# Patient Record
Sex: FEMALE | Race: WHITE | Employment: OTHER | RURAL
[De-identification: names, ages, dates, MRNs, and addresses within clinical notes are randomized per-mention and may not be internally consistent; named-entity substitution may affect disease eponyms.]

---

## 2017-01-01 ENCOUNTER — OFFICE VISIT (OUTPATIENT)
Dept: INTERNAL MEDICINE CLINIC | Age: 72
End: 2017-01-01

## 2017-01-01 ENCOUNTER — TELEPHONE (OUTPATIENT)
Dept: INTERNAL MEDICINE CLINIC | Age: 72
End: 2017-01-01

## 2017-01-01 ENCOUNTER — HOSPITAL ENCOUNTER (OUTPATIENT)
Dept: LAB | Age: 72
Discharge: HOME | End: 2017-09-14
Payer: MEDICARE

## 2017-01-01 VITALS
HEART RATE: 91 BPM | HEIGHT: 66 IN | DIASTOLIC BLOOD PRESSURE: 60 MMHG | WEIGHT: 168 LBS | BODY MASS INDEX: 27 KG/M2 | TEMPERATURE: 96.8 F | SYSTOLIC BLOOD PRESSURE: 112 MMHG | OXYGEN SATURATION: 99 % | RESPIRATION RATE: 20 BRPM

## 2017-01-01 VITALS
OXYGEN SATURATION: 97 % | BODY MASS INDEX: 27.26 KG/M2 | HEIGHT: 66 IN | WEIGHT: 169.6 LBS | SYSTOLIC BLOOD PRESSURE: 120 MMHG | RESPIRATION RATE: 17 BRPM | HEART RATE: 82 BPM | DIASTOLIC BLOOD PRESSURE: 60 MMHG | TEMPERATURE: 97.2 F

## 2017-01-01 VITALS
TEMPERATURE: 96.6 F | RESPIRATION RATE: 16 BRPM | BODY MASS INDEX: 27.32 KG/M2 | HEIGHT: 66 IN | DIASTOLIC BLOOD PRESSURE: 49 MMHG | WEIGHT: 170 LBS | HEART RATE: 64 BPM | OXYGEN SATURATION: 98 % | SYSTOLIC BLOOD PRESSURE: 129 MMHG

## 2017-01-01 VITALS
RESPIRATION RATE: 18 BRPM | OXYGEN SATURATION: 97 % | DIASTOLIC BLOOD PRESSURE: 56 MMHG | HEART RATE: 57 BPM | WEIGHT: 163 LBS | BODY MASS INDEX: 26.2 KG/M2 | SYSTOLIC BLOOD PRESSURE: 104 MMHG | HEIGHT: 66 IN | TEMPERATURE: 96.9 F

## 2017-01-01 DIAGNOSIS — M10.072 IDIOPATHIC GOUT OF LEFT FOOT, UNSPECIFIED CHRONICITY: ICD-10-CM

## 2017-01-01 DIAGNOSIS — R10.2 VAGINAL PAIN: ICD-10-CM

## 2017-01-01 DIAGNOSIS — Z72.0 TOBACCO ABUSE: ICD-10-CM

## 2017-01-01 DIAGNOSIS — F41.9 ANXIETY: ICD-10-CM

## 2017-01-01 DIAGNOSIS — I10 ESSENTIAL HYPERTENSION: ICD-10-CM

## 2017-01-01 DIAGNOSIS — J45.21 EXACERBATION OF INTERMITTENT ASTHMA, UNSPECIFIED ASTHMA SEVERITY: Primary | ICD-10-CM

## 2017-01-01 DIAGNOSIS — K21.9 GASTROESOPHAGEAL REFLUX DISEASE WITHOUT ESOPHAGITIS: ICD-10-CM

## 2017-01-01 DIAGNOSIS — R10.2 VULVAR PAIN: ICD-10-CM

## 2017-01-01 DIAGNOSIS — R82.90 ABNORMAL URINE: ICD-10-CM

## 2017-01-01 DIAGNOSIS — E78.00 ELEVATED CHOLESTEROL: ICD-10-CM

## 2017-01-01 DIAGNOSIS — R10.84 GENERALIZED ABDOMINAL PAIN: Primary | ICD-10-CM

## 2017-01-01 DIAGNOSIS — Z88.7 ALLERGY TO INFLUENZA VACCINE: ICD-10-CM

## 2017-01-01 DIAGNOSIS — J02.9 PHARYNGITIS, UNSPECIFIED ETIOLOGY: Primary | ICD-10-CM

## 2017-01-01 DIAGNOSIS — G43.109 MIGRAINE WITH AURA AND WITHOUT STATUS MIGRAINOSUS, NOT INTRACTABLE: Primary | ICD-10-CM

## 2017-01-01 DIAGNOSIS — R10.84 GENERALIZED ABDOMINAL PAIN: ICD-10-CM

## 2017-01-01 LAB
BACTERIA UR CULT: NORMAL
BILIRUB UR QL STRIP: NEGATIVE
GLUCOSE UR-MCNC: NEGATIVE MG/DL
KETONES P FAST UR STRIP-MCNC: NEGATIVE MG/DL
PH UR STRIP: 5.5 [PH] (ref 4.6–8)
PROT UR QL STRIP: NEGATIVE MG/DL
SP GR UR STRIP: 1.01 (ref 1–1.03)
UA UROBILINOGEN AMB POC: NORMAL (ref 0.2–1)
URINALYSIS CLARITY POC: CLEAR
URINALYSIS COLOR POC: YELLOW
URINE BLOOD POC: NEGATIVE
URINE LEUKOCYTES POC: NORMAL
URINE NITRITES POC: NEGATIVE

## 2017-01-01 PROCEDURE — 88142 CYTOPATH C/V THIN LAYER: CPT | Performed by: NURSE PRACTITIONER

## 2017-01-01 RX ORDER — OMEPRAZOLE 20 MG/1
20 CAPSULE, DELAYED RELEASE ORAL DAILY
Qty: 30 CAP | Refills: 11 | Status: SHIPPED | OUTPATIENT
Start: 2017-01-01

## 2017-01-01 RX ORDER — POTASSIUM CHLORIDE 750 MG/1
10 TABLET, EXTENDED RELEASE ORAL DAILY
Qty: 30 TAB | Refills: 5 | Status: ON HOLD | OUTPATIENT
Start: 2017-01-01 | End: 2018-01-01

## 2017-01-01 RX ORDER — PREDNISONE 10 MG/1
TABLET ORAL
Qty: 20 TAB | Refills: 1 | Status: SHIPPED | OUTPATIENT
Start: 2017-01-01 | End: 2017-01-01 | Stop reason: SDUPTHER

## 2017-01-01 RX ORDER — FLUTICASONE PROPIONATE 50 MCG
SPRAY, SUSPENSION (ML) NASAL
Qty: 1 BOTTLE | Refills: 5 | Status: ON HOLD | OUTPATIENT
Start: 2017-01-01 | End: 2018-01-01 | Stop reason: DRUGHIGH

## 2017-01-01 RX ORDER — CLONAZEPAM 1 MG/1
TABLET ORAL
Qty: 120 TAB | Refills: 1 | Status: SHIPPED | OUTPATIENT
Start: 2017-01-01 | End: 2017-01-01 | Stop reason: SDUPTHER

## 2017-01-01 RX ORDER — CEPHALEXIN 500 MG/1
500 CAPSULE ORAL 3 TIMES DAILY
Qty: 21 CAP | Refills: 0 | Status: SHIPPED | OUTPATIENT
Start: 2017-01-01 | End: 2017-01-01

## 2017-01-01 RX ORDER — NYSTATIN 100000 U/G
CREAM TOPICAL 2 TIMES DAILY
Qty: 30 G | Refills: 2 | Status: ON HOLD | OUTPATIENT
Start: 2017-01-01 | End: 2018-01-01 | Stop reason: DRUGHIGH

## 2017-01-01 RX ORDER — CLONAZEPAM 1 MG/1
TABLET ORAL
Qty: 120 TAB | Refills: 1 | Status: SHIPPED | OUTPATIENT
Start: 2017-01-01 | End: 2018-01-01 | Stop reason: SDUPTHER

## 2017-01-01 RX ORDER — PREDNISONE 10 MG/1
TABLET ORAL
COMMUNITY
End: 2018-01-01 | Stop reason: ALTCHOICE

## 2017-01-01 RX ORDER — POTASSIUM CHLORIDE 750 MG/1
10 TABLET, EXTENDED RELEASE ORAL DAILY
Qty: 30 TAB | Refills: 5 | Status: SHIPPED | OUTPATIENT
Start: 2017-01-01 | End: 2017-01-01 | Stop reason: ALTCHOICE

## 2017-01-01 RX ORDER — CEPHALEXIN 500 MG/1
500 CAPSULE ORAL 3 TIMES DAILY
Qty: 21 CAP | Refills: 0 | OUTPATIENT
Start: 2017-01-01 | End: 2017-01-01

## 2017-01-01 RX ORDER — METOPROLOL TARTRATE 25 MG/1
25 TABLET, FILM COATED ORAL DAILY
Qty: 30 TAB | Refills: 5 | Status: ON HOLD | OUTPATIENT
Start: 2017-01-01 | End: 2018-01-01

## 2017-01-01 RX ORDER — NITROGLYCERIN 0.4 MG/1
0.4 TABLET SUBLINGUAL
Qty: 25 TAB | Refills: 5 | Status: SHIPPED | OUTPATIENT
Start: 2017-01-01 | End: 2018-01-01 | Stop reason: SDUPTHER

## 2017-01-01 RX ORDER — ALBUTEROL SULFATE 90 UG/1
1 AEROSOL, METERED RESPIRATORY (INHALATION)
Qty: 1 INHALER | Refills: 5 | Status: SHIPPED | OUTPATIENT
Start: 2017-01-01 | End: 2018-01-01 | Stop reason: SDUPTHER

## 2017-01-01 RX ORDER — NAPROXEN 500 MG/1
500 TABLET ORAL 2 TIMES DAILY WITH MEALS
Qty: 60 TAB | Refills: 2 | Status: SHIPPED | OUTPATIENT
Start: 2017-01-01 | End: 2018-01-01

## 2017-01-01 RX ORDER — PREDNISONE 10 MG/1
TABLET ORAL
Qty: 20 TAB | Refills: 1 | Status: SHIPPED | OUTPATIENT
Start: 2017-01-01 | End: 2017-01-01 | Stop reason: ALTCHOICE

## 2017-01-23 RX ORDER — CLONAZEPAM 1 MG/1
TABLET ORAL
Qty: 120 TAB | Refills: 0 | OUTPATIENT
Start: 2017-01-23

## 2017-01-23 RX ORDER — CLONAZEPAM 1 MG/1
TABLET ORAL
Qty: 120 TAB | Refills: 0 | Status: SHIPPED | OUTPATIENT
Start: 2017-01-23 | End: 2017-01-31 | Stop reason: SDUPTHER

## 2017-01-25 ENCOUNTER — TELEPHONE (OUTPATIENT)
Dept: INTERNAL MEDICINE CLINIC | Age: 72
End: 2017-01-25

## 2017-01-25 NOTE — TELEPHONE ENCOUNTER
Returned call and they have misplaced the overnight pulse ox test signed in December, will send another one today

## 2017-01-25 NOTE — TELEPHONE ENCOUNTER
[place], form [facility], called in reference to wanting to speak with a nurse about an oximetry test. Please call [phone] ext [phone].

## 2017-01-31 ENCOUNTER — OFFICE VISIT (OUTPATIENT)
Dept: INTERNAL MEDICINE CLINIC | Age: 72
End: 2017-01-31

## 2017-01-31 VITALS
BODY MASS INDEX: 28.32 KG/M2 | OXYGEN SATURATION: 96 % | HEIGHT: 65 IN | SYSTOLIC BLOOD PRESSURE: 122 MMHG | WEIGHT: 170 LBS | DIASTOLIC BLOOD PRESSURE: 47 MMHG | HEART RATE: 82 BPM | RESPIRATION RATE: 16 BRPM | TEMPERATURE: 96.9 F

## 2017-01-31 DIAGNOSIS — J01.00 SUBACUTE MAXILLARY SINUSITIS: ICD-10-CM

## 2017-01-31 DIAGNOSIS — Z13.6 SCREENING FOR ISCHEMIC HEART DISEASE: ICD-10-CM

## 2017-01-31 DIAGNOSIS — H43.393 FLOATERS, BILATERAL: ICD-10-CM

## 2017-01-31 DIAGNOSIS — Z00.00 ROUTINE GENERAL MEDICAL EXAMINATION AT A HEALTH CARE FACILITY: Primary | ICD-10-CM

## 2017-01-31 DIAGNOSIS — Z23 ENCOUNTER FOR IMMUNIZATION: ICD-10-CM

## 2017-01-31 DIAGNOSIS — J45.41 MODERATE PERSISTENT ASTHMA WITH ACUTE EXACERBATION: ICD-10-CM

## 2017-01-31 DIAGNOSIS — I50.22 CHRONIC SYSTOLIC HEART FAILURE (HCC): ICD-10-CM

## 2017-01-31 DIAGNOSIS — I10 ESSENTIAL HYPERTENSION: ICD-10-CM

## 2017-01-31 DIAGNOSIS — Z13.39 SCREENING FOR ALCOHOLISM: ICD-10-CM

## 2017-01-31 DIAGNOSIS — I25.10 CORONARY ARTERY DISEASE INVOLVING NATIVE CORONARY ARTERY OF NATIVE HEART WITHOUT ANGINA PECTORIS: ICD-10-CM

## 2017-01-31 DIAGNOSIS — Z13.31 SCREENING FOR DEPRESSION: ICD-10-CM

## 2017-01-31 DIAGNOSIS — Z11.59 NEED FOR HEPATITIS C SCREENING TEST: ICD-10-CM

## 2017-01-31 RX ORDER — CLONAZEPAM 1 MG/1
TABLET ORAL
Qty: 120 TAB | Refills: 1 | Status: SHIPPED | OUTPATIENT
Start: 2017-01-31 | End: 2017-04-21 | Stop reason: SDUPTHER

## 2017-01-31 RX ORDER — CEPHALEXIN 500 MG/1
1 TABLET ORAL 3 TIMES DAILY
Qty: 21 TAB | Refills: 0 | Status: SHIPPED | OUTPATIENT
Start: 2017-01-31 | End: 2017-03-24 | Stop reason: SDUPTHER

## 2017-01-31 RX ORDER — PREDNISONE 10 MG/1
TABLET ORAL
Qty: 20 TAB | Refills: 1 | Status: SHIPPED | OUTPATIENT
Start: 2017-01-31 | End: 2017-04-21 | Stop reason: ALTCHOICE

## 2017-01-31 NOTE — LETTER
2/1/2017 12:39 PM 
 
Ms. Oleksandr Pan [address] [facility] [id number] Dear Oleksandr Pan: 
 
Please find your most recent results below. Resulted Orders LIPID PANEL Result Value Ref Range Cholesterol, total 179 100 - 199 mg/dL Triglyceride 263 (H) 0 - 149 mg/dL HDL Cholesterol 41 >39 mg/dL VLDL, calculated 53 (H) 5 - 40 mg/dL LDL, calculated 85 0 - 99 mg/dL Narrative Performed at:  [address]  [id number] : Sarah Jiang MD, Phone:  [phone] HEPATITIS C AB Result Value Ref Range Hep C Virus Ab <0.1 0.0 - 0.9 s/co ratio Comment:  
                                     Negative:     < 0.8 Indeterminate: 0.8 - 0.9 Positive:     > 0.9 The CDC recommends that a positive HCV antibody result 
 be followed up with a HCV Nucleic Acid Amplification 
 test (488891). Narrative Performed at:  [address]  [id number] : Sarah Jiang MD, Phone:  [phone] METABOLIC PANEL, BASIC Result Value Ref Range Glucose 101 (H) 65 - 99 mg/dL BUN 32 (H) 8 - 27 mg/dL Creatinine 1.28 (H) 0.57 - 1.00 mg/dL GFR est non-AA 42 (L) >59 mL/min/1.73 GFR est AA 48 (L) >59 mL/min/1.73  
 BUN/Creatinine ratio 25 11 - 26 Sodium 138 134 - 144 mmol/L Potassium 4.6 3.5 - 5.2 mmol/L Chloride 100 96 - 106 mmol/L  
 CO2 20 18 - 29 mmol/L Calcium 9.4 8.7 - 10.3 mg/dL Narrative Performed at:  [address]  [id number] : Sarah Jiang MD, Phone:  [phone] CVD REPORT Result Value Ref Range INTERPRETATION NTAP   
 PDF IMAGE Not applicable Narrative Performed at:  [address] [place] [address]  [id number] : Andi Crabtree PhD, Phone:  [phone] CKD REPORT Result Value Ref Range Interpretation Note Comment:  
   Supplement report is available. Narrative Performed at:  [address] [place] [address]  [id number] : Quirino Morgan PhD, Phone:  [phone] RECOMMENDATIONS: 
Results of labs are normal/ stable. Follow up as scheduled. Please call me if you have any questions: [phone] Sincerely, Pamela Price MD

## 2017-01-31 NOTE — PROGRESS NOTES
This is a Subsequent Medicare Annual Wellness Visit providing Personalized Prevention Plan Services (PPPS) (Performed 12 months after initial AWV and PPPS )    I have reviewed the patient's medical history in detail and updated the computerized patient record. History     Feels well in general min SOB but has some wheezing and coughing lately, bring up some yellow sputum x few days. Wt stable lostr a efw Lbs compared to last year No PND  Or swelling. Lately seeing spots occa. Past Medical History   Diagnosis Date    CHF (congestive heart failure) (HCC)     Other ill-defined conditions(799.89)      AMI    Psychiatric disorder      depression      Past Surgical History   Procedure Laterality Date    Pr cardiac surg procedure unlist       stents    Hx appendectomy      Hx cholecystectomy      Hx gyn       hysterectomy    Hx gi       adhesions     Current Outpatient Prescriptions   Medication Sig Dispense Refill    clonazePAM (KLONOPIN) 1 mg tablet TAKE TWO TABLETS BY MOUTH TWICE DAILY 120 Tab 0    omeprazole (PRILOSEC) 20 mg capsule Take 1 Cap by mouth daily. Take only as needed 30 Cap 5    fluticasone (FLONASE) 50 mcg/actuation nasal spray 1 spray each nostril daily 1 Bottle 5    arformoterol (BROVANA) 15 mcg/2 mL nebu neb solution 2 mL by Nebulization route two (2) times a day. 180 Vial 3    furosemide (LASIX) 20 mg tablet Take 2 Tabs by mouth daily. 180 Tab 3    predniSONE (DELTASONE) 10 mg tablet 1 daily for allergies or headache 20 Tab 0    lisinopril (PRINIVIL, ZESTRIL) 5 mg tablet Take 1 Tab by mouth daily. 30 Tab 5    albuterol (PROVENTIL HFA, VENTOLIN HFA, PROAIR HFA) 90 mcg/actuation inhaler Take 1 Puff by inhalation every six (6) hours as needed for Wheezing. 1 Inhaler 5    atorvastatin (LIPITOR) 40 mg tablet Take 1 Tab by mouth every evening. 90 Tab 3    citalopram (CELEXA) 10 mg tablet Take 1 Tab by mouth daily.  Use in place of clonazepam 60 Tab 5    carvedilol (COREG) 3.125 mg tablet TAKE 1 TABLET TWICE DAILY WITH MEALS 180 Tab 3    omega-3 fatty acids-vitamin e (FISH OIL) 1,000 mg cap Take 1 Cap by mouth.  calcium-cholecalciferol, D3, (CALCIUM WITH VITAMIN D) tablet Take 1 tablet by mouth daily. 90 tablet 3    nitroglycerin (NITROSTAT) 0.4 mg SL tablet 1 Tab by SubLINGual route every five (5) minutes as needed. Indications: ANGINA 25 Tab 5    aspirin 81 mg chewable tablet Take 1 Tab by mouth daily.  cyanocobalamin (VITAMIN B-12) 100 mcg tablet Take 100 mcg by mouth daily.  ASPIRIN/ACETAMINOPHEN/CAFFEINE (EXCEDRIN MIGRAINE PO) Take 1 Tab by mouth as needed. Allergies   Allergen Reactions    Ciprofloxacin Shortness of Breath    Codeine Nausea Only    Influenza Virus Vaccine, Specific Unknown (comments)     Allergy documented in Admission Data base 1 screening    Pcn [Penicillins] Itching     History reviewed. No pertinent family history. Social History   Substance Use Topics    Smoking status: Former Smoker     Packs/day: 0.10     Quit date: 11/1/2014    Smokeless tobacco: Not on file    Alcohol use No     Patient Active Problem List   Diagnosis Code    Systolic heart failure (HCC) I50.20    Hypertension I10    Anxiety F41.9    GERD (gastroesophageal reflux disease) K21.9    Elevated cholesterol E78.00    Allergic rhinitis J30.9    Moderate persistent asthma J45.40    Coronary artery disease without angina pectoris I25.10       Depression Risk Factor Screening:     PHQ 2 / 9, over the last two weeks 1/31/2017   Little interest or pleasure in doing things Several days   Feeling down, depressed or hopeless Several days   Total Score PHQ 2 2     Alcohol Risk Factor Screening: On any occasion during the past 3 months, have you had more than 3 drinks containing alcohol? No    Do you average more than 7 drinks per week? No      Functional Ability and Level of Safety:     Hearing Loss   mild    Activities of Daily Living   Self-care.    Requires assistance with: no ADLs    Fall Risk     Fall Risk Assessment, last 12 mths 1/31/2017   Able to walk? Yes   Fall in past 12 months? No     Abuse Screen   Patient is not abused    Review of Systems   Pertinent items are noted in HPI. Physical Examination     Evaluation of Cognitive Function:  Mood/affect:  neutral  Appearance: age appropriate  Family member/caregiver input: NA    Visit Vitals    /47 (BP 1 Location: Left arm, BP Patient Position: Sitting)    Pulse 82    Temp 96.9 °F (36.1 °C) (Oral)    Resp 16    Ht 5' 4.5\" (1.638 m)    Wt 170 lb (77.1 kg)    SpO2 96%    BMI 28.73 kg/m2       WDWN NAD  TM clear, throat wnl  Neck no adenopathy  Heart RRR no C/M/R  Lungs CTA  Abdo soft non tender  Ext No redness swelling or edema      Patient Care Team:  Danielle Fernández MD as PCP - General (Family Practice)    Advice/Referrals/Counseling   Education and counseling provided:  Pneumococcal Vaccine  Cardiovascular screening blood test  Diabetes screening test    Assessment/Plan     Encounter Diagnoses   Name Primary?  Routine general medical examination at a health care facility Yes    Encounter for immunization     Screening for alcoholism     Screening for depression     Screening for ischemic heart disease     Need for hepatitis C screening test     Floaters, bilateral     Essential hypertension     Subacute maxillary sinusitis     Chronic systolic heart failure (HCC)     Moderate persistent asthma with acute exacerbation     Coronary artery disease involving native coronary artery of native heart without angina pectoris      Orders Placed This Encounter    Depression Screen Annual    PNEUMOCOCCAL CONJ VACCINE 13 VALENT IM    Lipid Panel ([id number])    HEPATITIS C AB    METABOLIC PANEL, BASIC    METABOLIC PANEL, BASIC    CVD REPORT    CKD REPORT    REFERRAL TO OPHTHALMOLOGY    predniSONE (DELTASONE) 10 mg tablet    clonazePAM (KLONOPIN) 1 mg tablet    cephalexin 500 mg tab   .   We discussed a screening exam STI and Hep C DM  and the  the pros and cons of having the test done. The patient made a decision to do the test: yes only Hep C and DM    Follow-up Disposition:  Return in about 3 months (around 4/30/2017) for routine follow up.

## 2017-01-31 NOTE — MR AVS SNAPSHOT
Visit Information Date & Time Provider Department Dept. Phone Encounter #  
 1/31/2017  3:30 PM Santi Berumen  [place]  [id number] Upcoming Health Maintenance Date Due Hepatitis C Screening 1945 COLONOSCOPY 1/3/1963 GLAUCOMA SCREENING Q2Y 1/3/2010 Pneumococcal 65+ Low/Medium Risk (2 of 2 - PCV13) 4/28/2016 MEDICARE YEARLY EXAM 4/28/2016 BREAST CANCER SCRN MAMMOGRAM 11/11/2018 DTaP/Tdap/Td series (2 - Td) 4/28/2025 Allergies as of 1/31/2017  Review Complete On: 1/31/2017 By: Jorge Murphy LPN Severity Noted Reaction Type Reactions Ciprofloxacin  05/06/2010    Shortness of Breath Codeine  05/06/2010    Nausea Only Influenza Virus Vaccine, Specific  03/10/2014   Not Verified Unknown (comments) Allergy documented in Admission Data base 1 screening Pcn [Penicillins]  05/06/2010    Itching Current Immunizations  Reviewed on 5/6/2010 Name Date Influenza Vaccine  Deferred (Contraindication) Pneumococcal Conjugate (PCV-13)  Incomplete Pneumococcal Polysaccharide (PPSV-23) 4/28/2015,  Deferred (Contraindication) Tdap 4/28/2015 Not reviewed this visit You Were Diagnosed With   
  
 Codes Comments Routine general medical examination at a health care facility    -  Primary ICD-10-CM: Z00.00 ICD-9-CM: V70.0 Encounter for immunization     ICD-10-CM: L48 ICD-9-CM: V03.89 Screening for alcoholism     ICD-10-CM: Z13.89 ICD-9-CM: V79.1 Screening for depression     ICD-10-CM: Z13.89 ICD-9-CM: V79.0 Screening for ischemic heart disease     ICD-10-CM: Z13.6 ICD-9-CM: V81.0 Need for hepatitis C screening test     ICD-10-CM: Z11.59 
ICD-9-CM: V73.89 Floaters, bilateral     ICD-10-CM: [id number] ICD-9-CM: 379.24 Essential hypertension     ICD-10-CM: I10 
ICD-9-CM: 401.9 Subacute maxillary sinusitis     ICD-10-CM: J01.00 ICD-9-CM: 461.0 Chronic systolic heart failure (HCC)     ICD-10-CM: I50.22 ICD-9-CM: 428.22 Moderate persistent asthma with acute exacerbation     ICD-10-CM: J45.41 
ICD-9-CM: 493.92 Coronary artery disease involving native coronary artery of native heart without angina pectoris     ICD-10-CM: I25.10 ICD-9-CM: 414.01 Vitals BP Pulse Temp Resp Height(growth percentile) Weight(growth percentile) 122/47 (BP 1 Location: Left arm, BP Patient Position: Sitting) 82 96.9 °F (36.1 °C) (Oral) 16 5' 4.5\" (1.638 m) 170 lb (77.1 kg) SpO2 BMI OB Status Smoking Status 96% 28.73 kg/m2 Hysterectomy Former Smoker Vitals History BMI and BSA Data Body Mass Index Body Surface Area 28.73 kg/m 2 1.87 m 2 Preferred Pharmacy Pharmacy Name Phone [facility] [address], [address] [facility] [phone] Your Updated Medication List  
  
   
This list is accurate as of: 1/31/17  4:45 PM.  Always use your most recent med list.  
  
  
  
  
 albuterol 90 mcg/actuation inhaler Commonly known as:  PROVENTIL HFA, VENTOLIN HFA, PROAIR HFA Take 1 Puff by inhalation every six (6) hours as needed for Wheezing. arformoterol 15 mcg/2 mL Nebu neb solution Commonly known as:  [place] Alt 2 mL by Nebulization route two (2) times a day. aspirin 81 mg chewable tablet Take 1 Tab by mouth daily. atorvastatin 40 mg tablet Commonly known as:  LIPITOR Take 1 Tab by mouth every evening. calcium-cholecalciferol (D3) tablet Commonly known as:  CALCIUM WITH VITAMIN D Take 1 tablet by mouth daily. carvedilol 3.125 mg tablet Commonly known as:  COREG  
TAKE 1 TABLET TWICE DAILY WITH MEALS  
  
 cephalexin 500 mg Tab Take 1 Tab by mouth three (3) times daily for 7 days. citalopram 10 mg tablet Commonly known as:  Antonieta Babe Take 1 Tab by mouth daily. Use in place of clonazepam  
  
 clonazePAM 1 mg tablet Commonly known as:  KlonoPIN  
TAKE TWO TABLETS BY MOUTH TWICE DAILY EXCEDRIN MIGRAINE PO Take 1 Tab by mouth as needed. FISH OIL 1,000 mg Cap Generic drug:  omega-3 fatty acids-vitamin e Take 1 Cap by mouth. fluticasone 50 mcg/actuation nasal spray Commonly known as:  FLONASE  
1 spray each nostril daily  
  
 furosemide 20 mg tablet Commonly known as:  LASIX Take 2 Tabs by mouth daily. lisinopril 5 mg tablet Commonly known as:  Nonah Yessenia Take 1 Tab by mouth daily. nitroglycerin 0.4 mg SL tablet Commonly known as:  NITROSTAT  
1 Tab by SubLINGual route every five (5) minutes as needed. Indications: ANGINA  
  
 omeprazole 20 mg capsule Commonly known as:  PRILOSEC Take 1 Cap by mouth daily. Take only as needed  
  
 predniSONE 10 mg tablet Commonly known as:  DELTASONE  
1 daily for allergies or headache VITAMIN B-12 100 mcg tablet Generic drug:  cyanocobalamin Take 100 mcg by mouth daily. Prescriptions Printed Refills  
 clonazePAM (KLONOPIN) 1 mg tablet 1 Sig: TAKE TWO TABLETS BY MOUTH TWICE DAILY Class: Print Prescriptions Sent to Pharmacy Refills  
 predniSONE (DELTASONE) 10 mg tablet 1 Si daily for allergies or headache Class: Normal  
 Pharmacy: [address] [place] [address]. Rd[address] [place]  [address] Ph #: [phone]  
 cephalexin 500 mg tab 0 Sig: Take 1 Tab by mouth three (3) times daily for 7 days. Class: Normal  
 Pharmacy: [address] [place] [address]. Rd[address] [place]  [address] Ph #: [phone] Route: Oral  
  
We Performed the Following ADMIN PNEUMOCOCCAL VACCINE [ [facility]] [facility] [[id number] HCPCS] HEPATITIS C AB [18824 CPT(R)] LIPID PANEL [57325 CPT(R)] METABOLIC PANEL, BASIC [49722 CPT(R)] PNEUMOCOCCAL CONJ VACCINE 13 VALENT IM [id number] CPT(R)] REFERRAL TO OPHTHALMOLOGY [REF57 Custom] Comments: C/o floaters, check for glaucoma Referral Information Referral ID Referred By Referred To  
  
 [id number] Adam MONTGOMERY MD   
   [address] [place], [address] Phone: [phone] Fax: [phone] Visits Status Start Date End Date 1 New Request 1/31/17 1/31/18 If your referral has a status of pending review or denied, additional information will be sent to support the outcome of this decision. Patient Instructions Vaccine Information Statement Pneumococcal Conjugate Vaccine (PCV13): What You Need to Know Many Vaccine Information Statements are available in [place]an and other languages. See www.immunize.org/vis. Hojas de información Sobre Vacunas están disponibles en español y en muchos otros idiomas. Visite www.immunize.org/vis. 1. Why get vaccinated? Vaccination can protect both children and adults from pneumococcal disease. Pneumococcal disease is caused by bacteria that can spread from person to person through close contact. It can cause ear infections, and it can also lead to more serious infections of the: 
 Lungs (pneumonia),  Blood (bacteremia), and 
 Covering of the brain and spinal cord (meningitis). Pneumococcal pneumonia is most common among adults. Pneumococcal meningitis can cause deafness and brain damage, and it kills about 1 child in 10 who get it. Anyone can get pneumococcal disease, but children under 3years of age and adults 72 years and older, people with certain medical conditions, and cigarette smokers are at the highest risk. Before there was a vaccine, the [facility] saw: 
 more than 700 cases of meningitis, 
 about 13,000 blood infections, 
 about 5 million ear infections, and 
 about 200 deaths 
in children under 5 each year from pneumococcal disease. Since vaccine became available, severe pneumococcal disease in these children has fallen by 88%. About 18,000 older adults die of pneumococcal disease each year in the [place]. Treatment of pneumococcal infections with penicillin and other drugs is not as effective as it used to be, because some strains of the disease have become resistant to these drugs. This makes prevention of the disease, through vaccination, even more important. 2. PCV13 vaccine Pneumococcal conjugate vaccine (called PCV13) protects against 13 types of pneumococcal bacteria. PCV13 is routinely given to children at 2, 4, 6, and 1515 months of age. It is also recommended for children and adults 3to 59years of age with certain health conditions, and for all adults 72years of age and older. Your doctor can give you details. 3. Some people should not get this vaccine Anyone who has ever had a life-threatening allergic reaction to a dose of this vaccine, to an earlier pneumococcal vaccine called PCV7, or to any vaccine containing diphtheria toxoid (for example, DTaP), should not get PCV13. Anyone with a severe allergy to any component of PCV13 should not get the vaccine. Tell your doctor if the person being vaccinated has any severe allergies. If the person scheduled for vaccination is not feeling well, your healthcare provider might decide to reschedule the shot on another day. 4. Risks of a vaccine reaction With any medicine, including vaccines, there is a chance of reactions. These are usually mild and go away on their own, but serious reactions are also possible. Problems reported following PCV13 varied by age and dose in the series. The most common problems reported among children were:  About half became drowsy after the shot, had a temporary loss of appetite, or had redness or tenderness where the shot was given.  About 1 out of 3 had swelling where the shot was given.  About 1 out of 3 had a mild fever, and about 1 in 20 had a fever over 102.2°F.  Up to about 8 out of 10 became fussy or irritable. Adults have reported pain, redness, and swelling where the shot was given; also mild fever, fatigue, headache, chills, or muscle pain. Zina Medicus children who get PCV13 along with inactivated flu vaccine at the same time may be at increased risk for seizures caused by fever. Ask your doctor for more information. Problems that could happen after any vaccine:  People sometimes faint after a medical procedure, including vaccination. Sitting or lying down for about 15 minutes can help prevent fainting, and injuries caused by a fall. Tell your doctor if you feel dizzy, or have vision changes or ringing in the ears.  Some older children and adults get severe pain in the shoulder and have difficulty moving the arm where a shot was given. This happens very rarely.  Any medication can cause a severe allergic reaction. Such reactions from a vaccine are very rare, estimated at about 1 in a million doses, and would happen within a few minutes to a few hours after the vaccination. As with any medicine, there is a very small chance of a vaccine causing a serious injury or death. The safety of vaccines is always being monitored. For more information, visit: www.cdc.gov/vaccinesafety/  
 
5. What if there is a serious reaction? What should I look for?  Look for anything that concerns you, such as signs of a severe allergic reaction, very high fever, or unusual behavior. Signs of a severe allergic reaction can include hives, swelling of the face and throat, difficulty breathing, a fast heartbeat, dizziness, and weakness  usually within a few minutes to a few hours after the vaccination. What should I do?  If you think it is a severe allergic reaction or other emergency that cant wait, call 9-1-1 or get the person to the nearest hospital. Otherwise, call your doctor. Reactions should be reported to the Vaccine Adverse Event Reporting System (VAERS). Your doctor should file this report, or you can do it yourself through the VAERS web site at www.vaers. [facility].gov, or by calling 4-678.899.1249. VAERS does not give medical advice. 6. The National Vaccine Injury Compensation Program 
 
The [facility] Vaccine Injury Compensation Program (VICP) is a federal program that was created to compensate people who may have been injured by certain vaccines. Persons who believe they may have been injured by a vaccine can learn about the program and about filing a claim by calling 3-749.191.8701 or visiting the [employer] website at www.[facility].gov/vaccinecompensation. There is a time limit to file a claim for compensation. 7. How can I learn more?  Ask your healthcare provider. He or she can give you the vaccine package insert or suggest other sources of information.  Call your local or state health department.  Contact the Centers for Disease Control and Prevention (CDC): 
- Call 8-121.544.5064 (1-800-CDC-INFO) or 
- Visit CDCs website at www.cdc.gov/vaccines Vaccine Information Statement PCV13 Vaccine 11/5/2015  
[address] THALIA Banegas [employer] [id number] Department of Health and [employer] Centers for Disease Control and Prevention Office Use Only Medicare Wellness Visit, Female The best way to live healthy is to have a healthy lifestyle by eating a well-balanced diet, exercising regularly, limiting alcohol and stopping smoking. Regular physical exams and screening tests are another way to keep healthy. Preventive exams provided by your health care provider can find health problems before they become diseases or illnesses. Preventive services including immunizations, screening tests, monitoring and exams can help you take care of your own health.  
 
All people over age 72 should have a pneumovax  and and a prevnar shot to prevent pneumonia. These are once in a lifetime unless you and your provider decide differently. All people over 65 should have a yearly flu shot and a tetanus vaccine every 10 years. A bone mass density to screen for osteoporosis or thinning of the bones should be done every 2 years after 65. Screening for diabetes mellitus with a blood sugar test should be done every year. Glaucoma is a disease of the eye due to increased ocular pressure that can lead to blindness and it should be done every year by an eye professional. 
 
Cardiovascular screening tests that check for elevated lipids (fatty part of blood) which can lead to heart disease and strokes should be done every 5 years. Colorectal screening that evaluates for blood or polyps in your colon should be done yearly as a stool test or every five years as a flexible sigmoidoscope or every 10 years as a colonoscopy up to age 76. Breast cancer screening with a mammogram is recommended biennially  for women age 54-69. Screening for cervical cancer with a pap smear and pelvic exam is recommended for women after age 72 years every 2 years up to age 79 or when the provider and patient decide to stop. If there is a history of cervical abnormalities or other increased risk for cancer then the test is recommended yearly. Hepatitis C screening is also recommended for anyone born between 80 through [address]. A shingles vaccine is also recommended once in a lifetime after age 61. Your Medicare Wellness Exam is recommended annually. Here is a list of your current Health Maintenance items with a due date: 
Health Maintenance Due Topic Date Due  
 Hepatitis C Test  1945  Shingles Vaccine  01/03/2005  Glaucoma Screening   01/03/2010  Pneumococcal Vaccine (2 of 2 - PCV13) 04/28/2016 [facility] Annual Well Visit  04/28/2016  Stool testing for trace blood  04/28/2016 Introducing [facility]! [employer] introduces moneymeets patient portal. Now you can access parts of your medical record, email your doctor's office, and request medication refills online. 1. In your internet browser, go to [email or web address] 2. Click on the First Time User? Click Here link in the Sign In box. You will see the New Member Sign Up page. 3. Enter your moneymeets Access Code exactly as it appears below. You will not need to use this code after youve completed the sign-up process. If you do not sign up before the expiration date, you must request a new code. · moneymeets Access Code: [email or web address] Expires: 5/1/2017  4:06 PM 
 
4. Enter the last four digits of your Social Security Number (xxxx) and Date of Birth (mm/dd/yyyy) as indicated and click Submit. You will be taken to the next sign-up page. 5. Create a moneymeets ID. This will be your moneymeets login ID and cannot be changed, so think of one that is secure and easy to remember. 6. Create a moneymeets password. You can change your password at any time. 7. Enter your Password Reset Question and Answer. This can be used at a later time if you forget your password. 8. Enter your e-mail address. You will receive e-mail notification when new information is available in [address] 9. Click Sign Up. You can now view and download portions of your medical record. 10. Click the Download Summary menu link to download a portable copy of your medical information. If you have questions, please visit the Frequently Asked Questions section of the moneymeets website. Remember, moneymeets is NOT to be used for urgent needs. For medical emergencies, dial 911. Now available from your iPhone and Android! Please provide this summary of care documentation to your next provider. Your primary care clinician is listed as Hernesto Addison. If you have any questions after today's visit, please call [phone].

## 2017-01-31 NOTE — PROGRESS NOTES
Chief Complaint   Patient presents with   [facility] Annual Wellness Visit     I have reviewed the patient's medical history in detail and updated the computerized patient record. Health Maintenance reviewed. 1. Have you been to the ER, urgent care clinic since your last visit? Hospitalized since your last visit?no    2. Have you seen or consulted any other health care providers outside of the [address] [place] [address] since your last visit? Include any pap smears or colon screening.  no

## 2017-01-31 NOTE — PATIENT INSTRUCTIONS
Vaccine Information Statement     Pneumococcal Conjugate Vaccine (PCV13): What You Need to Know    Many Vaccine Information Statements are available in Serbian and other languages. See www.immunize.org/vis. Hojas de información Sobre Vacunas están disponibles en español y en muchos otros idiomas. Visite www.immunize.org/vis. 1. Why get vaccinated? Vaccination can protect both children and adults from pneumococcal disease. Pneumococcal disease is caused by bacteria that can spread from person to person through close contact. It can cause ear infections, and it can also lead to more serious infections of the:   Lungs (pneumonia),   Blood (bacteremia), and   Covering of the brain and spinal cord (meningitis). Pneumococcal pneumonia is most common among adults. Pneumococcal meningitis can cause deafness and brain damage, and it kills about 1 child in 10 who get it. Anyone can get pneumococcal disease, but children under 3years of age and adults 72 years and older, people with certain medical conditions, and cigarette smokers are at the highest risk. Before there was a vaccine, the [facility] saw:   more than 700 cases of meningitis,   about 13,000 blood infections,   about 5 million ear infections, and   about 200 deaths  in children under 5 each year from pneumococcal disease. Since vaccine became available, severe pneumococcal disease in these children has fallen by 88%. About 18,000 older adults die of pneumococcal disease each year in the [place]. Treatment of pneumococcal infections with penicillin and other drugs is not as effective as it used to be, because some strains of the disease have become resistant to these drugs. This makes prevention of the disease, through vaccination, even more important. 2. PCV13 vaccine    Pneumococcal conjugate vaccine (called PCV13) protects against 13 types of pneumococcal bacteria.       PCV13 is routinely given to children at 2, 4, 6, and 1515 months of age. It is also recommended for children and adults 3to 59years of age with certain health conditions, and for all adults 72years of age and older. Your doctor can give you details. 3. Some people should not get this vaccine    Anyone who has ever had a life-threatening allergic reaction to a dose of this vaccine, to an earlier pneumococcal vaccine called PCV7, or to any vaccine containing diphtheria toxoid (for example, DTaP), should not get PCV13. Anyone with a severe allergy to any component of PCV13 should not get the vaccine. Tell your doctor if the person being vaccinated has any severe allergies. If the person scheduled for vaccination is not feeling well, your healthcare provider might decide to reschedule the shot on another day. 4. Risks of a vaccine reaction    With any medicine, including vaccines, there is a chance of reactions. These are usually mild and go away on their own, but serious reactions are also possible. Problems reported following PCV13 varied by age and dose in the series. The most common problems reported among children were:    About half became drowsy after the shot, had a temporary loss of appetite, or had redness or tenderness where the shot was given.  About 1 out of 3 had swelling where the shot was given.  About 1 out of 3 had a mild fever, and about 1 in 20 had a fever over 102.2°F.   Up to about 8 out of 10 became fussy or irritable. Adults have reported pain, redness, and swelling where the shot was given; also mild fever, fatigue, headache, chills, or muscle pain. Adelina Bell children who get PCV13 along with inactivated flu vaccine at the same time may be at increased risk for seizures caused by fever. Ask your doctor for more information. Problems that could happen after any vaccine:     People sometimes faint after a medical procedure, including vaccination.  Sitting or lying down for about 15 minutes can help prevent fainting, and injuries caused by a fall. Tell your doctor if you feel dizzy, or have vision changes or ringing in the ears.  Some older children and adults get severe pain in the shoulder and have difficulty moving the arm where a shot was given. This happens very rarely.  Any medication can cause a severe allergic reaction. Such reactions from a vaccine are very rare, estimated at about 1 in a million doses, and would happen within a few minutes to a few hours after the vaccination. As with any medicine, there is a very small chance of a vaccine causing a serious injury or death. The safety of vaccines is always being monitored. For more information, visit: www.cdc.gov/vaccinesafety/     5. What if there is a serious reaction? What should I look for?  Look for anything that concerns you, such as signs of a severe allergic reaction, very high fever, or unusual behavior. Signs of a severe allergic reaction can include hives, swelling of the face and throat, difficulty breathing, a fast heartbeat, dizziness, and weakness  usually within a few minutes to a few hours after the vaccination. What should I do?  If you think it is a severe allergic reaction or other emergency that cant wait, call 9-1-1 or get the person to the nearest hospital. Otherwise, call your doctor. Reactions should be reported to the Vaccine Adverse Event Reporting System (VAERS). Your doctor should file this report, or you can do it yourself through the VAERS web site at www.vaers. hhs.gov, or by calling 5-580.850.4380. VAERS does not give medical advice. 6. The National Vaccine Injury Compensation Program    The [facility] Vaccine Injury Compensation Program (VICP) is a federal program that was created to compensate people who may have been injured by certain vaccines.     Persons who believe they may have been injured by a vaccine can learn about the program and about filing a claim by calling 1-102.662.1340 or visiting the Press website at www.[facility].gov/vaccinecompensation. There is a time limit to file a claim for compensation. 7. How can I learn more?  Ask your healthcare provider. He or she can give you the vaccine package insert or suggest other sources of information.  Call your local or state health department.  Contact the Centers for Disease Control and Prevention (CDC):  - Call 8-441.724.5010 (5-908-TZA-INFO) or  - Visit CDCs website at www.cdc.gov/vaccines    Vaccine Information Statement   PCV13 Vaccine   11/5/2015   [id number]    Department of Health and Human Services  Centers for Disease Control and Prevention    Office Use Only    Medicare Wellness Visit, Female    The best way to live healthy is to have a healthy lifestyle by eating a well-balanced diet, exercising regularly, limiting alcohol and stopping smoking. Regular physical exams and screening tests are another way to keep healthy. Preventive exams provided by your health care provider can find health problems before they become diseases or illnesses. Preventive services including immunizations, screening tests, monitoring and exams can help you take care of your own health. All people over age 72 should have a pneumovax  and and a prevnar shot to prevent pneumonia. These are once in a lifetime unless you and your provider decide differently. All people over 65 should have a yearly flu shot and a tetanus vaccine every 10 years. A bone mass density to screen for osteoporosis or thinning of the bones should be done every 2 years after 65. Screening for diabetes mellitus with a blood sugar test should be done every year.     Glaucoma is a disease of the eye due to increased ocular pressure that can lead to blindness and it should be done every year by an eye professional.    Cardiovascular screening tests that check for elevated lipids (fatty part of blood) which can lead to heart disease and strokes should be done every 5 years. Colorectal screening that evaluates for blood or polyps in your colon should be done yearly as a stool test or every five years as a flexible sigmoidoscope or every 10 years as a colonoscopy up to age 76. Breast cancer screening with a mammogram is recommended biennially  for women age 54-69. Screening for cervical cancer with a pap smear and pelvic exam is recommended for women after age 72 years every 2 years up to age 79 or when the provider and patient decide to stop. If there is a history of cervical abnormalities or other increased risk for cancer then the test is recommended yearly. Hepatitis C screening is also recommended for anyone born between [birth year] through [address]. A shingles vaccine is also recommended once in a lifetime after age 61. Your Medicare Wellness Exam is recommended annually.     Here is a list of your current Health Maintenance items with a due date:  Health Maintenance Due   Topic Date Due    Hepatitis C Test  1945    Shingles Vaccine  01/03/2005    Glaucoma Screening   01/03/2010    Pneumococcal Vaccine (2 of 2 - PCV13) 04/28/2016    Annual Well Visit  04/28/2016    Stool testing for trace blood  04/28/2016

## 2017-02-01 LAB
BUN SERPL-MCNC: 32 MG/DL (ref 8–27)
BUN/CREAT SERPL: 25 (ref 11–26)
CALCIUM SERPL-MCNC: 9.4 MG/DL (ref 8.7–10.3)
CHLORIDE SERPL-SCNC: 100 MMOL/L (ref 96–106)
CHOLEST SERPL-MCNC: 179 MG/DL (ref 100–199)
CO2 SERPL-SCNC: 20 MMOL/L (ref 18–29)
CREAT SERPL-MCNC: 1.28 MG/DL (ref 0.57–1)
GLUCOSE SERPL-MCNC: 101 MG/DL (ref 65–99)
HCV AB S/CO SERPL IA: <0.1 S/CO RATIO (ref 0–0.9)
HDLC SERPL-MCNC: 41 MG/DL
INTERPRETATION, 910389: NORMAL
INTERPRETATION: NORMAL
LDLC SERPL CALC-MCNC: 85 MG/DL (ref 0–99)
PDF IMAGE, 910387: NORMAL
POTASSIUM SERPL-SCNC: 4.6 MMOL/L (ref 3.5–5.2)
SODIUM SERPL-SCNC: 138 MMOL/L (ref 134–144)
TRIGL SERPL-MCNC: 263 MG/DL (ref 0–149)
VLDLC SERPL CALC-MCNC: 53 MG/DL (ref 5–40)

## 2017-02-06 ENCOUNTER — TELEPHONE (OUTPATIENT)
Dept: INTERNAL MEDICINE CLINIC | Age: 72
End: 2017-02-06

## 2017-03-25 RX ORDER — CEPHALEXIN 500 MG/1
CAPSULE ORAL
Qty: 21 CAP | Refills: 0 | Status: SHIPPED | OUTPATIENT
Start: 2017-03-25 | End: 2017-04-11 | Stop reason: SDUPTHER

## 2017-04-11 ENCOUNTER — APPOINTMENT (OUTPATIENT)
Dept: GENERAL RADIOLOGY | Age: 72
End: 2017-04-11
Payer: MEDICARE

## 2017-04-11 ENCOUNTER — APPOINTMENT (OUTPATIENT)
Dept: CT IMAGING | Age: 72
End: 2017-04-11
Payer: MEDICARE

## 2017-04-11 ENCOUNTER — HOSPITAL ENCOUNTER (EMERGENCY)
Age: 72
Discharge: HOME | End: 2017-04-11
Attending: EMERGENCY MEDICINE
Payer: MEDICARE

## 2017-04-11 VITALS
RESPIRATION RATE: 21 BRPM | HEART RATE: 73 BPM | TEMPERATURE: 98.6 F | OXYGEN SATURATION: 96 % | SYSTOLIC BLOOD PRESSURE: 125 MMHG | DIASTOLIC BLOOD PRESSURE: 66 MMHG

## 2017-04-11 DIAGNOSIS — I49.3 MULTIFOCAL PVCS: ICD-10-CM

## 2017-04-11 DIAGNOSIS — R51.9 HEADACHE, UNSPECIFIED HEADACHE TYPE: ICD-10-CM

## 2017-04-11 DIAGNOSIS — J40 BRONCHITIS: Primary | ICD-10-CM

## 2017-04-11 DIAGNOSIS — R09.81 SINUS CONGESTION: ICD-10-CM

## 2017-04-11 LAB
ALBUMIN SERPL BCP-MCNC: 3.7 G/DL (ref 3.5–5)
ALBUMIN/GLOB SERPL: 1 {RATIO} (ref 1.1–2.2)
ALP SERPL-CCNC: 97 U/L (ref 45–117)
ALT SERPL-CCNC: 16 U/L (ref 12–78)
ANION GAP BLD CALC-SCNC: 8 MMOL/L (ref 5–15)
AST SERPL W P-5'-P-CCNC: 11 U/L (ref 15–37)
BASOPHILS # BLD AUTO: 0.1 K/UL (ref 0–0.1)
BASOPHILS # BLD: 1 % (ref 0–1)
BILIRUB SERPL-MCNC: 0.4 MG/DL (ref 0.2–1)
BUN SERPL-MCNC: 22 MG/DL (ref 6–20)
BUN/CREAT SERPL: 15 (ref 12–20)
CALCIUM SERPL-MCNC: 9.1 MG/DL (ref 8.5–10.1)
CHLORIDE SERPL-SCNC: 104 MMOL/L (ref 97–108)
CK SERPL-CCNC: 54 U/L (ref 26–192)
CO2 SERPL-SCNC: 29 MMOL/L (ref 21–32)
CREAT SERPL-MCNC: 1.43 MG/DL (ref 0.55–1.02)
EOSINOPHIL # BLD: 0.1 K/UL (ref 0–0.4)
EOSINOPHIL NFR BLD: 1 % (ref 0–7)
ERYTHROCYTE [DISTWIDTH] IN BLOOD BY AUTOMATED COUNT: 13.5 % (ref 11.5–14.5)
GLOBULIN SER CALC-MCNC: 3.6 G/DL (ref 2–4)
GLUCOSE SERPL-MCNC: 90 MG/DL (ref 65–100)
HCT VFR BLD AUTO: 35.3 % (ref 35–47)
HGB BLD-MCNC: 11.8 G/DL (ref 11.5–16)
LYMPHOCYTES # BLD AUTO: 26 % (ref 12–49)
LYMPHOCYTES # BLD: 2.6 K/UL (ref 0.8–3.5)
MCH RBC QN AUTO: 27.7 PG (ref 26–34)
MCHC RBC AUTO-ENTMCNC: 33.4 G/DL (ref 30–36.5)
MCV RBC AUTO: 82.9 FL (ref 80–99)
MONOCYTES # BLD: 0.5 K/UL (ref 0–1)
MONOCYTES NFR BLD AUTO: 5 % (ref 5–13)
NEUTS SEG # BLD: 6.6 K/UL (ref 1.8–8)
NEUTS SEG NFR BLD AUTO: 67 % (ref 32–75)
PLATELET # BLD AUTO: 173 K/UL (ref 150–400)
POTASSIUM SERPL-SCNC: 4.2 MMOL/L (ref 3.5–5.1)
PROT SERPL-MCNC: 7.3 G/DL (ref 6.4–8.2)
RBC # BLD AUTO: 4.26 M/UL (ref 3.8–5.2)
SODIUM SERPL-SCNC: 141 MMOL/L (ref 136–145)
TROPONIN I SERPL-MCNC: 0.05 NG/ML
WBC # BLD AUTO: 9.8 K/UL (ref 3.6–11)

## 2017-04-11 PROCEDURE — 84484 ASSAY OF TROPONIN QUANT: CPT | Performed by: PHYSICIAN ASSISTANT

## 2017-04-11 PROCEDURE — 80053 COMPREHEN METABOLIC PANEL: CPT | Performed by: PHYSICIAN ASSISTANT

## 2017-04-11 PROCEDURE — 82550 ASSAY OF CK (CPK): CPT | Performed by: PHYSICIAN ASSISTANT

## 2017-04-11 PROCEDURE — 74011250636 HC RX REV CODE- 250/636: Performed by: PHYSICIAN ASSISTANT

## 2017-04-11 PROCEDURE — 74011250637 HC RX REV CODE- 250/637: Performed by: PHYSICIAN ASSISTANT

## 2017-04-11 PROCEDURE — 93005 ELECTROCARDIOGRAM TRACING: CPT

## 2017-04-11 PROCEDURE — 36415 COLL VENOUS BLD VENIPUNCTURE: CPT | Performed by: PHYSICIAN ASSISTANT

## 2017-04-11 PROCEDURE — 85025 COMPLETE CBC W/AUTO DIFF WBC: CPT | Performed by: PHYSICIAN ASSISTANT

## 2017-04-11 PROCEDURE — 70450 CT HEAD/BRAIN W/O DYE: CPT

## 2017-04-11 PROCEDURE — 96375 TX/PRO/DX INJ NEW DRUG ADDON: CPT

## 2017-04-11 PROCEDURE — 71020 XR CHEST PA LAT: CPT

## 2017-04-11 PROCEDURE — 96374 THER/PROPH/DIAG INJ IV PUSH: CPT

## 2017-04-11 PROCEDURE — 99285 EMERGENCY DEPT VISIT HI MDM: CPT

## 2017-04-11 RX ORDER — SODIUM CHLORIDE 0.9 % (FLUSH) 0.9 %
5-10 SYRINGE (ML) INJECTION EVERY 8 HOURS
Status: DISCONTINUED | OUTPATIENT
Start: 2017-04-11 | End: 2017-04-11 | Stop reason: HOSPADM

## 2017-04-11 RX ORDER — BUTALBITAL, ACETAMINOPHEN AND CAFFEINE 50; 325; 40 MG/1; MG/1; MG/1
1 TABLET ORAL
Status: COMPLETED | OUTPATIENT
Start: 2017-04-11 | End: 2017-04-11

## 2017-04-11 RX ORDER — BUTALBITAL, ACETAMINOPHEN AND CAFFEINE 300; 40; 50 MG/1; MG/1; MG/1
1 CAPSULE ORAL
Qty: 20 CAP | Refills: 0 | Status: SHIPPED | OUTPATIENT
Start: 2017-04-11 | End: 2017-04-21 | Stop reason: ALTCHOICE

## 2017-04-11 RX ORDER — KETOROLAC TROMETHAMINE 30 MG/ML
15 INJECTION, SOLUTION INTRAMUSCULAR; INTRAVENOUS
Status: COMPLETED | OUTPATIENT
Start: 2017-04-11 | End: 2017-04-11

## 2017-04-11 RX ORDER — ONDANSETRON 2 MG/ML
4 INJECTION INTRAMUSCULAR; INTRAVENOUS
Status: COMPLETED | OUTPATIENT
Start: 2017-04-11 | End: 2017-04-11

## 2017-04-11 RX ORDER — SODIUM CHLORIDE 0.9 % (FLUSH) 0.9 %
5-10 SYRINGE (ML) INJECTION AS NEEDED
Status: DISCONTINUED | OUTPATIENT
Start: 2017-04-11 | End: 2017-04-11 | Stop reason: HOSPADM

## 2017-04-11 RX ORDER — CEPHALEXIN 500 MG/1
500 CAPSULE ORAL 2 TIMES DAILY
Qty: 14 CAP | Refills: 0 | Status: SHIPPED | OUTPATIENT
Start: 2017-04-11 | End: 2017-04-18

## 2017-04-11 RX ORDER — DEXAMETHASONE SODIUM PHOSPHATE 4 MG/ML
10 INJECTION, SOLUTION INTRA-ARTICULAR; INTRALESIONAL; INTRAMUSCULAR; INTRAVENOUS; SOFT TISSUE
Status: COMPLETED | OUTPATIENT
Start: 2017-04-11 | End: 2017-04-11

## 2017-04-11 RX ADMIN — Medication 10 ML: at 15:28

## 2017-04-11 RX ADMIN — DEXAMETHASONE SODIUM PHOSPHATE 10 MG: 4 INJECTION, SOLUTION INTRAMUSCULAR; INTRAVENOUS at 16:22

## 2017-04-11 RX ADMIN — KETOROLAC TROMETHAMINE 15 MG: 30 INJECTION, SOLUTION INTRAMUSCULAR at 20:05

## 2017-04-11 RX ADMIN — BUTALBITAL, ACETAMINOPHEN AND CAFFEINE 1 TABLET: 50; 325; 40 TABLET ORAL at 16:22

## 2017-04-11 RX ADMIN — ONDANSETRON HYDROCHLORIDE 4 MG: 2 INJECTION, SOLUTION INTRAMUSCULAR; INTRAVENOUS at 16:22

## 2017-04-11 NOTE — Clinical Note
Rest, follow up with primary care for recheck of upper respiratory symptoms. Follow up with Neurology for any continued headache. Contact information provided, as requested, for new cardiologist for evaluation of frequent PVCs and heart failure. Retur n to the Emergency Dept for any worsening headache, confusion, nausea/vomiting, weakness or problems with speech or gait or any chest pain/shortness of breath, dizziness. Thank you for allowing us to provide you with medical care today. We realize  that you have many choices for your emergency care needs. We thank you for choosing [id number]. Please choose us in the future for any continued health care needs. We hope we addressed all of your medical concerns. We strive t o provide excellent quality care in the Emergency Department. Anything less than excellent care does not meet our expectations. If a prescription has been provided, please have it filled as soon as possible to avoid a delay in treatment. Read the e ntire medication instruction sheet provided to you by the pharmacy. If you have any questions or reservations about taking the medication due to side effects or interactions with other medications please call the ER or your primary care physician. T he exam and treatment you received in the Emergency Department were for an emergent problem and is not intended as complete care. It is important that you follow up with a doctor, nurse practitioner, or [phone] assistant for ongoing care. If your sym ptoms worsen or you do not improve as expected and you are unable to reach your usual health care provider, you should return to the Emergency Department. We are available 24 hours a day.   
 
You may be contacted by a [address] for your opinion of clinton his visit. We would appreciate it if you answer \"very satisfied\" to the survey questions. If you are unable to answer that you are \"very satisfied\" with your visit please contact our nurse manager at [phone] to discuss your concerns. We strive to do  the best we can and your opinions are important to us and anything less that \"very satisfied\" is not acceptable to Claudine Bah or DORIS.

## 2017-04-11 NOTE — ED PROVIDER NOTES
HPI Comments: Christiane Gillespie is a 67 y.o. female with PMhx significant for CHF, AMI, and depression who presents via EMS to the ED with cc of \"worse headache of her life\" that radiates into her neck and into her bilateral shoulders with nausea secondary to the pain. She also c/o of cough and congestion x several weeks. Pt reports visual disturbance, but is unsure if the intensity of her headache is causing the change and is unable to describe the change in vision. She endorses applying arthritic cream to no relief. She states her pain is not exacerbated by movement. She notes a hx of migraines and headaches, but states her current symptoms are more intense and are not consistent. She states her PCP controls her CHF and does not regularly see a cardiologist. Pt specifically denies any SOB, vomiting, fevers. PCP: Lizzie Ruiz MD    There are no other complaints, changes or physical findings at this time. The history is provided by the patient and the EMS personnel. Past Medical History:   Diagnosis Date    CHF (congestive heart failure) (HCC)     Other ill-defined conditions(799.89)     AMI    Psychiatric disorder     depression       Past Surgical History:   Procedure Laterality Date    CARDIAC SURG PROCEDURE UNLIST      stents    HX APPENDECTOMY      HX CHOLECYSTECTOMY      HX GI      adhesions    HX GYN      hysterectomy         No family history on file.     Social History     Social History    Marital status:      Spouse name: N/A    Number of children: 3    Years of education: N/A     Occupational History     Retired     Social History Main Topics    Smoking status: Former Smoker     Packs/day: 0.10     Quit date: 11/1/2014    Smokeless tobacco: Not on file    Alcohol use No    Drug use: No    Sexual activity: Yes     Partners: Male     Birth control/ protection: None     Other Topics Concern    Not on file     Social History Narrative    Lives alone         ALLERGIES: Ciprofloxacin; Codeine; Influenza virus vaccine, specific; and Pcn [penicillins]    Review of Systems   Constitutional: Negative for chills and fever. HENT: Negative for congestion, rhinorrhea and sore throat. Eyes: Positive for visual disturbance. Respiratory: Negative for cough and shortness of breath. Cardiovascular: Negative for chest pain and palpitations. Gastrointestinal: Positive for nausea. Negative for abdominal pain, diarrhea and vomiting. Genitourinary: Negative for dysuria and hematuria. Musculoskeletal: Positive for myalgias (bilateral shoulders) and neck pain. Negative for neck stiffness. Skin: Negative for rash and wound. Neurological: Positive for headaches. Negative for dizziness. Psychiatric/Behavioral: Negative for agitation and confusion. Patient Vitals for the past 12 hrs:   Temp Pulse Resp BP SpO2   04/11/17 1518 98.6 °F (37 °C) 66 18 (!) 147/101 98 %         Physical Exam   Constitutional: She is oriented to person, place, and time. She appears well-developed and well-nourished. No distress. WDWN elderly female, alert, in NAD   HENT:   Head: Normocephalic and atraumatic. Mouth/Throat: No oropharyngeal exudate. Boggy nasal mucosa, clear rhinorrhea, posterior oropharynx injected without exudate. Increased effusion noted to bilat TMs, no erythema, good light reflex noted. Eyes: Conjunctivae and EOM are normal. Pupils are equal, round, and reactive to light. Right eye exhibits no discharge. Left eye exhibits no discharge. No scleral icterus. Neck: Normal range of motion. Neck supple. No JVD present. No tracheal deviation present. No thyromegaly present. No meningeal signs   Cardiovascular: Normal rate, regular rhythm and normal heart sounds. Pulmonary/Chest: Effort normal and breath sounds normal. No respiratory distress. She has no wheezes. Abdominal: Soft. She exhibits no distension. There is no tenderness. There is no rebound and no guarding. Musculoskeletal: Normal range of motion. She exhibits no edema. Lymphadenopathy:     She has no cervical adenopathy. Neurological: She is alert and oriented to person, place, and time. No cranial nerve deficit. She exhibits normal muscle tone. Coordination normal.   FNF, HTS neg bilat, no pronator drift   Skin: Skin is warm and dry. No rash noted. She is not diaphoretic. No erythema. Psychiatric: She has a normal mood and affect. Her behavior is normal. Judgment normal.   Nursing note and vitals reviewed. MDM  Number of Diagnoses or Management Options  Bronchitis:   Headache, unspecified headache type:   Multifocal PVCs:   Sinus congestion:   Diagnosis management comments: DDx: sinusitis, atypical migraine, tension headache, ICH, PNA       Amount and/or Complexity of Data Reviewed  Clinical lab tests: ordered and reviewed  Tests in the radiology section of CPT®: ordered and reviewed  Tests in the medicine section of CPT®: ordered and reviewed  Obtain history from someone other than the patient: yes (EMS)  Review and summarize past medical records: yes  Independent visualization of images, tracings, or specimens: yes    Patient Progress  Patient progress: stable    ED Course       Procedures    PROGRESS NOTE  3:20 PM  Discussed with patient at length the need for blood pressure re-evaluation and management with primary care. Discussed the long term sequelae for elevated blood pressure including blindness, CVA, MI, and renal failure/ dialysis. Pt agrees to follow up as directed. COLE Montano     ED EKG interpretation: 15:29  Rhythm: normal sinus rhythm, left anterior fascicular block and PVC's; and regular . Rate (approx.): 75; Axis: normal; P wave: normal; QRS interval: normal ; ST/T wave: non-specific changes; Other findings: abnormal ekg. This EKG was interpreted by Freddy Gibbs MD,ED Provider.     PROGRESS NOTE:  6:34 PM  Discussed CT findings with pt and informed her it showed not acute abnormality. Written by Merlinda Endow, ED Scribe, as dictated by Derek Wu. PROGRESS NOTE:  7:00 PM  Discussed pt with Dr. Jose Cruz Monterroso who agrees with the care plan. Written by Merlinda Endow, ED Scribe, as dictated by Derek Wu. PROGRESS NOTE:  7:06 PM  Pt reports that her headache is much improved. I will advise pt to follow up with neurology. Per chart review, pt saw Dr. Cecilia Zhang in the past. She states she was unsatisfied with her care and is requesting another group. Written by Merlinda Endow, ED Scribe, as dictated by Derek Wu. LABORATORY TESTS:  Recent Results (from the past 12 hour(s))   EKG, 12 LEAD, INITIAL    Collection Time: 04/11/17  3:29 PM   Result Value Ref Range    Ventricular Rate 75 BPM    Atrial Rate 75 BPM    P-R Interval 154 ms    QRS Duration 104 ms    Q-T Interval 422 ms    QTC Calculation (Bezet) 471 ms    Calculated P Axis 39 degrees    Calculated R Axis -51 degrees    Calculated T Axis 80 degrees    Diagnosis       Sinus rhythm with frequent premature ventricular complexes  Left anterior fascicular block  Nonspecific T wave abnormality  Prolonged QT  Abnormal ECG  When compared with ECG of 24-JUN-2016 21:23,  T wave inversion less evident in Lateral leads  QT has lengthened     CBC WITH AUTOMATED DIFF    Collection Time: 04/11/17  3:46 PM   Result Value Ref Range    WBC 9.8 3.6 - 11.0 K/uL    RBC 4.26 3.80 - 5.20 M/uL    HGB 11.8 11.5 - 16.0 g/dL    HCT 35.3 35.0 - 47.0 %    MCV 82.9 80.0 - 99.0 FL    MCH 27.7 26.0 - 34.0 PG    MCHC 33.4 30.0 - 36.5 g/dL    RDW 13.5 11.5 - 14.5 %    PLATELET 719 173 - 924 K/uL    NEUTROPHILS 67 32 - 75 %    LYMPHOCYTES 26 12 - 49 %    MONOCYTES 5 5 - 13 %    EOSINOPHILS 1 0 - 7 %    BASOPHILS 1 0 - 1 %    ABS. NEUTROPHILS 6.6 1.8 - 8.0 K/UL    ABS. LYMPHOCYTES 2.6 0.8 - 3.5 K/UL    ABS. MONOCYTES 0.5 0.0 - 1.0 K/UL    ABS. EOSINOPHILS 0.1 0.0 - 0.4 K/UL    ABS.  BASOPHILS 0.1 0.0 - 0.1 K/UL   METABOLIC PANEL, COMPREHENSIVE Collection Time: 04/11/17  3:46 PM   Result Value Ref Range    Sodium 141 136 - 145 mmol/L    Potassium 4.2 3.5 - 5.1 mmol/L    Chloride 104 97 - 108 mmol/L    CO2 29 21 - 32 mmol/L    Anion gap 8 5 - 15 mmol/L    Glucose 90 65 - 100 mg/dL    BUN 22 (H) 6 - 20 MG/DL    Creatinine 1.43 (H) 0.55 - 1.02 MG/DL    BUN/Creatinine ratio 15 12 - 20      GFR est AA 44 (L) >60 ml/min/1.73m2    GFR est non-AA 36 (L) >60 ml/min/1.73m2    Calcium 9.1 8.5 - 10.1 MG/DL    Bilirubin, total 0.4 0.2 - 1.0 MG/DL    ALT (SGPT) 16 12 - 78 U/L    AST (SGOT) 11 (L) 15 - 37 U/L    Alk. phosphatase 97 45 - 117 U/L    Protein, total 7.3 6.4 - 8.2 g/dL    Albumin 3.7 3.5 - 5.0 g/dL    Globulin 3.6 2.0 - 4.0 g/dL    A-G Ratio 1.0 (L) 1.1 - 2.2     CK W/ REFLX CKMB    Collection Time: 04/11/17  3:46 PM   Result Value Ref Range    CK 54 26 - 192 U/L   TROPONIN I    Collection Time: 04/11/17  3:46 PM   Result Value Ref Range    Troponin-I, Qt. 0.05 (H) <0.05 ng/mL       IMAGING RESULTS:  CT HEAD WO CONT   Final Result   EXAM: CT HEAD WO CONT     INDICATION: headache, constant headache radiating into the neck and bilateral  arms, worse headache of her life, possible visual disturbance           COMPARISON: 3/8/2014.     TECHNIQUE: Unenhanced CT of the head was performed using 5 mm images. Brain and  bone windows were generated. CT dose reduction was achieved through use of a  standardized protocol tailored for this examination and automatic exposure  control for dose modulation.      FINDINGS:  The ventricles and sulci are normal in size, shape and configuration and  midline. There is mild hypodensity of the cerebral white matter along with  chronic bilateral basal ganglia and white matter lacunar infarcts. Findings are  consistent with intracranial small vessel disease. . There is no intracranial  hemorrhage, extra-axial collection, mass, mass effect or midline shift. The  basilar cisterns are open. No acute infarct is identified.  The bone windows  demonstrate no abnormalities. The visualized portions of the paranasal sinuses  and mastoid air cells are clear.     IMPRESSION  IMPRESSION: No acute findings and no change since 2014. White matter hypodensity  and chronic lacunar infarcts consistent with chronic small vessel ischemic  changes. XR CHEST PA LAT   Final Result   EXAM: XR CHEST PA LAT     INDICATION: cough, congestion     COMPARISON: 6/24/2016      FINDINGS:     PA and lateral radiographs of the chest demonstrate clear lungs. The heart may  be slightly enlarged, but this is likely exaggerated by the pectus deformity. There is no vascular congestion or pleural fluid. There is a bone island in a  lower thoracic vertebra. This has been previously demonstrated on the CT scan.      IMPRESSION  IMPRESSION:      No acute process. MEDICATIONS GIVEN:  Medications   sodium chloride (NS) flush 5-10 mL (10 mL IntraVENous Given 4/11/17 1528)   sodium chloride (NS) flush 5-10 mL (not administered)   ketorolac (TORADOL) injection 15 mg (not administered)   butalbital-acetaminophen-caffeine (FIORICET, ESGIC) -40 mg per tablet 1 Tab (1 Tab Oral Given 4/11/17 1622)   dexamethasone (DECADRON) 4 mg/mL injection 10 mg (10 mg IntraVENous Given 4/11/17 1622)   ondansetron (ZOFRAN) injection 4 mg (4 mg IntraVENous Given 4/11/17 1622)       IMPRESSION:  1. Bronchitis    2. Sinus congestion    3. Headache, unspecified headache type    4. Multifocal PVCs        PLAN:  1. Discharge home  Current Discharge Medication List      START taking these medications    Details   butalbital-acetaminophen-caff (FIORICET) -40 mg per capsule Take 1 Cap by mouth every six (6) hours as needed for Headache for up to 20 doses. Max Daily Amount: 4 Caps. Qty: 20 Cap, Refills: 0         CONTINUE these medications which have NOT CHANGED    Details   calcium-cholecalciferol, D3, (CALCIUM WITH VITAMIN D) tablet Take 1 tablet by mouth daily.   Qty: 90 tablet, Refills: 3 nitroglycerin (NITROSTAT) 0.4 mg SL tablet 1 Tab by SubLINGual route every five (5) minutes as needed. Indications: ANGINA  Qty: 25 Tab, Refills: 5      aspirin 81 mg chewable tablet Take 1 Tab by mouth daily. cyanocobalamin (VITAMIN B-12) 100 mcg tablet Take 100 mcg by mouth daily. ASPIRIN/ACETAMINOPHEN/CAFFEINE (EXCEDRIN MIGRAINE PO) Take 1 Tab by mouth as needed. 2.   Follow-up Information     Follow up With Details Comments [address], [place]   [address] [facility] [address] Dineen Mcardle, [address] [facility]       Jaylene Hernandez, [address] [facility] [address]  [facility].  [phone]      [facility] EMERGENCY DEPT  If symptoms worsen [address] [facility] [address]   [address] [phone]        Return to ED if worse     DISCHARGE NOTE:  7:13 PM  The patient is ready for discharge. The patients signs, symptoms, diagnosis, and instructions for discharge have been discussed and the pt has conveyed their understanding. The patient is to follow up as recommended with PCP or return to the ER should their symptoms worsen. Plan has been discussed and patient has conveyed their agreement. This note is prepared by Jose Miguel De León, acting as Scribe for Mando Hernandez. COLE Ansari: The scribe's documentation has been prepared under my direction and personally reviewed by me in its entirety. I confirm that the note above accurately reflects all work, treatment, procedures, and medical decision making performed by me.

## 2017-04-11 NOTE — DISCHARGE INSTRUCTIONS
Bronchitis: Care Instructions  Your Care Instructions    Bronchitis is inflammation of the bronchial tubes, which carry air to the lungs. The tubes swell and produce mucus, or phlegm. The mucus and inflamed bronchial tubes make you cough. You may have trouble breathing. Most cases of bronchitis are caused by viruses like those that cause colds. Antibiotics usually do not help and they may be harmful. Bronchitis usually develops rapidly and lasts about 2 to 3 weeks in otherwise healthy people. Follow-up care is a key part of your treatment and safety. Be sure to make and go to all appointments, and call your doctor if you are having problems. It's also a good idea to know your test results and keep a list of the medicines you take. How can you care for yourself at home? · Take all medicines exactly as prescribed. Call your doctor if you think you are having a problem with your medicine. · Get some extra rest.  · Take an over-the-counter pain medicine, such as acetaminophen (Tylenol), ibuprofen (Advil, Motrin), or naproxen (Aleve) to reduce fever and relieve body aches. Read and follow all instructions on the label. · Do not take two or more pain medicines at the same time unless the doctor told you to. Many pain medicines have acetaminophen, which is Tylenol. Too much acetaminophen (Tylenol) can be harmful. · Take an over-the-counter cough medicine that contains dextromethorphan to help quiet a dry, hacking cough so that you can sleep. Avoid cough medicines that have more than one active ingredient. Read and follow all instructions on the label. · Breathe moist air from a humidifier, hot shower, or sink filled with hot water. The heat and moisture will thin mucus so you can cough it out. · Do not smoke. Smoking can make bronchitis worse. If you need help quitting, talk to your doctor about stop-smoking programs and medicines. These can increase your chances of quitting for good.   When should you call for help? Call 911 anytime you think you may need emergency care. For example, call if:  · You have severe trouble breathing. Call your doctor now or seek immediate medical care if:  · You have new or worse trouble breathing. · You cough up dark brown or bloody mucus (sputum). · You have a new or higher fever. · You have a new rash. Watch closely for changes in your health, and be sure to contact your doctor if:  · You cough more deeply or more often, especially if you notice more mucus or a change in the color of your mucus. · You are not getting better as expected. Where can you learn more? Go to http://marta-naomy.info/. Enter H333 in the search box to learn more about \"Bronchitis: Care Instructions. \"  Current as of: May 23, 2016  Content Version: 11.2  © 4136-0273 [employer]. Care instructions adapted under license by [employer] (which disclaims liability or warranty for this information). If you have questions about a medical condition or this instruction, always ask your healthcare professional. [place] [address] any warranty or liability for your use of this information. Saline Nasal Washes: Care Instructions  Your Care Instructions  Saline nasal washes help keep the nasal passages open by washing out thick or dried mucus. This simple remedy can help relieve symptoms of allergies, sinusitis, and colds. It also can make the nose feel more comfortable by keeping the mucous membranes moist. You may notice a little burning sensation in your nose the first few times you use the solution, but this usually gets better in a few days. Follow-up care is a key part of your treatment and safety. Be sure to make and go to all appointments, and call your doctor if you are having problems. It's also a good idea to know your test results and keep a list of the medicines you take. How can you care for yourself at home?   · You can buy premixed saline solution in a squeeze bottle or other sinus rinse products at a drugstore. Read and follow the instructions on the label. · You also can make your own saline solution by adding 1 teaspoon of salt and 1 teaspoon of baking soda to 2 cups of distilled water. · If you use a homemade solution, pour a small amount into a clean bowl. Using a rubber bulb syringe, squeeze the syringe and place the tip in the salt water. Pull a small amount of the salt water into the syringe by relaxing your hand. · Sit down with your head tilted slightly back. Do not lie down. Put the tip of the bulb syringe or the squeeze bottle a little way into one of your nostrils. Gently drip or squirt a few drops into the nostril. Repeat with the other nostril. Some sneezing and gagging are normal at first.  · Gently blow your nose. · Wipe the syringe or bottle tip clean after each use. · Repeat this 2 or 3 times a day. · Use nasal washes gently if you have nosebleeds often. When should you call for help? Watch closely for changes in your health, and be sure to contact your doctor if:  · You often get nosebleeds. · You have problems doing the nasal washes. Where can you learn more? Go to http://marta-naomy.info/. Enter [id number] in the search box to learn more about \"Saline Nasal Washes: Care Instructions. \"  Current as of: July 29, 2016  Content Version: 11.2  © 9923-8284 [employer]. Care instructions adapted under license by [employer] (which disclaims liability or warranty for this information). If you have questions about a medical condition or this instruction, always ask your healthcare professional. [place] [address] any warranty or liability for your use of this information. Headache: Care Instructions  Your Care Instructions    Headaches have many possible causes. Most headaches aren't a sign of a more serious problem, and they will get better on their own. Home treatment may help you feel better faster. The doctor has checked you carefully, but problems can develop later. If you notice any problems or new symptoms, get medical treatment right away. Follow-up care is a key part of your treatment and safety. Be sure to make and go to all appointments, and call your doctor if you are having problems. It's also a good idea to know your test results and keep a list of the medicines you take. How can you care for yourself at home? · Do not drive if you have taken a prescription pain medicine. · Rest in a quiet, dark room until your headache is gone. Close your eyes and try to relax or go to sleep. Don't watch TV or read. · Put a cold, moist cloth or cold pack on the painful area for 10 to 20 minutes at a time. Put a thin cloth between the cold pack and your skin. · Use a warm, moist towel or a heating pad set on low to relax tight shoulder and neck muscles. · Have someone gently massage your neck and shoulders. · Take pain medicines exactly as directed. ¨ If the doctor gave you a prescription medicine for pain, take it as prescribed. ¨ If you are not taking a prescription pain medicine, ask your doctor if you can take an over-the-counter medicine. · Be careful not to take pain medicine more often than the instructions allow, because you may get worse or more frequent headaches when the medicine wears off. · Do not ignore new symptoms that occur with a headache, such as a fever, weakness or numbness, vision changes, or confusion. These may be signs of a more serious problem. To prevent headaches  · Keep a headache diary so you can figure out what triggers your headaches. Avoiding triggers may help you prevent headaches. Record when each headache began, how long it lasted, and what the pain was like (throbbing, aching, stabbing, or dull).  Write down any other symptoms you had with the headache, such as nausea, flashing lights or dark spots, or sensitivity to bright light or loud noise. Note if the headache occurred near your period. List anything that might have triggered the headache, such as certain foods (chocolate, cheese, wine) or odors, smoke, bright light, stress, or lack of sleep. · Find healthy ways to deal with stress. Headaches are most common during or right after stressful times. Take time to relax before and after you do something that has caused a headache in the past.  · Try to keep your muscles relaxed by keeping good posture. Check your jaw, face, neck, and shoulder muscles for tension, and try relaxing them. When sitting at a desk, change positions often, and stretch for 30 seconds each hour. · Get plenty of sleep and exercise. · Eat regularly and well. Long periods without food can trigger a headache. · Treat yourself to a massage. Some people find that regular massages are very helpful in relieving tension. · Limit caffeine by not drinking too much coffee, tea, or soda. But don't quit caffeine suddenly, because that can also give you headaches. · Reduce eyestrain from computers by blinking frequently and looking away from the computer screen every so often. Make sure you have proper eyewear and that your monitor is set up properly, about an arm's length away. · Seek help if you have depression or anxiety. Your headaches may be linked to these conditions. Treatment can both prevent headaches and help with symptoms of anxiety or depression. When should you call for help? Call 911 anytime you think you may need emergency care. For example, call if:  · You have signs of a stroke. These may include:  ¨ Sudden numbness, paralysis, or weakness in your face, arm, or leg, especially on only one side of your body. ¨ Sudden vision changes. ¨ Sudden trouble speaking. ¨ Sudden confusion or trouble understanding simple statements. ¨ Sudden problems with walking or balance. ¨ A sudden, severe headache that is different from past headaches.   Call your doctor now or seek immediate medical care if:  · You have a new or worse headache. · Your headache gets much worse. Where can you learn more? Go to http://marta-naomy.info/. Enter M271 in the search box to learn more about \"Headache: Care Instructions. \"  Current as of: October 14, 2016  Content Version: 11.2  © 7951-3023 [employer]. Care instructions adapted under license by [employer] (which disclaims liability or warranty for this information). If you have questions about a medical condition or this instruction, always ask your healthcare professional. [place] [address] any warranty or liability for your use of this information. Premature Heartbeat: Care Instructions  Your Care Instructions    A premature heartbeat happens when the heart beats earlier than it should. This briefly interrupts the heart's rhythm. You do not usually feel the early heartbeat, and the next beat is stronger. To many people, this feels like a skipped heartbeat or a flutter. If you have no heart problems, premature heartbeats are not a cause for concern. Most people have them at some time. They may happen more often if you drink a lot of caffeine or alcohol or are under stress. Usually, no cause for a premature heartbeat is found, and no treatment is needed. Follow-up care is a key part of your treatment and safety. Be sure to make and go to all appointments, and call your doctor if you are having problems. It's also a good idea to know your test results and keep a list of the medicines you take. How can you care for yourself at home? · Limit caffeine and other stimulants if they trigger premature heartbeats. · Reduce stress. Avoid people and places that make you feel anxious, if you can. Learn ways to reduce stress, such as biofeedback, guided imagery, and meditation. · Do not smoke or allow others to smoke around you.  If you need help quitting, talk to your doctor about stop-smoking programs and medicines. These can increase your chances of quitting for good. · Get at least 30 minutes of exercise on most days of the week. Walking is a good choice. You also may want to do other activities, such as running, swimming, cycling, or playing tennis or team sports. · Get enough sleep. Keep your room dark and quiet, and try to go to bed at the same time every night. · Limit alcohol to 2 drinks a day for men and 1 drink a day for women. Too much alcohol can cause health problems. If drinking alcohol causes more premature heartbeats, do not drink it. · If your doctor prescribes medicine, take it exactly as prescribed. Call your doctor if you think you are having a problem with your medicine. When should you call for help? Call 911 anytime you think you may need emergency care. For example, call if:  · You passed out (lost consciousness). · You have severe shortness of breath. Call your doctor now or seek immediate medical care if:  · You have a heart rate that stays above 120 beats per minute for more than a few minutes. · You are suddenly dizzy. Watch closely for changes in your health, and be sure to contact your doctor if you have any problems. Where can you learn more? Go to http://marta-naomy.info/. Enter W967 in the search box to learn more about \"Premature Heartbeat: Care Instructions. \"  Current as of: January 27, 2016  Content Version: 11.2  © 4686-3381 [employer]. Care instructions adapted under license by [employer] (which disclaims liability or warranty for this information). If you have questions about a medical condition or this instruction, always ask your healthcare professional. [place] [address] any warranty or liability for your use of this information.

## 2017-04-11 NOTE — ED TRIAGE NOTES
Pt arrived to ED via stretcher by EMS. No acute distress noted upon arrival to ED. According to EMS pt has had bilateral arm pain and weakness \"for a while\" with cold and flu like symptoms and HA. No other concerns noted by EMS at this time.

## 2017-04-12 ENCOUNTER — PATIENT OUTREACH (OUTPATIENT)
Dept: INTERNAL MEDICINE CLINIC | Age: 72
End: 2017-04-12

## 2017-04-12 ENCOUNTER — TELEPHONE (OUTPATIENT)
Dept: INTERNAL MEDICINE CLINIC | Age: 72
End: 2017-04-12

## 2017-04-12 LAB
ATRIAL RATE: 75 BPM
CALCULATED P AXIS, ECG09: 39 DEGREES
CALCULATED R AXIS, ECG10: -51 DEGREES
CALCULATED T AXIS, ECG11: 80 DEGREES
DIAGNOSIS, 93000: NORMAL
P-R INTERVAL, ECG05: 154 MS
Q-T INTERVAL, ECG07: 422 MS
QRS DURATION, ECG06: 104 MS
QTC CALCULATION (BEZET), ECG08: 471 MS
VENTRICULAR RATE, ECG03: 75 BPM

## 2017-04-12 NOTE — TELEPHONE ENCOUNTER
Returned pt's call and explained that she needed an appt with Dr. Severiano Burgess. Tried to transfer to  but got disconnected. Called pt back and she will not  the phone.

## 2017-04-12 NOTE — ED NOTES
Assumed care of pt from Loree Valentine RN at bedside with verbal report consisting of Situation, Background, Assessment, and Recommendations (SBAR). Pt is A&O x 4. Pt resting comfortably on the stretcher in a position of comfort. Call bell within reach. Side rails x 2. Cardiac monitor x 2. Stretcher locked in the lowest position. Concerns and questions addressed at this time. Pt in no acute distress at this the time. Will continue to monitor.

## 2017-04-12 NOTE — ED NOTES
FLORY Rivera gave and reviewed discharge instructions with the patient. The patient verbalized understanding. The patient was given opportunity for questions. Patient discharged in stable condition to the waiting room with female visitor.

## 2017-04-17 NOTE — PROGRESS NOTES
Spoke with patient today. Pt denies arm pain but continues with headache. Pt states headache is a 5/10 today to right side. Pt states she has pressure behind her eye and pain increases when she bends over. Pt was given Fioricet rx from ED but states it does not help, that Excedrin migraine provides more relief. Pt last took Fioricet yesterday. Pt has f/u with DR. Danette Plasencia on 4/21/17 at 1pm, I offered to check for earlier appt but pt states transportation is an issue but she will be able to make the appt Friday. Pt states she uses bus and a friend. Pt states she tried an ice pack to head for a few minutes and that did provide relief. Pt denies dizziness or visual changes. Pt expresses thoughts of going back to ED for pain medicine. Educated patient on the importance of seeing PCP to see if we are able to get to cause of headache, Pt verbalized understanding and denies questions. Educated patient about signs and symptoms of stroke (F.A.S.T.), Pt verbalized understanding and denies questions. This note will not be viewable in [address]

## 2017-04-21 ENCOUNTER — OFFICE VISIT (OUTPATIENT)
Dept: INTERNAL MEDICINE CLINIC | Age: 72
End: 2017-04-21

## 2017-04-21 VITALS
HEART RATE: 49 BPM | DIASTOLIC BLOOD PRESSURE: 56 MMHG | HEIGHT: 65 IN | WEIGHT: 168 LBS | SYSTOLIC BLOOD PRESSURE: 132 MMHG | TEMPERATURE: 96.6 F | OXYGEN SATURATION: 98 % | RESPIRATION RATE: 18 BRPM | BODY MASS INDEX: 27.99 KG/M2

## 2017-04-21 DIAGNOSIS — G43.011 INTRACTABLE MIGRAINE WITHOUT AURA AND WITH STATUS MIGRAINOSUS: Primary | ICD-10-CM

## 2017-04-21 DIAGNOSIS — I10 ESSENTIAL HYPERTENSION: ICD-10-CM

## 2017-04-21 DIAGNOSIS — I50.20 SYSTOLIC HEART FAILURE, UNSPECIFIED HEART FAILURE CHRONICITY: ICD-10-CM

## 2017-04-21 DIAGNOSIS — I25.10 CORONARY ARTERY DISEASE INVOLVING NATIVE CORONARY ARTERY OF NATIVE HEART WITHOUT ANGINA PECTORIS: ICD-10-CM

## 2017-04-21 DIAGNOSIS — H92.01 OTALGIA OF RIGHT EAR: ICD-10-CM

## 2017-04-21 RX ORDER — CLONAZEPAM 1 MG/1
TABLET ORAL
Qty: 120 TAB | Refills: 0 | Status: SHIPPED | OUTPATIENT
Start: 2017-04-21 | End: 2017-01-01 | Stop reason: SDUPTHER

## 2017-04-21 RX ORDER — NEOMYCIN SULFATE, POLYMYXIN B SULFATE AND HYDROCORTISONE 10; 3.5; 1 MG/ML; MG/ML; [USP'U]/ML
3 SUSPENSION/ DROPS AURICULAR (OTIC) 3 TIMES DAILY
Qty: 10 ML | Refills: 0 | Status: SHIPPED | OUTPATIENT
Start: 2017-04-21 | End: 2018-01-01 | Stop reason: SDUPTHER

## 2017-04-21 RX ORDER — HYDROCODONE BITARTRATE AND ACETAMINOPHEN 5; 325 MG/1; MG/1
1 TABLET ORAL
Qty: 16 TAB | Refills: 0 | Status: SHIPPED | OUTPATIENT
Start: 2017-04-21 | End: 2017-01-01 | Stop reason: ALTCHOICE

## 2017-04-21 RX ORDER — PREDNISONE 10 MG/1
TABLET ORAL
Qty: 20 TAB | Refills: 1 | Status: SHIPPED | OUTPATIENT
Start: 2017-04-21 | End: 2017-01-01 | Stop reason: SDUPTHER

## 2017-04-21 NOTE — LETTER
4/25/2017 7:44 AM 
 
Ms. Hanny Goncalves [address] [place] [id number] Dear Hanny Goncalves: 
 
Please find your most recent results below. Resulted Orders SED RATE (ESR) Result Value Ref Range Sed rate (ESR) 21 0 - 40 mm/hr Narrative Performed at:  [address]  [id number] : Mary Anne Kolb MD, Phone:  [phone] RECOMMENDATIONS: 
Results of your most recent labs are normal/ stable. Follow up as scheduled. Please call me if you have any questions: [phone] Sincerely, Julia Gonzalez MD

## 2017-04-21 NOTE — PATIENT INSTRUCTIONS
Please bring your medications with you to the next visit. You can check your  medications against what is listed in your check out sheet  Let us know if there is a difference.

## 2017-04-21 NOTE — PROGRESS NOTES
HISTORY OF PRESENT ILLNESS  Patrick Jean is a 67 y.o. female. Head Pain    The history is provided by the patient. This is a chronic problem. The current episode started more than 1 week ago. The problem occurs constantly. The problem has been gradually improving. The headache is aggravated by visual complaints. The quality of the pain is described as sharp. The pain is severe. Associated symptoms include visual change. Pertinent negatives include no fever and no shortness of breath. Treatments tried: fioricet from ER. The treatment provided no (but ice helps) relief. she is requesting pain relief with narcotic agents given the severity of her discomfort. Prednisone in the past has helped, ran out of it. She has had migraines before this 1 is more severe than the other ones and has lasted longer. Workup in the emergency room has included a CT scan which was negative. For the last few days her right ear has been hurting, no cough cold symptoms.     Allergies   Allergen Reactions    Ciprofloxacin Shortness of Breath    Codeine Nausea Only    Influenza Virus Vaccine, Specific Unknown (comments)     Allergy documented in Admission Data base 1 screening    Pcn [Penicillins] Itching     Past Medical History:   Diagnosis Date    CHF (congestive heart failure) (HCC)     Other ill-defined conditions     [facility]    Psychiatric disorder     depression     Social History     Social History    Marital status:      Spouse name: N/A    Number of children: 3    Years of education: N/A     Occupational History     Retired     Social History Main Topics    Smoking status: Former Smoker     Packs/day: 0.10     Quit date: 11/1/2014    Smokeless tobacco: Not on file    Alcohol use No    Drug use: No    Sexual activity: Yes     Partners: Male     Birth control/ protection: None     Other Topics Concern    Not on file     Social History Narrative    Lives alone         Review of Systems   Constitutional: Negative for fever. HENT:        Sinus congestion, CT showed no sinusitis   Eyes:        Squiggly lines   Respiratory: Negative for cough and shortness of breath. Cardiovascular: Negative for chest pain. Musculoskeletal: Negative for falls. Skin: Negative for rash. Neurological: Positive for headaches. Negative for speech change and focal weakness. Left sided weakness       Physical Exam  Visit Vitals    /56 (BP 1 Location: Left arm, BP Patient Position: Sitting)    Pulse (!) 49    Temp 96.6 °F (35.9 °C) (Oral)    Resp 18    Ht 5' 4.5\" (1.638 m)    Wt 168 lb (76.2 kg)    SpO2 98%    BMI 28.39 kg/m2     Well developed well nourished no acute distress. Pupils equal round react to light, extraocular muscles intact. Tympanic membranes within normal limits throat, right ear sore to touch TM unremarkable, right ear tender to palp  Cranial nerves II through XII are intact. Neck unremarkable  Heart regular rate and rhythm without clicks murmurs rubs  Lungs are clear to auscultation  Abdomen soft. Extremities, motor 5 out of 5 bilaterally, reflexes 2+ equal bilaterally. Labs from today were reviewed  yes, labs done previously were reviewed  yes, Labs done in ER were reviewed  yes, Additional labs are ordered  yes,      ASSESSMENT and PLAN for the last few days  Encounter Diagnoses   Name Primary?     Intractable migraine without aura and with status migrainosus Yes    Systolic heart failure, unspecified heart failure chronicity     Coronary artery disease involving native coronary artery of native heart without angina pectoris     Essential hypertension     Otalgia of right ear      Orders Placed This Encounter    SED RATE (ESR)    HYDROcodone-acetaminophen (NORCO) 5-325 mg per tablet    clonazePAM (KLONOPIN) 1 mg tablet    predniSONE (DELTASONE) 10 mg tablet    neomycin-polymyxin-hydrocortisone, buffered, (PEDIOTIC) 3.5-10,000-1 mg/mL-unit/mL-% otic suspension    she is requesting narcotic agent. Did agree to some hydrocodone but emphasized limited usage of this pain medication for headache relief. She is not to take the clonazepam with it. It will not be refilled. Sed rate to rule out temporal arteritis. Consider Imitrex. We discussed this pain and the usage of controlled substances for headache relief. In general these medications are indicated for the acute symptom relief and not for chronic usage, allthough they are frequently used for chronic management  After a certain period of time they should be stopped to avoid dependence and/or addiction. I warned her about the dangers of addiction and other side affects including respiratory depression and death. Discussed how this is a controlled substance and that the prescribing of it is should be monitored very carefully. Drug usage is also monitored by our practise and the [facility], since there has been a lot of abuse and diversion of controlled substances. In general we do not refill this medication over the phone. PLease ask for enough pills to get you to your next appointment and make sure you keep your appointments. Warned not to take other medications like alcohol, other benzodiazapines marijuana or other narcotics when on this medication. Follow-up Disposition:  Return in about 4 weeks (around 5/19/2017) for routine follow up.

## 2017-04-21 NOTE — PROGRESS NOTES
Chief Complaint   Patient presents with    Head Pain     R/side head pain     I have reviewed the patient's medical history in detail and updated the computerized patient record. Health Maintenance reviewed. 1. Have you been to the ER, urgent care clinic since your last visit? Hospitalized since your last visit?no    2. Have you seen or consulted any other health care providers outside of the [address] [place] [address] since your last visit? Include any pap smears or colon screening. no    Do you have an [address] in place in the event that you have a healthcare crisis that could impact your decision making as it pertains to your health?no    Would you like information about the [address] [place] [address] given. no

## 2017-04-22 LAB — ERYTHROCYTE [SEDIMENTATION RATE] IN BLOOD BY WESTERGREN METHOD: 21 MM/HR (ref 0–40)

## 2017-04-26 ENCOUNTER — TELEPHONE (OUTPATIENT)
Dept: INTERNAL MEDICINE CLINIC | Age: 72
End: 2017-04-26

## 2017-05-02 ENCOUNTER — PATIENT OUTREACH (OUTPATIENT)
Dept: INTERNAL MEDICINE CLINIC | Age: 72
End: 2017-05-02

## 2017-05-11 ENCOUNTER — HOSPITAL ENCOUNTER (EMERGENCY)
Age: 72
Discharge: HOME | End: 2017-05-11
Attending: EMERGENCY MEDICINE
Payer: MEDICARE

## 2017-05-11 VITALS
BODY MASS INDEX: 25.71 KG/M2 | OXYGEN SATURATION: 96 % | SYSTOLIC BLOOD PRESSURE: 147 MMHG | DIASTOLIC BLOOD PRESSURE: 71 MMHG | HEART RATE: 73 BPM | RESPIRATION RATE: 18 BRPM | WEIGHT: 160 LBS | HEIGHT: 66 IN

## 2017-05-11 DIAGNOSIS — Z86.79 HISTORY OF CHF (CONGESTIVE HEART FAILURE): ICD-10-CM

## 2017-05-11 DIAGNOSIS — R51.9 HEADACHE, UNSPECIFIED HEADACHE TYPE: ICD-10-CM

## 2017-05-11 DIAGNOSIS — R60.9 PITTING EDEMA: ICD-10-CM

## 2017-05-11 DIAGNOSIS — M79.672 LEFT FOOT PAIN: Primary | ICD-10-CM

## 2017-05-11 LAB
ALBUMIN SERPL BCP-MCNC: 3.8 G/DL (ref 3.5–5)
ALBUMIN/GLOB SERPL: 0.8 {RATIO} (ref 1.1–2.2)
ALP SERPL-CCNC: 114 U/L (ref 45–117)
ALT SERPL-CCNC: 14 U/L (ref 12–78)
ANION GAP BLD CALC-SCNC: 11 MMOL/L (ref 5–15)
APPEARANCE UR: CLEAR
AST SERPL W P-5'-P-CCNC: 10 U/L (ref 15–37)
BACTERIA URNS QL MICRO: NEGATIVE /HPF
BASOPHILS # BLD AUTO: 0 K/UL (ref 0–0.1)
BASOPHILS # BLD: 0 % (ref 0–1)
BILIRUB SERPL-MCNC: 0.3 MG/DL (ref 0.2–1)
BILIRUB UR QL: NEGATIVE
BNP SERPL-MCNC: 9504 PG/ML (ref 0–125)
BUN SERPL-MCNC: 21 MG/DL (ref 6–20)
BUN/CREAT SERPL: 16 (ref 12–20)
CALCIUM SERPL-MCNC: 9.2 MG/DL (ref 8.5–10.1)
CHLORIDE SERPL-SCNC: 104 MMOL/L (ref 97–108)
CO2 SERPL-SCNC: 25 MMOL/L (ref 21–32)
COLOR UR: NORMAL
CREAT SERPL-MCNC: 1.34 MG/DL (ref 0.55–1.02)
EOSINOPHIL # BLD: 0 K/UL (ref 0–0.4)
EOSINOPHIL NFR BLD: 0 % (ref 0–7)
EPITH CASTS URNS QL MICRO: NORMAL /LPF
ERYTHROCYTE [DISTWIDTH] IN BLOOD BY AUTOMATED COUNT: 13.3 % (ref 11.5–14.5)
GLOBULIN SER CALC-MCNC: 4.7 G/DL (ref 2–4)
GLUCOSE SERPL-MCNC: 132 MG/DL (ref 65–100)
GLUCOSE UR STRIP.AUTO-MCNC: NEGATIVE MG/DL
HCT VFR BLD AUTO: 38.5 % (ref 35–47)
HGB BLD-MCNC: 12.9 G/DL (ref 11.5–16)
HGB UR QL STRIP: NEGATIVE
KETONES UR QL STRIP.AUTO: NEGATIVE MG/DL
LEUKOCYTE ESTERASE UR QL STRIP.AUTO: NEGATIVE
LYMPHOCYTES # BLD AUTO: 11 % (ref 12–49)
LYMPHOCYTES # BLD: 1.1 K/UL (ref 0.8–3.5)
MCH RBC QN AUTO: 27.4 PG (ref 26–34)
MCHC RBC AUTO-ENTMCNC: 33.5 G/DL (ref 30–36.5)
MCV RBC AUTO: 81.7 FL (ref 80–99)
MONOCYTES # BLD: 0.1 K/UL (ref 0–1)
MONOCYTES NFR BLD AUTO: 1 % (ref 5–13)
NEUTS SEG # BLD: 8.8 K/UL (ref 1.8–8)
NEUTS SEG NFR BLD AUTO: 88 % (ref 32–75)
NITRITE UR QL STRIP.AUTO: NEGATIVE
PH UR STRIP: 5 [PH] (ref 5–8)
PLATELET # BLD AUTO: 221 K/UL (ref 150–400)
POTASSIUM SERPL-SCNC: 3.2 MMOL/L (ref 3.5–5.1)
PROT SERPL-MCNC: 8.5 G/DL (ref 6.4–8.2)
PROT UR STRIP-MCNC: NEGATIVE MG/DL
RBC # BLD AUTO: 4.71 M/UL (ref 3.8–5.2)
RBC #/AREA URNS HPF: NORMAL /HPF (ref 0–5)
SODIUM SERPL-SCNC: 140 MMOL/L (ref 136–145)
SP GR UR REFRACTOMETRY: 1.01 (ref 1–1.03)
UA: UC IF INDICATED,UAUC: NORMAL
UROBILINOGEN UR QL STRIP.AUTO: 0.2 EU/DL (ref 0.2–1)
WBC # BLD AUTO: 10 K/UL (ref 3.6–11)
WBC URNS QL MICRO: NORMAL /HPF (ref 0–4)

## 2017-05-11 PROCEDURE — 81001 URINALYSIS AUTO W/SCOPE: CPT

## 2017-05-11 PROCEDURE — 80053 COMPREHEN METABOLIC PANEL: CPT

## 2017-05-11 PROCEDURE — 83880 ASSAY OF NATRIURETIC PEPTIDE: CPT

## 2017-05-11 PROCEDURE — 74011250637 HC RX REV CODE- 250/637: Performed by: PHYSICIAN ASSISTANT

## 2017-05-11 PROCEDURE — 36415 COLL VENOUS BLD VENIPUNCTURE: CPT

## 2017-05-11 PROCEDURE — 99283 EMERGENCY DEPT VISIT LOW MDM: CPT

## 2017-05-11 PROCEDURE — 85025 COMPLETE CBC W/AUTO DIFF WBC: CPT

## 2017-05-11 RX ORDER — BUTALBITAL, ACETAMINOPHEN AND CAFFEINE 50; 325; 40 MG/1; MG/1; MG/1
1 TABLET ORAL
Status: COMPLETED | OUTPATIENT
Start: 2017-05-11 | End: 2017-05-11

## 2017-05-11 RX ORDER — BUTALBITAL, ACETAMINOPHEN AND CAFFEINE 300; 40; 50 MG/1; MG/1; MG/1
1 CAPSULE ORAL
Qty: 20 CAP | Refills: 0 | Status: SHIPPED | OUTPATIENT
Start: 2017-05-11 | End: 2018-01-01

## 2017-05-11 RX ADMIN — BUTALBITAL, ACETAMINOPHEN, AND CAFFEINE 1 TABLET: 50; 325; 40 TABLET ORAL at 16:59

## 2017-05-11 NOTE — DISCHARGE INSTRUCTIONS
Leg and Ankle Edema: Care Instructions  Your Care Instructions  Swelling in the legs, ankles, and feet is called edema. It is common after you sit or stand for a while. Long plane flights or car rides often cause swelling in the legs and feet. You may also have swelling if you have to stand for long periods of time at your job. Problems with the veins in the legs (varicose veins) and changes in hormones can also cause swelling. Sometimes the swelling in the ankles and feet is caused by a more serious problem, such as heart failure, infection, blood clots, or liver or kidney disease. Follow-up care is a key part of your treatment and safety. Be sure to make and go to all appointments, and call your doctor if you are having problems. Its also a good idea to know your test results and keep a list of the medicines you take. How can you care for yourself at home? · If your doctor gave you medicine, take it as prescribed. Call your doctor if you think you are having a problem with your medicine. · Whenever you are resting, raise your legs up. Try to keep the swollen area higher than the level of your heart. · Take breaks from standing or sitting in one position. ¨ Walk around to increase the blood flow in your lower legs. ¨ Move your feet and ankles often while you stand, or tighten and relax your leg muscles. · Wear support stockings. Put them on in the morning, before swelling gets worse. · Eat a balanced diet. Lose weight if you need to. · Limit the amount of salt (sodium) in your diet. Salt holds fluid in the body and may increase swelling. When should you call for help? Call 911 anytime you think you may need emergency care. For example, call if:  · You have symptoms of a blood clot in your lung (called a pulmonary embolism). These may include:  ¨ Sudden chest pain. ¨ Trouble breathing. ¨ Coughing up blood.   Call your doctor now or seek immediate medical care if:  · You have signs of a blood clot, such as:  ¨ Pain in your calf, back of the knee, thigh, or groin. ¨ Redness and swelling in your leg or groin. · You have symptoms of infection, such as:  ¨ Increased pain, swelling, warmth, or redness. ¨ Red streaks or pus. ¨ A fever. Watch closely for changes in your health, and be sure to contact your doctor if:  · Your swelling is getting worse. · You have new or worsening pain in your legs. · You do not get better as expected. Where can you learn more? Go to http://marta-naomy.info/. Enter B690 in the search box to learn more about \"Leg and Ankle Edema: Care Instructions. \"  Current as of: May 27, 2016  Content Version: 11.2  © 8393-0614 [employer]. Care instructions adapted under license by [employer] (which disclaims liability or warranty for this information). If you have questions about a medical condition or this instruction, always ask your healthcare professional. [place] [address] any warranty or liability for your use of this information. Headache: Care Instructions  Your Care Instructions    Headaches have many possible causes. Most headaches aren't a sign of a more serious problem, and they will get better on their own. Home treatment may help you feel better faster. The doctor has checked you carefully, but problems can develop later. If you notice any problems or new symptoms, get medical treatment right away. Follow-up care is a key part of your treatment and safety. Be sure to make and go to all appointments, and call your doctor if you are having problems. It's also a good idea to know your test results and keep a list of the medicines you take. How can you care for yourself at home? · Do not drive if you have taken a prescription pain medicine. · Rest in a quiet, dark room until your headache is gone. Close your eyes and try to relax or go to sleep. Don't watch TV or read.   · Put a cold, moist cloth or cold pack on the painful area for 10 to 20 minutes at a time. Put a thin cloth between the cold pack and your skin. · Use a warm, moist towel or a heating pad set on low to relax tight shoulder and neck muscles. · Have someone gently massage your neck and shoulders. · Take pain medicines exactly as directed. ¨ If the doctor gave you a prescription medicine for pain, take it as prescribed. ¨ If you are not taking a prescription pain medicine, ask your doctor if you can take an over-the-counter medicine. · Be careful not to take pain medicine more often than the instructions allow, because you may get worse or more frequent headaches when the medicine wears off. · Do not ignore new symptoms that occur with a headache, such as a fever, weakness or numbness, vision changes, or confusion. These may be signs of a more serious problem. To prevent headaches  · Keep a headache diary so you can figure out what triggers your headaches. Avoiding triggers may help you prevent headaches. Record when each headache began, how long it lasted, and what the pain was like (throbbing, aching, stabbing, or dull). Write down any other symptoms you had with the headache, such as nausea, flashing lights or dark spots, or sensitivity to bright light or loud noise. Note if the headache occurred near your period. List anything that might have triggered the headache, such as certain foods (chocolate, cheese, wine) or odors, smoke, bright light, stress, or lack of sleep. · Find healthy ways to deal with stress. Headaches are most common during or right after stressful times. Take time to relax before and after you do something that has caused a headache in the past.  · Try to keep your muscles relaxed by keeping good posture. Check your jaw, face, neck, and shoulder muscles for tension, and try relaxing them. When sitting at a desk, change positions often, and stretch for 30 seconds each hour. · Get plenty of sleep and exercise.   · Eat regularly and well. Long periods without food can trigger a headache. · Treat yourself to a massage. Some people find that regular massages are very helpful in relieving tension. · Limit caffeine by not drinking too much coffee, tea, or soda. But don't quit caffeine suddenly, because that can also give you headaches. · Reduce eyestrain from computers by blinking frequently and looking away from the computer screen every so often. Make sure you have proper eyewear and that your monitor is set up properly, about an arm's length away. · Seek help if you have depression or anxiety. Your headaches may be linked to these conditions. Treatment can both prevent headaches and help with symptoms of anxiety or depression. When should you call for help? Call 911 anytime you think you may need emergency care. For example, call if:  · You have signs of a stroke. These may include:  ¨ Sudden numbness, paralysis, or weakness in your face, arm, or leg, especially on only one side of your body. ¨ Sudden vision changes. ¨ Sudden trouble speaking. ¨ Sudden confusion or trouble understanding simple statements. ¨ Sudden problems with walking or balance. ¨ A sudden, severe headache that is different from past headaches. Call your doctor now or seek immediate medical care if:  · You have a new or worse headache. · Your headache gets much worse. Where can you learn more? Go to http://marta-naomy.info/. Enter M271 in the search box to learn more about \"Headache: Care Instructions. \"  Current as of: October 14, 2016  Content Version: 11.2  © 6568-3974 [employer]. Care instructions adapted under license by [employer] (which disclaims liability or warranty for this information).  If you have questions about a medical condition or this instruction, always ask your healthcare professional. [place] [address] any warranty or liability for your use of this information. Foot Pain: Care Instructions  Your Care Instructions  Foot injuries that cause pain and swelling are fairly common. Almost all sports or home repair projects can cause a misstep that ends up as foot pain. Normal wear and tear, especially as you get older, also can cause foot pain. Most minor foot injuries will heal on their own, and home treatment is usually all you need to do. If you have a severe injury, you may need tests and treatment. Follow-up care is a key part of your treatment and safety. Be sure to make and go to all appointments, and call your doctor if you are having problems. Its also a good idea to know your test results and keep a list of the medicines you take. How can you care for yourself at home? · Take pain medicines exactly as directed. ¨ If the doctor gave you a prescription medicine for pain, take it as prescribed. ¨ If you are not taking a prescription pain medicine, ask your doctor if you can take an over-the-counter medicine. · Rest and protect your foot. Take a break from any activity that may cause pain. · Put ice or a cold pack on your foot for 10 to 20 minutes at a time. Put a thin cloth between the ice and your skin. · Prop up the sore foot on a pillow when you ice it or anytime you sit or lie down during the next 3 days. Try to keep it above the level of your heart. This will help reduce swelling. · Your doctor may recommend that you wrap your foot with an elastic bandage. Keep your foot wrapped for as long as your doctor advises. · If your doctor recommends crutches, use them as directed. · Wear roomy footwear. · As soon as pain and swelling end, begin gentle exercises of your foot. Your doctor can tell you which exercises will help. When should you call for help? Call 911 anytime you think you may need emergency care. For example, call if:  · Your foot turns pale, white, blue, or cold.   Call your doctor now or seek immediate medical care if:  · You cannot move or stand on your foot. · Your foot looks twisted or out of its normal position. · Your foot is not stable when you step down. · You have signs of infection, such as:  ¨ Increased pain, swelling, warmth, or redness. ¨ Red streaks leading from the sore area. ¨ Pus draining from a place on your foot. ¨ A fever. · Your foot is numb or tingly. Watch closely for changes in your health, and be sure to contact your doctor if:  · You do not get better as expected. · You have bruises from an injury that last longer than 2 weeks. Where can you learn more? Go to http://marta-naomy.info/. Enter R730 in the search box to learn more about \"Foot Pain: Care Instructions. \"  Current as of: May 23, 2016  Content Version: 11.2  © 5106-2347 [email or web address]. Care instructions adapted under license by [employer] (which disclaims liability or warranty for this information). If you have questions about a medical condition or this instruction, always ask your healthcare professional. [address] any warranty or liability for your use of this information.

## 2017-05-11 NOTE — ED NOTES
Provided with a urine cup and instructed on how to obtain clean urine sample. Pt ambulated to restroom with steady gait to obtain urine sample.

## 2017-05-11 NOTE — PROGRESS NOTES
Patient brought to ED via EMS for complaint of left foot pain and edema - Hx of CHF. Patient has been seen in ED and ready for discharge. Patient states she has no one that can pick her up from hospital as everyone is \"out of town for [place] Patient denies any other resources for transport, including other family or friends. CM contacted patient's daughter - Eduardo Aguilera - [phone] - who reports she has a sister that lives over an hour from the hospital and cannot  patient. Daughter also denies ability to assist patient with cost of transport. CM has no option but to provide patient with [employer] voucher to her home at [address] [place] [address]. Patient's daughter states patient is competent to be alone at home. Patient states she has keys and can access home.     Kailey Coffman RN, BSN, ACM  ED Case Manager  [phone]

## 2017-05-11 NOTE — ED NOTES
Pt provided with cab ticket. Pt insistent on waiting in waiting room. Ambulated with a steady gait to waiting room after declining wheelchair ride out of ED. No other complaints voiced at this time.

## 2017-05-11 NOTE — ED PROVIDER NOTES
The history is provided by the patient. No  was used. Lorena Gomez is a 67 y.o. female who presents via EMS in [address] [facility] to [facility] ED, with PMH of depression, AMI, CHF, migraines,  c/o 4/10 scale pain when she suddenly looked down and noticed B foot/ankle pain/swelling with L > R  and discoloration to left lateral foot. Patient advises she felt discomfort from left medial ankle up left medial calf to left medial knee a few minutes before she noticed her left foot discoloration. Patient advises the left foot discoloration is improving since onset. Patient has experienced recent SOB with increase of SOB today due to \"being scared\",  sweats, toe swelling, recent urinary urgency with low output, dysuria for a few weeks, itching of left foot, lower back pain, nausea, ABD bloating sensation(last BM yesterday) and h/a. Patient has had no fever/chills, left heel pain, CP, vomiting, wounds. Patient has been taking a lot of Excedrin daily due to having hx h/a; excedrin has worked for her discomfort. Patient's last dose of excedrin was 1000 today. Patient denies recent change  in lasix dosing; patient takes lasix 10 mg twice daily; patient advises for past 2 weeks \"lasix has not been working\". For past x 2 weeks patient has experienced urinary urgency but has had low output. Patient with hx of CHF x 10 years,  but has not had a cardiac evaluation by a cardiologist in many years; patient's PMD does her heart evaluations and lasix dosing. Patient's most recent PMD evaluation was x 1 month ago. Patient with no other specific c/o or concerns today. Patient advises no recent changes in medications however, patient did recently begin to take flonase as prescribed by her ENT for nasal issues; patient has never take flonase before.  Patient has not used compression hose in past.       PCP: Marilynn Lynn MD  Allergies: cipro, codeine,influenza vaccine, PCN  Social Hx: former tobacco, no alcohol    There are no other complaints, changes or physical findings at this time. Past Medical History:   Diagnosis Date    CHF (congestive heart failure) (HCC)     Other ill-defined conditions     AMI    Psychiatric disorder     depression       Past Surgical History:   Procedure Laterality Date    CARDIAC SURG PROCEDURE UNLIST      stents    HX APPENDECTOMY      HX CHOLECYSTECTOMY      HX GI      adhesions    HX GYN      hysterectomy         History reviewed. No pertinent family history. Social History     Social History    Marital status:      Spouse name: N/A    Number of children: 3    Years of education: N/A     Occupational History     Retired     Social History Main Topics    Smoking status: Former Smoker     Packs/day: 0.10     Quit date: 11/1/2014    Smokeless tobacco: Not on file    Alcohol use No    Drug use: No    Sexual activity: Yes     Partners: Male     Birth control/ protection: None     Other Topics Concern    Not on file     Social History Narrative    Lives alone         ALLERGIES: Ciprofloxacin; Codeine; Influenza virus vaccine, specific; and Pcn [penicillins]    Review of Systems   Constitutional: Positive for diaphoresis. Negative for chills and fever. HENT: Negative for sore throat. Eyes: Negative for pain. Respiratory: Positive for shortness of breath. Negative for cough. Cardiovascular: Negative for chest pain. Gastrointestinal: Positive for nausea. Negative for abdominal pain, diarrhea and vomiting. Sensation of ABD bloating   Genitourinary: Positive for decreased urine volume, dysuria and urgency. Negative for hematuria. Musculoskeletal: Positive for arthralgias (left foot) and back pain. Negative for myalgias. Lower back pain, B foot pain/swelling with L > R   Skin: Negative for rash and wound. Neurological: Positive for headaches. Negative for dizziness, light-headedness and numbness.    Psychiatric/Behavioral: Negative for behavioral problems and confusion. All other systems reviewed and are negative. Vitals:    05/11/17 1507   BP: 147/71   Pulse: 73   Resp: 18   SpO2: 96%   Weight: 72.6 kg (160 lb)   Height: 5' 6\" (1.676 m)            Physical Exam   Constitutional: She is oriented to person, place, and time. She appears well-developed and well-nourished. No distress. 68 y/o  female in no acute distress   HENT:   Head: Normocephalic and atraumatic. Right Ear: External ear normal.   Left Ear: External ear normal.   Nose: Nose normal.   Eyes: Conjunctivae and EOM are normal.   Neck: Normal range of motion. Neck supple. Cardiovascular: Normal rate, regular rhythm, normal heart sounds and intact distal pulses. No murmur heard. Pulmonary/Chest: Effort normal and breath sounds normal. No respiratory distress. She has no wheezes. Abdominal: Soft. Bowel sounds are normal. She exhibits no distension. There is no tenderness. There is no guarding. Musculoskeletal: Normal range of motion. Left foot: There is tenderness. There is normal range of motion, no deformity and no laceration. 1+ symmetric pitting edema in bilateral feet. Mild ecchymosis present on lateral aspect of left foot    Neurological: She is alert and oriented to person, place, and time. Skin: Skin is warm and dry. No rash noted. She is not diaphoretic. Psychiatric: She has a normal mood and affect. Her behavior is normal. Judgment normal.   Nursing note and vitals reviewed. MDM  Number of Diagnoses or Management Options  Headache, unspecified headache type:   History of CHF (congestive heart failure):   Left foot pain:   Pitting edema:   Diagnosis management comments: DDx: UTI, pitting edema, pyelo, CHF, electrolyte abnormality, migraine    Patient presents with left foot pain and swelling x 1 day. Reports history of CHF and taking Lasix 10 mg BID. Bilateral 1+ pitting edema present in bilateral feet. Small area of ecchymosis present to left foot.  No recent trauma or fall. Suspect pain and swelling related to history of CHF and may need Lasix prescription adjusted. Will assess with basic lab work and recommend close f/u with her PCP for likely medication adjustment. Amount and/or Complexity of Data Reviewed  Clinical lab tests: ordered and reviewed    Patient Progress  Patient progress: stable    ED Course       Procedures     Chief Complaint   Patient presents with    Foot Swelling     Sudden onset of left foot/leg swelling today around 1200. Pt. denies injury and reports compliance with daily lasix.  Leg Pain     LLE discomfort. No calf tenderness, warmth or redness noted. Area of Ecchymosis noted to left lateral aspect of foot. Dorsalis pedis pulses palpable on both extremities. Pt. reports pain with weight bearing. 4:36 PM  The patients presenting problems have been discussed, and they are in agreement with the care plan formulated and outlined with them. I have encouraged them to ask questions as they arise throughout their visit.     MEDICATIONS GIVEN:  Medications   butalbital-acetaminophen-caffeine (FIORICET, ESGIC) -40 mg per tablet 1 Tab (1 Tab Oral Given 5/11/17 8765)       LABS REVIEWED:  Recent Results (from the past 24 hour(s))   URINALYSIS W/ REFLEX CULTURE    Collection Time: 05/11/17  4:25 PM   Result Value Ref Range    Color YELLOW/STRAW      Appearance CLEAR CLEAR      Specific gravity 1.009 1.003 - 1.030      pH (UA) 5.0 5.0 - 8.0      Protein NEGATIVE  NEG mg/dL    Glucose NEGATIVE  NEG mg/dL    Ketone NEGATIVE  NEG mg/dL    Bilirubin NEGATIVE  NEG      Blood NEGATIVE  NEG      Urobilinogen 0.2 0.2 - 1.0 EU/dL    Nitrites NEGATIVE  NEG      Leukocyte Esterase NEGATIVE  NEG      WBC 0-4 0 - 4 /hpf    RBC 0-5 0 - 5 /hpf    Epithelial cells FEW FEW /lpf    Bacteria NEGATIVE  NEG /hpf    UA:UC IF INDICATED CULTURE NOT INDICATED BY UA RESULT CNI     CBC WITH AUTOMATED DIFF    Collection Time: 05/11/17  4:25 PM   Result Value Ref Range    WBC 10.0 3.6 - 11.0 K/uL    RBC 4.71 3.80 - 5.20 M/uL    HGB 12.9 11.5 - 16.0 g/dL    HCT 38.5 35.0 - 47.0 %    MCV 81.7 80.0 - 99.0 FL    MCH 27.4 26.0 - 34.0 PG    MCHC 33.5 30.0 - 36.5 g/dL    RDW 13.3 11.5 - 14.5 %    PLATELET 085 599 - 209 K/uL    NEUTROPHILS 88 (H) 32 - 75 %    LYMPHOCYTES 11 (L) 12 - 49 %    MONOCYTES 1 (L) 5 - 13 %    EOSINOPHILS 0 0 - 7 %    BASOPHILS 0 0 - 1 %    ABS. NEUTROPHILS 8.8 (H) 1.8 - 8.0 K/UL    ABS. LYMPHOCYTES 1.1 0.8 - 3.5 K/UL    ABS. MONOCYTES 0.1 0.0 - 1.0 K/UL    ABS. EOSINOPHILS 0.0 0.0 - 0.4 K/UL    ABS. BASOPHILS 0.0 0.0 - 0.1 K/UL   METABOLIC PANEL, COMPREHENSIVE    Collection Time: 05/11/17  4:25 PM   Result Value Ref Range    Sodium 140 136 - 145 mmol/L    Potassium 3.2 (L) 3.5 - 5.1 mmol/L    Chloride 104 97 - 108 mmol/L    CO2 25 21 - 32 mmol/L    Anion gap 11 5 - 15 mmol/L    Glucose 132 (H) 65 - 100 mg/dL    BUN 21 (H) 6 - 20 MG/DL    Creatinine 1.34 (H) 0.55 - 1.02 MG/DL    BUN/Creatinine ratio 16 12 - 20      GFR est AA 47 (L) >60 ml/min/1.73m2    GFR est non-AA 39 (L) >60 ml/min/1.73m2    Calcium 9.2 8.5 - 10.1 MG/DL    Bilirubin, total 0.3 0.2 - 1.0 MG/DL    ALT (SGPT) 14 12 - 78 U/L    AST (SGOT) 10 (L) 15 - 37 U/L    Alk. phosphatase 114 45 - 117 U/L    Protein, total 8.5 (H) 6.4 - 8.2 g/dL    Albumin 3.8 3.5 - 5.0 g/dL    Globulin 4.7 (H) 2.0 - 4.0 g/dL    A-G Ratio 0.8 (L) 1.1 - 2.2     PRO-BNP    Collection Time: 05/11/17  4:25 PM   Result Value Ref Range    NT pro-BNP 9504 (H) 0 - 125 PG/ML       VITAL SIGNS:  Patient Vitals for the past 12 hrs:   Pulse Resp BP SpO2   05/11/17 1507 73 18 147/71 96 %     PROGRESS NOTES:  3:39 PM  Patient noted to be ambulatory to /from bathroom without assistance or difficulty. 4:36 PM  Initial assessment of patient complete, and patient was given the opportunity to ask questions. Plan of care reviewed with patient and will update when results are available.     5:04 PM  I have just reevaluated the patient. I have reviewed her vital signs and determined there is currently no worsening in their condition or physical exam. Results have been reviewed with them and their questions have been answered. We will continue to review further results as they come available. Patient is requesting something for her headache.    5:30 PM  I have just reevaluated the patient. I have reviewed her vital signs and determined there is currently no worsening in their condition or physical exam. Results have been reviewed with them and their questions have been answered. 5:28 PM  FLORY Perez and FLORY aGllo at bedside. Patient advised of the change in caregiver while in ER today and reviewing with patient the available test results and the anticipated discharge instructions. Patient verbalizes understanding. SIGN OUT:  5:31 PM  Patient's presentation, labs/imaging and plan of care was reviewed with FLORY Gallo as part of sign out. They will review additional test results as part of the plan discussed with the patient. FLORY Gallo's assistance in completion of this plan is greatly appreciated but it should be noted that I will be the provider of record for this patient. This note is prepared by Gagan Medina, acting as Scribe for FLORY Cool PA-C  : The scribe's documentation has been prepared under my direction and personally reviewed by me in its entirety. I confirm that the note above accurately reflects all work, treatment, procedures, and medical decision making performed by me. DIAGNOSIS:    1. Left foot pain    2. Pitting edema    3. History of CHF (congestive heart failure)    4. Headache, unspecified headache type        PLAN:  1. Discharge home  2. Medications as directed  3. F/u with PCP in 2-3 days  4.  Return precautions reviewed    Follow-up Information     Follow up With Details Comments Mattie Gu MD Schedule an appointment as soon as possible for a visit in 3 days  [phone] [address] [address]  [phone]      hospitals EMERGENCY DEPT  As needed, If symptoms worsen [address] [facility] [address]  [phone]        Discharge Medication List as of 5/11/2017  6:01 PM      START taking these medications    Details   butalbital-acetaminophen-caff (FIORICET) -40 mg per capsule Take 1 Cap by mouth every four (4) hours as needed for Pain. Max Daily Amount: 6 Caps., Print, Disp-20 Cap, R-0         CONTINUE these medications which have NOT CHANGED    Details   HYDROcodone-acetaminophen (NORCO) 5-325 mg per tablet Take 1 Tab by mouth every six (6) hours as needed for Pain. Max Daily Amount: 4 Tabs. Do not take with klonipine, Print, Disp-16 Tab, R-0      clonazePAM (KLONOPIN) 1 mg tablet TAKE TWO TABLETS BY MOUTH TWICE DAILY, Print, Disp-120 Tab, R-0      predniSONE (DELTASONE) 10 mg tablet 4 tabs for 2 day, 3 tabs for 2 days, 2 tabs for 2 days, 1tab for 2 days, Normal, Disp-20 Tab, R-1      omeprazole (PRILOSEC) 20 mg capsule Take 1 Cap by mouth daily. Take only as needed, Normal, Disp-30 Cap, R-5      fluticasone (FLONASE) 50 mcg/actuation nasal spray 1 spray each nostril daily, Normal, Disp-1 Bottle, R-5      arformoterol (BROVANA) 15 mcg/2 mL nebu neb solution 2 mL by Nebulization route two (2) times a day., Normal, Disp-180 Vial, R-3      furosemide (LASIX) 20 mg tablet Take 2 Tabs by mouth daily. , Normal, Disp-180 Tab, R-3      lisinopril (PRINIVIL, ZESTRIL) 5 mg tablet Take 1 Tab by mouth daily. , Normal, Disp-30 Tab, R-5      albuterol (PROVENTIL HFA, VENTOLIN HFA, PROAIR HFA) 90 mcg/actuation inhaler Take 1 Puff by inhalation every six (6) hours as needed for Wheezing., Normal, Disp-1 Inhaler, R-5      atorvastatin (LIPITOR) 40 mg tablet Take 1 Tab by mouth every evening., Normal, Disp-90 Tab, R-3      omega-3 fatty acids-vitamin e (FISH OIL) 1,000 mg cap Take 1 Cap by mouth., Historical Med      nitroglycerin (NITROSTAT) 0.4 mg SL tablet 1 Tab by SubLINGual route every five (5) minutes as needed. Indications: ANGINA, Normal, Disp-25 Tab, R-5      aspirin 81 mg chewable tablet Take 1 Tab by mouth daily. , OTC      cyanocobalamin (VITAMIN B-12) 100 mcg tablet Take 100 mcg by mouth daily. , Historical Med      ASPIRIN/ACETAMINOPHEN/CAFFEINE (EXCEDRIN MIGRAINE PO) Take 1 Tab by mouth as needed., Historical Med               ED COURSE: The patients hospital course has been uncomplicated. DISCHARGE NOTE:  6:04 PM  The care plan has been outline with the patient and/or family, who verbally conveyed understanding and agreement. Available results have been reviewed. Patient and/or family understand the follow up plan as outlined and discharge instructions. Should their condition deterioration at any time after discharge the patient agrees to return, follow up sooner than outlined or seek medical assistance at the closest Emergency Room as soon as possible. Questions have been answered. Discharge instructions and educational information regarding the patient's diagnosis as well a list of reasons why the patient would want to seek immediate medical attention, should their condition change, were reviewed directly with the patient/family     This note is prepared by Berto Sargent, acting as a Scribe for [employer], [employer]. [facility], [place]: The scribe's documentation has been prepared under my direction and personally reviewed by me in its entirety. I confirm that the notes above accurately reflects all work, treatment, procedures, and medical decision making performed by me.          This note will not be viewable in [facility]

## 2017-05-11 NOTE — ED NOTES
Discharge instructions reviewed with pt and given by MAMTA Brown. Spoke with case management for ride home for pt.

## 2017-05-11 NOTE — ED NOTES
Medication provided for headache and pillow for comfort measures. Awaiting lab results. No other complaints voiced at this time.

## 2017-05-23 NOTE — TELEPHONE ENCOUNTER
Patient called in reference to wanting to speak with someone about her labs not being covered. Please call her at [phone].

## 2017-05-25 NOTE — TELEPHONE ENCOUNTER
2nd call from pt is ref to her labs work not being covered and her receiving a bill for them. the patient would like a call back asap

## 2017-05-25 NOTE — TELEPHONE ENCOUNTER
Called patient - she states that she received a denial letter about her labs at [facility] - letter states that they are not covered, only at yearly physicals - she is coming to see Dr iMma Farfan next week and will bring the letter with her when she comes - Dedrick Franco LPN  9/99/9821  6:65 PM

## 2017-05-31 NOTE — PROGRESS NOTES
Chief Complaint   Patient presents with    Foot Swelling     follow up     I have reviewed the patient's medical history in detail and updated the computerized patient record. Health Maintenance reviewed. 1. Have you been to the ER, urgent care clinic since your last visit? Hospitalized since your last visit?no    2. Have you seen or consulted any other health care providers outside of the [address] [place] [address] since your last visit? Include any pap smears or colon screening. No    Do you have an [address] in place in the event that you have a healthcare crisis that could impact your decision making as it pertains to your health?no    Would you like information about the [address] [place] [address] given. no

## 2017-05-31 NOTE — PROGRESS NOTES
HISTORY OF PRESENT ILLNESS  Margarette Alvarado is a 67 y.o. female. Head Pain    The history is provided by the patient. This is a recurrent problem. Episode onset: few months. Episode frequency: daily. The problem has been gradually worsening. The headache is aggravated by nausea (sinuses). The pain is located in the bilateral and frontal region. The quality of the pain is described as throbbing. The pain is severe (at times). Associated symptoms include nausea. Pertinent negatives include no shortness of breath and no dizziness. Treatments tried: prednisone. The treatment provided mild relief. she is finished her narcotic agent. Requests refills of her prednisone and clonazepam.  Says without it she would go crazy. Takes her blood pressure pills no complaints with it but blood pressure runs quite low. Current Outpatient Prescriptions   Medication Sig Dispense Refill    butalbital-acetaminophen-caff (FIORICET) -40 mg per capsule Take 1 Cap by mouth every four (4) hours as needed for Pain. Max Daily Amount: 6 Caps. 20 Cap 0    clonazePAM (KLONOPIN) 1 mg tablet TAKE TWO TABLETS BY MOUTH TWICE DAILY 120 Tab 0    predniSONE (DELTASONE) 10 mg tablet 4 tabs for 2 day, 3 tabs for 2 days, 2 tabs for 2 days, 1tab for 2 days 20 Tab 1    omeprazole (PRILOSEC) 20 mg capsule Take 1 Cap by mouth daily. Take only as needed 30 Cap 5    fluticasone (FLONASE) 50 mcg/actuation nasal spray 1 spray each nostril daily 1 Bottle 5    arformoterol (BROVANA) 15 mcg/2 mL nebu neb solution 2 mL by Nebulization route two (2) times a day. 180 Vial 3    furosemide (LASIX) 20 mg tablet Take 2 Tabs by mouth daily. 180 Tab 3    albuterol (PROVENTIL HFA, VENTOLIN HFA, PROAIR HFA) 90 mcg/actuation inhaler Take 1 Puff by inhalation every six (6) hours as needed for Wheezing. 1 Inhaler 5    atorvastatin (LIPITOR) 40 mg tablet Take 1 Tab by mouth every evening.  90 Tab 3    omega-3 fatty acids-vitamin e (FISH OIL) 1,000 mg cap Take 1 Cap by mouth.  nitroglycerin (NITROSTAT) 0.4 mg SL tablet 1 Tab by SubLINGual route every five (5) minutes as needed. Indications: ANGINA 25 Tab 5    aspirin 81 mg chewable tablet Take 1 Tab by mouth daily.  cyanocobalamin (VITAMIN B-12) 100 mcg tablet Take 100 mcg by mouth daily.  ASPIRIN/ACETAMINOPHEN/CAFFEINE (EXCEDRIN MIGRAINE PO) Take 1 Tab by mouth as needed. Allergies   Allergen Reactions    Ciprofloxacin Shortness of Breath    Codeine Nausea Only    Influenza Virus Vaccine, Specific Unknown (comments)     Allergy documented in Admission Data base 1 screening    Pcn [Penicillins] Itching       Review of Systems   Respiratory: Negative for shortness of breath. Cardiovascular: Negative for chest pain. Gastrointestinal: Positive for nausea. Neurological: Negative for dizziness and focal weakness. Psychiatric/Behavioral: The patient is nervous/anxious. Physical Exam  Visit Vitals    /56    Pulse (!) 57    Temp 96.9 °F (36.1 °C) (Oral)    Resp 18    Ht 5' 6\" (1.676 m)    Wt 163 lb (73.9 kg)    SpO2 97%    BMI 26.31 kg/m2     Well developed well nourished no acute distress. Pupils equal round react to light, extraocular muscles intact. Tympanic membranes within normal limits throat unremarkable  Cranial nerves II through XII are intact. Neck unremarkable  Heart regular rate and rhythm without clicks murmurs rubs  Lungs are clear to auscultation  Abdomen soft. Extremities, motor 5 out of 5 bilaterally, reflexes 1+ equal bilaterally. ASSESSMENT and PLAN  Encounter Diagnoses   Name Primary?     Migraine with aura and without status migrainosus, not intractable Yes    Essential hypertension     Anxiety     Elevated cholesterol      Orders Placed This Encounter    predniSONE (DELTASONE) 10 mg tablet    clonazePAM (KLONOPIN) 1 mg tablet    metoprolol tartrate (LOPRESSOR) 25 mg tablet    DISCONTD: potassium chloride (KLOR-CON) 10 mEq tablet    potassium chloride (KLOR-CON) 10 mEq tablet    will try migraine prophylactic medicine beta-blocker as above. Discussed possible side affects, precautions, and drug interactions and possible benefits of the medication(s). Refilled prednisone which helps her headaches. Last labs showed low potassium she takes Lasix, add potassium. Refilled her anxiety medicine discussed how we eventually would need to get her off that but will not do that currently  Follow-up Disposition:  Return in about 4 weeks (around 6/28/2017) for routine follow up.

## 2017-05-31 NOTE — MR AVS SNAPSHOT
Visit Information Date & Time Provider Department Dept. Phone Encounter #  
 5/31/2017  3:15 PM Michael Felder  Kay Ortega [id number] Follow-up Instructions Return in about 4 weeks (around 6/28/2017) for routine follow up. Upcoming Health Maintenance Date Due  
 GLAUCOMA SCREENING Q2Y 1/3/2010 MEDICARE YEARLY EXAM 2/1/2018 BREAST CANCER SCRN MAMMOGRAM 11/11/2018 COLONOSCOPY 12/24/2019 DTaP/Tdap/Td series (2 - Td) 4/28/2025 Allergies as of 5/31/2017  Review Complete On: 5/31/2017 By: Adolfo Parkinson LPN Severity Noted Reaction Type Reactions Ciprofloxacin  05/06/2010    Shortness of Breath Codeine  05/06/2010    Nausea Only Influenza Virus Vaccine, Specific  03/10/2014   Not Verified Unknown (comments) Allergy documented in Admission Data base 1 screening Pcn [Penicillins]  05/06/2010    Itching Current Immunizations  Reviewed on 5/6/2010 Name Date Influenza Vaccine  Deferred (Contraindication) Pneumococcal Conjugate (PCV-13) 1/31/2017 Pneumococcal Polysaccharide (PPSV-23) 4/28/2015,  Deferred (Contraindication) Tdap 4/28/2015 Not reviewed this visit You Were Diagnosed With   
  
 Codes Comments Migraine with aura and without status migrainosus, not intractable    -  Primary ICD-10-CM: G43.109 ICD-9-CM: 346.00 Essential hypertension     ICD-10-CM: I10 
ICD-9-CM: 401.9 Anxiety     ICD-10-CM: F41.9 ICD-9-CM: 300.00 Elevated cholesterol     ICD-10-CM: E78.00 ICD-9-CM: 272.0 Vitals BP Pulse Temp Resp Height(growth percentile) Weight(growth percentile) 104/56 (!) 57 96.9 °F (36.1 °C) (Oral) 18 5' 6\" (1.676 m) 163 lb (73.9 kg) SpO2 BMI OB Status Smoking Status 97% 26.31 kg/m2 Hysterectomy Former Smoker Vitals History BMI and BSA Data Body Mass Index Body Surface Area  
 26.31 kg/m 2 1.86 m 2 Preferred Pharmacy Pharmacy Name Phone [facility] [address], [address] [phone] Your Updated Medication List  
  
   
This list is accurate as of: 5/31/17  3:42 PM.  Always use your most recent med list.  
  
  
  
  
 albuterol 90 mcg/actuation inhaler Commonly known as:  PROVENTIL HFA, VENTOLIN HFA, PROAIR HFA Take 1 Puff by inhalation every six (6) hours as needed for Wheezing. arformoterol 15 mcg/2 mL Nebu neb solution Commonly known as:  Sudarshan Lincoln 2 mL by Nebulization route two (2) times a day. aspirin 81 mg chewable tablet Take 1 Tab by mouth daily. atorvastatin 40 mg tablet Commonly known as:  LIPITOR Take 1 Tab by mouth every evening. butalbital-acetaminophen-caff -40 mg per capsule Commonly known as:  [employer] Take 1 Cap by mouth every four (4) hours as needed for Pain. Max Daily Amount: 6 Caps. clonazePAM 1 mg tablet Commonly known as:  KlonoPIN  
TAKE TWO TABLETS BY MOUTH TWICE DAILY EXCEDRIN MIGRAINE PO Take 1 Tab by mouth as needed. FISH OIL 1,000 mg Cap Generic drug:  omega-3 fatty acids-vitamin e Take 1 Cap by mouth. fluticasone 50 mcg/actuation nasal spray Commonly known as:  FLONASE  
1 spray each nostril daily  
  
 furosemide 20 mg tablet Commonly known as:  LASIX Take 2 Tabs by mouth daily. metoprolol tartrate 25 mg tablet Commonly known as:  LOPRESSOR Take 1 Tab by mouth daily. nitroglycerin 0.4 mg SL tablet Commonly known as:  NITROSTAT  
1 Tab by SubLINGual route every five (5) minutes as needed. Indications: ANGINA  
  
 omeprazole 20 mg capsule Commonly known as:  PRILOSEC Take 1 Cap by mouth daily. Take only as needed  
  
 potassium chloride 10 mEq tablet Commonly known as:  KLOR-CON Take 1 Tab by mouth daily. predniSONE 10 mg tablet Commonly known as:  DELTASONE  
4 tabs for 2 day, 3 tabs for 2 days, 2 tabs for 2 days, 1tab for 2 days as needed for headache VITAMIN B-12 100 mcg tablet Generic drug:  cyanocobalamin Take 100 mcg by mouth daily. Prescriptions Printed Refills  
 predniSONE (DELTASONE) 10 mg tablet 1 Si tabs for 2 day, 3 tabs for 2 days, 2 tabs for 2 days, 1tab for 2 days as needed for headache Class: Print  
 clonazePAM (KLONOPIN) 1 mg tablet 1 Sig: TAKE TWO TABLETS BY MOUTH TWICE DAILY Class: Print  
 metoprolol tartrate (LOPRESSOR) 25 mg tablet 5 Sig: Take 1 Tab by mouth daily. Class: Print Route: Oral  
  
Prescriptions Sent to Pharmacy Refills  
 potassium chloride (KLOR-CON) 10 mEq tablet 5 Sig: Take 1 Tab by mouth daily. Class: Normal  
 Pharmacy: [address] #: [phone] Route: Oral  
  
Follow-up Instructions Return in about 4 weeks (around 2017) for routine follow up. Introducing [facility] & HEALTH SERVICES! Nazario Meneses introduces [employer] patient portal. Now you can access parts of your medical record, email your doctor's office, and request medication refills online. 1. In your internet browser, go to [email or web address] 2. Click on the First Time User? Click Here link in the Sign In box. You will see the New Member Sign Up page. 3. Enter your [employer] Access Code exactly as it appears below. You will not need to use this code after youve completed the sign-up process. If you do not sign up before the expiration date, you must request a new code. · [employer] Access Code: [email or web address] Expires: 2017  4:21 PM 
 
4. Enter the last four digits of your Social Security Number (xxxx) and Date of Birth (mm/dd/yyyy) as indicated and click Submit. You will be taken to the next sign-up page. 5. Create a [employer] ID. This will be your [employer] login ID and cannot be changed, so think of one that is secure and easy to remember. 6. Create a [employer] password. You can change your password at any time. 7. Enter your Password Reset Question and Answer. This can be used at a later time if you forget your password. 8. Enter your e-mail address. You will receive e-mail notification when new information is available in [address] 9. Click Sign Up. You can now view and download portions of your medical record. 10. Click the Download Summary menu link to download a portable copy of your medical information. If you have questions, please visit the Frequently Asked Questions section of the [employer] website. Remember, [employer] is NOT to be used for urgent needs. For medical emergencies, dial 911. Now available from your iPhone and Android! Please provide this summary of care documentation to your next provider. Your primary care clinician is listed as Ramiro Daugherty. If you have any questions after today's visit, please call [phone].

## 2017-08-07 NOTE — PROGRESS NOTES
HISTORY OF PRESENT ILLNESS  Sven Shankar is a 67 y.o. female. Facial Pain   The history is provided by the patient. This is a new problem. Episode onset: 3 -4 days but coughing for 2 weeks. Associated symptoms include headaches. Pertinent negatives include no shortness of breath. Ear Pain   This is a new problem. Episode onset: 3-4 days. The problem occurs hourly. Associated symptoms include headaches. Pertinent negatives include no shortness of breath. Associated symptoms comments: Right ear. Treatments tried: neomycin hc. The treatment provided no relief. Past Medical History:   Diagnosis Date    CHF (congestive heart failure) (HCC)     Other ill-defined conditions     AMI    Psychiatric disorder     depression     Social History     Social History    Marital status:      Spouse name: N/A    Number of children: 3    Years of education: N/A     Occupational History     Retired     Social History Main Topics    Smoking status: Former Smoker     Packs/day: 0.10     Quit date: 11/1/2014    Smokeless tobacco: Not on file    Alcohol use No    Drug use: No    Sexual activity: Yes     Partners: Male     Birth control/ protection: None     Other Topics Concern    Not on file     Social History Narrative    Lives alone, kids are moving         Review of Systems   Constitutional: Negative for fever. HENT: Positive for sore throat. Respiratory: Positive for cough. Negative for hemoptysis, shortness of breath and wheezing. Skin: Negative for rash. Neurological: Positive for headaches. Negative for focal weakness. Psychiatric/Behavioral: The patient is nervous/anxious. Lots of family stressors       Physical Exam  Visit Vitals    /60    Pulse 91    Temp 96.8 °F (36 °C) (Oral)    Resp 20    Ht 5' 6\" (1.676 m)    Wt 168 lb (76.2 kg)    SpO2 99%    BMI 27.12 kg/m2       WDWN NAD  TM clear, No acute infection old perf?  On R, throat wnl  Neck no adenopathy  Heart RRR no C/M/R  Lungs CTA  Abdo soft non tender  Ext No redness swelling or edema    ASSESSMENT and PLAN  Encounter Diagnoses   Name Primary?  Pharyngitis, unspecified etiology Yes    Essential hypertension     Anxiety     Tobacco abuse      Orders Placed This Encounter    clonazePAM (KLONOPIN) 1 mg tablet    predniSONE (DELTASONE) 10 mg tablet    cephALEXin (KEFLEX) 500 mg capsule    omeprazole (PRILOSEC) 20 mg capsule     The patient was counseled on the dangers of tobacco use, and was advised to quit. Reviewed strategies to maximize success, including written materials. Discussed possible side affects, precautions, and drug interactions and possible benefits of the medication(s). Follow-up Disposition:  Return in about 6 weeks (around 9/18/2017) for routine follow up.

## 2017-08-07 NOTE — PATIENT INSTRUCTIONS

## 2017-08-07 NOTE — MR AVS SNAPSHOT
Visit Information Date & Time Provider Department Dept. Phone Encounter #  
 8/7/2017  9:00 AM [place] [address] [phone] Follow-up Instructions Return in about 6 weeks (around 9/18/2017) for routine follow up. Upcoming Health Maintenance Date Due  
 GLAUCOMA SCREENING Q2Y 1/3/2010 MEDICARE YEARLY EXAM 2/1/2018 BREAST CANCER SCRN MAMMOGRAM 11/11/2018 COLONOSCOPY 12/24/2019 DTaP/Tdap/Td series (2 - Td) 4/28/2025 Allergies as of 8/7/2017  Review Complete On: 8/7/2017 By: Carlos Chu LPN Severity Noted Reaction Type Reactions Ciprofloxacin  05/06/2010    Shortness of Breath Codeine  05/06/2010    Nausea Only Influenza Virus Vaccine, Specific  03/10/2014   Not Verified Unknown (comments) Allergy documented in Admission Data base 1 screening Pcn [Penicillins]  05/06/2010    Itching Current Immunizations  Reviewed on 5/6/2010 Name Date Influenza Vaccine  Deferred (Contraindication) Pneumococcal Conjugate (PCV-13) 1/31/2017 Pneumococcal Polysaccharide (PPSV-23) 4/28/2015,  Deferred (Contraindication) Tdap 4/28/2015 Not reviewed this visit You Were Diagnosed With   
  
 Codes Comments Pharyngitis, unspecified etiology    -  Primary ICD-10-CM: J02.9 ICD-9-CM: 599 Essential hypertension     ICD-10-CM: I10 
ICD-9-CM: 401.9 Anxiety     ICD-10-CM: F41.9 ICD-9-CM: 300.00 Tobacco abuse     ICD-10-CM: Z72.0 ICD-9-CM: 305.1 Vitals BP Pulse Temp Resp Height(growth percentile) Weight(growth percentile) 112/60 91 96.8 °F (36 °C) (Oral) 20 5' 6\" (1.676 m) 168 lb (76.2 kg) SpO2 BMI OB Status Smoking Status 99% 27.12 kg/m2 Hysterectomy Former Smoker Vitals History BMI and BSA Data Body Mass Index Body Surface Area  
 27.12 kg/m 2 1.88 m 2 Preferred Pharmacy Pharmacy Name Phone [facility] [address] [facility], [address] [place] [phone] Your Updated Medication List  
  
   
This list is accurate as of: 8/7/17  9:29 AM.  Always use your most recent med list.  
  
  
  
  
 albuterol 90 mcg/actuation inhaler Commonly known as:  PROVENTIL HFA, VENTOLIN HFA, PROAIR HFA Take 1 Puff by inhalation every six (6) hours as needed for Wheezing. arformoterol 15 mcg/2 mL Nebu neb solution Commonly known as:  Nils Mario 2 mL by Nebulization route two (2) times a day. aspirin 81 mg chewable tablet Take 1 Tab by mouth daily. atorvastatin 40 mg tablet Commonly known as:  LIPITOR Take 1 Tab by mouth every evening. butalbital-acetaminophen-caff -40 mg per capsule Commonly known as:  [employer] Take 1 Cap by mouth every four (4) hours as needed for Pain. Max Daily Amount: 6 Caps. cephALEXin 500 mg capsule Commonly known as:  Margean Skillern Take 1 Cap by mouth three (3) times daily for 7 days. clonazePAM 1 mg tablet Commonly known as:  KlonoPIN  
TAKE TWO TABLETS BY MOUTH TWICE DAILY EXCEDRIN MIGRAINE PO Take 1 Tab by mouth as needed. FISH OIL 1,000 mg Cap Generic drug:  omega-3 fatty acids-vitamin e Take 1 Cap by mouth. fluticasone 50 mcg/actuation nasal spray Commonly known as:  FLONASE  
1 spray each nostril daily  
  
 furosemide 20 mg tablet Commonly known as:  LASIX Take 2 Tabs by mouth daily. metoprolol tartrate 25 mg tablet Commonly known as:  LOPRESSOR Take 1 Tab by mouth daily. nitroglycerin 0.4 mg SL tablet Commonly known as:  NITROSTAT  
1 Tab by SubLINGual route every five (5) minutes as needed. Indications: ANGINA  
  
 omeprazole 20 mg capsule Commonly known as:  PRILOSEC Take 1 Cap by mouth daily. Take only as needed  
  
 potassium chloride 10 mEq tablet Commonly known as:  KLOR-CON Take 1 Tab by mouth daily. predniSONE 10 mg tablet Commonly known as:  DELTASONE  
4 tabs for 2 day, 3 tabs for 2 days, 2 tabs for 2 days, 1tab for 2 days as needed for headache VITAMIN B-12 100 mcg tablet Generic drug:  cyanocobalamin Take 100 mcg by mouth daily. Prescriptions Printed Refills  
 clonazePAM (KLONOPIN) 1 mg tablet 1 Sig: TAKE TWO TABLETS BY MOUTH TWICE DAILY Class: Print  
 predniSONE (DELTASONE) 10 mg tablet 1 Si tabs for 2 day, 3 tabs for 2 days, 2 tabs for 2 days, 1tab for 2 days as needed for headache Class: Print  
 cephALEXin (KEFLEX) 500 mg capsule 0 Sig: Take 1 Cap by mouth three (3) times daily for 7 days. Class: Print Route: Oral  
 omeprazole (PRILOSEC) 20 mg capsule 11 Sig: Take 1 Cap by mouth daily. Take only as needed Class: Print Route: Oral  
  
Follow-up Instructions Return in about 6 weeks (around 2017) for routine follow up. Patient Instructions Stopping Smoking: Care Instructions Your Care Instructions Cigarette smokers crave the nicotine in cigarettes. Giving it up is much harder than simply changing a habit. Your body has to stop craving the nicotine. It is hard to quit, but you can do it. There are many tools that people use to quit smoking. You may find that combining tools works best for you. There are several steps to quitting. First you get ready to quit. Then you get support to help you. After that, you learn new skills and behaviors to become a nonsmoker. For many people, a necessary step is getting and using medicine. Your doctor will help you set up the plan that best meets your needs. You may want to attend a smoking cessation program to help you quit smoking. When you choose a program, look for one that has proven success. Ask your doctor for ideas.  You will greatly increase your chances of success if you take medicine as well as get counseling or join a cessation program. 
 Some of the changes you feel when you first quit tobacco are uncomfortable. Your body will miss the nicotine at first, and you may feel short-tempered and grumpy. You may have trouble sleeping or concentrating. Medicine can help you deal with these symptoms. You may struggle with changing your smoking habits and rituals. The last step is the tricky one: Be prepared for the smoking urge to continue for a time. This is a lot to deal with, but keep at it. You will feel better. Follow-up care is a key part of your treatment and safety. Be sure to make and go to all appointments, and call your doctor if you are having problems. Its also a good idea to know your test results and keep a list of the medicines you take. How can you care for yourself at home? · Ask your family, friends, and coworkers for support. You have a better chance of quitting if you have help and support. · Join a support group, such as Nicotine Anonymous, for people who are trying to quit smoking. · Consider signing up for a smoking cessation program, such as the American Lung Association's Freedom from Smoking program. 
· Set a quit date. Pick your date carefully so that it is not right in the middle of a big deadline or stressful time. Once you quit, do not even take a puff. Get rid of all ashtrays and lighters after your last cigarette. Clean your house and your clothes so that they do not smell of smoke. · Learn how to be a nonsmoker. Think about ways you can avoid those things that make you reach for a cigarette. ¨ Avoid situations that put you at greatest risk for smoking. For some people, it is hard to have a drink with friends without smoking. For others, they might skip a coffee break with coworkers who smoke. ¨ Change your daily routine. Take a different route to work or eat a meal in a different place. · Cut down on stress.  Calm yourself or release tension by doing an activity you enjoy, such as reading a book, taking a hot bath, or gardening. · Talk to your doctor or pharmacist about nicotine replacement therapy, which replaces the nicotine in your body. You still get nicotine but you do not use tobacco. Nicotine replacement products help you slowly reduce the amount of nicotine you need. These products come in several forms, many of them available over-the-counter: ¨ Nicotine patches ¨ Nicotine gum and lozenges ¨ Nicotine inhaler · Ask your doctor about bupropion (Wellbutrin) or varenicline (Chantix), which are prescription medicines. They do not contain nicotine. They help you by reducing withdrawal symptoms, such as stress and anxiety. · Some people find hypnosis, acupuncture, and massage helpful for ending the smoking habit. · Eat a healthy diet and get regular exercise. Having healthy habits will help your body move past its craving for nicotine. · Be prepared to keep trying. Most people are not successful the first few times they try to quit. Do not get mad at yourself if you smoke again. Make a list of things you learned and think about when you want to try again, such as next week, next month, or next year. Where can you learn more? Go to http://martaAvenger Networksnaomy.info/. Enter O084 in the search box to learn more about \"Stopping Smoking: Care Instructions. \" Current as of: March 20, 2017 Content Version: 11.3 © 4120-6840 Healthwise[employer]orporated. Care instructions adapted under license by [employer] (which disclaims liability or warranty for this information). If you have questions about a medical condition or this instruction, always ask your healthcare professional. [place] [address] any warranty or liability for your use of this information. Introducing [facility] HE[facility] SERVICES!    
 Raudel Mckeon introduces [employer] patient portal. Now you can access parts of your medical record, email your doctor's office, and request medication refills online. 1. In your internet browser, go to [email or web address] 2. Click on the First Time User? Click Here link in the Sign In box. You will see the New Member Sign Up page. 3. Enter your [employer] Access Code exactly as it appears below. You will not need to use this code after youve completed the sign-up process. If you do not sign up before the expiration date, you must request a new code. · [employer] Access Code: [email or web address] Expires: 11/5/2017  8:52 AM 
 
4. Enter the last four digits of your Social Security Number (xxxx) and Date of Birth (mm/dd/yyyy) as indicated and click Submit. You will be taken to the next sign-up page. 5. Create a [employer] ID. This will be your [employer] login ID and cannot be changed, so think of one that is secure and easy to remember. 6. Create a [employer] password. You can change your password at any time. 7. Enter your Password Reset Question and Answer. This can be used at a later time if you forget your password. 8. Enter your e-mail address. You will receive e-mail notification when new information is available in [address] 9. Click Sign Up. You can now view and download portions of your medical record. 10. Click the Download Summary menu link to download a portable copy of your medical information. If you have questions, please visit the Frequently Asked Questions section of the [employer] website. Remember, [employer] is NOT to be used for urgent needs. For medical emergencies, dial 911. Now available from your iPhone and Android! Please provide this summary of care documentation to your next provider. Your primary care clinician is listed as Thiago Petty. If you have any questions after today's visit, please call [phone].

## 2017-09-14 NOTE — PROGRESS NOTES
HISTORY OF PRESENT ILLNESS  Ace Hughes is a 67 y.o. female. HPI  Chief Complaint   Patient presents with    Vaginal Pain     X 1 WEEK; C/O ITCHING, BUT DENIES DISCHARGE    Abdominal Pain     X 1 WEEK     States pain started 10 days ago  States with urination pain was the worse on the outside. Has used epsom salt water and vagisil that has been comforting. C/o pain ABD around navel area on Sunday. Pain stopped 2 days after Sunday. States pressure on stomach starts pain up again. Review of Systems   Constitutional: Negative for chills and fever. Gastrointestinal: Positive for nausea. Negative for vomiting. Genitourinary: Positive for frequency. Negative for hematuria. States has been having severe vaginal and ABD pain. States use vagisil and soaked in epsom salt and feels better today. C/O vulva discomfort. Neurological: Negative. Physical Exam   Constitutional: She is oriented to person, place, and time. She appears well-developed and well-nourished. No distress. HENT:   Head: Normocephalic and atraumatic. Neck: Normal range of motion. Neck supple. Cardiovascular: Normal rate, regular rhythm and intact distal pulses. Exam reveals no gallop and no friction rub. No murmur heard. Pulmonary/Chest: Effort normal and breath sounds normal. No respiratory distress. She has no wheezes. She has no rales. Abdominal: Soft. Bowel sounds are normal. She exhibits no mass. There is tenderness. There is no guarding. Positive for generalized ABD Pain   Genitourinary: Vagina normal. No vaginal discharge found. Genitourinary Comments: No cervix  Vaginal Pap smear done   Musculoskeletal: Normal range of motion. Neurological: She is alert and oriented to person, place, and time. Skin: Skin is warm and dry. She is not diaphoretic. Nursing note and vitals reviewed. Plan of care and AVS reviewed with patient who verbalized understanding.     ASSESSMENT and PLAN    ICD-10-CM ICD-9-CM    1. Generalized abdominal pain R10.84 789.07 AMB POC URINALYSIS DIP STICK MANUAL W/O MICRO      US ABD COMP      US PELV NON OBS   2. Vaginal pain R10.2 625.9 PAP, LIQUID BASED, MANUAL SCREEN      US PELV NON OBS   3. Abnormal urine R82.90 791.9 CULTURE, URINE   4. Vulvar pain R10.2 625.9 nystatin (MYCOSTATIN) topical cream   5. Allergy to Flu Vaccine. Nursing to assist patient with US appointments  Will notify patient of urine and Pap results. Reviewed with patient use of nystatin.   Keep regular scheduled appointment with PCP

## 2017-09-14 NOTE — MR AVS SNAPSHOT
Visit Information Date & Time Provider Department Dept. Phone Encounter #  
 9/14/2017  9:00 AM Erasmo Cobian NP [facility] [phone] [id number] Follow-up Instructions Return if symptoms worsen or fail to improve. Your Appointments 9/18/2017 10:45 AM  
ACUTE CARE with MD Maksim Dutta [address] [place] [address]) Appt Note: f/u on bp $0 cp lsh 8/7/17  
 [address] Formerly [facility], [facility] [address]  
[phone]  
  
   
 [address] Upcoming Health Maintenance Date Due  
 GLAUCOMA SCREENING Q2Y 1/3/2010 MEDICARE YEARLY EXAM 2/1/2018 BREAST CANCER SCRN MAMMOGRAM 11/11/2018 COLONOSCOPY 12/24/2019 DTaP/Tdap/Td series (2 - Td) 4/28/2025 Allergies as of 9/14/2017  Review Complete On: 9/14/2017 By: Erasmo Cobian NP Severity Noted Reaction Type Reactions Ciprofloxacin  05/06/2010    Shortness of Breath Codeine  05/06/2010    Nausea Only Influenza Virus Vaccine, Specific  03/10/2014   Not Verified Unknown (comments) Allergy documented in Admission Data base 1 screening Pcn [Penicillins]  05/06/2010    Itching Current Immunizations  Reviewed on 5/6/2010 Name Date Influenza Vaccine  Deferred (Contraindication) Pneumococcal Conjugate (PCV-13) 1/31/2017 Pneumococcal Polysaccharide (PPSV-23) 4/28/2015,  Deferred (Contraindication) Tdap 4/28/2015 Not reviewed this visit You Were Diagnosed With   
  
 Codes Comments Generalized abdominal pain    -  Primary ICD-10-CM: R10.84 ICD-9-CM: 789.07 Vaginal pain     ICD-10-CM: R10.2 ICD-9-CM: 625.9 Abnormal urine     ICD-10-CM: R82.90 ICD-9-CM: 791.9 Vulvar pain     ICD-10-CM: R10.2 ICD-9-CM: 625.9 Vitals BP Pulse Temp Resp Height(growth percentile) Weight(growth percentile) 120/60 (BP 1 Location: Left arm, BP Patient Position: Sitting) 82 97.2 °F (36.2 °C) (Oral) 17 5' 6\" (1.676 m) 169 lb 9.6 oz (76.9 kg) SpO2 BMI OB Status Smoking Status 97% 27.37 kg/m2 Hysterectomy Former Smoker Vitals History BMI and BSA Data Body Mass Index Body Surface Area  
 27.37 kg/m 2 1.89 m 2 Preferred Pharmacy Pharmacy Name Phone [facility] [address] [facility], [address] [facility] [phone] Your Updated Medication List  
  
   
This list is accurate as of: 9/14/17  9:51 AM.  Always use your most recent med list.  
  
  
  
  
 albuterol 90 mcg/actuation inhaler Commonly known as:  PROVENTIL HFA, VENTOLIN HFA, PROAIR HFA Take 1 Puff by inhalation every six (6) hours as needed for Wheezing. arformoterol 15 mcg/2 mL Nebu neb solution Commonly known as:  Antonella Barrs 2 mL by Nebulization route two (2) times a day. aspirin 81 mg chewable tablet Take 1 Tab by mouth daily. atorvastatin 40 mg tablet Commonly known as:  LIPITOR Take 1 Tab by mouth every evening. butalbital-acetaminophen-caff -40 mg per capsule Commonly known as:  [employer] Take 1 Cap by mouth every four (4) hours as needed for Pain. Max Daily Amount: 6 Caps. clonazePAM 1 mg tablet Commonly known as:  KlonoPIN  
TAKE TWO TABLETS BY MOUTH TWICE DAILY EXCEDRIN MIGRAINE PO Take 1 Tab by mouth as needed. FISH OIL 1,000 mg Cap Generic drug:  omega-3 fatty acids-vitamin e Take 1 Cap by mouth. fluticasone 50 mcg/actuation nasal spray Commonly known as:  FLONASE  
1 spray each nostril daily  
  
 furosemide 20 mg tablet Commonly known as:  LASIX Take 2 Tabs by mouth daily. metoprolol tartrate 25 mg tablet Commonly known as:  LOPRESSOR Take 1 Tab by mouth daily. nitroglycerin 0.4 mg SL tablet Commonly known as:  NITROSTAT 1 Tab by SubLINGual route every five (5) minutes as needed. Indications: ANGINA  
  
 nystatin topical cream  
Commonly known as:  MYCOSTATIN Apply  to affected area two (2) times a day. omeprazole 20 mg capsule Commonly known as:  PRILOSEC Take 1 Cap by mouth daily. Take only as needed  
  
 potassium chloride 10 mEq tablet Commonly known as:  KLOR-CON Take 1 Tab by mouth daily. VITAMIN B-12 100 mcg tablet Generic drug:  cyanocobalamin Take 100 mcg by mouth daily. Prescriptions Sent to Pharmacy Refills  
 fluticasone (FLONASE) 50 mcg/actuation nasal spray 5 Si spray each nostril daily Class: Normal  
 Pharmacy: [address] [place] [address]. Rd[address], [facility] Ph #: [phone]  
 albuterol (PROVENTIL HFA, VENTOLIN HFA, PROAIR HFA) 90 mcg/actuation inhaler 5 Sig: Take 1 Puff by inhalation every six (6) hours as needed for Wheezing. Class: Normal  
 Pharmacy: [address] [place] [address]. Rd., [address], [facility] Ph #: [phone] Route: Inhalation  
 nitroglycerin (NITROSTAT) 0.4 mg SL tablet 5 Si Tab by SubLINGual route every five (5) minutes as needed. Indications: ANGINA Class: Normal  
 Pharmacy: [address] [place] [address]. Rd., [address], [address] [place] [address] Ph #: [phone] Route: SubLINGual  
 nystatin (MYCOSTATIN) topical cream 2 Sig: Apply  to affected area two (2) times a day. Class: Normal  
 Pharmacy: [address] [place] [address]. Rd., [address], [address] [place] [address] Ph #: [phone] Route: Topical  
  
We Performed the Following AMB POC URINALYSIS DIP STICK MANUAL W/O MICRO [61848 CPT(R)] CULTURE, URINE [id number] CPT(R)] PAP, LIQUID BASED, MANUAL SCREEN [71526 CPT(R)] Follow-up Instructions Return if symptoms worsen or fail to improve. To-Do List   
 2017 Imaging:  US ABD COMP   
  
 2017 Imaging:  US PELV NON OBS Introducing [facility] HEALTH [facility]! Heike Lincoln introduces [employer] patient portal. Now you can access parts of your medical record, email your doctor's office, and request medication refills online. 1. In your internet browser, go to [email or web address] 2. Click on the First Time User? Click Here link in the Sign In box. You will see the New Member Sign Up page. 3. Enter your [employer] Access Code exactly as it appears below. You will not need to use this code after youve completed the sign-up process. If you do not sign up before the expiration date, you must request a new code. · [employer] Access Code: [email or web address] Expires: 11/5/2017  8:52 AM 
 
4. Enter the last four digits of your Social Security Number (xxxx) and Date of Birth (mm/dd/yyyy) as indicated and click Submit. You will be taken to the next sign-up page. 5. Create a [employer] ID. This will be your [employer] login ID and cannot be changed, so think of one that is secure and easy to remember. 6. Create a [employer] password. You can change your password at any time. 7. Enter your Password Reset Question and Answer. This can be used at a later time if you forget your password. 8. Enter your e-mail address. You will receive e-mail notification when new information is available in [address] 9. Click Sign Up. You can now view and download portions of your medical record. 10. Click the Download Summary menu link to download a portable copy of your medical information. If you have questions, please visit the Frequently Asked Questions section of the [employer] website. Remember, [employer] is NOT to be used for urgent needs. For medical emergencies, dial 911. Now available from your iPhone and Android! Please provide this summary of care documentation to your next provider. Your primary care clinician is listed as Mamie Gary. If you have any questions after today's visit, please call [phone].

## 2017-09-14 NOTE — PROGRESS NOTES
Chief Complaint   Patient presents with    Vaginal Pain     X 1 WEEK; C/O ITCHING, BUT DENIES DISCHARGE    Abdominal Pain     X 1 WEEK     1. Have you been to the ER, urgent care clinic since your last visit? Hospitalized since your last visit? No    2. Have you seen or consulted any other health care providers outside of the [address] [place] [address] since your last visit? Include any pap smears or colon screening. No    Health Maintenance Due   Topic Date Due    GLAUCOMA SCREENING Q2Y  01/03/2010     Do you have an [address] in place in the event that you have a healthcare crisis that could impact your decision making as it pertains to your health? NO    Would you like information about Advance Care Planning? NO    Information given.  NO

## 2017-09-15 NOTE — TELEPHONE ENCOUNTER
Chief Complaint   Patient presents with    Appointment     US ABD [address] NON OBS     Left message for the patient to return my call to discuss whether that she will make the appointment or do I have to make the appointment. Appointment scheduled for Tuesday, September 19, 2017 at [facility] is scheduled at 0830 followed by the US PELV NON OBS at 0930. Patient has been informed to be arrive 20 minutes early for registration and to be NPO after midnite the day of testing for US ABD COMP. Patient was also advised to drink 32 oz clear fluid and not to urinate prior to the [address]. Patient verbalized understanding.

## 2017-09-18 NOTE — TELEPHONE ENCOUNTER
Pt called state that she was returning nurse call, and that It was very important that she received a call back asap

## 2017-09-18 NOTE — TELEPHONE ENCOUNTER
----- Message from Yobany Roper sent at 9/15/2017  4:37 PM EDT -----  Regarding: Dr. Ayla Shultz  Pt would like a call from the nurse regarding a question about test results. Please call the pt back [phone].

## 2017-09-18 NOTE — TELEPHONE ENCOUNTER
Chief Complaint   Patient presents with    Results     URINE CULTURE AND PAP SMEAR     Informed patient that her normal mohan is a little elevated which may attribute to the elevated WBC per Vinnie Grissom DNP. Patient inquired about doing the test that is scheduled for the next day. I informed her that she still had complaints of groin pain and that it was in her best interest to have the tests done.

## 2017-09-18 NOTE — TELEPHONE ENCOUNTER
Chief Complaint   Patient presents with    Results     URINE CULTURE AND PAP SMEAR     Informed patient per Winnie Crockett DNP that her urine culture results came back negative for infection and that results are still pending for the Pap Smear.

## 2017-10-02 NOTE — TELEPHONE ENCOUNTER
Requested Prescriptions     Pending Prescriptions Disp Refills    cephALEXin (KEFLEX) 500 mg capsule 21 Cap 0     Sig: Take 1 Cap by mouth three (3) times daily for 7 days.

## 2017-10-03 NOTE — TELEPHONE ENCOUNTER
Last office visit 9/14/17 with Dr. Lizeth Cleveland  No future  Last filled 8/7/17  Changes made to medication on last visit  NEEDS APPT

## 2017-10-10 NOTE — MR AVS SNAPSHOT
Visit Information Date & Time Provider Department Dept. Phone Encounter #  
 10/10/2017  2:00 PM Noni Mendoza  Amendshana Ortega [id number] Follow-up Instructions Return in about 2 weeks (around 10/24/2017) for routine follow up. Upcoming Health Maintenance Date Due  
 GLAUCOMA SCREENING Q2Y 12/30/2017* MEDICARE YEARLY EXAM 2/1/2018 BREAST CANCER SCRN MAMMOGRAM 11/11/2018 COLONOSCOPY 12/24/2019 DTaP/Tdap/Td series (2 - Td) 4/28/2025 *Topic was postponed. The date shown is not the original due date. Allergies as of 10/10/2017  Review Complete On: 10/10/2017 By: Noni Mendoza MD  
  
 Severity Noted Reaction Type Reactions Ciprofloxacin  05/06/2010    Shortness of Breath Codeine  05/06/2010    Nausea Only Influenza Virus Vaccine, Specific  03/10/2014   Not Verified Unknown (comments) Allergy documented in Admission Data base 1 screening Pcn [Penicillins]  05/06/2010    Itching Current Immunizations  Reviewed on 5/6/2010 Name Date Influenza Vaccine  Deferred (Contraindication) Pneumococcal Conjugate (PCV-13) 1/31/2017 Pneumococcal Polysaccharide (PPSV-23) 4/28/2015,  Deferred (Contraindication) Tdap 4/28/2015 Not reviewed this visit You Were Diagnosed With   
  
 Codes Comments Exacerbation of intermittent asthma, unspecified asthma severity    -  Primary ICD-10-CM: J45.21 ICD-9-CM: 249.25 Anxiety     ICD-10-CM: F41.9 ICD-9-CM: 300.00 Gastroesophageal reflux disease without esophagitis     ICD-10-CM: K21.9 ICD-9-CM: 530.81 Idiopathic gout of left foot, unspecified chronicity     ICD-10-CM: M10.072 ICD-9-CM: 274.00 Vitals BP Pulse Temp Resp Height(growth percentile) Weight(growth percentile) 129/49 (BP 1 Location: Left arm, BP Patient Position: At rest) 64 96.6 °F (35.9 °C) (Oral) 16 5' 6\" (1.676 m) 170 lb (77.1 kg) SpO2 BMI OB Status Smoking Status 98% 27.44 kg/m2 Hysterectomy Former Smoker BMI and BSA Data Body Mass Index Body Surface Area  
 27.44 kg/m 2 1.89 m 2 Preferred Pharmacy Pharmacy Name Phone [facility] [address] [facility], [address] [facility] [phone] Your Updated Medication List  
  
   
This list is accurate as of: 10/10/17  3:05 PM.  Always use your most recent med list.  
  
  
  
  
 albuterol 90 mcg/actuation inhaler Commonly known as:  PROVENTIL HFA, VENTOLIN HFA, PROAIR HFA Take 1 Puff by inhalation every six (6) hours as needed for Wheezing. aspirin 81 mg chewable tablet Take 1 Tab by mouth daily. atorvastatin 40 mg tablet Commonly known as:  LIPITOR Take 1 Tab by mouth every evening. butalbital-acetaminophen-caff -40 mg per capsule Commonly known as:  [employer] Take 1 Cap by mouth every four (4) hours as needed for Pain. Max Daily Amount: 6 Caps. cephALEXin 500 mg capsule Commonly known as:  Inez Ly Take 1 Cap by mouth three (3) times daily for 7 days. clonazePAM 1 mg tablet Commonly known as:  KlonoPIN  
TAKE TWO TABLETS BY MOUTH TWICE DAILY EXCEDRIN MIGRAINE PO Take 1 Tab by mouth as needed. FISH OIL 1,000 mg Cap Generic drug:  omega-3 fatty acids-vitamin e Take 1 Cap by mouth. fluticasone 50 mcg/actuation nasal spray Commonly known as:  FLONASE  
1 spray each nostril daily  
  
 furosemide 20 mg tablet Commonly known as:  LASIX Take 2 Tabs by mouth daily. metoprolol tartrate 25 mg tablet Commonly known as:  LOPRESSOR Take 1 Tab by mouth daily. naproxen 500 mg tablet Commonly known as:  NAPROSYN Take 1 Tab by mouth two (2) times daily (with meals). As needed  
  
 nitroglycerin 0.4 mg SL tablet Commonly known as:  NITROSTAT  
1 Tab by SubLINGual route every five (5) minutes as needed.  Indications: ANGINA  
  
 nystatin topical cream  
Commonly known as:  MYCOSTATIN  
 Apply  to affected area two (2) times a day. omeprazole 20 mg capsule Commonly known as:  PRILOSEC Take 1 Cap by mouth daily. Take only as needed  
  
 potassium chloride 10 mEq tablet Commonly known as:  KLOR-CON Take 1 Tab by mouth daily. predniSONE 10 mg tablet Commonly known as:  Saray James Take  by mouth daily (with breakfast). VITAMIN B-12 100 mcg tablet Generic drug:  cyanocobalamin Take 100 mcg by mouth daily. Prescriptions Printed Refills  
 clonazePAM (KLONOPIN) 1 mg tablet 1 Sig: TAKE TWO TABLETS BY MOUTH TWICE DAILY Class: Print  
 cephALEXin (KEFLEX) 500 mg capsule 0 Sig: Take 1 Cap by mouth three (3) times daily for 7 days. Class: Print Route: Oral  
 naproxen (NAPROSYN) 500 mg tablet 2 Sig: Take 1 Tab by mouth two (2) times daily (with meals). As needed Class: Print Route: Oral  
  
Follow-up Instructions Return in about 2 weeks (around 10/24/2017) for routine follow up. Introducing [facility] & HEALTH SERVICES! Allie Dobbs introduces [employer] patient portal. Now you can access parts of your medical record, email your doctor's office, and request medication refills online. 1. In your internet browser, go to [email or web address] 2. Click on the First Time User? Click Here link in the Sign In box. You will see the New Member Sign Up page. 3. Enter your [employer] Access Code exactly as it appears below. You will not need to use this code after youve completed the sign-up process. If you do not sign up before the expiration date, you must request a new code. · [employer] Access Code: [email or web address] Expires: 11/5/2017  8:52 AM 
 
4. Enter the last four digits of your Social Security Number (xxxx) and Date of Birth (mm/dd/yyyy) as indicated and click Submit. You will be taken to the next sign-up page. 5. Create a [employer] ID.  This will be your [employer] login ID and cannot be changed, so think of one that is secure and easy to remember. 6. Create a [employer] password. You can change your password at any time. 7. Enter your Password Reset Question and Answer. This can be used at a later time if you forget your password. 8. Enter your e-mail address. You will receive e-mail notification when new information is available in [place]. 9. Click Sign Up. You can now view and download portions of your medical record. 10. Click the Download Summary menu link to download a portable copy of your medical information. If you have questions, please visit the Frequently Asked Questions section of the [employer] website. Remember, [employer] is NOT to be used for urgent needs. For medical emergencies, dial 911. Now available from your iPhone and Android! Please provide this summary of care documentation to your next provider. Your primary care clinician is listed as Debbie Sousa. If you have any questions after today's visit, please call [phone].

## 2017-10-10 NOTE — PROGRESS NOTES
Subjective:   Ramiro Townsend is a 67 y.o. female who complains of congestion, sore throat, post nasal drip, productive cough, headache and hoarseness for 7 days, gradually worsening since that time. She denies a history of fevers and wheezing. For the last few days her left foot has been hurting, it has improved somewhat. No trauma, she blames gout which she has had before. Evaluation to date: none. But went to the ER because stressed about US results who then sent her to see Patricia. He went over the results with her. They are fairly unremarkable. Minimal complaints of abdominal pain or vaginal pain at this point. Saw my partner last month with some lower abdomen and vaginal complaints. Ultrasound was done but no results in chart for over a week. Felt very anxious about the lack of results to the point where she called the rescue squad to take her to the ER. We have just received the results by fax today. Treatment to date: OTC products. Patient does not smoke cigarettes. Relevant PMH: Asthma and heart failure. Minimal swelling, minimal wheezing.     Allergies   Allergen Reactions    Ciprofloxacin Shortness of Breath    Codeine Nausea Only    Influenza Virus Vaccine, Specific Unknown (comments)     Allergy documented in Admission Data base 1 screening    Pcn [Penicillins] Itching         Patient Active Problem List    Diagnosis Date Noted    Coronary artery disease without angina pectoris 01/31/2017    Moderate persistent asthma 08/23/2016    Allergic rhinitis 04/28/2015    Elevated cholesterol 07/22/2014    Hypertension 03/20/2014    Anxiety 03/20/2014    GERD (gastroesophageal reflux disease) 34/18/7395    Systolic heart failure ([facility] [address] 03/11/2014     Social History   Substance Use Topics    Smoking status: Former Smoker     Packs/day: 0.10     Quit date: 11/1/2014    Smokeless tobacco: Never Used    Alcohol use No        Review of Systems  Pertinent items are noted in HPI.    Objective:     Visit Vitals    /49 (BP 1 Location: Left arm, BP Patient Position: At rest)    Pulse 64    Temp 96.6 °F (35.9 °C) (Oral)    Resp 16    Ht 5' 6\" (1.676 m)    Wt 170 lb (77.1 kg)    SpO2 98%    BMI 27.44 kg/m2     General:  alert, cooperative, no distress   Eyes: negative   Ears: normal TM's and external ear canals AU   Sinuses: Normal paranasal sinuses without tenderness   Mouth:  Lips, mucosa, and tongue normal. Teeth and gums normal   Neck: supple, symmetrical, trachea midline and no adenopathy. Heart: S1 and S2 normal, no murmurs noted. Lungs: clear to auscultation bilaterally   Abdomen: soft, non-tender. Bowel sounds normal. No masses,  no organomegaly   Left foot somewhat swollen little red, painful to touch, neurovascularly intact. Assessment/Plan:   sinusitis  Suggested symptomatic OTC remedies. Antibiotics per orders. Encounter Diagnoses   Name Primary?  Exacerbation of intermittent asthma, unspecified asthma severity Yes    Anxiety     Gastroesophageal reflux disease without esophagitis     Idiopathic gout of left foot, unspecified chronicity      Orders Placed This Encounter    predniSONE (DELTASONE) 10 mg tablet    clonazePAM (KLONOPIN) 1 mg tablet    cephALEXin (KEFLEX) 500 mg capsule    naproxen (NAPROSYN) 500 mg tablet   . Follow-up Disposition:  Return in about 2 weeks (around 10/24/2017) for routine follow up. Chronic Conditions Addressed Today     1. GERD (gastroesophageal reflux disease)    2.  Anxiety     Relevant Medications     clonazePAM (KLONOPIN) 1 mg tablet      Acute Diagnoses Addressed Today     Exacerbation of intermittent asthma, unspecified asthma severity    -  Primary        Relevant Medications        predniSONE (DELTASONE) 10 mg tablet    Idiopathic gout of left foot, unspecified chronicity            Relevant Medications        predniSONE (DELTASONE) 10 mg tablet        naproxen (NAPROSYN) 500 mg tablet

## 2018-01-01 ENCOUNTER — HOME CARE VISIT (OUTPATIENT)
Dept: HOME HEALTH SERVICES | Facility: HOME HEALTH | Age: 73
End: 2018-01-01
Payer: MEDICARE

## 2018-01-01 ENCOUNTER — APPOINTMENT (OUTPATIENT)
Dept: GENERAL RADIOLOGY | Age: 73
DRG: 280 | End: 2018-01-01
Attending: EMERGENCY MEDICINE
Payer: MEDICARE

## 2018-01-01 ENCOUNTER — TELEPHONE (OUTPATIENT)
Dept: INTERNAL MEDICINE CLINIC | Age: 73
End: 2018-01-01

## 2018-01-01 ENCOUNTER — APPOINTMENT (OUTPATIENT)
Dept: CT IMAGING | Age: 73
DRG: 280 | End: 2018-01-01
Attending: EMERGENCY MEDICINE
Payer: MEDICARE

## 2018-01-01 ENCOUNTER — HOSPITAL ENCOUNTER (EMERGENCY)
Age: 73
Discharge: HOME | End: 2018-05-08
Attending: EMERGENCY MEDICINE
Payer: MEDICARE

## 2018-01-01 ENCOUNTER — APPOINTMENT (OUTPATIENT)
Dept: GENERAL RADIOLOGY | Age: 73
End: 2018-01-01
Attending: EMERGENCY MEDICINE
Payer: MEDICARE

## 2018-01-01 ENCOUNTER — PATIENT OUTREACH (OUTPATIENT)
Dept: INTERNAL MEDICINE CLINIC | Age: 73
End: 2018-01-01

## 2018-01-01 ENCOUNTER — APPOINTMENT (OUTPATIENT)
Dept: GENERAL RADIOLOGY | Age: 73
DRG: 246 | End: 2018-01-01
Attending: INTERNAL MEDICINE
Payer: MEDICARE

## 2018-01-01 ENCOUNTER — HOSPITAL ENCOUNTER (INPATIENT)
Age: 73
LOS: 1 days | Discharge: HOSPICE | DRG: 280 | End: 2018-05-13
Attending: EMERGENCY MEDICINE | Admitting: HOSPITALIST
Payer: MEDICARE

## 2018-01-01 ENCOUNTER — HOME HEALTH ADMISSION (OUTPATIENT)
Dept: HOME HEALTH SERVICES | Facility: HOME HEALTH | Age: 73
End: 2018-01-01
Payer: MEDICARE

## 2018-01-01 ENCOUNTER — APPOINTMENT (OUTPATIENT)
Dept: ULTRASOUND IMAGING | Age: 73
DRG: 280 | End: 2018-01-01
Attending: EMERGENCY MEDICINE
Payer: MEDICARE

## 2018-01-01 ENCOUNTER — OFFICE VISIT (OUTPATIENT)
Dept: INTERNAL MEDICINE CLINIC | Age: 73
End: 2018-01-01

## 2018-01-01 ENCOUNTER — HOME CARE VISIT (OUTPATIENT)
Dept: SCHEDULING | Facility: HOME HEALTH | Age: 73
End: 2018-01-01
Payer: MEDICARE

## 2018-01-01 ENCOUNTER — HOSPITAL ENCOUNTER (EMERGENCY)
Age: 73
Discharge: HOME | End: 2018-02-08
Attending: EMERGENCY MEDICINE
Payer: MEDICARE

## 2018-01-01 ENCOUNTER — APPOINTMENT (OUTPATIENT)
Dept: NUCLEAR MEDICINE | Age: 73
DRG: 246 | End: 2018-01-01
Attending: INTERNAL MEDICINE
Payer: MEDICARE

## 2018-01-01 ENCOUNTER — APPOINTMENT (OUTPATIENT)
Dept: CT IMAGING | Age: 73
DRG: 246 | End: 2018-01-01
Attending: EMERGENCY MEDICINE
Payer: MEDICARE

## 2018-01-01 ENCOUNTER — HOSPICE ADMISSION (OUTPATIENT)
Dept: HOSPICE | Facility: HOSPICE | Age: 73
End: 2018-01-01
Payer: MEDICARE

## 2018-01-01 ENCOUNTER — HOSPITAL ENCOUNTER (INPATIENT)
Age: 73
LOS: 9 days | Discharge: NURSING, REHAB OR LONG-TERM CARE | DRG: 246 | End: 2018-04-03
Attending: EMERGENCY MEDICINE | Admitting: INTERNAL MEDICINE
Payer: MEDICARE

## 2018-01-01 ENCOUNTER — APPOINTMENT (OUTPATIENT)
Dept: GENERAL RADIOLOGY | Age: 73
DRG: 246 | End: 2018-01-01
Attending: EMERGENCY MEDICINE
Payer: MEDICARE

## 2018-01-01 ENCOUNTER — HOSPITAL ENCOUNTER (INPATIENT)
Age: 73
LOS: 3 days | DRG: 951 | End: 2018-05-16
Attending: INTERNAL MEDICINE | Admitting: PHYSICAL MEDICINE & REHABILITATION
Payer: OTHER MISCELLANEOUS

## 2018-01-01 ENCOUNTER — PATIENT OUTREACH (OUTPATIENT)
Dept: CASE MANAGEMENT | Age: 73
End: 2018-01-01

## 2018-01-01 ENCOUNTER — APPOINTMENT (OUTPATIENT)
Dept: CT IMAGING | Age: 73
End: 2018-01-01
Attending: EMERGENCY MEDICINE
Payer: MEDICARE

## 2018-01-01 VITALS
DIASTOLIC BLOOD PRESSURE: 36 MMHG | RESPIRATION RATE: 8 BRPM | TEMPERATURE: 98 F | HEART RATE: 80 BPM | SYSTOLIC BLOOD PRESSURE: 101 MMHG | OXYGEN SATURATION: 87 %

## 2018-01-01 VITALS
TEMPERATURE: 97.7 F | HEART RATE: 64 BPM | DIASTOLIC BLOOD PRESSURE: 82 MMHG | WEIGHT: 155 LBS | SYSTOLIC BLOOD PRESSURE: 121 MMHG | BODY MASS INDEX: 25.83 KG/M2 | RESPIRATION RATE: 16 BRPM | HEIGHT: 65 IN | OXYGEN SATURATION: 97 %

## 2018-01-01 VITALS
TEMPERATURE: 98 F | OXYGEN SATURATION: 97 % | HEART RATE: 88 BPM | SYSTOLIC BLOOD PRESSURE: 110 MMHG | RESPIRATION RATE: 18 BRPM | DIASTOLIC BLOOD PRESSURE: 60 MMHG

## 2018-01-01 VITALS
RESPIRATION RATE: 16 BRPM | OXYGEN SATURATION: 98 % | SYSTOLIC BLOOD PRESSURE: 122 MMHG | HEART RATE: 66 BPM | TEMPERATURE: 97.9 F | DIASTOLIC BLOOD PRESSURE: 62 MMHG

## 2018-01-01 VITALS
HEART RATE: 77 BPM | OXYGEN SATURATION: 96 % | TEMPERATURE: 98.3 F | SYSTOLIC BLOOD PRESSURE: 108 MMHG | RESPIRATION RATE: 18 BRPM | DIASTOLIC BLOOD PRESSURE: 60 MMHG

## 2018-01-01 VITALS
TEMPERATURE: 97.8 F | HEART RATE: 77 BPM | SYSTOLIC BLOOD PRESSURE: 126 MMHG | DIASTOLIC BLOOD PRESSURE: 59 MMHG | BODY MASS INDEX: 25.83 KG/M2 | WEIGHT: 155 LBS | OXYGEN SATURATION: 96 % | HEIGHT: 65 IN | RESPIRATION RATE: 20 BRPM

## 2018-01-01 VITALS
RESPIRATION RATE: 18 BRPM | DIASTOLIC BLOOD PRESSURE: 54 MMHG | BODY MASS INDEX: 26.66 KG/M2 | TEMPERATURE: 97.3 F | SYSTOLIC BLOOD PRESSURE: 140 MMHG | HEIGHT: 65 IN | WEIGHT: 160 LBS | OXYGEN SATURATION: 97 % | HEART RATE: 69 BPM

## 2018-01-01 VITALS
BODY MASS INDEX: 25.99 KG/M2 | HEIGHT: 65 IN | DIASTOLIC BLOOD PRESSURE: 41 MMHG | OXYGEN SATURATION: 95 % | HEART RATE: 59 BPM | TEMPERATURE: 98.1 F | RESPIRATION RATE: 20 BRPM | SYSTOLIC BLOOD PRESSURE: 95 MMHG | WEIGHT: 156 LBS

## 2018-01-01 VITALS
TEMPERATURE: 98.1 F | RESPIRATION RATE: 18 BRPM | DIASTOLIC BLOOD PRESSURE: 60 MMHG | OXYGEN SATURATION: 96 % | HEART RATE: 88 BPM | SYSTOLIC BLOOD PRESSURE: 110 MMHG

## 2018-01-01 VITALS
DIASTOLIC BLOOD PRESSURE: 62 MMHG | RESPIRATION RATE: 18 BRPM | OXYGEN SATURATION: 99 % | SYSTOLIC BLOOD PRESSURE: 124 MMHG | TEMPERATURE: 98.3 F | HEART RATE: 100 BPM

## 2018-01-01 VITALS
DIASTOLIC BLOOD PRESSURE: 41 MMHG | RESPIRATION RATE: 20 BRPM | HEART RATE: 106 BPM | BODY MASS INDEX: 26.78 KG/M2 | HEIGHT: 65 IN | OXYGEN SATURATION: 99 % | TEMPERATURE: 98 F | SYSTOLIC BLOOD PRESSURE: 87 MMHG | WEIGHT: 160.72 LBS

## 2018-01-01 VITALS
DIASTOLIC BLOOD PRESSURE: 60 MMHG | HEART RATE: 90 BPM | OXYGEN SATURATION: 97 % | SYSTOLIC BLOOD PRESSURE: 96 MMHG | BODY MASS INDEX: 22.47 KG/M2 | WEIGHT: 135 LBS | TEMPERATURE: 97 F

## 2018-01-01 VITALS
OXYGEN SATURATION: 95 % | TEMPERATURE: 97 F | HEART RATE: 64 BPM | WEIGHT: 157 LBS | RESPIRATION RATE: 24 BRPM | BODY MASS INDEX: 26.16 KG/M2 | HEIGHT: 65 IN | SYSTOLIC BLOOD PRESSURE: 102 MMHG | DIASTOLIC BLOOD PRESSURE: 64 MMHG

## 2018-01-01 VITALS
HEART RATE: 97 BPM | SYSTOLIC BLOOD PRESSURE: 100 MMHG | RESPIRATION RATE: 22 BRPM | DIASTOLIC BLOOD PRESSURE: 70 MMHG | WEIGHT: 153 LBS | OXYGEN SATURATION: 93 % | BODY MASS INDEX: 25.46 KG/M2 | TEMPERATURE: 98 F

## 2018-01-01 VITALS
TEMPERATURE: 97.7 F | HEIGHT: 66 IN | OXYGEN SATURATION: 98 % | DIASTOLIC BLOOD PRESSURE: 52 MMHG | HEART RATE: 68 BPM | RESPIRATION RATE: 16 BRPM | WEIGHT: 160 LBS | BODY MASS INDEX: 25.71 KG/M2 | SYSTOLIC BLOOD PRESSURE: 128 MMHG

## 2018-01-01 VITALS
RESPIRATION RATE: 18 BRPM | SYSTOLIC BLOOD PRESSURE: 118 MMHG | BODY MASS INDEX: 25.83 KG/M2 | TEMPERATURE: 96.7 F | HEIGHT: 65 IN | DIASTOLIC BLOOD PRESSURE: 68 MMHG | OXYGEN SATURATION: 97 % | WEIGHT: 155 LBS | HEART RATE: 79 BPM

## 2018-01-01 VITALS
HEART RATE: 68 BPM | OXYGEN SATURATION: 97 % | RESPIRATION RATE: 20 BRPM | TEMPERATURE: 98.7 F | SYSTOLIC BLOOD PRESSURE: 106 MMHG | DIASTOLIC BLOOD PRESSURE: 68 MMHG

## 2018-01-01 DIAGNOSIS — R51.9 NONINTRACTABLE HEADACHE, UNSPECIFIED CHRONICITY PATTERN, UNSPECIFIED HEADACHE TYPE: ICD-10-CM

## 2018-01-01 DIAGNOSIS — G43.909 MIGRAINE WITHOUT STATUS MIGRAINOSUS, NOT INTRACTABLE, UNSPECIFIED MIGRAINE TYPE: ICD-10-CM

## 2018-01-01 DIAGNOSIS — I25.10 CORONARY ARTERY DISEASE INVOLVING NATIVE CORONARY ARTERY OF NATIVE HEART WITHOUT ANGINA PECTORIS: ICD-10-CM

## 2018-01-01 DIAGNOSIS — I21.4 NSTEMI (NON-ST ELEVATED MYOCARDIAL INFARCTION) (HCC): Primary | ICD-10-CM

## 2018-01-01 DIAGNOSIS — F41.9 ANXIETY: ICD-10-CM

## 2018-01-01 DIAGNOSIS — R10.84 ABDOMINAL PAIN, GENERALIZED: Primary | ICD-10-CM

## 2018-01-01 DIAGNOSIS — R65.10 SIRS (SYSTEMIC INFLAMMATORY RESPONSE SYNDROME) (HCC): ICD-10-CM

## 2018-01-01 DIAGNOSIS — R35.0 URINE FREQUENCY: Primary | ICD-10-CM

## 2018-01-01 DIAGNOSIS — J18.9 PNEUMONIA OF RIGHT LOWER LOBE DUE TO INFECTIOUS ORGANISM: ICD-10-CM

## 2018-01-01 DIAGNOSIS — J01.00 SUBACUTE MAXILLARY SINUSITIS: Primary | ICD-10-CM

## 2018-01-01 DIAGNOSIS — I50.20 SYSTOLIC HEART FAILURE, UNSPECIFIED HF CHRONICITY (HCC): Primary | ICD-10-CM

## 2018-01-01 DIAGNOSIS — J44.9 CHRONIC OBSTRUCTIVE PULMONARY DISEASE, UNSPECIFIED COPD TYPE (HCC): ICD-10-CM

## 2018-01-01 DIAGNOSIS — Z13.31 SCREENING FOR DEPRESSION: ICD-10-CM

## 2018-01-01 DIAGNOSIS — M79.89 LEFT LEG SWELLING: ICD-10-CM

## 2018-01-01 DIAGNOSIS — Z13.39 SCREENING FOR ALCOHOLISM: ICD-10-CM

## 2018-01-01 DIAGNOSIS — J45.40 MODERATE PERSISTENT ASTHMA WITHOUT COMPLICATION: ICD-10-CM

## 2018-01-01 DIAGNOSIS — J43.9 PULMONARY EMPHYSEMA, UNSPECIFIED EMPHYSEMA TYPE (HCC): ICD-10-CM

## 2018-01-01 DIAGNOSIS — I50.22 CHRONIC SYSTOLIC CHF (CONGESTIVE HEART FAILURE) (HCC): Primary | ICD-10-CM

## 2018-01-01 DIAGNOSIS — R77.8 ELEVATED TROPONIN: ICD-10-CM

## 2018-01-01 DIAGNOSIS — I50.20 SYSTOLIC CONGESTIVE HEART FAILURE, UNSPECIFIED HF CHRONICITY (HCC): Primary | ICD-10-CM

## 2018-01-01 DIAGNOSIS — L03.119 CELLULITIS OF LOWER EXTREMITY, UNSPECIFIED LATERALITY: ICD-10-CM

## 2018-01-01 DIAGNOSIS — I50.20 SYSTOLIC HEART FAILURE, UNSPECIFIED HF CHRONICITY (HCC): ICD-10-CM

## 2018-01-01 DIAGNOSIS — R07.9 RIGHT-SIDED CHEST PAIN: Primary | ICD-10-CM

## 2018-01-01 DIAGNOSIS — I48.0 PAROXYSMAL ATRIAL FIBRILLATION (HCC): ICD-10-CM

## 2018-01-01 DIAGNOSIS — N18.9 ACUTE RENAL FAILURE SUPERIMPOSED ON CHRONIC KIDNEY DISEASE, UNSPECIFIED CKD STAGE, UNSPECIFIED ACUTE RENAL FAILURE TYPE (HCC): ICD-10-CM

## 2018-01-01 DIAGNOSIS — Z00.00 MEDICARE ANNUAL WELLNESS VISIT, SUBSEQUENT: Primary | ICD-10-CM

## 2018-01-01 DIAGNOSIS — R10.32 ABDOMINAL PAIN, LLQ (LEFT LOWER QUADRANT): ICD-10-CM

## 2018-01-01 DIAGNOSIS — N17.9 ACUTE RENAL FAILURE SUPERIMPOSED ON CHRONIC KIDNEY DISEASE, UNSPECIFIED CKD STAGE, UNSPECIFIED ACUTE RENAL FAILURE TYPE (HCC): ICD-10-CM

## 2018-01-01 DIAGNOSIS — J01.00 SUBACUTE MAXILLARY SINUSITIS: ICD-10-CM

## 2018-01-01 DIAGNOSIS — J45.41 MODERATE PERSISTENT ASTHMA WITH ACUTE EXACERBATION: ICD-10-CM

## 2018-01-01 DIAGNOSIS — N39.0 URINARY TRACT INFECTION WITHOUT HEMATURIA, SITE UNSPECIFIED: ICD-10-CM

## 2018-01-01 DIAGNOSIS — G43.001 MIGRAINE WITHOUT AURA AND WITH STATUS MIGRAINOSUS, NOT INTRACTABLE: ICD-10-CM

## 2018-01-01 DIAGNOSIS — R10.11 RUQ PAIN: ICD-10-CM

## 2018-01-01 DIAGNOSIS — I51.9 CARDIAC DISEASE: ICD-10-CM

## 2018-01-01 DIAGNOSIS — I50.22 CHRONIC SYSTOLIC HEART FAILURE (HCC): ICD-10-CM

## 2018-01-01 DIAGNOSIS — I10 ESSENTIAL HYPERTENSION: ICD-10-CM

## 2018-01-01 LAB
ALBUMIN SERPL-MCNC: 3 G/DL (ref 3.5–5)
ALBUMIN SERPL-MCNC: 3.1 G/DL (ref 3.5–5)
ALBUMIN SERPL-MCNC: 3.5 G/DL (ref 3.5–5)
ALBUMIN SERPL-MCNC: 3.6 G/DL (ref 3.5–5)
ALBUMIN SERPL-MCNC: 3.6 G/DL (ref 3.5–5)
ALBUMIN SERPL-MCNC: 4.1 G/DL (ref 3.5–5)
ALBUMIN/GLOB SERPL: 0.8 {RATIO} (ref 1.1–2.2)
ALBUMIN/GLOB SERPL: 0.9 {RATIO} (ref 1.1–2.2)
ALBUMIN/GLOB SERPL: 1 {RATIO} (ref 1.1–2.2)
ALBUMIN/GLOB SERPL: 1.2 {RATIO} (ref 1.1–2.2)
ALP SERPL-CCNC: 75 U/L (ref 45–117)
ALP SERPL-CCNC: 80 U/L (ref 45–117)
ALP SERPL-CCNC: 83 U/L (ref 45–117)
ALP SERPL-CCNC: 85 U/L (ref 45–117)
ALP SERPL-CCNC: 86 U/L (ref 45–117)
ALP SERPL-CCNC: 87 U/L (ref 45–117)
ALT SERPL-CCNC: 21 U/L (ref 12–78)
ALT SERPL-CCNC: 22 U/L (ref 12–78)
ALT SERPL-CCNC: 23 U/L (ref 12–78)
ALT SERPL-CCNC: 24 U/L (ref 12–78)
ALT SERPL-CCNC: 29 U/L (ref 12–78)
ALT SERPL-CCNC: 31 U/L (ref 12–78)
ANION GAP SERPL CALC-SCNC: 10 MMOL/L (ref 5–15)
ANION GAP SERPL CALC-SCNC: 11 MMOL/L (ref 5–15)
ANION GAP SERPL CALC-SCNC: 11 MMOL/L (ref 5–15)
ANION GAP SERPL CALC-SCNC: 13 MMOL/L (ref 5–15)
ANION GAP SERPL CALC-SCNC: 16 MMOL/L (ref 5–15)
ANION GAP SERPL CALC-SCNC: 5 MMOL/L (ref 5–15)
ANION GAP SERPL CALC-SCNC: 5 MMOL/L (ref 5–15)
ANION GAP SERPL CALC-SCNC: 6 MMOL/L (ref 5–15)
ANION GAP SERPL CALC-SCNC: 6 MMOL/L (ref 5–15)
ANION GAP SERPL CALC-SCNC: 7 MMOL/L (ref 5–15)
ANION GAP SERPL CALC-SCNC: 8 MMOL/L (ref 5–15)
ANION GAP SERPL CALC-SCNC: 9 MMOL/L (ref 5–15)
ANION GAP SERPL CALC-SCNC: 9 MMOL/L (ref 5–15)
APPEARANCE UR: ABNORMAL
APPEARANCE UR: CLEAR
APTT PPP: 26.5 SEC (ref 22.1–32)
APTT PPP: 41.3 SEC (ref 22.1–32)
APTT PPP: 46.8 SEC (ref 22.1–32)
APTT PPP: 53.2 SEC (ref 22.1–32)
APTT PPP: 54.7 SEC (ref 22.1–32)
ARTERIAL PATENCY WRIST A: YES
AST SERPL-CCNC: 14 U/L (ref 15–37)
AST SERPL-CCNC: 15 U/L (ref 15–37)
AST SERPL-CCNC: 17 U/L (ref 15–37)
AST SERPL-CCNC: 20 U/L (ref 15–37)
AST SERPL-CCNC: 30 U/L (ref 15–37)
AST SERPL-CCNC: 61 U/L (ref 15–37)
ATRIAL RATE: 127 BPM
ATRIAL RATE: 128 BPM
ATRIAL RATE: 55 BPM
ATRIAL RATE: 71 BPM
ATRIAL RATE: 75 BPM
ATRIAL RATE: 87 BPM
ATRIAL RATE: 92 BPM
BACTERIA SPEC CULT: ABNORMAL
BACTERIA SPEC CULT: ABNORMAL
BACTERIA SPEC CULT: NORMAL
BACTERIA UR CULT: NORMAL
BACTERIA UR CULT: NORMAL
BACTERIA URNS QL MICRO: ABNORMAL /HPF
BACTERIA URNS QL MICRO: NEGATIVE /HPF
BASE DEFICIT BLDA-SCNC: 1.5 MMOL/L
BASOPHILS # BLD: 0 K/UL (ref 0–0.1)
BASOPHILS # BLD: 0 K/UL (ref 0–0.1)
BASOPHILS # BLD: 0.1 K/UL (ref 0–0.1)
BASOPHILS NFR BLD: 0 % (ref 0–1)
BASOPHILS NFR BLD: 1 % (ref 0–1)
BDY SITE: ABNORMAL
BILIRUB SERPL-MCNC: 0.3 MG/DL (ref 0.2–1)
BILIRUB SERPL-MCNC: 0.3 MG/DL (ref 0.2–1)
BILIRUB SERPL-MCNC: 0.4 MG/DL (ref 0.2–1)
BILIRUB SERPL-MCNC: 0.5 MG/DL (ref 0.2–1)
BILIRUB SERPL-MCNC: 0.7 MG/DL (ref 0.2–1)
BILIRUB SERPL-MCNC: 1.2 MG/DL (ref 0.2–1)
BILIRUB UR QL CFM: NEGATIVE
BILIRUB UR QL CFM: NEGATIVE
BILIRUB UR QL STRIP: ABNORMAL
BILIRUB UR QL STRIP: NORMAL
BILIRUB UR QL: NEGATIVE
BILIRUB UR QL: NEGATIVE
BNP SERPL-MCNC: ABNORMAL PG/ML (ref 0–125)
BNP SERPL-MCNC: ABNORMAL PG/ML (ref 0–125)
BUN SERPL-MCNC: 23 MG/DL (ref 6–20)
BUN SERPL-MCNC: 24 MG/DL (ref 6–20)
BUN SERPL-MCNC: 29 MG/DL (ref 6–20)
BUN SERPL-MCNC: 29 MG/DL (ref 6–20)
BUN SERPL-MCNC: 32 MG/DL (ref 6–20)
BUN SERPL-MCNC: 32 MG/DL (ref 6–20)
BUN SERPL-MCNC: 33 MG/DL (ref 6–20)
BUN SERPL-MCNC: 34 MG/DL (ref 6–20)
BUN SERPL-MCNC: 38 MG/DL (ref 6–20)
BUN SERPL-MCNC: 39 MG/DL (ref 6–20)
BUN SERPL-MCNC: 39 MG/DL (ref 6–20)
BUN SERPL-MCNC: 40 MG/DL (ref 6–20)
BUN SERPL-MCNC: 44 MG/DL (ref 6–20)
BUN SERPL-MCNC: 48 MG/DL (ref 6–20)
BUN SERPL-MCNC: 68 MG/DL (ref 6–20)
BUN/CREAT SERPL: 17 (ref 12–20)
BUN/CREAT SERPL: 18 (ref 12–20)
BUN/CREAT SERPL: 19 (ref 12–20)
BUN/CREAT SERPL: 20 (ref 12–20)
BUN/CREAT SERPL: 20 (ref 12–20)
BUN/CREAT SERPL: 21 (ref 12–20)
BUN/CREAT SERPL: 22 (ref 12–20)
BUN/CREAT SERPL: 22 (ref 12–20)
BUN/CREAT SERPL: 26 (ref 12–20)
BUN/CREAT SERPL: 26 (ref 12–20)
BUN/CREAT SERPL: 27 (ref 12–20)
BUN/CREAT SERPL: 27 (ref 12–20)
BUN/CREAT SERPL: 28 (ref 12–20)
CALCIUM SERPL-MCNC: 8.4 MG/DL (ref 8.5–10.1)
CALCIUM SERPL-MCNC: 8.5 MG/DL (ref 8.5–10.1)
CALCIUM SERPL-MCNC: 8.7 MG/DL (ref 8.5–10.1)
CALCIUM SERPL-MCNC: 8.8 MG/DL (ref 8.5–10.1)
CALCIUM SERPL-MCNC: 8.9 MG/DL (ref 8.5–10.1)
CALCIUM SERPL-MCNC: 8.9 MG/DL (ref 8.5–10.1)
CALCIUM SERPL-MCNC: 9.1 MG/DL (ref 8.5–10.1)
CALCIUM SERPL-MCNC: 9.1 MG/DL (ref 8.5–10.1)
CALCIUM SERPL-MCNC: 9.3 MG/DL (ref 8.5–10.1)
CALCIUM SERPL-MCNC: 9.5 MG/DL (ref 8.5–10.1)
CALCULATED P AXIS, ECG09: 61 DEGREES
CALCULATED P AXIS, ECG09: 85 DEGREES
CALCULATED P AXIS, ECG09: 88 DEGREES
CALCULATED P AXIS, ECG09: 92 DEGREES
CALCULATED R AXIS, ECG10: -60 DEGREES
CALCULATED R AXIS, ECG10: -62 DEGREES
CALCULATED R AXIS, ECG10: -63 DEGREES
CALCULATED R AXIS, ECG10: -64 DEGREES
CALCULATED R AXIS, ECG10: -66 DEGREES
CALCULATED R AXIS, ECG10: -68 DEGREES
CALCULATED R AXIS, ECG10: -73 DEGREES
CALCULATED T AXIS, ECG11: 101 DEGREES
CALCULATED T AXIS, ECG11: 103 DEGREES
CALCULATED T AXIS, ECG11: 126 DEGREES
CALCULATED T AXIS, ECG11: 133 DEGREES
CALCULATED T AXIS, ECG11: 137 DEGREES
CALCULATED T AXIS, ECG11: 84 DEGREES
CALCULATED T AXIS, ECG11: 93 DEGREES
CC UR VC: ABNORMAL
CHLORIDE SERPL-SCNC: 101 MMOL/L (ref 97–108)
CHLORIDE SERPL-SCNC: 102 MMOL/L (ref 97–108)
CHLORIDE SERPL-SCNC: 103 MMOL/L (ref 97–108)
CHLORIDE SERPL-SCNC: 104 MMOL/L (ref 97–108)
CHLORIDE SERPL-SCNC: 104 MMOL/L (ref 97–108)
CHLORIDE SERPL-SCNC: 105 MMOL/L (ref 97–108)
CHLORIDE SERPL-SCNC: 106 MMOL/L (ref 97–108)
CHLORIDE SERPL-SCNC: 107 MMOL/L (ref 97–108)
CHLORIDE SERPL-SCNC: 109 MMOL/L (ref 97–108)
CHLORIDE SERPL-SCNC: 98 MMOL/L (ref 97–108)
CHOLEST SERPL-MCNC: 127 MG/DL
CK MB CFR SERPL CALC: 13.8 % (ref 0–2.5)
CK MB CFR SERPL CALC: 14.2 % (ref 0–2.5)
CK MB CFR SERPL CALC: 19.1 % (ref 0–2.5)
CK MB CFR SERPL CALC: 7.5 % (ref 0–2.5)
CK MB SERPL-MCNC: 12.1 NG/ML (ref 5–25)
CK MB SERPL-MCNC: 13.5 NG/ML (ref 5–25)
CK MB SERPL-MCNC: 4.2 NG/ML (ref 5–25)
CK MB SERPL-MCNC: 44.7 NG/ML (ref 5–25)
CK SERPL-CCNC: 234 U/L (ref 26–192)
CK SERPL-CCNC: 56 U/L (ref 26–192)
CK SERPL-CCNC: 88 U/L (ref 26–192)
CK SERPL-CCNC: 95 U/L (ref 26–192)
CO2 SERPL-SCNC: 18 MMOL/L (ref 21–32)
CO2 SERPL-SCNC: 22 MMOL/L (ref 21–32)
CO2 SERPL-SCNC: 24 MMOL/L (ref 21–32)
CO2 SERPL-SCNC: 25 MMOL/L (ref 21–32)
CO2 SERPL-SCNC: 26 MMOL/L (ref 21–32)
CO2 SERPL-SCNC: 27 MMOL/L (ref 21–32)
CO2 SERPL-SCNC: 27 MMOL/L (ref 21–32)
CO2 SERPL-SCNC: 28 MMOL/L (ref 21–32)
CO2 SERPL-SCNC: 29 MMOL/L (ref 21–32)
COLOR UR: ABNORMAL
COLOR UR: NORMAL
CREAT SERPL-MCNC: 1.05 MG/DL (ref 0.55–1.02)
CREAT SERPL-MCNC: 1.1 MG/DL (ref 0.55–1.02)
CREAT SERPL-MCNC: 1.18 MG/DL (ref 0.55–1.02)
CREAT SERPL-MCNC: 1.21 MG/DL (ref 0.55–1.02)
CREAT SERPL-MCNC: 1.31 MG/DL (ref 0.55–1.02)
CREAT SERPL-MCNC: 1.38 MG/DL (ref 0.55–1.02)
CREAT SERPL-MCNC: 1.42 MG/DL (ref 0.55–1.02)
CREAT SERPL-MCNC: 1.47 MG/DL (ref 0.55–1.02)
CREAT SERPL-MCNC: 1.5 MG/DL (ref 0.55–1.02)
CREAT SERPL-MCNC: 1.5 MG/DL (ref 0.55–1.02)
CREAT SERPL-MCNC: 1.76 MG/DL (ref 0.55–1.02)
CREAT SERPL-MCNC: 1.86 MG/DL (ref 0.55–1.02)
CREAT SERPL-MCNC: 2.33 MG/DL (ref 0.55–1.02)
CREAT SERPL-MCNC: 2.86 MG/DL (ref 0.55–1.02)
CREAT SERPL-MCNC: 3.73 MG/DL (ref 0.55–1.02)
CREAT UR-MCNC: 226 MG/DL
D DIMER PPP FEU-MCNC: 0.29 MG/L FEU (ref 0–0.65)
D DIMER PPP FEU-MCNC: 0.29 MG/L FEU (ref 0–0.65)
D DIMER PPP FEU-MCNC: 0.31 MG/L FEU (ref 0–0.65)
DIAGNOSIS, 93000: NORMAL
DIFFERENTIAL METHOD BLD: ABNORMAL
EOSINOPHIL # BLD: 0 K/UL (ref 0–0.4)
EOSINOPHIL # BLD: 0.1 K/UL (ref 0–0.4)
EOSINOPHIL NFR BLD: 0 % (ref 0–7)
EOSINOPHIL NFR BLD: 1 % (ref 0–7)
EPITH CASTS URNS QL MICRO: ABNORMAL /LPF
EPITH CASTS URNS QL MICRO: NORMAL /LPF
ERYTHROCYTE [DISTWIDTH] IN BLOOD BY AUTOMATED COUNT: 14.3 % (ref 11.5–14.5)
ERYTHROCYTE [DISTWIDTH] IN BLOOD BY AUTOMATED COUNT: 14.5 % (ref 11.5–14.5)
ERYTHROCYTE [DISTWIDTH] IN BLOOD BY AUTOMATED COUNT: 14.5 % (ref 11.5–14.5)
ERYTHROCYTE [DISTWIDTH] IN BLOOD BY AUTOMATED COUNT: 14.8 % (ref 11.5–14.5)
ERYTHROCYTE [DISTWIDTH] IN BLOOD BY AUTOMATED COUNT: 15 % (ref 11.5–14.5)
ERYTHROCYTE [DISTWIDTH] IN BLOOD BY AUTOMATED COUNT: 15.1 % (ref 11.5–14.5)
ERYTHROCYTE [DISTWIDTH] IN BLOOD BY AUTOMATED COUNT: 15.5 % (ref 11.5–14.5)
ERYTHROCYTE [DISTWIDTH] IN BLOOD BY AUTOMATED COUNT: 16 % (ref 11.5–14.5)
ERYTHROCYTE [DISTWIDTH] IN BLOOD BY AUTOMATED COUNT: 16.2 % (ref 11.5–14.5)
ERYTHROCYTE [DISTWIDTH] IN BLOOD BY AUTOMATED COUNT: 16.4 % (ref 11.5–14.5)
ERYTHROCYTE [DISTWIDTH] IN BLOOD BY AUTOMATED COUNT: 16.5 % (ref 11.5–14.5)
ERYTHROCYTE [DISTWIDTH] IN BLOOD BY AUTOMATED COUNT: 16.6 % (ref 11.5–14.5)
FERRITIN SERPL-MCNC: 16 NG/ML (ref 8–252)
FOLATE SERPL-MCNC: 5.7 NG/ML (ref 5–21)
GAS FLOW.O2 O2 DELIVERY SYS: 2 L/MIN
GLOBULIN SER CALC-MCNC: 3.3 G/DL (ref 2–4)
GLOBULIN SER CALC-MCNC: 3.4 G/DL (ref 2–4)
GLOBULIN SER CALC-MCNC: 3.5 G/DL (ref 2–4)
GLOBULIN SER CALC-MCNC: 3.7 G/DL (ref 2–4)
GLOBULIN SER CALC-MCNC: 3.8 G/DL (ref 2–4)
GLOBULIN SER CALC-MCNC: 3.8 G/DL (ref 2–4)
GLUCOSE BLD STRIP.AUTO-MCNC: 123 MG/DL (ref 65–100)
GLUCOSE BLD STRIP.AUTO-MCNC: 139 MG/DL (ref 65–100)
GLUCOSE BLD STRIP.AUTO-MCNC: 171 MG/DL (ref 65–100)
GLUCOSE SERPL-MCNC: 103 MG/DL (ref 65–100)
GLUCOSE SERPL-MCNC: 105 MG/DL (ref 65–100)
GLUCOSE SERPL-MCNC: 107 MG/DL (ref 65–100)
GLUCOSE SERPL-MCNC: 110 MG/DL (ref 65–100)
GLUCOSE SERPL-MCNC: 112 MG/DL (ref 65–100)
GLUCOSE SERPL-MCNC: 123 MG/DL (ref 65–100)
GLUCOSE SERPL-MCNC: 127 MG/DL (ref 65–100)
GLUCOSE SERPL-MCNC: 130 MG/DL (ref 65–100)
GLUCOSE SERPL-MCNC: 131 MG/DL (ref 65–100)
GLUCOSE SERPL-MCNC: 133 MG/DL (ref 65–100)
GLUCOSE SERPL-MCNC: 93 MG/DL (ref 65–100)
GLUCOSE SERPL-MCNC: 98 MG/DL (ref 65–100)
GLUCOSE SERPL-MCNC: 99 MG/DL (ref 65–100)
GLUCOSE UR STRIP.AUTO-MCNC: NEGATIVE MG/DL
GLUCOSE UR-MCNC: NEGATIVE MG/DL
GLUCOSE UR-MCNC: NEGATIVE MG/DL
HCO3 BLDA-SCNC: 21 MMOL/L (ref 22–26)
HCT VFR BLD AUTO: 29.3 % (ref 35–47)
HCT VFR BLD AUTO: 29.5 % (ref 35–47)
HCT VFR BLD AUTO: 30.7 % (ref 35–47)
HCT VFR BLD AUTO: 31 % (ref 35–47)
HCT VFR BLD AUTO: 31 % (ref 35–47)
HCT VFR BLD AUTO: 31.4 % (ref 35–47)
HCT VFR BLD AUTO: 31.4 % (ref 35–47)
HCT VFR BLD AUTO: 31.8 % (ref 35–47)
HCT VFR BLD AUTO: 32.1 % (ref 35–47)
HCT VFR BLD AUTO: 33 % (ref 35–47)
HCT VFR BLD AUTO: 33.7 % (ref 35–47)
HCT VFR BLD AUTO: 33.7 % (ref 35–47)
HCT VFR BLD AUTO: 33.8 % (ref 35–47)
HCT VFR BLD AUTO: 33.9 % (ref 35–47)
HDLC SERPL-MCNC: 43 MG/DL
HDLC SERPL: 3 {RATIO} (ref 0–5)
HGB BLD-MCNC: 10.1 G/DL (ref 11.5–16)
HGB BLD-MCNC: 10.1 G/DL (ref 11.5–16)
HGB BLD-MCNC: 10.2 G/DL (ref 11.5–16)
HGB BLD-MCNC: 10.4 G/DL (ref 11.5–16)
HGB BLD-MCNC: 10.7 G/DL (ref 11.5–16)
HGB BLD-MCNC: 9 G/DL (ref 11.5–16)
HGB BLD-MCNC: 9 G/DL (ref 11.5–16)
HGB BLD-MCNC: 9.1 G/DL (ref 11.5–16)
HGB BLD-MCNC: 9.3 G/DL (ref 11.5–16)
HGB BLD-MCNC: 9.4 G/DL (ref 11.5–16)
HGB BLD-MCNC: 9.5 G/DL (ref 11.5–16)
HGB BLD-MCNC: 9.7 G/DL (ref 11.5–16)
HGB BLD-MCNC: 9.7 G/DL (ref 11.5–16)
HGB BLD-MCNC: 9.8 G/DL (ref 11.5–16)
HGB UR QL STRIP: ABNORMAL
HGB UR QL STRIP: NEGATIVE
HYALINE CASTS URNS QL MICRO: ABNORMAL /LPF (ref 0–5)
HYALINE CASTS URNS QL MICRO: NORMAL /LPF (ref 0–5)
IMM GRANULOCYTES # BLD: 0 K/UL (ref 0–0.04)
IMM GRANULOCYTES # BLD: 0.1 K/UL (ref 0–0.04)
IMM GRANULOCYTES NFR BLD AUTO: 0 % (ref 0–0.5)
IMM GRANULOCYTES NFR BLD AUTO: 1 % (ref 0–0.5)
INR PPP: 1.5 (ref 0.9–1.1)
IRON SATN MFR SERPL: 4 % (ref 20–50)
IRON SERPL-MCNC: 17 UG/DL (ref 35–150)
KETONES P FAST UR STRIP-MCNC: NEGATIVE MG/DL
KETONES P FAST UR STRIP-MCNC: NEGATIVE MG/DL
KETONES UR QL STRIP.AUTO: ABNORMAL MG/DL
KETONES UR QL STRIP.AUTO: NEGATIVE MG/DL
LACTATE SERPL-SCNC: 0.9 MMOL/L (ref 0.4–2)
LACTATE SERPL-SCNC: 1.4 MMOL/L (ref 0.4–2)
LDLC SERPL CALC-MCNC: 61.2 MG/DL (ref 0–100)
LEUKOCYTE ESTERASE UR QL STRIP.AUTO: ABNORMAL
LEUKOCYTE ESTERASE UR QL STRIP.AUTO: NEGATIVE
LIPASE SERPL-CCNC: 135 U/L (ref 73–393)
LIPASE SERPL-CCNC: 171 U/L (ref 73–393)
LIPASE SERPL-CCNC: 76 U/L (ref 73–393)
LIPID PROFILE,FLP: NORMAL
LYMPHOCYTES # BLD: 1.4 K/UL (ref 0.8–3.5)
LYMPHOCYTES # BLD: 1.4 K/UL (ref 0.8–3.5)
LYMPHOCYTES # BLD: 1.6 K/UL (ref 0.8–3.5)
LYMPHOCYTES # BLD: 1.6 K/UL (ref 0.8–3.5)
LYMPHOCYTES # BLD: 1.9 K/UL (ref 0.8–3.5)
LYMPHOCYTES # BLD: 2 K/UL (ref 0.8–3.5)
LYMPHOCYTES # BLD: 2.2 K/UL (ref 0.8–3.5)
LYMPHOCYTES # BLD: 2.6 K/UL (ref 0.8–3.5)
LYMPHOCYTES # BLD: 2.9 K/UL (ref 0.8–3.5)
LYMPHOCYTES NFR BLD: 10 % (ref 12–49)
LYMPHOCYTES NFR BLD: 10 % (ref 12–49)
LYMPHOCYTES NFR BLD: 12 % (ref 12–49)
LYMPHOCYTES NFR BLD: 13 % (ref 12–49)
LYMPHOCYTES NFR BLD: 16 % (ref 12–49)
LYMPHOCYTES NFR BLD: 18 % (ref 12–49)
LYMPHOCYTES NFR BLD: 22 % (ref 12–49)
LYMPHOCYTES NFR BLD: 24 % (ref 12–49)
LYMPHOCYTES NFR BLD: 25 % (ref 12–49)
MAGNESIUM SERPL-MCNC: 2 MG/DL (ref 1.6–2.4)
MAGNESIUM SERPL-MCNC: 2.1 MG/DL (ref 1.6–2.4)
MAGNESIUM SERPL-MCNC: 2.2 MG/DL (ref 1.6–2.4)
MAGNESIUM SERPL-MCNC: 2.3 MG/DL (ref 1.6–2.4)
MAGNESIUM SERPL-MCNC: 2.6 MG/DL (ref 1.6–2.4)
MAGNESIUM SERPL-MCNC: 2.7 MG/DL (ref 1.6–2.4)
MCH RBC QN AUTO: 23.6 PG (ref 26–34)
MCH RBC QN AUTO: 23.8 PG (ref 26–34)
MCH RBC QN AUTO: 24.1 PG (ref 26–34)
MCH RBC QN AUTO: 24.1 PG (ref 26–34)
MCH RBC QN AUTO: 24.3 PG (ref 26–34)
MCH RBC QN AUTO: 24.7 PG (ref 26–34)
MCH RBC QN AUTO: 24.7 PG (ref 26–34)
MCH RBC QN AUTO: 24.8 PG (ref 26–34)
MCH RBC QN AUTO: 24.9 PG (ref 26–34)
MCH RBC QN AUTO: 24.9 PG (ref 26–34)
MCH RBC QN AUTO: 25 PG (ref 26–34)
MCH RBC QN AUTO: 25.2 PG (ref 26–34)
MCH RBC QN AUTO: 25.3 PG (ref 26–34)
MCH RBC QN AUTO: 26.4 PG (ref 26–34)
MCHC RBC AUTO-ENTMCNC: 29.6 G/DL (ref 30–36.5)
MCHC RBC AUTO-ENTMCNC: 29.6 G/DL (ref 30–36.5)
MCHC RBC AUTO-ENTMCNC: 30 G/DL (ref 30–36.5)
MCHC RBC AUTO-ENTMCNC: 30.2 G/DL (ref 30–36.5)
MCHC RBC AUTO-ENTMCNC: 30.2 G/DL (ref 30–36.5)
MCHC RBC AUTO-ENTMCNC: 30.3 G/DL (ref 30–36.5)
MCHC RBC AUTO-ENTMCNC: 30.5 G/DL (ref 30–36.5)
MCHC RBC AUTO-ENTMCNC: 30.6 G/DL (ref 30–36.5)
MCHC RBC AUTO-ENTMCNC: 30.6 G/DL (ref 30–36.5)
MCHC RBC AUTO-ENTMCNC: 30.7 G/DL (ref 30–36.5)
MCHC RBC AUTO-ENTMCNC: 30.8 G/DL (ref 30–36.5)
MCHC RBC AUTO-ENTMCNC: 30.9 G/DL (ref 30–36.5)
MCHC RBC AUTO-ENTMCNC: 30.9 G/DL (ref 30–36.5)
MCHC RBC AUTO-ENTMCNC: 31.6 G/DL (ref 30–36.5)
MCV RBC AUTO: 76.6 FL (ref 80–99)
MCV RBC AUTO: 77.6 FL (ref 80–99)
MCV RBC AUTO: 79.7 FL (ref 80–99)
MCV RBC AUTO: 80.4 FL (ref 80–99)
MCV RBC AUTO: 80.7 FL (ref 80–99)
MCV RBC AUTO: 81.1 FL (ref 80–99)
MCV RBC AUTO: 81.3 FL (ref 80–99)
MCV RBC AUTO: 82 FL (ref 80–99)
MCV RBC AUTO: 82.1 FL (ref 80–99)
MCV RBC AUTO: 82.1 FL (ref 80–99)
MCV RBC AUTO: 82.2 FL (ref 80–99)
MCV RBC AUTO: 82.3 FL (ref 80–99)
MCV RBC AUTO: 83.3 FL (ref 80–99)
MCV RBC AUTO: 83.5 FL (ref 80–99)
MONOCYTES # BLD: 0.5 K/UL (ref 0–1)
MONOCYTES # BLD: 0.5 K/UL (ref 0–1)
MONOCYTES # BLD: 0.7 K/UL (ref 0–1)
MONOCYTES # BLD: 0.7 K/UL (ref 0–1)
MONOCYTES # BLD: 0.8 K/UL (ref 0–1)
MONOCYTES # BLD: 0.8 K/UL (ref 0–1)
MONOCYTES # BLD: 0.9 K/UL (ref 0–1)
MONOCYTES NFR BLD: 4 % (ref 5–13)
MONOCYTES NFR BLD: 5 % (ref 5–13)
MONOCYTES NFR BLD: 6 % (ref 5–13)
MONOCYTES NFR BLD: 7 % (ref 5–13)
MONOCYTES NFR BLD: 7 % (ref 5–13)
MONOCYTES NFR BLD: 8 % (ref 5–13)
MONOCYTES NFR BLD: 9 % (ref 5–13)
NEUTS SEG # BLD: 10.2 K/UL (ref 1.8–8)
NEUTS SEG # BLD: 11.1 K/UL (ref 1.8–8)
NEUTS SEG # BLD: 11.1 K/UL (ref 1.8–8)
NEUTS SEG # BLD: 11.2 K/UL (ref 1.8–8)
NEUTS SEG # BLD: 6.4 K/UL (ref 1.8–8)
NEUTS SEG # BLD: 6.7 K/UL (ref 1.8–8)
NEUTS SEG # BLD: 8.2 K/UL (ref 1.8–8)
NEUTS SEG # BLD: 8.8 K/UL (ref 1.8–8)
NEUTS SEG # BLD: 8.9 K/UL (ref 1.8–8)
NEUTS SEG NFR BLD: 63 % (ref 32–75)
NEUTS SEG NFR BLD: 67 % (ref 32–75)
NEUTS SEG NFR BLD: 72 % (ref 32–75)
NEUTS SEG NFR BLD: 73 % (ref 32–75)
NEUTS SEG NFR BLD: 75 % (ref 32–75)
NEUTS SEG NFR BLD: 82 % (ref 32–75)
NEUTS SEG NFR BLD: 82 % (ref 32–75)
NEUTS SEG NFR BLD: 84 % (ref 32–75)
NEUTS SEG NFR BLD: 84 % (ref 32–75)
NITRITE UR QL STRIP.AUTO: NEGATIVE
NRBC # BLD: 0 K/UL (ref 0–0.01)
NRBC # BLD: 0.02 K/UL (ref 0–0.01)
NRBC # BLD: 0.02 K/UL (ref 0–0.01)
NRBC # BLD: 0.04 K/UL (ref 0–0.01)
NRBC BLD-RTO: 0 PER 100 WBC
NRBC BLD-RTO: 0.2 PER 100 WBC
P-R INTERVAL, ECG05: 154 MS
P-R INTERVAL, ECG05: 156 MS
P-R INTERVAL, ECG05: 160 MS
P-R INTERVAL, ECG05: 162 MS
PCO2 BLDA: 31 MMHG (ref 35–45)
PH BLDA: 7.45 [PH] (ref 7.35–7.45)
PH UR STRIP: 5 [PH] (ref 4.6–8)
PH UR STRIP: 5 [PH] (ref 5–8)
PH UR STRIP: 5.5 [PH] (ref 4.6–8)
PH UR STRIP: 5.5 [PH] (ref 5–8)
PHOSPHATE SERPL-MCNC: 3 MG/DL (ref 2.6–4.7)
PHOSPHATE SERPL-MCNC: 3.3 MG/DL (ref 2.6–4.7)
PHOSPHATE SERPL-MCNC: 3.9 MG/DL (ref 2.6–4.7)
PHOSPHATE SERPL-MCNC: 4.6 MG/DL (ref 2.6–4.7)
PHOSPHATE SERPL-MCNC: 5 MG/DL (ref 2.6–4.7)
PHOSPHATE SERPL-MCNC: 5.2 MG/DL (ref 2.6–4.7)
PLATELET # BLD AUTO: 174 K/UL (ref 150–400)
PLATELET # BLD AUTO: 177 K/UL (ref 150–400)
PLATELET # BLD AUTO: 183 K/UL (ref 150–400)
PLATELET # BLD AUTO: 186 K/UL (ref 150–400)
PLATELET # BLD AUTO: 189 K/UL (ref 150–400)
PLATELET # BLD AUTO: 201 K/UL (ref 150–400)
PLATELET # BLD AUTO: 210 K/UL (ref 150–400)
PLATELET # BLD AUTO: 214 K/UL (ref 150–400)
PLATELET # BLD AUTO: 218 K/UL (ref 150–400)
PLATELET # BLD AUTO: 218 K/UL (ref 150–400)
PLATELET # BLD AUTO: 256 K/UL (ref 150–400)
PLATELET # BLD AUTO: 266 K/UL (ref 150–400)
PLATELET # BLD AUTO: 307 K/UL (ref 150–400)
PLATELET # BLD AUTO: 312 K/UL (ref 150–400)
PMV BLD AUTO: 11.3 FL (ref 8.9–12.9)
PMV BLD AUTO: 11.8 FL (ref 8.9–12.9)
PMV BLD AUTO: 11.8 FL (ref 8.9–12.9)
PMV BLD AUTO: 12 FL (ref 8.9–12.9)
PMV BLD AUTO: 12.5 FL (ref 8.9–12.9)
PMV BLD AUTO: 12.8 FL (ref 8.9–12.9)
PMV BLD AUTO: 12.9 FL (ref 8.9–12.9)
PMV BLD AUTO: 12.9 FL (ref 8.9–12.9)
PMV BLD AUTO: 13 FL (ref 8.9–12.9)
PO2 BLDA: 88 MMHG (ref 80–100)
POTASSIUM SERPL-SCNC: 3.5 MMOL/L (ref 3.5–5.1)
POTASSIUM SERPL-SCNC: 3.7 MMOL/L (ref 3.5–5.1)
POTASSIUM SERPL-SCNC: 3.7 MMOL/L (ref 3.5–5.1)
POTASSIUM SERPL-SCNC: 3.8 MMOL/L (ref 3.5–5.1)
POTASSIUM SERPL-SCNC: 4 MMOL/L (ref 3.5–5.1)
POTASSIUM SERPL-SCNC: 4.1 MMOL/L (ref 3.5–5.1)
POTASSIUM SERPL-SCNC: 4.2 MMOL/L (ref 3.5–5.1)
POTASSIUM SERPL-SCNC: 4.3 MMOL/L (ref 3.5–5.1)
POTASSIUM SERPL-SCNC: 4.3 MMOL/L (ref 3.5–5.1)
POTASSIUM SERPL-SCNC: 4.6 MMOL/L (ref 3.5–5.1)
POTASSIUM SERPL-SCNC: 4.7 MMOL/L (ref 3.5–5.1)
POTASSIUM SERPL-SCNC: 4.9 MMOL/L (ref 3.5–5.1)
PROT SERPL-MCNC: 6.3 G/DL (ref 6.4–8.2)
PROT SERPL-MCNC: 6.8 G/DL (ref 6.4–8.2)
PROT SERPL-MCNC: 7.1 G/DL (ref 6.4–8.2)
PROT SERPL-MCNC: 7.3 G/DL (ref 6.4–8.2)
PROT SERPL-MCNC: 7.4 G/DL (ref 6.4–8.2)
PROT SERPL-MCNC: 7.5 G/DL (ref 6.4–8.2)
PROT UR QL STRIP: ABNORMAL
PROT UR QL STRIP: NORMAL
PROT UR STRIP-MCNC: 100 MG/DL
PROT UR STRIP-MCNC: 30 MG/DL
PROT UR STRIP-MCNC: ABNORMAL MG/DL
PROT UR STRIP-MCNC: NEGATIVE MG/DL
PROT UR-MCNC: 155 MG/DL (ref 0–11.9)
PROTHROMBIN TIME: 15.2 SEC (ref 9–11.1)
PTH-INTACT SERPL-MCNC: 114.9 PG/ML (ref 18.4–88)
Q-T INTERVAL, ECG07: 322 MS
Q-T INTERVAL, ECG07: 324 MS
Q-T INTERVAL, ECG07: 388 MS
Q-T INTERVAL, ECG07: 424 MS
Q-T INTERVAL, ECG07: 430 MS
Q-T INTERVAL, ECG07: 434 MS
Q-T INTERVAL, ECG07: 478 MS
QRS DURATION, ECG06: 106 MS
QRS DURATION, ECG06: 108 MS
QRS DURATION, ECG06: 112 MS
QRS DURATION, ECG06: 112 MS
QTC CALCULATION (BEZET), ECG08: 466 MS
QTC CALCULATION (BEZET), ECG08: 467 MS
QTC CALCULATION (BEZET), ECG08: 470 MS
QTC CALCULATION (BEZET), ECG08: 478 MS
QTC CALCULATION (BEZET), ECG08: 484 MS
QTC CALCULATION (BEZET), ECG08: 495 MS
QTC CALCULATION (BEZET), ECG08: 555 MS
RBC # BLD AUTO: 3.57 M/UL (ref 3.8–5.2)
RBC # BLD AUTO: 3.63 M/UL (ref 3.8–5.2)
RBC # BLD AUTO: 3.74 M/UL (ref 3.8–5.2)
RBC # BLD AUTO: 3.77 M/UL (ref 3.8–5.2)
RBC # BLD AUTO: 3.77 M/UL (ref 3.8–5.2)
RBC # BLD AUTO: 3.83 M/UL (ref 3.8–5.2)
RBC # BLD AUTO: 3.84 M/UL (ref 3.8–5.2)
RBC # BLD AUTO: 3.9 M/UL (ref 3.8–5.2)
RBC # BLD AUTO: 3.92 M/UL (ref 3.8–5.2)
RBC # BLD AUTO: 4.06 M/UL (ref 3.8–5.2)
RBC # BLD AUTO: 4.19 M/UL (ref 3.8–5.2)
RBC # BLD AUTO: 4.24 M/UL (ref 3.8–5.2)
RBC # BLD AUTO: 4.25 M/UL (ref 3.8–5.2)
RBC # BLD AUTO: 4.4 M/UL (ref 3.8–5.2)
RBC #/AREA URNS HPF: ABNORMAL /HPF (ref 0–5)
RBC #/AREA URNS HPF: NORMAL /HPF (ref 0–5)
RBC MORPH BLD: ABNORMAL
SAO2 % BLD: 97 % (ref 92–97)
SAO2% DEVICE SAO2% SENSOR NAME: ABNORMAL
SERVICE CMNT-IMP: ABNORMAL
SERVICE CMNT-IMP: NORMAL
SODIUM SERPL-SCNC: 132 MMOL/L (ref 136–145)
SODIUM SERPL-SCNC: 135 MMOL/L (ref 136–145)
SODIUM SERPL-SCNC: 135 MMOL/L (ref 136–145)
SODIUM SERPL-SCNC: 136 MMOL/L (ref 136–145)
SODIUM SERPL-SCNC: 136 MMOL/L (ref 136–145)
SODIUM SERPL-SCNC: 137 MMOL/L (ref 136–145)
SODIUM SERPL-SCNC: 138 MMOL/L (ref 136–145)
SODIUM SERPL-SCNC: 139 MMOL/L (ref 136–145)
SODIUM SERPL-SCNC: 140 MMOL/L (ref 136–145)
SODIUM SERPL-SCNC: 141 MMOL/L (ref 136–145)
SODIUM SERPL-SCNC: 143 MMOL/L (ref 136–145)
SODIUM UR-SCNC: 45 MMOL/L
SP GR UR REFRACTOMETRY: 1.01 (ref 1–1.03)
SP GR UR REFRACTOMETRY: 1.02 (ref 1–1.03)
SP GR UR REFRACTOMETRY: 1.03 (ref 1–1.03)
SP GR UR REFRACTOMETRY: >1.03 (ref 1–1.03)
SP GR UR STRIP: 1.03 (ref 1–1.03)
SP GR UR STRIP: 5.5 (ref 1–1.03)
SPECIMEN SITE: ABNORMAL
THERAPEUTIC RANGE,PTTT: ABNORMAL SECS (ref 58–77)
THERAPEUTIC RANGE,PTTT: NORMAL SECS (ref 58–77)
TIBC SERPL-MCNC: 408 UG/DL (ref 250–450)
TRIGL SERPL-MCNC: 114 MG/DL (ref ?–150)
TROPONIN I SERPL-MCNC: 0.05 NG/ML
TROPONIN I SERPL-MCNC: 0.06 NG/ML
TROPONIN I SERPL-MCNC: 1.28 NG/ML
TROPONIN I SERPL-MCNC: 1.57 NG/ML
TROPONIN I SERPL-MCNC: 5.31 NG/ML
TROPONIN I SERPL-MCNC: 5.89 NG/ML
TROPONIN I SERPL-MCNC: 6.46 NG/ML
TROPONIN I SERPL-MCNC: 6.5 NG/ML
TSH SERPL DL<=0.05 MIU/L-ACNC: 1.07 UIU/ML (ref 0.36–3.74)
UA UROBILINOGEN AMB POC: ABNORMAL (ref 0.2–1)
UA UROBILINOGEN AMB POC: NORMAL (ref 0.2–1)
UA: UC IF INDICATED,UAUC: ABNORMAL
UA: UC IF INDICATED,UAUC: ABNORMAL
UA: UC IF INDICATED,UAUC: NORMAL
URINALYSIS CLARITY POC: ABNORMAL
URINALYSIS CLARITY POC: NORMAL
URINALYSIS COLOR POC: ABNORMAL
URINALYSIS COLOR POC: NORMAL
URINE BLOOD POC: NEGATIVE
URINE BLOOD POC: NEGATIVE
URINE LEUKOCYTES POC: NEGATIVE
URINE LEUKOCYTES POC: NEGATIVE
URINE NITRITES POC: NEGATIVE
URINE NITRITES POC: NEGATIVE
UROBILINOGEN UR QL STRIP.AUTO: 0.2 EU/DL (ref 0.2–1)
UROBILINOGEN UR QL STRIP.AUTO: 0.2 EU/DL (ref 0.2–1)
UROBILINOGEN UR QL STRIP.AUTO: 1 EU/DL (ref 0.2–1)
UROBILINOGEN UR QL STRIP.AUTO: 1 EU/DL (ref 0.2–1)
VENTRICULAR RATE, ECG03: 128 BPM
VENTRICULAR RATE, ECG03: 131 BPM
VENTRICULAR RATE, ECG03: 71 BPM
VENTRICULAR RATE, ECG03: 75 BPM
VENTRICULAR RATE, ECG03: 81 BPM
VENTRICULAR RATE, ECG03: 82 BPM
VENTRICULAR RATE, ECG03: 87 BPM
VIT B12 SERPL-MCNC: 730 PG/ML (ref 211–911)
VLDLC SERPL CALC-MCNC: 22.8 MG/DL
WBC # BLD AUTO: 10.1 K/UL (ref 3.6–11)
WBC # BLD AUTO: 11.7 K/UL (ref 3.6–11)
WBC # BLD AUTO: 12.3 K/UL (ref 3.6–11)
WBC # BLD AUTO: 12.3 K/UL (ref 3.6–11)
WBC # BLD AUTO: 12.5 K/UL (ref 3.6–11)
WBC # BLD AUTO: 13.2 K/UL (ref 3.6–11)
WBC # BLD AUTO: 13.4 K/UL (ref 3.6–11)
WBC # BLD AUTO: 13.6 K/UL (ref 3.6–11)
WBC # BLD AUTO: 22.5 K/UL (ref 3.6–11)
WBC # BLD AUTO: 8.6 K/UL (ref 3.6–11)
WBC # BLD AUTO: 9.3 K/UL (ref 3.6–11)
WBC # BLD AUTO: 9.6 K/UL (ref 3.6–11)
WBC # BLD AUTO: 9.8 K/UL (ref 3.6–11)
WBC # BLD AUTO: 9.9 K/UL (ref 3.6–11)
WBC URNS QL MICRO: ABNORMAL /HPF (ref 0–4)
WBC URNS QL MICRO: NORMAL /HPF (ref 0–4)

## 2018-01-01 PROCEDURE — 3331090002 HH PPS REVENUE DEBIT

## 2018-01-01 PROCEDURE — 80048 BASIC METABOLIC PNL TOTAL CA: CPT | Performed by: INTERNAL MEDICINE

## 2018-01-01 PROCEDURE — 74011000258 HC RX REV CODE- 258: Performed by: INTERNAL MEDICINE

## 2018-01-01 PROCEDURE — 99284 EMERGENCY DEPT VISIT MOD MDM: CPT

## 2018-01-01 PROCEDURE — 3331090001 HH PPS REVENUE CREDIT

## 2018-01-01 PROCEDURE — 97161 PT EVAL LOW COMPLEX 20 MIN: CPT

## 2018-01-01 PROCEDURE — C1769 GUIDE WIRE: HCPCS

## 2018-01-01 PROCEDURE — 74011250636 HC RX REV CODE- 250/636: Performed by: INTERNAL MEDICINE

## 2018-01-01 PROCEDURE — 74011000250 HC RX REV CODE- 250: Performed by: INTERNAL MEDICINE

## 2018-01-01 PROCEDURE — 77010033678 HC OXYGEN DAILY

## 2018-01-01 PROCEDURE — 74011250637 HC RX REV CODE- 250/637: Performed by: INTERNAL MEDICINE

## 2018-01-01 PROCEDURE — G0151 HHCP-SERV OF PT,EA 15 MIN: HCPCS

## 2018-01-01 PROCEDURE — 400013 HH SOC

## 2018-01-01 PROCEDURE — 93458 L HRT ARTERY/VENTRICLE ANGIO: CPT

## 2018-01-01 PROCEDURE — C1725 CATH, TRANSLUMIN NON-LASER: HCPCS

## 2018-01-01 PROCEDURE — 97116 GAIT TRAINING THERAPY: CPT | Performed by: PHYSICAL THERAPIST

## 2018-01-01 PROCEDURE — B2111ZZ FLUOROSCOPY OF MULTIPLE CORONARY ARTERIES USING LOW OSMOLAR CONTRAST: ICD-10-PCS | Performed by: INTERNAL MEDICINE

## 2018-01-01 PROCEDURE — 85730 THROMBOPLASTIN TIME PARTIAL: CPT | Performed by: HOSPITALIST

## 2018-01-01 PROCEDURE — 80061 LIPID PANEL: CPT | Performed by: INTERNAL MEDICINE

## 2018-01-01 PROCEDURE — 83970 ASSAY OF PARATHORMONE: CPT | Performed by: INTERNAL MEDICINE

## 2018-01-01 PROCEDURE — 87077 CULTURE AEROBIC IDENTIFY: CPT | Performed by: HOSPITALIST

## 2018-01-01 PROCEDURE — 81001 URINALYSIS AUTO W/SCOPE: CPT | Performed by: EMERGENCY MEDICINE

## 2018-01-01 PROCEDURE — 65660000000 HC RM CCU STEPDOWN

## 2018-01-01 PROCEDURE — 36415 COLL VENOUS BLD VENIPUNCTURE: CPT | Performed by: EMERGENCY MEDICINE

## 2018-01-01 PROCEDURE — 83735 ASSAY OF MAGNESIUM: CPT | Performed by: INTERNAL MEDICINE

## 2018-01-01 PROCEDURE — 36600 WITHDRAWAL OF ARTERIAL BLOOD: CPT | Performed by: INTERNAL MEDICINE

## 2018-01-01 PROCEDURE — 77030029684 HC NEB SM VOL KT MONA -A

## 2018-01-01 PROCEDURE — 99232 SBSQ HOSP IP/OBS MODERATE 35: CPT | Performed by: INTERNAL MEDICINE

## 2018-01-01 PROCEDURE — 74011250637 HC RX REV CODE- 250/637: Performed by: HOSPITALIST

## 2018-01-01 PROCEDURE — 83735 ASSAY OF MAGNESIUM: CPT

## 2018-01-01 PROCEDURE — 77030005518 HC CATH URETH FOL 2W BARD -B

## 2018-01-01 PROCEDURE — 84100 ASSAY OF PHOSPHORUS: CPT | Performed by: INTERNAL MEDICINE

## 2018-01-01 PROCEDURE — G0299 HHS/HOSPICE OF RN EA 15 MIN: HCPCS

## 2018-01-01 PROCEDURE — 85027 COMPLETE CBC AUTOMATED: CPT | Performed by: INTERNAL MEDICINE

## 2018-01-01 PROCEDURE — 85610 PROTHROMBIN TIME: CPT | Performed by: EMERGENCY MEDICINE

## 2018-01-01 PROCEDURE — 0651 HSPC ROUTINE HOME CARE

## 2018-01-01 PROCEDURE — 74011250636 HC RX REV CODE- 250/636: Performed by: HOSPITALIST

## 2018-01-01 PROCEDURE — 77030038269 HC DRN EXT URIN PURWCK BARD -A

## 2018-01-01 PROCEDURE — C1894 INTRO/SHEATH, NON-LASER: HCPCS

## 2018-01-01 PROCEDURE — 82550 ASSAY OF CK (CPK): CPT | Performed by: EMERGENCY MEDICINE

## 2018-01-01 PROCEDURE — G0155 HHCP-SVS OF CSW,EA 15 MIN: HCPCS

## 2018-01-01 PROCEDURE — 83880 ASSAY OF NATRIURETIC PEPTIDE: CPT | Performed by: EMERGENCY MEDICINE

## 2018-01-01 PROCEDURE — 74176 CT ABD & PELVIS W/O CONTRAST: CPT

## 2018-01-01 PROCEDURE — 65270000015 HC RM PRIVATE ONCOLOGY

## 2018-01-01 PROCEDURE — 74011250636 HC RX REV CODE- 250/636: Performed by: EMERGENCY MEDICINE

## 2018-01-01 PROCEDURE — 84484 ASSAY OF TROPONIN QUANT: CPT | Performed by: EMERGENCY MEDICINE

## 2018-01-01 PROCEDURE — 80048 BASIC METABOLIC PNL TOTAL CA: CPT | Performed by: HOSPITALIST

## 2018-01-01 PROCEDURE — 74011250637 HC RX REV CODE- 250/637: Performed by: EMERGENCY MEDICINE

## 2018-01-01 PROCEDURE — 74011636320 HC RX REV CODE- 636/320

## 2018-01-01 PROCEDURE — 93005 ELECTROCARDIOGRAM TRACING: CPT

## 2018-01-01 PROCEDURE — 83690 ASSAY OF LIPASE: CPT | Performed by: EMERGENCY MEDICINE

## 2018-01-01 PROCEDURE — 71250 CT THORAX DX C-: CPT

## 2018-01-01 PROCEDURE — 74022 RADEX COMPL AQT ABD SERIES: CPT

## 2018-01-01 PROCEDURE — 85379 FIBRIN DEGRADATION QUANT: CPT | Performed by: EMERGENCY MEDICINE

## 2018-01-01 PROCEDURE — 82803 BLOOD GASES ANY COMBINATION: CPT | Performed by: INTERNAL MEDICINE

## 2018-01-01 PROCEDURE — G8988 SELF CARE GOAL STATUS: HCPCS | Performed by: OCCUPATIONAL THERAPIST

## 2018-01-01 PROCEDURE — 94640 AIRWAY INHALATION TREATMENT: CPT

## 2018-01-01 PROCEDURE — 82962 GLUCOSE BLOOD TEST: CPT

## 2018-01-01 PROCEDURE — 83605 ASSAY OF LACTIC ACID: CPT | Performed by: EMERGENCY MEDICINE

## 2018-01-01 PROCEDURE — C1874 STENT, COATED/COV W/DEL SYS: HCPCS

## 2018-01-01 PROCEDURE — 82728 ASSAY OF FERRITIN: CPT | Performed by: INTERNAL MEDICINE

## 2018-01-01 PROCEDURE — 87186 SC STD MICRODIL/AGAR DIL: CPT | Performed by: HOSPITALIST

## 2018-01-01 PROCEDURE — 97165 OT EVAL LOW COMPLEX 30 MIN: CPT | Performed by: OCCUPATIONAL THERAPIST

## 2018-01-01 PROCEDURE — 81001 URINALYSIS AUTO W/SCOPE: CPT | Performed by: HOSPITALIST

## 2018-01-01 PROCEDURE — 82550 ASSAY OF CK (CPK): CPT | Performed by: INTERNAL MEDICINE

## 2018-01-01 PROCEDURE — 84300 ASSAY OF URINE SODIUM: CPT | Performed by: HOSPITALIST

## 2018-01-01 PROCEDURE — 77030028837 HC SYR ANGI PWR INJ COEU -A

## 2018-01-01 PROCEDURE — A9540 TC99M MAA: HCPCS

## 2018-01-01 PROCEDURE — 84484 ASSAY OF TROPONIN QUANT: CPT | Performed by: INTERNAL MEDICINE

## 2018-01-01 PROCEDURE — 74011000250 HC RX REV CODE- 250: Performed by: EMERGENCY MEDICINE

## 2018-01-01 PROCEDURE — 80053 COMPREHEN METABOLIC PANEL: CPT | Performed by: INTERNAL MEDICINE

## 2018-01-01 PROCEDURE — 82746 ASSAY OF FOLIC ACID SERUM: CPT | Performed by: INTERNAL MEDICINE

## 2018-01-01 PROCEDURE — 36415 COLL VENOUS BLD VENIPUNCTURE: CPT | Performed by: INTERNAL MEDICINE

## 2018-01-01 PROCEDURE — 77030029065 HC DRSG HEMO QCLOT ZMED -B

## 2018-01-01 PROCEDURE — 85025 COMPLETE CBC W/AUTO DIFF WBC: CPT | Performed by: INTERNAL MEDICINE

## 2018-01-01 PROCEDURE — 74011000250 HC RX REV CODE- 250

## 2018-01-01 PROCEDURE — 99285 EMERGENCY DEPT VISIT HI MDM: CPT

## 2018-01-01 PROCEDURE — 84156 ASSAY OF PROTEIN URINE: CPT | Performed by: HOSPITALIST

## 2018-01-01 PROCEDURE — 85730 THROMBOPLASTIN TIME PARTIAL: CPT | Performed by: EMERGENCY MEDICINE

## 2018-01-01 PROCEDURE — 85025 COMPLETE CBC W/AUTO DIFF WBC: CPT | Performed by: EMERGENCY MEDICINE

## 2018-01-01 PROCEDURE — 81001 URINALYSIS AUTO W/SCOPE: CPT | Performed by: INTERNAL MEDICINE

## 2018-01-01 PROCEDURE — C1760 CLOSURE DEV, VASC: HCPCS

## 2018-01-01 PROCEDURE — 77030018798 HC PMP KT ENTRL FED COVD -A

## 2018-01-01 PROCEDURE — 96374 THER/PROPH/DIAG INJ IV PUSH: CPT

## 2018-01-01 PROCEDURE — 80053 COMPREHEN METABOLIC PANEL: CPT | Performed by: EMERGENCY MEDICINE

## 2018-01-01 PROCEDURE — 93306 TTE W/DOPPLER COMPLETE: CPT

## 2018-01-01 PROCEDURE — 74011250636 HC RX REV CODE- 250/636

## 2018-01-01 PROCEDURE — 97535 SELF CARE MNGMENT TRAINING: CPT | Performed by: OCCUPATIONAL THERAPIST

## 2018-01-01 PROCEDURE — 83735 ASSAY OF MAGNESIUM: CPT | Performed by: EMERGENCY MEDICINE

## 2018-01-01 PROCEDURE — C1887 CATHETER, GUIDING: HCPCS

## 2018-01-01 PROCEDURE — 96376 TX/PRO/DX INJ SAME DRUG ADON: CPT

## 2018-01-01 PROCEDURE — 87040 BLOOD CULTURE FOR BACTERIA: CPT | Performed by: INTERNAL MEDICINE

## 2018-01-01 PROCEDURE — 93970 EXTREMITY STUDY: CPT

## 2018-01-01 PROCEDURE — 96375 TX/PRO/DX INJ NEW DRUG ADDON: CPT

## 2018-01-01 PROCEDURE — 82607 VITAMIN B-12: CPT | Performed by: INTERNAL MEDICINE

## 2018-01-01 PROCEDURE — 027035Z DILATION OF CORONARY ARTERY, ONE ARTERY WITH TWO DRUG-ELUTING INTRALUMINAL DEVICES, PERCUTANEOUS APPROACH: ICD-10-PCS | Performed by: INTERNAL MEDICINE

## 2018-01-01 PROCEDURE — 87086 URINE CULTURE/COLONY COUNT: CPT | Performed by: HOSPITALIST

## 2018-01-01 PROCEDURE — 77030000299 HC FEMSTP COMP GLD STJU -B

## 2018-01-01 PROCEDURE — G0300 HHS/HOSPICE OF LPN EA 15 MIN: HCPCS

## 2018-01-01 PROCEDURE — 77030019697 HC SYR ANGI INFL MRTM -B

## 2018-01-01 PROCEDURE — 94761 N-INVAS EAR/PLS OXIMETRY MLT: CPT

## 2018-01-01 PROCEDURE — 71046 X-RAY EXAM CHEST 2 VIEWS: CPT

## 2018-01-01 PROCEDURE — 97116 GAIT TRAINING THERAPY: CPT

## 2018-01-01 PROCEDURE — 85027 COMPLETE CBC AUTOMATED: CPT | Performed by: HOSPITALIST

## 2018-01-01 PROCEDURE — 83540 ASSAY OF IRON: CPT | Performed by: INTERNAL MEDICINE

## 2018-01-01 PROCEDURE — 71045 X-RAY EXAM CHEST 1 VIEW: CPT

## 2018-01-01 PROCEDURE — G8987 SELF CARE CURRENT STATUS: HCPCS | Performed by: OCCUPATIONAL THERAPIST

## 2018-01-01 PROCEDURE — 85025 COMPLETE CBC W/AUTO DIFF WBC: CPT

## 2018-01-01 PROCEDURE — 96365 THER/PROPH/DIAG IV INF INIT: CPT

## 2018-01-01 PROCEDURE — 82570 ASSAY OF URINE CREATININE: CPT | Performed by: HOSPITALIST

## 2018-01-01 PROCEDURE — 36415 COLL VENOUS BLD VENIPUNCTURE: CPT | Performed by: HOSPITALIST

## 2018-01-01 PROCEDURE — 36415 COLL VENOUS BLD VENIPUNCTURE: CPT

## 2018-01-01 PROCEDURE — 74011000250 HC RX REV CODE- 250: Performed by: HOSPITALIST

## 2018-01-01 PROCEDURE — 4A023N7 MEASUREMENT OF CARDIAC SAMPLING AND PRESSURE, LEFT HEART, PERCUTANEOUS APPROACH: ICD-10-PCS | Performed by: INTERNAL MEDICINE

## 2018-01-01 PROCEDURE — 80048 BASIC METABOLIC PNL TOTAL CA: CPT

## 2018-01-01 PROCEDURE — 84443 ASSAY THYROID STIM HORMONE: CPT | Performed by: INTERNAL MEDICINE

## 2018-01-01 PROCEDURE — G8989 SELF CARE D/C STATUS: HCPCS | Performed by: OCCUPATIONAL THERAPIST

## 2018-01-01 PROCEDURE — 0656 HSPC GENERAL INPATIENT

## 2018-01-01 PROCEDURE — 77030003623 HC NDL PERC VASC TELE -C

## 2018-01-01 PROCEDURE — 74011000258 HC RX REV CODE- 258

## 2018-01-01 PROCEDURE — 97530 THERAPEUTIC ACTIVITIES: CPT | Performed by: PHYSICAL THERAPIST

## 2018-01-01 PROCEDURE — 3336500001 HSPC ELECTION

## 2018-01-01 RX ORDER — LISINOPRIL 2.5 MG/1
2.5 TABLET ORAL DAILY
Qty: 30 TAB | Refills: 1 | Status: SHIPPED | OUTPATIENT
Start: 2018-01-01 | End: 2018-01-01 | Stop reason: ALTCHOICE

## 2018-01-01 RX ORDER — ASPIRIN 325 MG
325 TABLET ORAL ONCE
Status: COMPLETED | OUTPATIENT
Start: 2018-01-01 | End: 2018-01-01

## 2018-01-01 RX ORDER — IODIXANOL 320 MG/ML
0-100 INJECTION, SOLUTION INTRAVASCULAR
Status: DISCONTINUED | OUTPATIENT
Start: 2018-01-01 | End: 2018-01-01

## 2018-01-01 RX ORDER — BUTALBITAL, ACETAMINOPHEN AND CAFFEINE 50; 325; 40 MG/1; MG/1; MG/1
2 TABLET ORAL
Status: COMPLETED | OUTPATIENT
Start: 2018-01-01 | End: 2018-01-01

## 2018-01-01 RX ORDER — BUTALBITAL, ACETAMINOPHEN AND CAFFEINE 50; 325; 40 MG/1; MG/1; MG/1
2 TABLET ORAL
Qty: 30 TAB | Refills: 0 | Status: SHIPPED | OUTPATIENT
Start: 2018-01-01 | End: 2018-01-01 | Stop reason: ALTCHOICE

## 2018-01-01 RX ORDER — SODIUM CHLORIDE 9 MG/ML
100 INJECTION, SOLUTION INTRAVENOUS CONTINUOUS
Status: DISCONTINUED | OUTPATIENT
Start: 2018-01-01 | End: 2018-01-01

## 2018-01-01 RX ORDER — IODIXANOL 320 MG/ML
INJECTION, SOLUTION INTRAVASCULAR
Status: COMPLETED
Start: 2018-01-01 | End: 2018-01-01

## 2018-01-01 RX ORDER — FACIAL-BODY WIPES
10 EACH TOPICAL DAILY PRN
Status: DISCONTINUED | OUTPATIENT
Start: 2018-01-01 | End: 2018-05-17 | Stop reason: HOSPADM

## 2018-01-01 RX ORDER — ATORVASTATIN CALCIUM 40 MG/1
40 TABLET, FILM COATED ORAL EVERY EVENING
Status: DISCONTINUED | OUTPATIENT
Start: 2018-01-01 | End: 2018-01-01 | Stop reason: HOSPADM

## 2018-01-01 RX ORDER — FUROSEMIDE 10 MG/ML
40 INJECTION INTRAMUSCULAR; INTRAVENOUS 2 TIMES DAILY
Status: DISCONTINUED | OUTPATIENT
Start: 2018-01-01 | End: 2018-01-01

## 2018-01-01 RX ORDER — ACETAMINOPHEN 650 MG/1
650 SUPPOSITORY RECTAL
Status: DISCONTINUED | OUTPATIENT
Start: 2018-01-01 | End: 2018-05-17 | Stop reason: HOSPADM

## 2018-01-01 RX ORDER — LORAZEPAM 2 MG/ML
0.5 INJECTION INTRAMUSCULAR
Status: DISCONTINUED | OUTPATIENT
Start: 2018-01-01 | End: 2018-05-17 | Stop reason: HOSPADM

## 2018-01-01 RX ORDER — PANTOPRAZOLE SODIUM 40 MG/1
40 TABLET, DELAYED RELEASE ORAL
Status: DISCONTINUED | OUTPATIENT
Start: 2018-01-01 | End: 2018-01-01 | Stop reason: HOSPADM

## 2018-01-01 RX ORDER — MORPHINE SULFATE 4 MG/ML
4 INJECTION INTRAVENOUS
Status: COMPLETED | OUTPATIENT
Start: 2018-01-01 | End: 2018-01-01

## 2018-01-01 RX ORDER — ONDANSETRON 2 MG/ML
4 INJECTION INTRAMUSCULAR; INTRAVENOUS
Status: DISCONTINUED | OUTPATIENT
Start: 2018-01-01 | End: 2018-05-17 | Stop reason: HOSPADM

## 2018-01-01 RX ORDER — HEPARIN SODIUM 200 [USP'U]/100ML
INJECTION, SOLUTION INTRAVENOUS
Status: COMPLETED
Start: 2018-01-01 | End: 2018-01-01

## 2018-01-01 RX ORDER — SCOLOPAMINE TRANSDERMAL SYSTEM 1 MG/1
1 PATCH, EXTENDED RELEASE TRANSDERMAL
Status: DISCONTINUED | OUTPATIENT
Start: 2018-01-01 | End: 2018-05-17 | Stop reason: HOSPADM

## 2018-01-01 RX ORDER — NITROGLYCERIN 0.4 MG/1
0.4 TABLET SUBLINGUAL AS NEEDED
Status: DISCONTINUED | OUTPATIENT
Start: 2018-01-01 | End: 2018-01-01 | Stop reason: HOSPADM

## 2018-01-01 RX ORDER — ACETAMINOPHEN 325 MG/1
650 TABLET ORAL
Qty: 40 TAB | Refills: 0 | Status: SHIPPED | OUTPATIENT
Start: 2018-01-01 | End: 2018-01-01

## 2018-01-01 RX ORDER — CARVEDILOL 3.12 MG/1
3.12 TABLET ORAL 2 TIMES DAILY WITH MEALS
Status: DISCONTINUED | OUTPATIENT
Start: 2018-01-01 | End: 2018-01-01 | Stop reason: HOSPADM

## 2018-01-01 RX ORDER — MORPHINE SULFATE 4 MG/ML
2 INJECTION INTRAVENOUS
Status: DISCONTINUED | OUTPATIENT
Start: 2018-01-01 | End: 2018-01-01 | Stop reason: HOSPADM

## 2018-01-01 RX ORDER — CLOPIDOGREL BISULFATE 75 MG/1
75 TABLET ORAL DAILY
Status: DISCONTINUED | OUTPATIENT
Start: 2018-01-01 | End: 2018-01-01 | Stop reason: HOSPADM

## 2018-01-01 RX ORDER — FUROSEMIDE 10 MG/ML
40 INJECTION INTRAMUSCULAR; INTRAVENOUS
Status: COMPLETED | OUTPATIENT
Start: 2018-01-01 | End: 2018-01-01

## 2018-01-01 RX ORDER — NYSTATIN 100000 U/G
CREAM TOPICAL
COMMUNITY
End: 2018-01-01

## 2018-01-01 RX ORDER — IBUPROFEN 600 MG/1
600 TABLET ORAL
Status: COMPLETED | OUTPATIENT
Start: 2018-01-01 | End: 2018-01-01

## 2018-01-01 RX ORDER — ASPIRIN 81 MG/1
81 TABLET ORAL DAILY
Status: DISCONTINUED | OUTPATIENT
Start: 2018-01-01 | End: 2018-01-01 | Stop reason: HOSPADM

## 2018-01-01 RX ORDER — LIDOCAINE HYDROCHLORIDE 10 MG/ML
INJECTION, SOLUTION EPIDURAL; INFILTRATION; INTRACAUDAL; PERINEURAL
Status: COMPLETED
Start: 2018-01-01 | End: 2018-01-01

## 2018-01-01 RX ORDER — ACETAMINOPHEN 325 MG/1
650 TABLET ORAL
Status: DISCONTINUED | OUTPATIENT
Start: 2018-01-01 | End: 2018-01-01 | Stop reason: HOSPADM

## 2018-01-01 RX ORDER — FENTANYL CITRATE 50 UG/ML
25-50 INJECTION, SOLUTION INTRAMUSCULAR; INTRAVENOUS
Status: DISCONTINUED | OUTPATIENT
Start: 2018-01-01 | End: 2018-01-01

## 2018-01-01 RX ORDER — SODIUM CHLORIDE 0.9 % (FLUSH) 0.9 %
5-10 SYRINGE (ML) INJECTION AS NEEDED
Status: DISCONTINUED | OUTPATIENT
Start: 2018-01-01 | End: 2018-01-01 | Stop reason: SDUPTHER

## 2018-01-01 RX ORDER — ATORVASTATIN CALCIUM 40 MG/1
40 TABLET, FILM COATED ORAL EVERY EVENING
Qty: 30 TAB | Refills: 5 | Status: SHIPPED | OUTPATIENT
Start: 2018-01-01

## 2018-01-01 RX ORDER — PREDNISONE 10 MG/1
20 TABLET ORAL
COMMUNITY
End: 2018-01-01

## 2018-01-01 RX ORDER — HEPARIN SODIUM 5000 [USP'U]/ML
4000 INJECTION, SOLUTION INTRAVENOUS; SUBCUTANEOUS AS NEEDED
Status: DISCONTINUED | OUTPATIENT
Start: 2018-01-01 | End: 2018-01-01 | Stop reason: HOSPADM

## 2018-01-01 RX ORDER — CLONAZEPAM 2 MG/1
2 TABLET ORAL
Qty: 30 TAB | Refills: 0 | Status: SHIPPED | OUTPATIENT
Start: 2018-01-01 | End: 2018-01-01 | Stop reason: SDUPTHER

## 2018-01-01 RX ORDER — LORAZEPAM 2 MG/ML
1 INJECTION INTRAMUSCULAR
Status: DISCONTINUED | OUTPATIENT
Start: 2018-01-01 | End: 2018-01-01 | Stop reason: HOSPADM

## 2018-01-01 RX ORDER — CLONAZEPAM 1 MG/1
1 TABLET ORAL
Status: DISCONTINUED | OUTPATIENT
Start: 2018-01-01 | End: 2018-01-01

## 2018-01-01 RX ORDER — GLYCOPYRROLATE 0.2 MG/ML
0.2 INJECTION INTRAMUSCULAR; INTRAVENOUS EVERY 4 HOURS
Status: DISCONTINUED | OUTPATIENT
Start: 2018-01-01 | End: 2018-01-01

## 2018-01-01 RX ORDER — ONDANSETRON 2 MG/ML
4 INJECTION INTRAMUSCULAR; INTRAVENOUS
Status: DISCONTINUED | OUTPATIENT
Start: 2018-01-01 | End: 2018-01-01 | Stop reason: HOSPADM

## 2018-01-01 RX ORDER — BUTALBITAL, ACETAMINOPHEN AND CAFFEINE 50; 325; 40 MG/1; MG/1; MG/1
1 TABLET ORAL
Status: DISCONTINUED | OUTPATIENT
Start: 2018-01-01 | End: 2018-01-01 | Stop reason: HOSPADM

## 2018-01-01 RX ORDER — BUTALBITAL, ACETAMINOPHEN AND CAFFEINE 300; 40; 50 MG/1; MG/1; MG/1
1 CAPSULE ORAL
Qty: 15 CAP | Refills: 0 | Status: ON HOLD | OUTPATIENT
Start: 2018-01-01 | End: 2018-01-01

## 2018-01-01 RX ORDER — ATORVASTATIN CALCIUM 40 MG/1
40 TABLET, FILM COATED ORAL
Status: DISCONTINUED | OUTPATIENT
Start: 2018-01-01 | End: 2018-01-01 | Stop reason: SDUPTHER

## 2018-01-01 RX ORDER — SODIUM CHLORIDE 0.9 % (FLUSH) 0.9 %
5-10 SYRINGE (ML) INJECTION AS NEEDED
Status: DISCONTINUED | OUTPATIENT
Start: 2018-01-01 | End: 2018-01-01 | Stop reason: HOSPADM

## 2018-01-01 RX ORDER — ASPIRIN 325 MG
325 TABLET ORAL
Status: COMPLETED | OUTPATIENT
Start: 2018-01-01 | End: 2018-01-01

## 2018-01-01 RX ORDER — ASPIRIN 81 MG/1
81 TABLET ORAL DAILY
Status: DISCONTINUED | OUTPATIENT
Start: 2018-01-01 | End: 2018-01-01

## 2018-01-01 RX ORDER — HYDROMORPHONE HYDROCHLORIDE 2 MG/ML
0.3 INJECTION, SOLUTION INTRAMUSCULAR; INTRAVENOUS; SUBCUTANEOUS
Status: DISCONTINUED | OUTPATIENT
Start: 2018-01-01 | End: 2018-05-17 | Stop reason: HOSPADM

## 2018-01-01 RX ORDER — MORPHINE SULFATE 2 MG/ML
2 INJECTION, SOLUTION INTRAMUSCULAR; INTRAVENOUS ONCE
Status: COMPLETED | OUTPATIENT
Start: 2018-01-01 | End: 2018-01-01

## 2018-01-01 RX ORDER — HEPARIN SODIUM 200 [USP'U]/100ML
500 INJECTION, SOLUTION INTRAVENOUS ONCE
Status: COMPLETED | OUTPATIENT
Start: 2018-01-01 | End: 2018-01-01

## 2018-01-01 RX ORDER — GLYCOPYRROLATE 0.2 MG/ML
0.2 INJECTION INTRAMUSCULAR; INTRAVENOUS EVERY 4 HOURS
Status: DISCONTINUED | OUTPATIENT
Start: 2018-01-01 | End: 2018-05-17 | Stop reason: HOSPADM

## 2018-01-01 RX ORDER — IODIXANOL 320 MG/ML
INJECTION, SOLUTION INTRAVASCULAR
Status: DISCONTINUED
Start: 2018-01-01 | End: 2018-01-01

## 2018-01-01 RX ORDER — IPRATROPIUM BROMIDE AND ALBUTEROL SULFATE 2.5; .5 MG/3ML; MG/3ML
3 SOLUTION RESPIRATORY (INHALATION)
Status: DISCONTINUED | OUTPATIENT
Start: 2018-01-01 | End: 2018-01-01 | Stop reason: HOSPADM

## 2018-01-01 RX ORDER — PREDNISONE 10 MG/1
TABLET ORAL
Qty: 20 TAB | Refills: 1 | Status: ON HOLD | OUTPATIENT
Start: 2018-01-01 | End: 2018-01-01 | Stop reason: DRUGHIGH

## 2018-01-01 RX ORDER — FLUTICASONE PROPIONATE 50 MCG
2 SPRAY, SUSPENSION (ML) NASAL
COMMUNITY

## 2018-01-01 RX ORDER — CLONAZEPAM 1 MG/1
2 TABLET ORAL 2 TIMES DAILY
Status: DISCONTINUED | OUTPATIENT
Start: 2018-01-01 | End: 2018-01-01 | Stop reason: HOSPADM

## 2018-01-01 RX ORDER — CLONAZEPAM 0.5 MG/1
1 TABLET ORAL
Status: DISCONTINUED | OUTPATIENT
Start: 2018-01-01 | End: 2018-01-01 | Stop reason: HOSPADM

## 2018-01-01 RX ORDER — FUROSEMIDE 40 MG/1
40 TABLET ORAL DAILY
Qty: 30 TAB | Refills: 1 | Status: SHIPPED | OUTPATIENT
Start: 2018-01-01 | End: 2018-01-01 | Stop reason: SDUPTHER

## 2018-01-01 RX ORDER — FUROSEMIDE 10 MG/ML
20 INJECTION INTRAMUSCULAR; INTRAVENOUS DAILY
Status: DISCONTINUED | OUTPATIENT
Start: 2018-01-01 | End: 2018-01-01

## 2018-01-01 RX ORDER — ASPIRIN 81 MG/1
81 TABLET ORAL DAILY
Qty: 30 TAB | Refills: 1 | Status: SHIPPED | OUTPATIENT
Start: 2018-01-01

## 2018-01-01 RX ORDER — SCOLOPAMINE TRANSDERMAL SYSTEM 1 MG/1
1 PATCH, EXTENDED RELEASE TRANSDERMAL
Status: DISCONTINUED | OUTPATIENT
Start: 2018-01-01 | End: 2018-01-01 | Stop reason: HOSPADM

## 2018-01-01 RX ORDER — ENOXAPARIN SODIUM 100 MG/ML
40 INJECTION SUBCUTANEOUS DAILY
Status: DISCONTINUED | OUTPATIENT
Start: 2018-01-01 | End: 2018-01-01

## 2018-01-01 RX ORDER — SODIUM CHLORIDE 0.9 % (FLUSH) 0.9 %
5-10 SYRINGE (ML) INJECTION EVERY 8 HOURS
Status: DISCONTINUED | OUTPATIENT
Start: 2018-01-01 | End: 2018-01-01 | Stop reason: SDUPTHER

## 2018-01-01 RX ORDER — LANOLIN ALCOHOL/MO/W.PET/CERES
325 CREAM (GRAM) TOPICAL
Qty: 60 TAB | Refills: 5 | Status: SHIPPED | OUTPATIENT
Start: 2018-01-01

## 2018-01-01 RX ORDER — FENTANYL CITRATE 50 UG/ML
INJECTION, SOLUTION INTRAMUSCULAR; INTRAVENOUS
Status: COMPLETED
Start: 2018-01-01 | End: 2018-01-01

## 2018-01-01 RX ORDER — MORPHINE SULFATE 4 MG/ML
1 INJECTION INTRAVENOUS ONCE
Status: COMPLETED | OUTPATIENT
Start: 2018-01-01 | End: 2018-01-01

## 2018-01-01 RX ORDER — LANOLIN ALCOHOL/MO/W.PET/CERES
325 CREAM (GRAM) TOPICAL
Status: DISCONTINUED | OUTPATIENT
Start: 2018-01-01 | End: 2018-01-01 | Stop reason: HOSPADM

## 2018-01-01 RX ORDER — LIDOCAINE HYDROCHLORIDE 20 MG/ML
INJECTION, SOLUTION EPIDURAL; INFILTRATION; INTRACAUDAL; PERINEURAL
Status: DISCONTINUED
Start: 2018-01-01 | End: 2018-01-01

## 2018-01-01 RX ORDER — LANOLIN ALCOHOL/MO/W.PET/CERES
325 CREAM (GRAM) TOPICAL
Qty: 30 TAB | Refills: 1 | Status: SHIPPED | OUTPATIENT
Start: 2018-01-01 | End: 2018-01-01 | Stop reason: SDUPTHER

## 2018-01-01 RX ORDER — CLONAZEPAM 1 MG/1
1 TABLET ORAL 3 TIMES DAILY
Qty: 90 TAB | Refills: 1 | Status: SHIPPED | OUTPATIENT
Start: 2018-01-01

## 2018-01-01 RX ORDER — LISINOPRIL 5 MG/1
2.5 TABLET ORAL DAILY
Status: DISCONTINUED | OUTPATIENT
Start: 2018-01-01 | End: 2018-01-01 | Stop reason: HOSPADM

## 2018-01-01 RX ORDER — MIDAZOLAM HYDROCHLORIDE 1 MG/ML
.5-2 INJECTION, SOLUTION INTRAMUSCULAR; INTRAVENOUS
Status: DISCONTINUED | OUTPATIENT
Start: 2018-01-01 | End: 2018-01-01

## 2018-01-01 RX ORDER — METOPROLOL TARTRATE 25 MG/1
12.5 TABLET, FILM COATED ORAL EVERY 12 HOURS
Qty: 60 TAB | Refills: 5 | Status: SHIPPED | OUTPATIENT
Start: 2018-01-01 | End: 2018-01-01 | Stop reason: ALTCHOICE

## 2018-01-01 RX ORDER — METOPROLOL TARTRATE 25 MG/1
25 TABLET, FILM COATED ORAL DAILY
Status: DISCONTINUED | OUTPATIENT
Start: 2018-01-01 | End: 2018-01-01

## 2018-01-01 RX ORDER — ENOXAPARIN SODIUM 100 MG/ML
30 INJECTION SUBCUTANEOUS EVERY 24 HOURS
Status: DISCONTINUED | OUTPATIENT
Start: 2018-01-01 | End: 2018-01-01

## 2018-01-01 RX ORDER — CLOPIDOGREL BISULFATE 75 MG/1
75 TABLET ORAL DAILY
Qty: 30 TAB | Refills: 1 | Status: SHIPPED | OUTPATIENT
Start: 2018-01-01 | End: 2018-01-01 | Stop reason: SDUPTHER

## 2018-01-01 RX ORDER — LANOLIN ALCOHOL/MO/W.PET/CERES
1 CREAM (GRAM) TOPICAL
Status: DISCONTINUED | OUTPATIENT
Start: 2018-01-01 | End: 2018-01-01 | Stop reason: HOSPADM

## 2018-01-01 RX ORDER — QUETIAPINE FUMARATE 25 MG/1
25 TABLET, FILM COATED ORAL 2 TIMES DAILY
Status: DISCONTINUED | OUTPATIENT
Start: 2018-01-01 | End: 2018-01-01

## 2018-01-01 RX ORDER — FUROSEMIDE 40 MG/1
40 TABLET ORAL DAILY
Qty: 30 TAB | Refills: 5 | Status: SHIPPED | OUTPATIENT
Start: 2018-01-01

## 2018-01-01 RX ORDER — DOCUSATE SODIUM 100 MG/1
100 CAPSULE, LIQUID FILLED ORAL 2 TIMES DAILY
Status: DISCONTINUED | OUTPATIENT
Start: 2018-01-01 | End: 2018-01-01 | Stop reason: HOSPADM

## 2018-01-01 RX ORDER — GLYCOPYRROLATE 0.2 MG/ML
0.2 INJECTION INTRAMUSCULAR; INTRAVENOUS
Status: DISCONTINUED | OUTPATIENT
Start: 2018-01-01 | End: 2018-05-17 | Stop reason: HOSPADM

## 2018-01-01 RX ORDER — CLONAZEPAM 2 MG/1
2 TABLET ORAL
Qty: 60 TAB | Refills: 0 | Status: SHIPPED | OUTPATIENT
Start: 2018-01-01 | End: 2018-01-01 | Stop reason: SDUPTHER

## 2018-01-01 RX ORDER — METOPROLOL TARTRATE 25 MG/1
12.5 TABLET, FILM COATED ORAL EVERY 12 HOURS
Status: DISCONTINUED | OUTPATIENT
Start: 2018-01-01 | End: 2018-01-01 | Stop reason: HOSPADM

## 2018-01-01 RX ORDER — ATORVASTATIN CALCIUM 40 MG/1
40 TABLET, FILM COATED ORAL EVERY EVENING
Qty: 30 TAB | Refills: 1 | Status: SHIPPED | OUTPATIENT
Start: 2018-01-01 | End: 2018-01-01 | Stop reason: SDUPTHER

## 2018-01-01 RX ORDER — NEOMYCIN SULFATE, POLYMYXIN B SULFATE AND HYDROCORTISONE 10; 3.5; 1 MG/ML; MG/ML; [USP'U]/ML
3 SUSPENSION/ DROPS AURICULAR (OTIC)
COMMUNITY
End: 2018-01-01 | Stop reason: ALTCHOICE

## 2018-01-01 RX ORDER — SODIUM CHLORIDE 0.9 % (FLUSH) 0.9 %
5-10 SYRINGE (ML) INJECTION EVERY 8 HOURS
Status: DISCONTINUED | OUTPATIENT
Start: 2018-01-01 | End: 2018-01-01 | Stop reason: HOSPADM

## 2018-01-01 RX ORDER — CEPHALEXIN 500 MG/1
500 CAPSULE ORAL 3 TIMES DAILY
Qty: 21 CAP | Refills: 0 | Status: CANCELLED | OUTPATIENT
Start: 2018-01-01 | End: 2018-01-01

## 2018-01-01 RX ORDER — HEPARIN SODIUM 5000 [USP'U]/ML
2000 INJECTION, SOLUTION INTRAVENOUS; SUBCUTANEOUS AS NEEDED
Status: DISCONTINUED | OUTPATIENT
Start: 2018-01-01 | End: 2018-01-01 | Stop reason: HOSPADM

## 2018-01-01 RX ORDER — NALOXONE HYDROCHLORIDE 0.4 MG/ML
0.4 INJECTION, SOLUTION INTRAMUSCULAR; INTRAVENOUS; SUBCUTANEOUS AS NEEDED
Status: DISCONTINUED | OUTPATIENT
Start: 2018-01-01 | End: 2018-01-01 | Stop reason: HOSPADM

## 2018-01-01 RX ORDER — HYDROMORPHONE HYDROCHLORIDE 2 MG/ML
0.5 INJECTION, SOLUTION INTRAMUSCULAR; INTRAVENOUS; SUBCUTANEOUS
Status: DISCONTINUED | OUTPATIENT
Start: 2018-01-01 | End: 2018-05-17 | Stop reason: HOSPADM

## 2018-01-01 RX ORDER — LIDOCAINE HYDROCHLORIDE 10 MG/ML
1-30 INJECTION, SOLUTION EPIDURAL; INFILTRATION; INTRACAUDAL; PERINEURAL
Status: DISCONTINUED | OUTPATIENT
Start: 2018-01-01 | End: 2018-01-01

## 2018-01-01 RX ORDER — CETIRIZINE HCL 10 MG
10 TABLET ORAL
Status: DISCONTINUED | OUTPATIENT
Start: 2018-01-01 | End: 2018-01-01 | Stop reason: HOSPADM

## 2018-01-01 RX ORDER — CEPHALEXIN 500 MG/1
500 CAPSULE ORAL 2 TIMES DAILY
Qty: 14 CAP | Refills: 0 | Status: SHIPPED | OUTPATIENT
Start: 2018-01-01 | End: 2018-01-01

## 2018-01-01 RX ORDER — MAG HYDROX/ALUMINUM HYD/SIMETH 200-200-20
30 SUSPENSION, ORAL (FINAL DOSE FORM) ORAL
Status: DISCONTINUED | OUTPATIENT
Start: 2018-01-01 | End: 2018-05-17 | Stop reason: HOSPADM

## 2018-01-01 RX ORDER — CLONAZEPAM 1 MG/1
TABLET ORAL
Qty: 120 TAB | Refills: 1 | Status: SHIPPED | OUTPATIENT
Start: 2018-01-01 | End: 2018-01-01

## 2018-01-01 RX ORDER — MORPHINE SULFATE 10 MG/ML
2 INJECTION, SOLUTION INTRAMUSCULAR; INTRAVENOUS ONCE
Status: COMPLETED | OUTPATIENT
Start: 2018-01-01 | End: 2018-01-01

## 2018-01-01 RX ORDER — CLOPIDOGREL BISULFATE 75 MG/1
75 TABLET ORAL DAILY
Qty: 30 TAB | Refills: 5 | Status: SHIPPED | OUTPATIENT
Start: 2018-01-01

## 2018-01-01 RX ORDER — ALBUTEROL SULFATE 90 UG/1
1 AEROSOL, METERED RESPIRATORY (INHALATION)
Qty: 1 INHALER | Refills: 5 | Status: SHIPPED | OUTPATIENT
Start: 2018-01-01

## 2018-01-01 RX ORDER — SODIUM CHLORIDE 9 MG/ML
75 INJECTION, SOLUTION INTRAVENOUS CONTINUOUS
Status: DISCONTINUED | OUTPATIENT
Start: 2018-01-01 | End: 2018-01-01 | Stop reason: HOSPADM

## 2018-01-01 RX ORDER — HEPARIN SODIUM 10000 [USP'U]/100ML
12-25 INJECTION, SOLUTION INTRAVENOUS
Status: DISCONTINUED | OUTPATIENT
Start: 2018-01-01 | End: 2018-01-01 | Stop reason: HOSPADM

## 2018-01-01 RX ORDER — METOPROLOL TARTRATE 5 MG/5ML
5 INJECTION INTRAVENOUS ONCE
Status: COMPLETED | OUTPATIENT
Start: 2018-01-01 | End: 2018-01-01

## 2018-01-01 RX ORDER — PREDNISONE 5 MG/1
5 TABLET ORAL DAILY
Qty: 30 TAB | Refills: 1 | Status: SHIPPED | OUTPATIENT
Start: 2018-01-01

## 2018-01-01 RX ORDER — DIPHENHYDRAMINE HCL 25 MG
25 CAPSULE ORAL
Status: DISCONTINUED | OUTPATIENT
Start: 2018-01-01 | End: 2018-01-01 | Stop reason: HOSPADM

## 2018-01-01 RX ORDER — SODIUM CHLORIDE 0.9 % (FLUSH) 0.9 %
5 SYRINGE (ML) INJECTION AS NEEDED
Status: DISCONTINUED | OUTPATIENT
Start: 2018-01-01 | End: 2018-05-17 | Stop reason: HOSPADM

## 2018-01-01 RX ORDER — DIPHENHYDRAMINE HYDROCHLORIDE 50 MG/ML
25 INJECTION, SOLUTION INTRAMUSCULAR; INTRAVENOUS
Status: DISCONTINUED | OUTPATIENT
Start: 2018-01-01 | End: 2018-05-17 | Stop reason: HOSPADM

## 2018-01-01 RX ORDER — GUAIFENESIN 100 MG/5ML
81 LIQUID (ML) ORAL DAILY
Status: DISCONTINUED | OUTPATIENT
Start: 2018-01-01 | End: 2018-01-01 | Stop reason: SDUPTHER

## 2018-01-01 RX ORDER — NEOMYCIN SULFATE, POLYMYXIN B SULFATE AND HYDROCORTISONE 10; 3.5; 1 MG/ML; MG/ML; [USP'U]/ML
SUSPENSION/ DROPS AURICULAR (OTIC)
Qty: 10 ML | Refills: 0 | Status: ON HOLD | OUTPATIENT
Start: 2018-01-01 | End: 2018-01-01

## 2018-01-01 RX ORDER — PHENAZOPYRIDINE HYDROCHLORIDE 95 MG/1
95 TABLET ORAL
Qty: 20 TAB | Refills: 0 | Status: SHIPPED | OUTPATIENT
Start: 2018-01-01 | End: 2018-01-01

## 2018-01-01 RX ORDER — CARVEDILOL 3.12 MG/1
3.12 TABLET ORAL
Status: COMPLETED | OUTPATIENT
Start: 2018-01-01 | End: 2018-01-01

## 2018-01-01 RX ORDER — METOPROLOL TARTRATE 25 MG/1
12.5 TABLET, FILM COATED ORAL EVERY 12 HOURS
Qty: 60 TAB | Refills: 1 | Status: SHIPPED | OUTPATIENT
Start: 2018-01-01 | End: 2018-01-01 | Stop reason: SDUPTHER

## 2018-01-01 RX ORDER — UREA 10 %
100 LOTION (ML) TOPICAL DAILY
Status: DISCONTINUED | OUTPATIENT
Start: 2018-01-01 | End: 2018-01-01 | Stop reason: HOSPADM

## 2018-01-01 RX ORDER — HEPARIN SODIUM 5000 [USP'U]/ML
58.8 INJECTION, SOLUTION INTRAVENOUS; SUBCUTANEOUS ONCE
Status: COMPLETED | OUTPATIENT
Start: 2018-01-01 | End: 2018-01-01

## 2018-01-01 RX ORDER — ZOLPIDEM TARTRATE 5 MG/1
5 TABLET ORAL
Status: DISCONTINUED | OUTPATIENT
Start: 2018-01-01 | End: 2018-01-01

## 2018-01-01 RX ORDER — SODIUM CHLORIDE 900 MG/100ML
INJECTION INTRAVENOUS
Status: COMPLETED
Start: 2018-01-01 | End: 2018-01-01

## 2018-01-01 RX ORDER — ENOXAPARIN SODIUM 100 MG/ML
1 INJECTION SUBCUTANEOUS
Status: COMPLETED | OUTPATIENT
Start: 2018-01-01 | End: 2018-01-01

## 2018-01-01 RX ORDER — METOPROLOL TARTRATE 5 MG/5ML
2.5 INJECTION INTRAVENOUS ONCE
Status: COMPLETED | OUTPATIENT
Start: 2018-01-01 | End: 2018-01-01

## 2018-01-01 RX ORDER — CETIRIZINE HCL 10 MG
10 TABLET ORAL
COMMUNITY

## 2018-01-01 RX ORDER — FLUTICASONE PROPIONATE 50 MCG
2 SPRAY, SUSPENSION (ML) NASAL
Status: DISCONTINUED | OUTPATIENT
Start: 2018-01-01 | End: 2018-01-01 | Stop reason: HOSPADM

## 2018-01-01 RX ORDER — FACIAL-BODY WIPES
10 EACH TOPICAL DAILY PRN
Status: DISCONTINUED | OUTPATIENT
Start: 2018-01-01 | End: 2018-01-01 | Stop reason: HOSPADM

## 2018-01-01 RX ORDER — BIVALIRUDIN 250 MG/5ML
INJECTION, POWDER, LYOPHILIZED, FOR SOLUTION INTRAVENOUS
Status: DISCONTINUED
Start: 2018-01-01 | End: 2018-01-01 | Stop reason: HOSPADM

## 2018-01-01 RX ORDER — NITROFURANTOIN 25; 75 MG/1; MG/1
100 CAPSULE ORAL 2 TIMES DAILY
Qty: 14 CAP | Refills: 0 | Status: SHIPPED | OUTPATIENT
Start: 2018-01-01 | End: 2018-01-01 | Stop reason: ALTCHOICE

## 2018-01-01 RX ORDER — NITROGLYCERIN 0.4 MG/1
0.4 TABLET SUBLINGUAL
Qty: 25 TAB | Refills: 5 | Status: SHIPPED | OUTPATIENT
Start: 2018-01-01 | End: 2018-01-01

## 2018-01-01 RX ORDER — LISINOPRIL 2.5 MG/1
2.5 TABLET ORAL DAILY
COMMUNITY
End: 2018-01-01

## 2018-01-01 RX ORDER — FUROSEMIDE 10 MG/ML
INJECTION INTRAMUSCULAR; INTRAVENOUS
Status: DISPENSED
Start: 2018-01-01 | End: 2018-01-01

## 2018-01-01 RX ORDER — FUROSEMIDE 20 MG/1
40 TABLET ORAL 2 TIMES DAILY
COMMUNITY
End: 2018-01-01

## 2018-01-01 RX ORDER — CEPHALEXIN 500 MG/1
500 CAPSULE ORAL 3 TIMES DAILY
Qty: 21 CAP | Refills: 0 | Status: SHIPPED | OUTPATIENT
Start: 2018-01-01 | End: 2018-01-01

## 2018-01-01 RX ORDER — MIDAZOLAM HYDROCHLORIDE 1 MG/ML
INJECTION, SOLUTION INTRAMUSCULAR; INTRAVENOUS
Status: COMPLETED
Start: 2018-01-01 | End: 2018-01-01

## 2018-01-01 RX ORDER — DOXYCYCLINE HYCLATE 100 MG
100 TABLET ORAL 2 TIMES DAILY
Qty: 14 TAB | Refills: 0 | Status: SHIPPED | OUTPATIENT
Start: 2018-01-01 | End: 2018-01-01

## 2018-01-01 RX ORDER — FUROSEMIDE 40 MG/1
40 TABLET ORAL DAILY
Status: DISCONTINUED | OUTPATIENT
Start: 2018-01-01 | End: 2018-01-01 | Stop reason: HOSPADM

## 2018-01-01 RX ADMIN — HYDROMORPHONE HYDROCHLORIDE 0.3 MG: 2 INJECTION INTRAMUSCULAR; INTRAVENOUS; SUBCUTANEOUS at 11:07

## 2018-01-01 RX ADMIN — HYDROMORPHONE HYDROCHLORIDE 0.3 MG: 2 INJECTION INTRAMUSCULAR; INTRAVENOUS; SUBCUTANEOUS at 22:53

## 2018-01-01 RX ADMIN — FERROUS SULFATE TAB 325 MG (65 MG ELEMENTAL FE) 325 MG: 325 (65 FE) TAB at 08:39

## 2018-01-01 RX ADMIN — HEPARIN SODIUM 2000 UNITS: 5000 INJECTION, SOLUTION INTRAVENOUS; SUBCUTANEOUS at 10:19

## 2018-01-01 RX ADMIN — FENTANYL CITRATE 25 MCG: 50 INJECTION, SOLUTION INTRAMUSCULAR; INTRAVENOUS at 12:21

## 2018-01-01 RX ADMIN — BUTALBITAL, ACETAMINOPHEN AND CAFFEINE 2 TABLET: 50; 325; 40 TABLET ORAL at 18:09

## 2018-01-01 RX ADMIN — BUTALBITAL, ACETAMINOPHEN AND CAFFEINE 2 TABLET: 50; 325; 40 TABLET ORAL at 20:13

## 2018-01-01 RX ADMIN — METOPROLOL TARTRATE 12.5 MG: 25 TABLET ORAL at 21:01

## 2018-01-01 RX ADMIN — LORAZEPAM 0.5 MG: 2 INJECTION INTRAMUSCULAR; INTRAVENOUS at 12:15

## 2018-01-01 RX ADMIN — Medication 5 ML: at 11:51

## 2018-01-01 RX ADMIN — METOPROLOL TARTRATE 12.5 MG: 25 TABLET ORAL at 08:39

## 2018-01-01 RX ADMIN — FUROSEMIDE 40 MG: 40 TABLET ORAL at 08:39

## 2018-01-01 RX ADMIN — LORAZEPAM 1 MG: 2 INJECTION INTRAMUSCULAR; INTRAVENOUS at 16:54

## 2018-01-01 RX ADMIN — Medication 10 ML: at 00:37

## 2018-01-01 RX ADMIN — ONDANSETRON HYDROCHLORIDE 4 MG: 2 INJECTION, SOLUTION INTRAMUSCULAR; INTRAVENOUS at 09:27

## 2018-01-01 RX ADMIN — LORAZEPAM 0.5 MG: 2 INJECTION INTRAMUSCULAR; INTRAVENOUS at 11:04

## 2018-01-01 RX ADMIN — LORAZEPAM 0.5 MG: 2 INJECTION INTRAMUSCULAR; INTRAVENOUS at 04:06

## 2018-01-01 RX ADMIN — HEPARIN SODIUM 16 UNITS/KG/HR: 10000 INJECTION, SOLUTION INTRAVENOUS at 01:29

## 2018-01-01 RX ADMIN — GLYCOPYRROLATE 0.2 MG: 0.2 INJECTION, SOLUTION INTRAMUSCULAR; INTRAVENOUS at 23:14

## 2018-01-01 RX ADMIN — HYDROMORPHONE HYDROCHLORIDE 0.3 MG: 2 INJECTION INTRAMUSCULAR; INTRAVENOUS; SUBCUTANEOUS at 23:14

## 2018-01-01 RX ADMIN — Medication 10 ML: at 14:43

## 2018-01-01 RX ADMIN — Medication 10 ML: at 04:17

## 2018-01-01 RX ADMIN — LIDOCAINE HYDROCHLORIDE 12 ML: 10 INJECTION, SOLUTION EPIDURAL; INFILTRATION; INTRACAUDAL; PERINEURAL at 12:31

## 2018-01-01 RX ADMIN — BUTALBITAL, ACETAMINOPHEN AND CAFFEINE 1 TABLET: 50; 325; 40 TABLET ORAL at 07:41

## 2018-01-01 RX ADMIN — QUETIAPINE FUMARATE 25 MG: 25 TABLET ORAL at 17:54

## 2018-01-01 RX ADMIN — GLYCOPYRROLATE 0.2 MG: 0.2 INJECTION, SOLUTION INTRAMUSCULAR; INTRAVENOUS at 00:49

## 2018-01-01 RX ADMIN — ASPIRIN 81 MG: 81 TABLET, COATED ORAL at 09:45

## 2018-01-01 RX ADMIN — ATORVASTATIN CALCIUM 40 MG: 40 TABLET, FILM COATED ORAL at 17:33

## 2018-01-01 RX ADMIN — Medication 10 ML: at 22:09

## 2018-01-01 RX ADMIN — ONDANSETRON HYDROCHLORIDE 4 MG: 2 INJECTION, SOLUTION INTRAMUSCULAR; INTRAVENOUS at 09:20

## 2018-01-01 RX ADMIN — DOCUSATE SODIUM 100 MG: 100 CAPSULE, LIQUID FILLED ORAL at 08:57

## 2018-01-01 RX ADMIN — CLOPIDOGREL BISULFATE 75 MG: 75 TABLET ORAL at 22:07

## 2018-01-01 RX ADMIN — QUETIAPINE FUMARATE 25 MG: 25 TABLET ORAL at 10:18

## 2018-01-01 RX ADMIN — LISINOPRIL 2.5 MG: 5 TABLET ORAL at 08:39

## 2018-01-01 RX ADMIN — IODIXANOL 100 ML: 320 INJECTION, SOLUTION INTRAVASCULAR at 12:05

## 2018-01-01 RX ADMIN — CLONAZEPAM 1 MG: 1 TABLET ORAL at 16:07

## 2018-01-01 RX ADMIN — Medication 10 ML: at 15:10

## 2018-01-01 RX ADMIN — CLOPIDOGREL BISULFATE 75 MG: 75 TABLET ORAL at 09:44

## 2018-01-01 RX ADMIN — ENOXAPARIN SODIUM 40 MG: 40 INJECTION SUBCUTANEOUS at 09:20

## 2018-01-01 RX ADMIN — CLOPIDOGREL BISULFATE 75 MG: 75 TABLET ORAL at 08:40

## 2018-01-01 RX ADMIN — ACETAMINOPHEN 650 MG: 325 TABLET ORAL at 18:13

## 2018-01-01 RX ADMIN — AMIODARONE HYDROCHLORIDE 0.5 MG/MIN: 50 INJECTION, SOLUTION INTRAVENOUS at 22:46

## 2018-01-01 RX ADMIN — LORAZEPAM 0.5 MG: 2 INJECTION INTRAMUSCULAR; INTRAVENOUS at 08:18

## 2018-01-01 RX ADMIN — CLOPIDOGREL BISULFATE 75 MG: 75 TABLET ORAL at 08:45

## 2018-01-01 RX ADMIN — MORPHINE SULFATE 2 MG: 2 INJECTION, SOLUTION INTRAMUSCULAR; INTRAVENOUS at 03:33

## 2018-01-01 RX ADMIN — BUTALBITAL, ACETAMINOPHEN AND CAFFEINE 1 TABLET: 50; 325; 40 TABLET ORAL at 10:40

## 2018-01-01 RX ADMIN — CLOPIDOGREL BISULFATE 75 MG: 75 TABLET ORAL at 10:17

## 2018-01-01 RX ADMIN — HYDROMORPHONE HYDROCHLORIDE 0.3 MG: 2 INJECTION INTRAMUSCULAR; INTRAVENOUS; SUBCUTANEOUS at 15:28

## 2018-01-01 RX ADMIN — CEFTRIAXONE 1 G: 1 INJECTION, POWDER, FOR SOLUTION INTRAMUSCULAR; INTRAVENOUS at 23:39

## 2018-01-01 RX ADMIN — HYDROMORPHONE HYDROCHLORIDE 0.3 MG: 2 INJECTION INTRAMUSCULAR; INTRAVENOUS; SUBCUTANEOUS at 00:03

## 2018-01-01 RX ADMIN — FUROSEMIDE 40 MG: 10 INJECTION, SOLUTION INTRAMUSCULAR; INTRAVENOUS at 18:09

## 2018-01-01 RX ADMIN — Medication 10 ML: at 00:27

## 2018-01-01 RX ADMIN — Medication 10 ML: at 10:12

## 2018-01-01 RX ADMIN — ONDANSETRON 4 MG: 2 INJECTION INTRAMUSCULAR; INTRAVENOUS at 00:32

## 2018-01-01 RX ADMIN — BUTALBITAL, ACETAMINOPHEN AND CAFFEINE 1 TABLET: 50; 325; 40 TABLET ORAL at 17:33

## 2018-01-01 RX ADMIN — MORPHINE SULFATE 4 MG: 4 INJECTION INTRAVENOUS at 22:34

## 2018-01-01 RX ADMIN — ASPIRIN 81 MG: 81 TABLET, COATED ORAL at 09:44

## 2018-01-01 RX ADMIN — BUTALBITAL, ACETAMINOPHEN AND CAFFEINE 1 TABLET: 50; 325; 40 TABLET ORAL at 17:17

## 2018-01-01 RX ADMIN — ONDANSETRON HYDROCHLORIDE 4 MG: 2 INJECTION, SOLUTION INTRAMUSCULAR; INTRAVENOUS at 18:52

## 2018-01-01 RX ADMIN — ASPIRIN 81 MG: 81 TABLET, COATED ORAL at 09:19

## 2018-01-01 RX ADMIN — ONDANSETRON 4 MG: 2 INJECTION INTRAMUSCULAR; INTRAVENOUS at 17:42

## 2018-01-01 RX ADMIN — LORAZEPAM 0.5 MG: 2 INJECTION INTRAMUSCULAR; INTRAVENOUS at 00:05

## 2018-01-01 RX ADMIN — ONDANSETRON 4 MG: 2 INJECTION INTRAMUSCULAR; INTRAVENOUS at 12:41

## 2018-01-01 RX ADMIN — Medication 10 ML: at 15:02

## 2018-01-01 RX ADMIN — FUROSEMIDE 40 MG: 40 TABLET ORAL at 18:34

## 2018-01-01 RX ADMIN — DIPHENHYDRAMINE HYDROCHLORIDE 25 MG: 50 INJECTION, SOLUTION INTRAMUSCULAR; INTRAVENOUS at 20:14

## 2018-01-01 RX ADMIN — HEPARIN SODIUM 12 UNITS/KG/HR: 10000 INJECTION, SOLUTION INTRAVENOUS at 03:36

## 2018-01-01 RX ADMIN — AZITHROMYCIN MONOHYDRATE 500 MG: 500 INJECTION, POWDER, LYOPHILIZED, FOR SOLUTION INTRAVENOUS at 21:52

## 2018-01-01 RX ADMIN — Medication 10 ML: at 00:21

## 2018-01-01 RX ADMIN — FERROUS SULFATE TAB 325 MG (65 MG ELEMENTAL FE) 325 MG: 325 (65 FE) TAB at 09:44

## 2018-01-01 RX ADMIN — GLYCOPYRROLATE 0.2 MG: 0.2 INJECTION, SOLUTION INTRAMUSCULAR; INTRAVENOUS at 20:14

## 2018-01-01 RX ADMIN — DIPHENHYDRAMINE HYDROCHLORIDE 25 MG: 50 INJECTION, SOLUTION INTRAMUSCULAR; INTRAVENOUS at 23:14

## 2018-01-01 RX ADMIN — Medication 10 ML: at 18:13

## 2018-01-01 RX ADMIN — LORAZEPAM 0.5 MG: 2 INJECTION INTRAMUSCULAR; INTRAVENOUS at 08:44

## 2018-01-01 RX ADMIN — Medication 10 ML: at 05:54

## 2018-01-01 RX ADMIN — ASPIRIN 81 MG: 81 TABLET, COATED ORAL at 08:40

## 2018-01-01 RX ADMIN — SODIUM CHLORIDE 75 ML/HR: 900 INJECTION, SOLUTION INTRAVENOUS at 06:40

## 2018-01-01 RX ADMIN — PANTOPRAZOLE SODIUM 40 MG: 40 TABLET, DELAYED RELEASE ORAL at 08:40

## 2018-01-01 RX ADMIN — HEPARIN SODIUM 2000 UNITS: 5000 INJECTION, SOLUTION INTRAVENOUS; SUBCUTANEOUS at 07:11

## 2018-01-01 RX ADMIN — LORAZEPAM 0.5 MG: 2 INJECTION INTRAMUSCULAR; INTRAVENOUS at 06:46

## 2018-01-01 RX ADMIN — AMIODARONE HYDROCHLORIDE 1 MG/MIN: 50 INJECTION, SOLUTION INTRAVENOUS at 15:06

## 2018-01-01 RX ADMIN — MENTHOL, CAMPHOR: 1.11; 1.11 LOTION TOPICAL at 19:03

## 2018-01-01 RX ADMIN — Medication 10 ML: at 09:46

## 2018-01-01 RX ADMIN — IPRATROPIUM BROMIDE AND ALBUTEROL SULFATE 3 ML: .5; 3 SOLUTION RESPIRATORY (INHALATION) at 16:26

## 2018-01-01 RX ADMIN — LORAZEPAM 0.5 MG: 2 INJECTION INTRAMUSCULAR; INTRAVENOUS at 10:07

## 2018-01-01 RX ADMIN — CLOPIDOGREL BISULFATE 75 MG: 75 TABLET ORAL at 08:39

## 2018-01-01 RX ADMIN — DIPHENHYDRAMINE HYDROCHLORIDE 25 MG: 50 INJECTION, SOLUTION INTRAMUSCULAR; INTRAVENOUS at 12:19

## 2018-01-01 RX ADMIN — ASPIRIN 325 MG: 325 TABLET ORAL at 03:38

## 2018-01-01 RX ADMIN — FERROUS SULFATE TAB 325 MG (65 MG ELEMENTAL FE) 325 MG: 325 (65 FE) TAB at 10:18

## 2018-01-01 RX ADMIN — CEFTRIAXONE 1 G: 1 INJECTION, POWDER, FOR SOLUTION INTRAMUSCULAR; INTRAVENOUS at 01:43

## 2018-01-01 RX ADMIN — HYDROMORPHONE HYDROCHLORIDE 0.3 MG: 2 INJECTION INTRAMUSCULAR; INTRAVENOUS; SUBCUTANEOUS at 14:12

## 2018-01-01 RX ADMIN — ONDANSETRON 4 MG: 2 INJECTION INTRAMUSCULAR; INTRAVENOUS at 08:34

## 2018-01-01 RX ADMIN — AZITHROMYCIN MONOHYDRATE 500 MG: 500 INJECTION, POWDER, LYOPHILIZED, FOR SOLUTION INTRAVENOUS at 00:22

## 2018-01-01 RX ADMIN — NITROGLYCERIN 1 INCH: 20 OINTMENT TOPICAL at 15:00

## 2018-01-01 RX ADMIN — HYDROMORPHONE HYDROCHLORIDE 0.3 MG: 2 INJECTION INTRAMUSCULAR; INTRAVENOUS; SUBCUTANEOUS at 15:30

## 2018-01-01 RX ADMIN — BUTALBITAL, ACETAMINOPHEN AND CAFFEINE 1 TABLET: 50; 325; 40 TABLET ORAL at 09:27

## 2018-01-01 RX ADMIN — FUROSEMIDE 40 MG: 10 INJECTION, SOLUTION INTRAMUSCULAR; INTRAVENOUS at 09:52

## 2018-01-01 RX ADMIN — HEPARIN SODIUM 1000 UNITS: 200 INJECTION, SOLUTION INTRAVENOUS at 12:11

## 2018-01-01 RX ADMIN — SODIUM CHLORIDE 50 ML: 900 INJECTION, SOLUTION INTRAVENOUS at 21:55

## 2018-01-01 RX ADMIN — MORPHINE SULFATE 2 MG: 4 INJECTION INTRAVENOUS at 09:35

## 2018-01-01 RX ADMIN — MORPHINE SULFATE 4 MG: 4 INJECTION INTRAVENOUS at 02:03

## 2018-01-01 RX ADMIN — Medication 10 ML: at 09:32

## 2018-01-01 RX ADMIN — ATORVASTATIN CALCIUM 40 MG: 40 TABLET, FILM COATED ORAL at 22:07

## 2018-01-01 RX ADMIN — METOPROLOL TARTRATE 12.5 MG: 25 TABLET ORAL at 00:38

## 2018-01-01 RX ADMIN — ACETAMINOPHEN 650 MG: 325 TABLET ORAL at 10:10

## 2018-01-01 RX ADMIN — Medication 10 ML: at 13:53

## 2018-01-01 RX ADMIN — Medication 10 ML: at 14:26

## 2018-01-01 RX ADMIN — CLONAZEPAM 2 MG: 1 TABLET ORAL at 08:39

## 2018-01-01 RX ADMIN — Medication 10 ML: at 09:36

## 2018-01-01 RX ADMIN — ACETAMINOPHEN 650 MG: 325 TABLET ORAL at 17:17

## 2018-01-01 RX ADMIN — Medication 10 ML: at 22:00

## 2018-01-01 RX ADMIN — Medication 10 ML: at 10:23

## 2018-01-01 RX ADMIN — ASPIRIN 81 MG: 81 TABLET, COATED ORAL at 09:55

## 2018-01-01 RX ADMIN — Medication 10 ML: at 22:47

## 2018-01-01 RX ADMIN — CARVEDILOL 3.12 MG: 3.12 TABLET, FILM COATED ORAL at 05:31

## 2018-01-01 RX ADMIN — DOCUSATE SODIUM 100 MG: 100 CAPSULE, LIQUID FILLED ORAL at 18:13

## 2018-01-01 RX ADMIN — ACETAMINOPHEN 650 MG: 325 TABLET ORAL at 10:22

## 2018-01-01 RX ADMIN — METOPROLOL TARTRATE 12.5 MG: 25 TABLET ORAL at 21:27

## 2018-01-01 RX ADMIN — ATORVASTATIN CALCIUM 40 MG: 40 TABLET, FILM COATED ORAL at 18:37

## 2018-01-01 RX ADMIN — FUROSEMIDE 40 MG: 10 INJECTION, SOLUTION INTRAMUSCULAR; INTRAVENOUS at 09:19

## 2018-01-01 RX ADMIN — METOPROLOL TARTRATE 12.5 MG: 25 TABLET ORAL at 21:58

## 2018-01-01 RX ADMIN — ENOXAPARIN SODIUM 30 MG: 40 INJECTION SUBCUTANEOUS at 09:58

## 2018-01-01 RX ADMIN — CLONAZEPAM 2 MG: 1 TABLET ORAL at 18:34

## 2018-01-01 RX ADMIN — ASPIRIN 325 MG: 325 TABLET ORAL at 21:38

## 2018-01-01 RX ADMIN — NITROGLYCERIN 200 MCG: 5 INJECTION, SOLUTION INTRAVENOUS at 12:59

## 2018-01-01 RX ADMIN — FERROUS SULFATE TAB 325 MG (65 MG ELEMENTAL FE) 325 MG: 325 (65 FE) TAB at 13:51

## 2018-01-01 RX ADMIN — ASPIRIN 81 MG: 81 TABLET, COATED ORAL at 10:17

## 2018-01-01 RX ADMIN — BIVALIRUDIN 1.75 MG/KG/HR: 250 INJECTION, POWDER, LYOPHILIZED, FOR SOLUTION INTRAVENOUS at 12:43

## 2018-01-01 RX ADMIN — CLONAZEPAM 2 MG: 1 TABLET ORAL at 12:25

## 2018-01-01 RX ADMIN — ACETAMINOPHEN 650 MG: 325 TABLET ORAL at 21:58

## 2018-01-01 RX ADMIN — METOPROLOL TARTRATE 12.5 MG: 25 TABLET ORAL at 21:51

## 2018-01-01 RX ADMIN — CLONAZEPAM 2 MG: 1 TABLET ORAL at 17:54

## 2018-01-01 RX ADMIN — VITAM B12 100 MCG: 100 TAB at 08:57

## 2018-01-01 RX ADMIN — ATORVASTATIN CALCIUM 40 MG: 40 TABLET, FILM COATED ORAL at 17:53

## 2018-01-01 RX ADMIN — Medication 10 ML: at 23:54

## 2018-01-01 RX ADMIN — HYDROMORPHONE HYDROCHLORIDE 0.3 MG: 2 INJECTION INTRAMUSCULAR; INTRAVENOUS; SUBCUTANEOUS at 20:14

## 2018-01-01 RX ADMIN — LORAZEPAM 0.5 MG: 2 INJECTION INTRAMUSCULAR; INTRAVENOUS at 17:11

## 2018-01-01 RX ADMIN — AZITHROMYCIN MONOHYDRATE 500 MG: 500 INJECTION, POWDER, LYOPHILIZED, FOR SOLUTION INTRAVENOUS at 01:31

## 2018-01-01 RX ADMIN — HYDROMORPHONE HYDROCHLORIDE 0.3 MG: 2 INJECTION INTRAMUSCULAR; INTRAVENOUS; SUBCUTANEOUS at 12:15

## 2018-01-01 RX ADMIN — ATORVASTATIN CALCIUM 40 MG: 40 TABLET, FILM COATED ORAL at 18:13

## 2018-01-01 RX ADMIN — ATORVASTATIN CALCIUM 40 MG: 40 TABLET, FILM COATED ORAL at 18:09

## 2018-01-01 RX ADMIN — HYDROMORPHONE HYDROCHLORIDE 0.3 MG: 2 INJECTION INTRAMUSCULAR; INTRAVENOUS; SUBCUTANEOUS at 04:06

## 2018-01-01 RX ADMIN — MENTHOL, CAMPHOR: 1.11; 1.11 LOTION TOPICAL at 14:12

## 2018-01-01 RX ADMIN — LORAZEPAM 0.5 MG: 2 INJECTION INTRAMUSCULAR; INTRAVENOUS at 06:45

## 2018-01-01 RX ADMIN — HYDROMORPHONE HYDROCHLORIDE 0.3 MG: 2 INJECTION INTRAMUSCULAR; INTRAVENOUS; SUBCUTANEOUS at 10:07

## 2018-01-01 RX ADMIN — ATORVASTATIN CALCIUM 40 MG: 40 TABLET, FILM COATED ORAL at 00:38

## 2018-01-01 RX ADMIN — CLONAZEPAM 1 MG: 1 TABLET ORAL at 22:07

## 2018-01-01 RX ADMIN — HYDROMORPHONE HYDROCHLORIDE 0.3 MG: 2 INJECTION INTRAMUSCULAR; INTRAVENOUS; SUBCUTANEOUS at 10:24

## 2018-01-01 RX ADMIN — BUTALBITAL, ACETAMINOPHEN AND CAFFEINE 1 TABLET: 50; 325; 40 TABLET ORAL at 13:41

## 2018-01-01 RX ADMIN — METOPROLOL TARTRATE 12.5 MG: 25 TABLET ORAL at 22:39

## 2018-01-01 RX ADMIN — HYDROMORPHONE HYDROCHLORIDE 0.3 MG: 2 INJECTION INTRAMUSCULAR; INTRAVENOUS; SUBCUTANEOUS at 08:00

## 2018-01-01 RX ADMIN — ONDANSETRON 4 MG: 2 INJECTION INTRAMUSCULAR; INTRAVENOUS at 16:53

## 2018-01-01 RX ADMIN — QUETIAPINE FUMARATE 25 MG: 25 TABLET ORAL at 18:34

## 2018-01-01 RX ADMIN — HYDROMORPHONE HYDROCHLORIDE 0.3 MG: 2 INJECTION INTRAMUSCULAR; INTRAVENOUS; SUBCUTANEOUS at 06:45

## 2018-01-01 RX ADMIN — FERROUS SULFATE TAB 325 MG (65 MG ELEMENTAL FE) 325 MG: 325 (65 FE) TAB at 08:45

## 2018-01-01 RX ADMIN — ACETAMINOPHEN 650 MG: 325 TABLET ORAL at 06:52

## 2018-01-01 RX ADMIN — CEFTRIAXONE 1 G: 1 INJECTION, POWDER, FOR SOLUTION INTRAMUSCULAR; INTRAVENOUS at 00:47

## 2018-01-01 RX ADMIN — CEFTRIAXONE 1 G: 1 INJECTION, POWDER, FOR SOLUTION INTRAMUSCULAR; INTRAVENOUS at 00:11

## 2018-01-01 RX ADMIN — CEFTRIAXONE 1 G: 1 INJECTION, POWDER, FOR SOLUTION INTRAMUSCULAR; INTRAVENOUS at 00:00

## 2018-01-01 RX ADMIN — HYDROMORPHONE HYDROCHLORIDE 0.3 MG: 2 INJECTION INTRAMUSCULAR; INTRAVENOUS; SUBCUTANEOUS at 17:11

## 2018-01-01 RX ADMIN — LORAZEPAM 0.5 MG: 2 INJECTION INTRAMUSCULAR; INTRAVENOUS at 06:22

## 2018-01-01 RX ADMIN — HYDROMORPHONE HYDROCHLORIDE 0.3 MG: 2 INJECTION INTRAMUSCULAR; INTRAVENOUS; SUBCUTANEOUS at 06:22

## 2018-01-01 RX ADMIN — FUROSEMIDE 40 MG: 10 INJECTION, SOLUTION INTRAMUSCULAR; INTRAVENOUS at 18:00

## 2018-01-01 RX ADMIN — PANTOPRAZOLE SODIUM 40 MG: 40 TABLET, DELAYED RELEASE ORAL at 07:42

## 2018-01-01 RX ADMIN — LORAZEPAM 0.5 MG: 2 INJECTION INTRAMUSCULAR; INTRAVENOUS at 11:06

## 2018-01-01 RX ADMIN — FUROSEMIDE 40 MG: 10 INJECTION, SOLUTION INTRAMUSCULAR; INTRAVENOUS at 21:15

## 2018-01-01 RX ADMIN — FERROUS SULFATE TAB 325 MG (65 MG ELEMENTAL FE) 325 MG: 325 (65 FE) TAB at 08:40

## 2018-01-01 RX ADMIN — MENTHOL, CAMPHOR: 1.11; 1.11 LOTION TOPICAL at 18:44

## 2018-01-01 RX ADMIN — FUROSEMIDE 40 MG: 10 INJECTION, SOLUTION INTRAMUSCULAR; INTRAVENOUS at 17:12

## 2018-01-01 RX ADMIN — TICAGRELOR 180 MG: 90 TABLET ORAL at 13:07

## 2018-01-01 RX ADMIN — GLYCOPYRROLATE 0.2 MG: 0.2 INJECTION, SOLUTION INTRAMUSCULAR; INTRAVENOUS at 04:06

## 2018-01-01 RX ADMIN — PANTOPRAZOLE SODIUM 40 MG: 40 TABLET, DELAYED RELEASE ORAL at 08:57

## 2018-01-01 RX ADMIN — ASPIRIN 81 MG: 81 TABLET, COATED ORAL at 10:10

## 2018-01-01 RX ADMIN — BUTALBITAL, ACETAMINOPHEN AND CAFFEINE 1 TABLET: 50; 325; 40 TABLET ORAL at 01:16

## 2018-01-01 RX ADMIN — Medication 10 ML: at 16:55

## 2018-01-01 RX ADMIN — ATORVASTATIN CALCIUM 40 MG: 40 TABLET, FILM COATED ORAL at 17:12

## 2018-01-01 RX ADMIN — ASPIRIN 81 MG: 81 TABLET, COATED ORAL at 08:45

## 2018-01-01 RX ADMIN — AZITHROMYCIN MONOHYDRATE 500 MG: 500 INJECTION, POWDER, LYOPHILIZED, FOR SOLUTION INTRAVENOUS at 22:53

## 2018-01-01 RX ADMIN — AZITHROMYCIN MONOHYDRATE 500 MG: 500 INJECTION, POWDER, LYOPHILIZED, FOR SOLUTION INTRAVENOUS at 23:48

## 2018-01-01 RX ADMIN — AMIODARONE HYDROCHLORIDE 0.5 MG/MIN: 50 INJECTION, SOLUTION INTRAVENOUS at 09:37

## 2018-01-01 RX ADMIN — IBUPROFEN 600 MG: 600 TABLET, FILM COATED ORAL at 21:09

## 2018-01-01 RX ADMIN — MORPHINE SULFATE 2 MG: 4 INJECTION INTRAVENOUS at 14:26

## 2018-01-01 RX ADMIN — MORPHINE SULFATE 4 MG: 4 INJECTION INTRAVENOUS at 03:19

## 2018-01-01 RX ADMIN — Medication 10 ML: at 05:32

## 2018-01-01 RX ADMIN — BUTALBITAL, ACETAMINOPHEN AND CAFFEINE 1 TABLET: 50; 325; 40 TABLET ORAL at 22:01

## 2018-01-01 RX ADMIN — CLONAZEPAM 1 MG: 1 TABLET ORAL at 20:44

## 2018-01-01 RX ADMIN — METOPROLOL TARTRATE 12.5 MG: 25 TABLET ORAL at 08:40

## 2018-01-01 RX ADMIN — HYDROMORPHONE HYDROCHLORIDE 0.3 MG: 2 INJECTION INTRAMUSCULAR; INTRAVENOUS; SUBCUTANEOUS at 08:44

## 2018-01-01 RX ADMIN — ACETAMINOPHEN 650 MG: 325 TABLET ORAL at 04:07

## 2018-01-01 RX ADMIN — NITROGLYCERIN 1 INCH: 20 OINTMENT TOPICAL at 08:57

## 2018-01-01 RX ADMIN — ATORVASTATIN CALCIUM 40 MG: 40 TABLET, FILM COATED ORAL at 17:20

## 2018-01-01 RX ADMIN — QUETIAPINE FUMARATE 25 MG: 25 TABLET ORAL at 09:45

## 2018-01-01 RX ADMIN — PANTOPRAZOLE SODIUM 40 MG: 40 TABLET, DELAYED RELEASE ORAL at 09:55

## 2018-01-01 RX ADMIN — PANTOPRAZOLE SODIUM 40 MG: 40 TABLET, DELAYED RELEASE ORAL at 09:44

## 2018-01-01 RX ADMIN — Medication 5 ML: at 14:00

## 2018-01-01 RX ADMIN — ASPIRIN 81 MG: 81 TABLET, COATED ORAL at 09:24

## 2018-01-01 RX ADMIN — AZITHROMYCIN MONOHYDRATE 500 MG: 500 INJECTION, POWDER, LYOPHILIZED, FOR SOLUTION INTRAVENOUS at 00:53

## 2018-01-01 RX ADMIN — MIDAZOLAM 1 MG: 1 INJECTION INTRAMUSCULAR; INTRAVENOUS at 12:21

## 2018-01-01 RX ADMIN — IPRATROPIUM BROMIDE AND ALBUTEROL SULFATE 3 ML: .5; 3 SOLUTION RESPIRATORY (INHALATION) at 03:25

## 2018-01-01 RX ADMIN — BUTALBITAL, ACETAMINOPHEN AND CAFFEINE 1 TABLET: 50; 325; 40 TABLET ORAL at 16:00

## 2018-01-01 RX ADMIN — MORPHINE SULFATE 2 MG: 4 INJECTION INTRAVENOUS at 01:20

## 2018-01-01 RX ADMIN — METOPROLOL TARTRATE 12.5 MG: 25 TABLET ORAL at 09:46

## 2018-01-01 RX ADMIN — ENOXAPARIN SODIUM 70 MG: 40 INJECTION SUBCUTANEOUS at 00:35

## 2018-01-01 RX ADMIN — LORAZEPAM 0.5 MG: 2 INJECTION INTRAMUSCULAR; INTRAVENOUS at 08:49

## 2018-01-01 RX ADMIN — QUETIAPINE FUMARATE 25 MG: 25 TABLET ORAL at 08:39

## 2018-01-01 RX ADMIN — Medication 10 ML: at 22:24

## 2018-01-01 RX ADMIN — AZITHROMYCIN MONOHYDRATE 500 MG: 500 INJECTION, POWDER, LYOPHILIZED, FOR SOLUTION INTRAVENOUS at 22:20

## 2018-01-01 RX ADMIN — Medication 10 ML: at 13:41

## 2018-01-01 RX ADMIN — METOPROLOL TARTRATE 12.5 MG: 25 TABLET ORAL at 20:44

## 2018-01-01 RX ADMIN — ONDANSETRON 4 MG: 2 INJECTION INTRAMUSCULAR; INTRAVENOUS at 01:00

## 2018-01-01 RX ADMIN — ASPIRIN 81 MG: 81 TABLET, COATED ORAL at 08:57

## 2018-01-01 RX ADMIN — Medication 10 ML: at 18:09

## 2018-01-01 RX ADMIN — CLONAZEPAM 2 MG: 1 TABLET ORAL at 17:17

## 2018-01-01 RX ADMIN — CEFTRIAXONE 1 G: 1 INJECTION, POWDER, FOR SOLUTION INTRAMUSCULAR; INTRAVENOUS at 00:29

## 2018-01-01 RX ADMIN — CLONAZEPAM 2 MG: 1 TABLET ORAL at 09:45

## 2018-01-01 RX ADMIN — CEFTRIAXONE 1 G: 1 INJECTION, POWDER, FOR SOLUTION INTRAMUSCULAR; INTRAVENOUS at 01:06

## 2018-01-01 RX ADMIN — ONDANSETRON 4 MG: 2 INJECTION INTRAMUSCULAR; INTRAVENOUS at 05:32

## 2018-01-01 RX ADMIN — METOPROLOL TARTRATE 12.5 MG: 25 TABLET ORAL at 10:17

## 2018-01-01 RX ADMIN — LORAZEPAM 0.5 MG: 2 INJECTION INTRAMUSCULAR; INTRAVENOUS at 14:11

## 2018-01-01 RX ADMIN — PANTOPRAZOLE SODIUM 40 MG: 40 TABLET, DELAYED RELEASE ORAL at 09:24

## 2018-01-01 RX ADMIN — BUTALBITAL, ACETAMINOPHEN AND CAFFEINE 1 TABLET: 50; 325; 40 TABLET ORAL at 08:35

## 2018-01-01 RX ADMIN — ATORVASTATIN CALCIUM 40 MG: 40 TABLET, FILM COATED ORAL at 17:17

## 2018-01-01 RX ADMIN — MENTHOL, CAMPHOR: 1.11; 1.11 LOTION TOPICAL at 22:00

## 2018-01-01 RX ADMIN — LORAZEPAM 0.5 MG: 2 INJECTION INTRAMUSCULAR; INTRAVENOUS at 00:34

## 2018-01-01 RX ADMIN — METOPROLOL TARTRATE 12.5 MG: 25 TABLET ORAL at 21:00

## 2018-01-01 RX ADMIN — GLYCOPYRROLATE 0.2 MG: 0.2 INJECTION, SOLUTION INTRAMUSCULAR; INTRAVENOUS at 14:11

## 2018-01-01 RX ADMIN — LORAZEPAM 0.5 MG: 2 INJECTION INTRAMUSCULAR; INTRAVENOUS at 11:51

## 2018-01-01 RX ADMIN — METOPROLOL TARTRATE 12.5 MG: 25 TABLET ORAL at 08:46

## 2018-01-01 RX ADMIN — ACETAMINOPHEN 650 MG: 325 TABLET ORAL at 13:14

## 2018-01-01 RX ADMIN — PANTOPRAZOLE SODIUM 40 MG: 40 TABLET, DELAYED RELEASE ORAL at 10:10

## 2018-01-01 RX ADMIN — ACETAMINOPHEN 650 MG: 325 TABLET ORAL at 21:08

## 2018-01-01 RX ADMIN — PANTOPRAZOLE SODIUM 40 MG: 40 TABLET, DELAYED RELEASE ORAL at 09:19

## 2018-01-01 RX ADMIN — Medication 10 ML: at 06:35

## 2018-01-01 RX ADMIN — GLYCOPYRROLATE 0.2 MG: 0.2 INJECTION, SOLUTION INTRAMUSCULAR; INTRAVENOUS at 14:42

## 2018-01-01 RX ADMIN — FERROUS SULFATE TAB 325 MG (65 MG ELEMENTAL FE) 325 MG: 325 (65 FE) TAB at 09:18

## 2018-01-01 RX ADMIN — PANTOPRAZOLE SODIUM 40 MG: 40 TABLET, DELAYED RELEASE ORAL at 08:45

## 2018-01-01 RX ADMIN — CEFTRIAXONE 1 G: 1 INJECTION, POWDER, FOR SOLUTION INTRAMUSCULAR; INTRAVENOUS at 02:05

## 2018-01-01 RX ADMIN — SODIUM CHLORIDE 200 ML: 900 INJECTION, SOLUTION INTRAVENOUS at 09:11

## 2018-01-01 RX ADMIN — LORAZEPAM 0.5 MG: 2 INJECTION INTRAMUSCULAR; INTRAVENOUS at 23:14

## 2018-01-01 RX ADMIN — HYDROMORPHONE HYDROCHLORIDE 0.5 MG: 2 INJECTION INTRAMUSCULAR; INTRAVENOUS; SUBCUTANEOUS at 14:30

## 2018-01-01 RX ADMIN — LORAZEPAM 0.5 MG: 2 INJECTION INTRAMUSCULAR; INTRAVENOUS at 04:34

## 2018-01-01 RX ADMIN — LORAZEPAM 0.5 MG: 2 INJECTION INTRAMUSCULAR; INTRAVENOUS at 20:14

## 2018-01-01 RX ADMIN — FERROUS SULFATE TAB 325 MG (65 MG ELEMENTAL FE) 325 MG: 325 (65 FE) TAB at 09:55

## 2018-01-01 RX ADMIN — PANTOPRAZOLE SODIUM 40 MG: 40 TABLET, DELAYED RELEASE ORAL at 10:17

## 2018-01-01 RX ADMIN — FUROSEMIDE 40 MG: 10 INJECTION, SOLUTION INTRAMUSCULAR; INTRAVENOUS at 09:27

## 2018-01-01 RX ADMIN — FUROSEMIDE 40 MG: 40 TABLET ORAL at 09:44

## 2018-01-01 RX ADMIN — MIDAZOLAM HYDROCHLORIDE 1 MG: 1 INJECTION, SOLUTION INTRAMUSCULAR; INTRAVENOUS at 12:21

## 2018-01-01 RX ADMIN — MENTHOL, CAMPHOR 1 ML: 1.11; 1.11 LOTION TOPICAL at 10:26

## 2018-01-01 RX ADMIN — QUETIAPINE FUMARATE 25 MG: 25 TABLET ORAL at 23:52

## 2018-01-01 RX ADMIN — METOROPROLOL TARTRATE 5 MG: 5 INJECTION, SOLUTION INTRAVENOUS at 03:22

## 2018-01-01 RX ADMIN — Medication 10 ML: at 17:34

## 2018-01-01 RX ADMIN — Medication 10 ML: at 14:00

## 2018-01-01 RX ADMIN — Medication 10 ML: at 21:51

## 2018-01-01 RX ADMIN — MORPHINE SULFATE 1 MG: 4 INJECTION INTRAVENOUS at 02:26

## 2018-01-01 RX ADMIN — DIPHENHYDRAMINE HYDROCHLORIDE 25 MG: 50 INJECTION, SOLUTION INTRAMUSCULAR; INTRAVENOUS at 06:22

## 2018-01-01 RX ADMIN — GLYCOPYRROLATE 0.2 MG: 0.2 INJECTION, SOLUTION INTRAMUSCULAR; INTRAVENOUS at 09:09

## 2018-01-01 RX ADMIN — DIPHENHYDRAMINE HYDROCHLORIDE 25 MG: 50 INJECTION, SOLUTION INTRAMUSCULAR; INTRAVENOUS at 15:27

## 2018-01-01 RX ADMIN — Medication 10 ML: at 08:35

## 2018-01-01 RX ADMIN — HEPARIN SODIUM 16 UNITS/KG/HR: 10000 INJECTION, SOLUTION INTRAVENOUS at 01:19

## 2018-01-01 RX ADMIN — BISACODYL 10 MG: 10 SUPPOSITORY RECTAL at 17:54

## 2018-01-01 RX ADMIN — ENOXAPARIN SODIUM 30 MG: 40 INJECTION SUBCUTANEOUS at 11:13

## 2018-01-01 RX ADMIN — BUTALBITAL, ACETAMINOPHEN AND CAFFEINE 1 TABLET: 50; 325; 40 TABLET ORAL at 01:17

## 2018-01-01 RX ADMIN — METOPROLOL TARTRATE 12.5 MG: 25 TABLET ORAL at 09:55

## 2018-01-01 RX ADMIN — METOROPROLOL TARTRATE 2.5 MG: 5 INJECTION, SOLUTION INTRAVENOUS at 05:32

## 2018-01-01 RX ADMIN — MORPHINE SULFATE 2 MG: 10 INJECTION INTRAMUSCULAR; INTRAVENOUS; SUBCUTANEOUS at 20:11

## 2018-01-01 RX ADMIN — LORAZEPAM 0.5 MG: 2 INJECTION INTRAMUSCULAR; INTRAVENOUS at 22:59

## 2018-01-01 RX ADMIN — LORAZEPAM 0.5 MG: 2 INJECTION INTRAMUSCULAR; INTRAVENOUS at 02:50

## 2018-01-01 RX ADMIN — CARVEDILOL 3.12 MG: 3.12 TABLET, FILM COATED ORAL at 18:13

## 2018-01-01 RX ADMIN — Medication 10 ML: at 00:53

## 2018-01-01 RX ADMIN — LORAZEPAM 0.5 MG: 2 INJECTION INTRAMUSCULAR; INTRAVENOUS at 15:26

## 2018-01-01 RX ADMIN — CLOPIDOGREL BISULFATE 75 MG: 75 TABLET ORAL at 11:35

## 2018-01-01 RX ADMIN — Medication 10 ML: at 21:27

## 2018-01-01 RX ADMIN — ACETAMINOPHEN 650 MG: 325 TABLET ORAL at 01:07

## 2018-01-01 RX ADMIN — AZITHROMYCIN MONOHYDRATE 500 MG: 500 INJECTION, POWDER, LYOPHILIZED, FOR SOLUTION INTRAVENOUS at 22:13

## 2018-01-01 RX ADMIN — METOPROLOL TARTRATE 12.5 MG: 25 TABLET ORAL at 11:11

## 2018-01-01 RX ADMIN — CLOPIDOGREL BISULFATE 75 MG: 75 TABLET ORAL at 08:57

## 2018-01-01 RX ADMIN — HEPARIN SODIUM 4000 UNITS: 5000 INJECTION, SOLUTION INTRAVENOUS; SUBCUTANEOUS at 03:28

## 2018-01-01 RX ADMIN — METOPROLOL TARTRATE 12.5 MG: 25 TABLET ORAL at 09:45

## 2018-01-24 NOTE — MR AVS SNAPSHOT
[address] [address] [facility] 
[phone] Patient: Robyn Lundy MRN: [id number] IPA:[id number] Visit Information Date & Time Provider Department Dept. Phone Encounter #  
 1/24/2018  3:00 PM Herb Solis  Kay Ortega [id number] Follow-up Instructions Return in about 2 months (around 3/24/2018) for routine follow up. Upcoming Health Maintenance Date Due  
 GLAUCOMA SCREENING Q2Y 1/3/2010 MEDICARE YEARLY EXAM 2/1/2018 BREAST CANCER SCRN MAMMOGRAM 11/11/2018 COLONOSCOPY 12/24/2019 DTaP/Tdap/Td series (2 - Td) 4/28/2025 Allergies as of 1/24/2018  Review Complete On: 1/24/2018 By: Fabio Barlow LPN Severity Noted Reaction Type Reactions Ciprofloxacin  05/06/2010    Shortness of Breath Codeine  05/06/2010    Nausea Only Influenza Virus Vaccine, Specific  03/10/2014   Not Verified Unknown (comments) Allergy documented in Admission Data base 1 screening Pcn [Penicillins]  05/06/2010    Itching Current Immunizations  Reviewed on 5/6/2010 Name Date Influenza Vaccine  Deferred (Contraindication) Pneumococcal Conjugate (PCV-13) 1/31/2017 Pneumococcal Polysaccharide (PPSV-23) 4/28/2015,  Deferred (Contraindication) Tdap 4/28/2015 Not reviewed this visit You Were Diagnosed With   
  
 Codes Comments Subacute maxillary sinusitis    -  Primary ICD-10-CM: J01.00 ICD-9-CM: 461.0 Migraine without aura and with status migrainosus, not intractable     ICD-10-CM: G43.001 ICD-9-CM: 346.12 Anxiety     ICD-10-CM: F41.9 ICD-9-CM: 300.00 Moderate persistent asthma with acute exacerbation     ICD-10-CM: J45.41 
ICD-9-CM: 493.92 Vitals BP Pulse Temp Resp Height(growth percentile) Weight(growth percentile)  128/52 (BP 1 Location: Left arm, BP Patient Position: Sitting) 68 97.7 °F (36.5 °C) (Oral) 16 5' 6\" (1.676 m) 160 lb (72.6 kg) SpO2 BMI OB Status Smoking Status 98% 25.82 kg/m2 Hysterectomy Former Smoker BMI and BSA Data Body Mass Index Body Surface Area  
 25.82 kg/m 2 1.84 m 2 Preferred Pharmacy Pharmacy Name Phone [facility] [phone] [facility] [phone] Your Updated Medication List  
  
   
This list is accurate as of: 1/24/18  3:39 PM.  Always use your most recent med list.  
  
  
  
  
 albuterol 90 mcg/actuation inhaler Commonly known as:  PROVENTIL HFA, VENTOLIN HFA, PROAIR HFA Take 1 Puff by inhalation every six (6) hours as needed for Wheezing. aspirin 81 mg chewable tablet Take 1 Tab by mouth daily. atorvastatin 40 mg tablet Commonly known as:  LIPITOR Take 1 Tab by mouth every evening. butalbital-acetaminophen-caff -40 mg per capsule Commonly known as:  [employer] Take 1 Cap by mouth every four (4) hours as needed for Pain. Max Daily Amount: 6 Caps. cephALEXin 500 mg capsule Commonly known as:  Fernando Mack Take 1 Cap by mouth three (3) times daily for 7 days. clonazePAM 1 mg tablet Commonly known as:  KlonoPIN  
TAKE TWO TABLETS BY MOUTH TWICE DAILY EXCEDRIN MIGRAINE PO Take 1 Tab by mouth as needed. FISH OIL 1,000 mg Cap Generic drug:  omega-3 fatty acids-vitamin e Take 1 Cap by mouth. fluticasone 50 mcg/actuation nasal spray Commonly known as:  FLONASE  
1 spray each nostril daily  
  
 furosemide 20 mg tablet Commonly known as:  LASIX Take 2 Tabs by mouth daily. metoprolol tartrate 25 mg tablet Commonly known as:  LOPRESSOR Take 1 Tab by mouth daily. naproxen 500 mg tablet Commonly known as:  NAPROSYN Take 1 Tab by mouth two (2) times daily (with meals). As needed  
  
 nitroglycerin 0.4 mg SL tablet Commonly known as:  NITROSTAT 1 Tab by SubLINGual route every five (5) minutes as needed. Indications: ANGINA  
  
 nystatin topical cream  
Commonly known as:  MYCOSTATIN Apply  to affected area two (2) times a day. omeprazole 20 mg capsule Commonly known as:  PRILOSEC Take 1 Cap by mouth daily. Take only as needed  
  
 potassium chloride 10 mEq tablet Commonly known as:  KLOR-CON Take 1 Tab by mouth daily. predniSONE 10 mg tablet Commonly known as:  DELTASONE  
4 tabs for 2 day, 3 tabs for 2 days, 2 tabs for 2 days, 1tab for 2 days VITAMIN B-12 100 mcg tablet Generic drug:  cyanocobalamin Take 100 mcg by mouth daily. Prescriptions Printed Refills  
 predniSONE (DELTASONE) 10 mg tablet 1 Si tabs for 2 day, 3 tabs for 2 days, 2 tabs for 2 days, 1tab for 2 days Class: Print  
 cephALEXin (KEFLEX) 500 mg capsule 0 Sig: Take 1 Cap by mouth three (3) times daily for 7 days. Class: Print Route: Oral  
 clonazePAM (KLONOPIN) 1 mg tablet 1 Sig: TAKE TWO TABLETS BY MOUTH TWICE DAILY Class: Print Follow-up Instructions Return in about 2 months (around 3/24/2018) for routine follow up. Introducing [facility]! Daria Petersen introduces [employer] patient portal. Now you can access parts of your medical record, email your doctor's office, and request medication refills online. 1. In your internet browser, go to [email or web address] 2. Click on the First Time User? Click Here link in the Sign In box. You will see the New Member Sign Up page. 3. Enter your [employer] Access Code exactly as it appears below. You will not need to use this code after youve completed the sign-up process. If you do not sign up before the expiration date, you must request a new code. · [employer] Access Code: [email or web address] Expires: 2018  2:39 PM 
 
4.  Enter the last four digits of your Social Security Number (xxxx) and Date of Birth (mm/dd/yyyy) as indicated and click Submit. You will be taken to the next sign-up page. 5. Create a [employer] ID. This will be your [employer] login ID and cannot be changed, so think of one that is secure and easy to remember. 6. Create a [employer] password. You can change your password at any time. 7. Enter your Password Reset Question and Answer. This can be used at a later time if you forget your password. 8. Enter your e-mail address. You will receive e-mail notification when new information is available in [address] 9. Click Sign Up. You can now view and download portions of your medical record. 10. Click the Download Summary menu link to download a portable copy of your medical information. If you have questions, please visit the Frequently Asked Questions section of the [employer] website. Remember, [employer] is NOT to be used for urgent needs. For medical emergencies, dial 911. Now available from your iPhone and Android! Please provide this summary of care documentation to your next provider. Your primary care clinician is listed as Annabelle Mccoy. If you have any questions after today's visit, please call [phone].

## 2018-01-24 NOTE — PROGRESS NOTES
Subjective:   Nenita Lewis is a 68 y.o. female who complains of congestion, cough described as productive of green sputum, myalgias, headache, bilateral sinus pain, chills and bilateral ear pain, pressure for 9 days, rapidly worsening since that time. She denies a history of nausea, shortness of breath, vomiting and wheezing. Evaluation to date: none. Treatment to date: OTC products. Patient admits to smoke cigarettes occa 1 here and there  Relevant PMH: heart failure. Min wheezing or swelling. Needs anxiety meds rf. Allergies   Allergen Reactions    Ciprofloxacin Shortness of Breath    Codeine Nausea Only    Influenza Virus Vaccine, Specific Unknown (comments)     Allergy documented in Admission Data base 1 screening    Pcn [Penicillins] Itching         Patient Active Problem List    Diagnosis Date Noted    Coronary artery disease without angina pectoris 01/31/2017    Moderate persistent asthma 08/23/2016    Allergic rhinitis 04/28/2015    Elevated cholesterol 07/22/2014    Hypertension 03/20/2014    Anxiety 03/20/2014    GERD (gastroesophageal reflux disease) 30/96/9121    Systolic heart failure (HCC) 03/11/2014     Allergies   Allergen Reactions    Ciprofloxacin Shortness of Breath    Codeine Nausea Only    Influenza Virus Vaccine, Specific Unknown (comments)     Allergy documented in Admission Data base 1 screening    Pcn [Penicillins] Itching     Social History   Substance Use Topics    Smoking status: Former Smoker     Packs/day: 0.10     Quit date: 11/1/2014    Smokeless tobacco: Never Used    Alcohol use No        Review of Systems  Pertinent items are noted in HPI.     Objective:     Visit Vitals    /52 (BP 1 Location: Left arm, BP Patient Position: Sitting)    Pulse 68    Temp 97.7 °F (36.5 °C) (Oral)    Resp 16    Ht 5' 6\" (1.676 m)    Wt 160 lb (72.6 kg)    SpO2 98%    BMI 25.82 kg/m2     General:  alert, cooperative, no distress   Eyes: negative Ears: normal TM's and external ear canals AU   Sinuses: Normal paranasal sinuses without tenderness   Mouth:  Lips, mucosa, and tongue normal. Teeth and gums normal   Neck: supple, symmetrical, trachea midline and no adenopathy. Heart: S1 and S2 normal, no murmurs noted. Lungs: clear to auscultation bilaterally   Abdomen: soft, non-tender. Bowel sounds normal. No masses,  no organomegaly      Assessment/Plan:   sinusitis  Discussed the dx and tx of sinusitis. Antibiotics per orders. RTC in 2 months or PRN. Encounter Diagnoses   Name Primary?  Subacute maxillary sinusitis Yes    Migraine without aura and with status migrainosus, not intractable     Anxiety     Moderate persistent asthma with acute exacerbation      Orders Placed This Encounter    predniSONE (DELTASONE) 10 mg tablet    cephALEXin (KEFLEX) 500 mg capsule    clonazePAM (KLONOPIN) 1 mg tablet   . Anxiety stable, rf benzo. Discussed possible side affects, precautions, and drug interactions and possible benefits of the medication(s). Follow-up Disposition:  Return in about 2 months (around 3/24/2018) for routine follow up.

## 2018-01-24 NOTE — PROGRESS NOTES
Chief Complaint   Patient presents with    Cold Symptoms     fever chills, prod cough thick yellow sputum, pain T - area of face, head pain, chest conjestion, ears feel full     I have reviewed the patient's medical history in detail and updated the computerized patient record. Health Maintenance reviewed. 1. Have you been to the ER, urgent care clinic since your last visit? Hospitalized since your last visit?no    2. Have you seen or consulted any other health care providers outside of the [address] [place] [address] [facility] since your last visit? Include any pap smears or colon screening. No    Encouraged pt to discuss pt's wishes with spouse/partner/family and bring them in the next appt to follow thru with the Advanced Directive    Fall Risk Assessment, last 12 mths 1/24/2018   Able to walk? Yes   Fall in past 12 months? No   Fall with injury? -   Number of falls in past 12 months -   Fall Risk Score -       PHQ over the last two weeks 1/24/2018   Little interest or pleasure in doing things Several days   Feeling down, depressed or hopeless Several days   Total Score PHQ 2 2       Abuse Screening Questionnaire 1/24/2018   Do you ever feel afraid of your partner? N   Are you in a relationship with someone who physically or mentally threatens you? N   Is it safe for you to go home?  Y       ADL Assessment 1/24/2018   Feeding yourself No Help Needed   Getting from bed to chair No Help Needed   Getting dressed No Help Needed   Bathing or showering No Help Needed   Walk across the room (includes cane/walker) No Help Needed   Using the telphone No Help Needed   Taking your medications No Help Needed   Preparing meals No Help Needed   Managing money (expenses/bills) No Help Needed   Moderately strenuous housework (laundry) No Help Needed   Shopping for personal items (toiletries/medicines) No Help Needed   Shopping for groceries No Help Needed   Driving No Help Needed   Climbing a flight of stairs No Help Needed Getting to places beyond walking distances No Help Needed

## 2018-02-05 NOTE — TELEPHONE ENCOUNTER
Last office visit:  1/24/18  Last filled:  Pediotic ear drops 4/21/17  No changes  Follow up 3/26/18 with Dr Melissa Loera

## 2018-02-08 NOTE — ED PROVIDER NOTES
EMERGENCY DEPARTMENT HISTORY AND PHYSICAL EXAM      Date: 2/8/2018  Patient Name: Robyn Lundy    History of Presenting Illness     Chief Complaint   Patient presents with    Headache     Pt arrives via EMS complaining of head pain after being hit by Chickens 2 weeks ago    Cough     Ongoing x week Pt reports being seen by PCP 2 weeks ago Dx with sinusitis       History Provided By: Patient    HPI: Robyn Lundy, 68 y.o. female with PMHx significant for CHF, presents via EMS to the ED with cc of constant lower abd pain and generalized HA for the last two weeks that worsened in intensity today and with onset of SOB. She describes that her abd pain is greatest around her navel and states the pain is similar to a \"jumping\" sensation. She denies any modifying factors to this pain. She states that her HA began after she was hit at the posterior aspect of her head by a frozen chicken breast package that fell out of her freezer. She reports that her SOB has improved since onset. She reports hx of appendectomy and cholecystectomy. She denies sx's of nausea, vomiting, fever, diarrhea, constipation, blood in stool, CP, LOC and urinary sx's. SHx: Former smoker and denies EtOH use     PCP: Herb Solis MD    There are no other complaints, changes, or physical findings at this time. Current Outpatient Prescriptions   Medication Sig Dispense Refill    doxycycline (VIBRA-TABS) 100 mg tablet Take 1 Tab by mouth two (2) times a day for 7 days. 14 Tab 0    butalbital-acetaminophen-caff (FIORICET) -40 mg per capsule Take 1 Cap by mouth every four (4) hours as needed for Pain.  15 Cap 0    neomycin-polymyxin-hydrocortisone, buffered, (PEDIOTIC) 3.5-10,000-1 mg/mL-unit/mL-% otic suspension PLACE 3 DROPS IN RIGHT EAR THREE TIMES A DAY FOR 7 DAYS 10 mL 0    predniSONE (DELTASONE) 10 mg tablet 4 tabs for 2 day, 3 tabs for 2 days, 2 tabs for 2 days, 1tab for 2 days 20 Tab 1    clonazePAM (KLONOPIN) 1 mg tablet TAKE TWO TABLETS BY MOUTH TWICE DAILY 120 Tab 1    naproxen (NAPROSYN) 500 mg tablet Take 1 Tab by mouth two (2) times daily (with meals). As needed 60 Tab 2    fluticasone (FLONASE) 50 mcg/actuation nasal spray 1 spray each nostril daily 1 Bottle 5    albuterol (PROVENTIL HFA, VENTOLIN HFA, PROAIR HFA) 90 mcg/actuation inhaler Take 1 Puff by inhalation every six (6) hours as needed for Wheezing. 1 Inhaler 5    nitroglycerin (NITROSTAT) 0.4 mg SL tablet 1 Tab by SubLINGual route every five (5) minutes as needed. Indications: ANGINA 25 Tab 5    nystatin (MYCOSTATIN) topical cream Apply  to affected area two (2) times a day. 30 g 2    omeprazole (PRILOSEC) 20 mg capsule Take 1 Cap by mouth daily. Take only as needed 30 Cap 11    metoprolol tartrate (LOPRESSOR) 25 mg tablet Take 1 Tab by mouth daily. 30 Tab 5    potassium chloride (KLOR-CON) 10 mEq tablet Take 1 Tab by mouth daily. 30 Tab 5    furosemide (LASIX) 20 mg tablet Take 2 Tabs by mouth daily. 180 Tab 3    atorvastatin (LIPITOR) 40 mg tablet Take 1 Tab by mouth every evening. 90 Tab 3    omega-3 fatty acids-vitamin e (FISH OIL) 1,000 mg cap Take 1 Cap by mouth.  aspirin 81 mg chewable tablet Take 1 Tab by mouth daily.  cyanocobalamin (VITAMIN B-12) 100 mcg tablet Take 100 mcg by mouth daily.  ASPIRIN/ACETAMINOPHEN/CAFFEINE (EXCEDRIN MIGRAINE PO) Take 1 Tab by mouth as needed. Past History     Past Medical History:  Past Medical History:   Diagnosis Date    CHF (congestive heart failure) (HCC)     Other ill-defined conditions(129.89)     AMI    Psychiatric disorder     depression       Past Surgical History:  Past Surgical History:   Procedure Laterality Date    CARDIAC SURG PROCEDURE UNLIST      stents    HX APPENDECTOMY      HX CHOLECYSTECTOMY      HX GI      adhesions    HX GYN      hysterectomy/  10 years       Family History:  No family history on file.     Social History:  Social History   Substance Use Topics    Smoking status: Former Smoker     Packs/day: 0.10     Quit date: 11/1/2014    Smokeless tobacco: Never Used    Alcohol use No       Allergies: Allergies   Allergen Reactions    Ciprofloxacin Shortness of Breath    Codeine Nausea Only    Influenza Virus Vaccine, Specific Unknown (comments)     Allergy documented in Admission Data base 1 screening    Pcn [Penicillins] Itching         Review of Systems   Review of Systems   Constitutional: Negative for fatigue and fever. HENT: Negative. Eyes: Negative. Respiratory: Positive for shortness of breath. Negative for wheezing. Cardiovascular: Negative for chest pain and leg swelling. Gastrointestinal: Positive for abdominal pain. Negative for blood in stool, constipation, diarrhea, nausea and vomiting. Endocrine: Negative. Genitourinary: Negative for difficulty urinating, dysuria, hematuria and urgency. Musculoskeletal: Negative. Skin: Negative for rash. Allergic/Immunologic: Negative. Neurological: Positive for headaches. Negative for syncope, weakness and numbness. Hematological: Negative. Psychiatric/Behavioral: Negative. All other systems reviewed and are negative. Physical Exam   Physical Exam   Constitutional: She is oriented to person, place, and time. She appears well-developed and well-nourished. No distress. HENT:   Head: Normocephalic and atraumatic. Mouth/Throat: Oropharynx is clear and moist.   Eyes: Conjunctivae and EOM are normal.   Neck: Neck supple. No JVD present. No tracheal deviation present. Cardiovascular: Normal rate, regular rhythm and intact distal pulses. Exam reveals no gallop and no friction rub. No murmur heard. Pulmonary/Chest: Effort normal and breath sounds normal. No stridor. No respiratory distress. She has no wheezes. Lungs clear to auscultation. Abdominal: Soft. Bowel sounds are normal. She exhibits no distension and no mass. There is tenderness.  There is no rebound and no guarding. Generalized abd tenderness. Active bowel sounds. Musculoskeletal: Normal range of motion. She exhibits no edema or tenderness. No deformity   Neurological: She is alert and oriented to person, place, and time. She has normal strength. No focal deficits   Skin: Skin is warm, dry and intact. No rash noted. Psychiatric: She has a normal mood and affect. Her behavior is normal. Judgment and thought content normal.   Nursing note and vitals reviewed. Diagnostic Study Results     Radiologic Studies -   CXR Results  (Last 48 hours)               02/08/18 1651  XR CHEST PA LAT Final result    Impression:  IMPRESSION: Right basilar patchy airspace disease and probable small right   pleural effusion. Narrative: Indication:  Cough        Exam: PA and lateral views of the chest.       Direct comparison is made to prior CXR dated April 2017. Findings: Cardiomediastinal silhouette is stable and mildly enlarged. There is   mild right basilar patchy airspace disease. There is a probable small right   pleural effusion. Left lung is clear. Left pleural space is normal.                   Medical Decision Making   I am the first provider for this patient. I reviewed the vital signs, available nursing notes, past medical history, past surgical history, family history and social history. Vital Signs-Reviewed the patient's vital signs. Patient Vitals for the past 12 hrs:   Temp Pulse Resp BP SpO2   02/08/18 1800 - - - 140/54 97 %   02/08/18 1730 - 69 - (!) 112/95 98 %   02/08/18 1445 97.3 °F (36.3 °C) - 18 99/86 98 %     Records Reviewed: Nursing Notes and Old Medical Records    Provider Notes (Medical Decision Making):   Pt presenting with persistent posterior headache after a package of chicken fell on her head 1 week ago. Pt has no focal neurologic deficits, and given mechanism of injury, I have low suspicion for acute intracranial process.  Symptoms likely consistent with muscle strain, post-concussive syndrome. Pt also having lower abdominal pain. Ddx includes uti, constipation. ED Course:   Initial assessment performed. The patients presenting problems have been discussed, and they are in agreement with the care plan formulated and outlined with them. I have encouraged them to ask questions as they arise throughout their visit. 9:31 PM  Pt reports that her HA has improved at this time. Written by Jayda Diana ED Scribe, as dictated by David Ludwig DO. Disposition:  Discharge Note:  9:36 PM  The patient is ready for discharge. The patient's signs, symptoms, diagnosis, and discharge instruction have been discussed and the patient has conveyed their understanding. The patient is to follow up as recommended or return to the ER should their symptoms worsen. Plan has been discussed and the patient is in agreement. Written by GONZALEZ Murphyibe, as dictated by David Ludwig DO. PLAN:  1. Current Discharge Medication List      START taking these medications    Details   doxycycline (VIBRA-TABS) 100 mg tablet Take 1 Tab by mouth two (2) times a day for 7 days. Qty: 14 Tab, Refills: 0         CONTINUE these medications which have CHANGED    Details   butalbital-acetaminophen-caff (FIORICET) -40 mg per capsule Take 1 Cap by mouth every four (4) hours as needed for Pain. Qty: 15 Cap, Refills: 0           2. Follow-up Information     Follow up With Details Comments Contact Info    Emani Bernard MD Schedule an appointment as soon as possible for a visit  [facility] 6  [phone]      [facility] EMERGENCY DEPT  As needed, If symptoms worsen [address] [place] [address]  [phone]        Return to ED if worse     Diagnosis     Clinical Impression:   1. Abdominal pain, generalized    2. Nonintractable headache, unspecified chronicity pattern, unspecified headache type    3.  Pneumonia of right lower lobe due to infectious organism [facility])        Attestations: This is note is prepared by Brie Adams, acting as Scribe for Lisa Glass DO. Lisa Glass DO The scribe's documentation has been prepared under my direction and personally reviewed by me in its entirety. I confirm that the note above accurately reflects all work, treatment, procedures, and medical decision making performed by me.

## 2018-02-08 NOTE — ED NOTES
Assumed care of patient. Patient is alert and oriented, does not appear to be in distress. Patient ambulatory to ED with c/o headache x 2 weeks since having whole frozen chickens and a other frozen meat fall on her head when pulling food out of her freezer. Pt has also had productive cough with green sputum x 2 weeks, was tx with keflex by pcp. Started with mid abd pain x 1 week.

## 2018-02-09 NOTE — DISCHARGE INSTRUCTIONS
Abdominal Pain: Care Instructions  Your Care Instructions    Abdominal pain has many possible causes. Some aren't serious and get better on their own in a few days. Others need more testing and treatment. If your pain continues or gets worse, you need to be rechecked and may need more tests to find out what is wrong. You may need surgery to correct the problem. Don't ignore new symptoms, such as fever, nausea and vomiting, urination problems, pain that gets worse, and dizziness. These may be signs of a more serious problem. Your doctor may have recommended a follow-up visit in the next 8 to 12 hours. If you are not getting better, you may need more tests or treatment. The doctor has checked you carefully, but problems can develop later. If you notice any problems or new symptoms, get medical treatment right away. Follow-up care is a key part of your treatment and safety. Be sure to make and go to all appointments, and call your doctor if you are having problems. It's also a good idea to know your test results and keep a list of the medicines you take. How can you care for yourself at home? · Rest until you feel better. · To prevent dehydration, drink plenty of fluids, enough so that your urine is light yellow or clear like water. Choose water and other caffeine-free clear liquids until you feel better. If you have kidney, heart, or liver disease and have to limit fluids, talk with your doctor before you increase the amount of fluids you drink. · If your stomach is upset, eat mild foods, such as rice, dry toast or crackers, bananas, and applesauce. Try eating several small meals instead of two or three large ones. · Wait until 48 hours after all symptoms have gone away before you have spicy foods, alcohol, and drinks that contain caffeine. · Do not eat foods that are high in fat. · Avoid anti-inflammatory medicines such as aspirin, ibuprofen (Advil, Motrin), and naproxen (Aleve).  These can cause stomach upset. Talk to your doctor if you take daily aspirin for another health problem. When should you call for help? Call 911 anytime you think you may need emergency care. For example, call if:  ? · You passed out (lost consciousness). ? · You pass maroon or very bloody stools. ? · You vomit blood or what looks like coffee grounds. ? · You have new, severe belly pain. ?Call your doctor now or seek immediate medical care if:  ? · Your pain gets worse, especially if it becomes focused in one area of your belly. ? · You have a new or higher fever. ? · Your stools are black and look like tar, or they have streaks of blood. ? · You have unexpected vaginal bleeding. ? · You have symptoms of a urinary tract infection. These may include:  ¨ Pain when you urinate. ¨ Urinating more often than usual.  ¨ Blood in your urine. ? · You are dizzy or lightheaded, or you feel like you may faint. ? Watch closely for changes in your health, and be sure to contact your doctor if:  ? · You are not getting better after 1 day (24 hours). Where can you learn more? Go to http://marta-naomy.info/. Enter P907 in the search box to learn more about \"Abdominal Pain: Care Instructions. \"  Current as of: March 20, 2017  Content Version: 11.4  © 1597-7302 [employer]. Care instructions adapted under license by [email or web address] (which disclaims liability or warranty for this information). If you have questions about a medical condition or this instruction, always ask your healthcare professional. [place] [address] any warranty or liability for your use of this information. Headache: Care Instructions  Your Care Instructions    Headaches have many possible causes. Most headaches aren't a sign of a more serious problem, and they will get better on their own. Home treatment may help you feel better faster.   The doctor has checked you carefully, but problems can develop later. If you notice any problems or new symptoms, get medical treatment right away. Follow-up care is a key part of your treatment and safety. Be sure to make and go to all appointments, and call your doctor if you are having problems. It's also a good idea to know your test results and keep a list of the medicines you take. How can you care for yourself at home? · Do not drive if you have taken a prescription pain medicine. · Rest in a quiet, dark room until your headache is gone. Close your eyes and try to relax or go to sleep. Don't watch TV or read. · Put a cold, moist cloth or cold pack on the painful area for 10 to 20 minutes at a time. Put a thin cloth between the cold pack and your skin. · Use a warm, moist towel or a heating pad set on low to relax tight shoulder and neck muscles. · Have someone gently massage your neck and shoulders. · Take pain medicines exactly as directed. ¨ If the doctor gave you a prescription medicine for pain, take it as prescribed. ¨ If you are not taking a prescription pain medicine, ask your doctor if you can take an over-the-counter medicine. · Be careful not to take pain medicine more often than the instructions allow, because you may get worse or more frequent headaches when the medicine wears off. · Do not ignore new symptoms that occur with a headache, such as a fever, weakness or numbness, vision changes, or confusion. These may be signs of a more serious problem. To prevent headaches  · Keep a headache diary so you can figure out what triggers your headaches. Avoiding triggers may help you prevent headaches. Record when each headache began, how long it lasted, and what the pain was like (throbbing, aching, stabbing, or dull). Write down any other symptoms you had with the headache, such as nausea, flashing lights or dark spots, or sensitivity to bright light or loud noise. Note if the headache occurred near your period.  List anything that might have triggered the headache, such as certain foods (chocolate, cheese, wine) or odors, smoke, bright light, stress, or lack of sleep. · Find healthy ways to deal with stress. Headaches are most common during or right after stressful times. Take time to relax before and after you do something that has caused a headache in the past.  · Try to keep your muscles relaxed by keeping good posture. Check your jaw, face, neck, and shoulder muscles for tension, and try relaxing them. When sitting at a desk, change positions often, and stretch for 30 seconds each hour. · Get plenty of sleep and exercise. · Eat regularly and well. Long periods without food can trigger a headache. · Treat yourself to a massage. Some people find that regular massages are very helpful in relieving tension. · Limit caffeine by not drinking too much coffee, tea, or soda. But don't quit caffeine suddenly, because that can also give you headaches. · Reduce eyestrain from computers by blinking frequently and looking away from the computer screen every so often. Make sure you have proper eyewear and that your monitor is set up properly, about an arm's length away. · Seek help if you have depression or anxiety. Your headaches may be linked to these conditions. Treatment can both prevent headaches and help with symptoms of anxiety or depression. When should you call for help? Call 911 anytime you think you may need emergency care. For example, call if:  ? · You have signs of a stroke. These may include:  ¨ Sudden numbness, paralysis, or weakness in your face, arm, or leg, especially on only one side of your body. ¨ Sudden vision changes. ¨ Sudden trouble speaking. ¨ Sudden confusion or trouble understanding simple statements. ¨ Sudden problems with walking or balance. ¨ A sudden, severe headache that is different from past headaches. ?Call your doctor now or seek immediate medical care if:  ? · You have a new or worse headache.    ? · Your headache gets much worse. Where can you learn more? Go to http://marta-naomy.info/. Enter M271 in the search box to learn more about \"Headache: Care Instructions. \"  Current as of: October 14, 2016  Content Version: 11.4  © 5481-7759 [employer]. Care instructions adapted under license by [employer] (which disclaims liability or warranty for this information). If you have questions about a medical condition or this instruction, always ask your healthcare professional. [address] any warranty or liability for your use of this information. Pneumonia: Care Instructions  Your Care Instructions    Pneumonia is an infection of the lungs. Most cases are caused by infections from bacteria or viruses. Pneumonia may be mild or very severe. If it is caused by bacteria, you will be treated with antibiotics. It may take a few weeks to a few months to recover fully from pneumonia, depending on how sick you were and whether your overall health is good. Follow-up care is a key part of your treatment and safety. Be sure to make and go to all appointments, and call your doctor if you are having problems. It's also a good idea to know your test results and keep a list of the medicines you take. How can you care for yourself at home? · Take your antibiotics exactly as directed. Do not stop taking the medicine just because you are feeling better. You need to take the full course of antibiotics. · Take your medicines exactly as prescribed. Call your doctor if you think you are having a problem with your medicine. · Get plenty of rest and sleep. You may feel weak and tired for a while, but your energy level will improve with time. · To prevent dehydration, drink plenty of fluids, enough so that your urine is light yellow or clear like water. Choose water and other caffeine-free clear liquids until you feel better.  If you have kidney, heart, or liver disease and have to limit fluids, talk with your doctor before you increase the amount of fluids you drink. · Take care of your cough so you can rest. A cough that brings up mucus from your lungs is common with pneumonia. It is one way your body gets rid of the infection. But if coughing keeps you from resting or causes severe fatigue and chest-wall pain, talk to your doctor. He or she may suggest that you take a medicine to reduce the cough. · Use a vaporizer or humidifier to add moisture to your bedroom. Follow the directions for cleaning the machine. · Do not smoke or allow others to smoke around you. Smoke will make your cough last longer. If you need help quitting, talk to your doctor about stop-smoking programs and medicines. These can increase your chances of quitting for good. · Take an over-the-counter pain medicine, such as acetaminophen (Tylenol), ibuprofen (Advil, Motrin), or naproxen (Aleve). Read and follow all instructions on the label. · Do not take two or more pain medicines at the same time unless the doctor told you to. Many pain medicines have acetaminophen, which is Tylenol. Too much acetaminophen (Tylenol) can be harmful. · If you were given a spirometer to measure how well your lungs are working, use it as instructed. This can help your doctor tell how your recovery is going. · To prevent pneumonia in the future, talk to your doctor about getting a flu vaccine (once a year) and a pneumococcal vaccine (one time only for most people). When should you call for help? Call 911 anytime you think you may need emergency care. For example, call if:  ? · You have severe trouble breathing. ?Call your doctor now or seek immediate medical care if:  ? · You cough up dark brown or bloody mucus (sputum). ? · You have new or worse trouble breathing. ? · You are dizzy or lightheaded, or you feel like you may faint. ? Watch closely for changes in your health, and be sure to contact your doctor if:  ? · You have a new or higher fever. ? · You are coughing more deeply or more often. ? · You are not getting better after 2 days (48 hours). ? · You do not get better as expected. Where can you learn more? Go to http://marta-naomy.info/. Enter [id number] in the search box to learn more about \"Pneumonia: Care Instructions. \"  Current as of: May 12, 2017  Content Version: 11.4  © 6875-6340 [employer]. Care instructions adapted under license by [employer] (which disclaims liability or warranty for this information). If you have questions about a medical condition or this instruction, always ask your healthcare professional. [address] any warranty or liability for your use of this information.

## 2018-03-25 PROBLEM — J96.90 RESPIRATORY FAILURE (HCC): Status: ACTIVE | Noted: 2018-01-01

## 2018-03-25 NOTE — IP AVS SNAPSHOT
[address] [facility] 
[phone] Patient: Ayla Sherwood MRN: [id number] [facility]:9/0/7081 About your hospitalization You were admitted on:  March 25, 2018 You last received care in the:  [facility] 2 CARDIOPULMONARY CARE You were discharged on:  April 3, 2018 Why you were hospitalized Your primary diagnosis was:  Not on File Your diagnoses also included:  Respiratory Failure (Hcc), Migraine, Anxiety, Coronary Artery Disease Without Angina Pectoris, Hypertension, Systolic Heart Failure (Hcc) Follow-up Information Follow up With Details Comments Contact Info [facility])   [facility] 
[phone] China Guzman MD Go on 4/17/2018 Hospital follow-up schedule at 2:00pm (If you need to reschedule please call [phone] [place] [address] [facility] 
[phone] Gabriel Madera MD Schedule an appointment as soon as possible for a visit please schedule a follow up appointment in 4-6 weeks. [address] [facility] 
[phone] Your Scheduled Appointments Tuesday April 17, 2018  2:00 PM EDT TRANSITIONAL CARE MANAGEMENT with China Guzman MD  
[place] [address] ([address] [place] [address]) Adrian Johnson 86 [address]99 [facility]  
[phone] Discharge Orders None A check umesh indicates which time of day the medication should be taken. My Medications START taking these medications Instructions Each Dose to Equal  
 Morning Noon Evening Bedtime  
 acetaminophen 325 mg tablet Commonly known as:  TYLENOL Your last dose was: Your next dose is: Take 2 Tabs by mouth every four (4) hours as needed. 650 mg  
    
   
   
   
  
 aspirin delayed-release 81 mg tablet Start taking on:  4/4/2018 Your last dose was: Your next dose is: Take 1 Tab by mouth daily. 81 mg  
    
   
   
   
  
 butalbital-acetaminophen-caffeine -40 mg per tablet Commonly known as:  Emiliano Fischer Replaces:  butalbital-acetaminophen-caff -40 mg per capsule Your last dose was: Your next dose is: Take 2 Tabs by mouth every eight (8) hours as needed for Headache. 2 Tab  
    
   
   
   
  
 clopidogrel 75 mg Tab Commonly known as:  PLAVIX Start taking on:  4/4/2018 Your last dose was: Your next dose is: Take 1 Tab by mouth daily. 75 mg  
    
   
   
   
  
 ferrous sulfate 325 mg (65 mg iron) tablet Start taking on:  4/4/2018 Your last dose was: Your next dose is: Take 1 Tab by mouth daily (with breakfast). 325 mg  
    
   
   
   
  
 metoprolol tartrate 25 mg tablet Commonly known as:  LOPRESSOR Your last dose was: Your next dose is: Take 0.5 Tabs by mouth every twelve (12) hours. 12.5 mg  
    
   
   
   
  
  
CHANGE how you take these medications Instructions Each Dose to Equal  
 Morning Noon Evening Bedtime  
 clonazePAM 2 mg tablet Commonly known as:  Liseth Sam What changed:   
- medication strength 
- how much to take 
- how to take this - when to take this 
- reasons to take this 
- additional instructions Your last dose was: Your next dose is: Take 1 Tab by mouth every twelve (12) hours as needed. Max Daily Amount: 4 mg.  
 2 mg  
    
   
   
   
  
 fluticasone 50 mcg/actuation nasal spray Commonly known as:  Rae Courser What changed:  Another medication with the same name was removed. Continue taking this medication, and follow the directions you see here. Your last dose was: Your next dose is: 2 Sprays by Both Nostrils route daily as needed for Allergies. 2 Spray furosemide 40 mg tablet Commonly known as:  LASIX What changed:   
- medication strength - Another medication with the same name was removed. Continue taking this medication, and follow the directions you see here. Your last dose was: Your next dose is: Take 1 Tab by mouth daily. 40 mg  
    
   
   
   
  
 nystatin topical cream  
Commonly known as:  MYCOSTATIN What changed:  Another medication with the same name was removed. Continue taking this medication, and follow the directions you see here. Your last dose was: Your next dose is:    
   
   
 Apply  to affected area daily as needed (vaginal itching/rash). CONTINUE taking these medications Instructions Each Dose to Equal  
 Morning Noon Evening Bedtime  
 albuterol 90 mcg/actuation inhaler Commonly known as:  PROVENTIL HFA, VENTOLIN HFA, PROAIR HFA Your last dose was: Your next dose is: Take 1 Puff by inhalation every six (6) hours as needed for Wheezing. 1 Puff  
    
   
   
   
  
 atorvastatin 40 mg tablet Commonly known as:  LIPITOR Your last dose was: Your next dose is: Take 1 Tab by mouth every evening. 40 mg  
    
   
   
   
  
 cetirizine 10 mg tablet Commonly known as:  ZYRTEC Your last dose was: Your next dose is: Take 10 mg by mouth daily as needed for Allergies. 10 mg  
    
   
   
   
  
 lisinopril 2.5 mg tablet Commonly known as:  Velkenya Boyd Start taking on:  4/4/2018 Your last dose was: Your next dose is: Take 1 Tab by mouth daily. 2.5 mg  
    
   
   
   
  
 neomycin-polymyxin-hydrocortisone (buffered) 3.5-10,000-1 mg/mL-unit/mL-% otic suspension Commonly known as:  Helen Avila Your last dose was:     
   
Your next dose is:    
   
   
 Administer 3 Drops in right ear three (3) times daily as needed (ear irritation and dryness). 3 Drop  
    
   
   
   
  
 nitroglycerin 0.4 mg SL tablet Commonly known as:  NITROSTAT Your last dose was: Your next dose is:    
   
   
 1 Tab by SubLINGual route every five (5) minutes as needed. Indications: ANGINA  
 0.4 mg  
    
   
   
   
  
 omeprazole 20 mg capsule Commonly known as:  PRILOSEC Your last dose was: Your next dose is: Take 1 Cap by mouth daily. Take only as needed 20 mg  
    
   
   
   
  
 VITAMIN B-12 100 mcg tablet Generic drug:  cyanocobalamin Your last dose was: Your next dose is: Take 100 mcg by mouth daily. 100 mcg STOP taking these medications   
 butalbital-acetaminophen-caff -40 mg per capsule Commonly known as:  [employer] Replaced by:  butalbital-acetaminophen-caffeine -40 mg per tablet EXCEDRIN MIGRAINE PO  
   
  
 naproxen 500 mg tablet Commonly known as:  NAPROSYN  
   
  
 predniSONE 10 mg tablet Commonly known as:  Ashley Canada Where to Get Your Medications Information on where to get these meds will be given to you by the nurse or doctor. ! Ask your nurse or doctor about these medications  
  acetaminophen 325 mg tablet  
 aspirin delayed-release 81 mg tablet  
 atorvastatin 40 mg tablet  
 butalbital-acetaminophen-caffeine -40 mg per tablet  
 clonazePAM 2 mg tablet  
 clopidogrel 75 mg Tab  
 ferrous sulfate 325 mg (65 mg iron) tablet  
 furosemide 40 mg tablet  
 lisinopril 2.5 mg tablet  
 metoprolol tartrate 25 mg tablet Discharge Instructions Weigh yourself Daily  Keep Log/Record Notify Dr. Myrna Hinton for a weight gain of 3 pounds or greater in one day or 5 pounds in 5 days. Low Sodium Diet as per CHF Education HOSPITALIST DISCHARGE INSTRUCTIONS 
 
NAME: Christ Deisy :  1945 MRN:  [id number] Date/Time:  4/3/2018 9:33 AM 
 
 ADMIT DATE: 3/25/2018 DISCHARGE DATE: 4/3/2018 Attending Physician: David Cooper MD 
 
DISCHARGE DIAGNOSIS: 
CAD, NSTEMI, CHF, P. A. Fib, MACY/CKD, Anemia, Headache/Migraine, Dementia, COPD Medications: Per above medication reconciliation. Pain Management: per above medications Recommended diet: Cardiac Diet Recommended activity: Activity as tolerated Wound care: None Indwelling devices:  None Supplemental Oxygen: None Required Lab work: Per SNF routine Glucose management:  None Code status: DNR 
 
CHF specific discharge instructions Weight: 
· Daily weights, Notify your Doctor if Wt gain of 3 lb in a day or 5 lb in a week · Daily Intake & Output Diet : 
· Low salt cardiac diet · Fluid restriction of 1500 ml daily Outside physician follow up: Follow-up Information Follow up With Details Comments Contact Info [address] [place] [address] [phone]. [phone] Isael Lares MD   [address] [place] [address] 
[phone] F/U PCP 
F/U Cardiology Skilled nursing facility/ SNF MD responsible for above on discharge. Information obtained by : 
I understand that if any problems occur once I am at home I am to contact my physician. I understand and acknowledge receipt of the instructions indicated above. Physician's or R.N.'s Signature                                                                  Date/Time Patient or Repres ACO Transitions of Care Introducing [place] [address] [address] offers a voluntary care coordination program to provide high quality service and care to [facility] fee-for-service beneficiaries. Wiliam Mcdonough was designed to help you enhance your health and well-being through the following services: ? Transitions of Care  support for individuals who are transitioning from one care setting to another (example: Hospital to home). ? Chronic and Complex Care Coordination  support for individuals and caregivers of those with serious or chronic illnesses or with more than one chronic (ongoing) condition and those who take a number of different medications. If you meet specific medical criteria, a On license of [facility] [address] may call you directly to coordinate your care with your primary care physician and your other care providers. For questions about the [facility] programs, please, contact your physicians office. For general questions or additional information about Accountable Care Organizations: 
Please visit www.medicare.gov/acos. html or call 1-800-MEDICARE (4-914.554.2392) TTY users should call 2-898.957.4045. [employer] Announcement We are excited to announce that we are making your provider's discharge notes available to you in [employer]omerix. You will see these notes when they are completed and signed by the physician that discharged you from your recent hospital stay. If you have any questions or concerns about any information you see in [employer], please call the Health Information Department where you were seen or reach out to your Primary Care Provider for more information about your plan of care. Introducing [facility] & HEALTH SERVICES! [employer] introduces [employer] patient portal. Now you can access parts of your medical record, email your doctor's office, and request medication refills online. 1. In your internet browser, go to [email or web address] [email or web address]. [facility] [employer]/mychart 2. Click on the First Time User? Click Here link in the Sign In box.  You will see the New Member Sign Up page. 3. Enter your Knowledge Factor Access Code exactly as it appears below. You will not need to use this code after youve completed the sign-up process. If you do not sign up before the expiration date, you must request a new code. · Knowledge Factor Access Code: [email or web address] Expires: 4/24/2018  3:39 PM 
 
4. Enter the last four digits of your Social Security Number (xxxx) and Date of Birth (mm/dd/yyyy) as indicated and click Submit. You will be taken to the next sign-up page. 5. Create a BlockSpringt ID. This will be your Knowledge Factor login ID and cannot be changed, so think of one that is secure and easy to remember. 6. Create a BlockSpringt password. You can change your password at any time. 7. Enter your Password Reset Question and Answer. This can be used at a later time if you forget your password. 8. Enter your e-mail address. You will receive e-mail notification when new information is available in [facility] [address] [facility] [address]. 9. Click Sign Up. You can now view and download portions of your medical record. 10. Click the Download Summary menu link to download a portable copy of your medical information. If you have questions, please visit the Frequently Asked Questions section of the Knowledge Factor website. Remember, Knowledge Factor is NOT to be used for urgent needs. For medical emergencies, dial 911. Now available from your iPhone and Android! Introducing Sebastian Rosas As a [employer] patient, I wanted to make you aware of our electronic visit tool called Sebastian Rosas. [employer] 24/7 allows you to connect within minutes with a medical provider 24 hours a day, seven days a week via a mobile device or tablet or logging into a secure website from your computer. You can access Sebastian Rosas from anywhere in the [place].  
 
A virtual visit might be right for you when you have a simple condition and feel like you just dont want to get out of bed, or cant get away from work for an appointment, when your regular Kriste Peek provider is not available (evenings, weekends or holidays), or when youre out of town and need minor care. Electronic visits cost only $49 and if the Kriste Peek 24/7 provider determines a prescription is needed to treat your condition, one can be electronically transmitted to a nearby pharmacy*. Please take a moment to enroll today if you have not already done so. The enrollment process is free and takes just a few minutes. To enroll, please download the [employer] 24/7 reymundo to your tablet or phone, or visit [email or web address] to enroll on your computer. And, as an [address] [place] [address] patient with a ShareGrove account, the results of your visits will be scanned into your electronic medical record and your primary care provider will be able to view the scanned results. We urge you to continue to see your regular Kriste Peek provider for your ongoing medical care. And while your primary care provider may not be the one available when you seek a Sebastian Angulofin virtual visit, the peace of mind you get from getting a real diagnosis real time can be priceless. For more information on Current Mediakelseyfin, view our Frequently Asked Questions (FAQs) at [email or web address]. Sincerely, 
 
Diane Mobley MD 
Chief Medical Officer [address] *:  certain medications cannot be prescribed via Sebastian ReqSpot.comkelseyfin Providers Seen During Your Hospitalization Provider Specialty Primary office phone Horace Borges MD Emergency Medicine [phone] Guicho Paul MD Internal Medicine [phone] Astrid Decker MD Internal Medicine [phone] Adwoa Brooks MD Hospitalist [phone] Your Primary Care Physician (PCP) Primary Care Physician Office Phone Office Fax Star Nicole [phone] You are allergic to the following Allergen Reactions Ciprofloxacin Shortness of Breath Codeine Nausea Only Influenza Virus Vaccine, Specific Unknown (comments) Allergy documented in Admission Data base 1 screening Pcn (Penicillins) Itching Recent Documentation Height Weight Breastfeeding? BMI OB Status Smoking Status 1.651 m 70.8 kg No 25.96 kg/m2 Hysterectomy Former Smoker Emergency Contacts Name Discharge Info Relation Home Work Mobile Maida Perez CAREGIVER [3] Daughter [21] [phone] Germania Blankenship  Child [2] [phone] Patient Belongings The following personal items are in your possession at time of discharge: 
  Dental Appliances: Lowers, Uppers  Visual Aid: Glasses, With patient      Home Medications: None   Jewelry: None  Clothing: With patient    Other Valuables: Purse Please provide this summary of care documentation to your next provider. Signatures-by signing, you are acknowledging that this After Visit Summary has been reviewed with you and you have received a copy. Patient Signature:  ____________________________________________________________ Date:  ____________________________________________________________  
  
Brian Galaviz Provider Signature:  ____________________________________________________________ Date:  ____________________________________________________________

## 2018-03-25 NOTE — IP AVS SNAPSHOT
[address] [facility] 
[phone] Patient: Leighann Bass MRN: [id number] K[facility]:8[birth year] A check umesh indicates which time of day the medication should be taken. My Medications START taking these medications Instructions Each Dose to Equal  
 Morning Noon Evening Bedtime  
 acetaminophen 325 mg tablet Commonly known as:  TYLENOL Your last dose was: Your next dose is: Take 2 Tabs by mouth every four (4) hours as needed. 650 mg  
    
   
   
   
  
 aspirin delayed-release 81 mg tablet Start taking on:  4/4/2018 Your last dose was: Your next dose is: Take 1 Tab by mouth daily. 81 mg  
    
   
   
   
  
 butalbital-acetaminophen-caffeine -40 mg per tablet Commonly known as:  Sourav Lomeli Replaces:  butalbital-acetaminophen-caff -40 mg per capsule Your last dose was: Your next dose is: Take 2 Tabs by mouth every eight (8) hours as needed for Headache. 2 Tab  
    
   
   
   
  
 clopidogrel 75 mg Tab Commonly known as:  PLAVIX Start taking on:  4/4/2018 Your last dose was: Your next dose is: Take 1 Tab by mouth daily. 75 mg  
    
   
   
   
  
 ferrous sulfate 325 mg (65 mg iron) tablet Start taking on:  4/4/2018 Your last dose was: Your next dose is: Take 1 Tab by mouth daily (with breakfast). 325 mg  
    
   
   
   
  
 metoprolol tartrate 25 mg tablet Commonly known as:  LOPRESSOR Your last dose was: Your next dose is: Take 0.5 Tabs by mouth every twelve (12) hours. 12.5 mg  
    
   
   
   
  
  
CHANGE how you take these medications Instructions Each Dose to Equal  
 Morning Noon Evening Bedtime  
 clonazePAM 2 mg tablet Commonly known as:  Brandy Mason What changed:   
- medication strength - how much to take 
- how to take this - when to take this 
- reasons to take this 
- additional instructions Your last dose was: Your next dose is: Take 1 Tab by mouth every twelve (12) hours as needed. Max Daily Amount: 4 mg.  
 2 mg  
    
   
   
   
  
 fluticasone 50 mcg/actuation nasal spray Commonly known as:  [place] What changed:  Another medication with the same name was removed. Continue taking this medication, and follow the directions you see here. Your last dose was: Your next dose is: 2 Sprays by Both Nostrils route daily as needed for Allergies. 2 Spray  
    
   
   
   
  
 furosemide 40 mg tablet Commonly known as:  LASIX What changed:   
- medication strength - Another medication with the same name was removed. Continue taking this medication, and follow the directions you see here. Your last dose was: Your next dose is: Take 1 Tab by mouth daily. 40 mg  
    
   
   
   
  
 nystatin topical cream  
Commonly known as:  MYCOSTATIN What changed:  Another medication with the same name was removed. Continue taking this medication, and follow the directions you see here. Your last dose was: Your next dose is:    
   
   
 Apply  to affected area daily as needed (vaginal itching/rash). CONTINUE taking these medications Instructions Each Dose to Equal  
 Morning Noon Evening Bedtime  
 albuterol 90 mcg/actuation inhaler Commonly known as:  PROVENTIL HFA, VENTOLIN HFA, PROAIR HFA Your last dose was: Your next dose is: Take 1 Puff by inhalation every six (6) hours as needed for Wheezing. 1 Puff  
    
   
   
   
  
 atorvastatin 40 mg tablet Commonly known as:  LIPITOR Your last dose was: Your next dose is: Take 1 Tab by mouth every evening. 40 mg  
    
   
   
   
  
 cetirizine 10 mg tablet Commonly known as:  ZYRTEC Your last dose was: Your next dose is: Take 10 mg by mouth daily as needed for Allergies. 10 mg  
    
   
   
   
  
 lisinopril 2.5 mg tablet Commonly known as:  Nilo Selena Start taking on:  4/4/2018 Your last dose was: Your next dose is: Take 1 Tab by mouth daily. 2.5 mg  
    
   
   
   
  
 neomycin-polymyxin-hydrocortisone (buffered) 3.5-10,000-1 mg/mL-unit/mL-% otic suspension Commonly known as:  Alejandro Mae Your last dose was: Your next dose is:    
   
   
 Administer 3 Drops in right ear three (3) times daily as needed (ear irritation and dryness). 3 Drop  
    
   
   
   
  
 nitroglycerin 0.4 mg SL tablet Commonly known as:  NITROSTAT Your last dose was: Your next dose is:    
   
   
 1 Tab by SubLINGual route every five (5) minutes as needed. Indications: ANGINA  
 0.4 mg  
    
   
   
   
  
 omeprazole 20 mg capsule Commonly known as:  PRILOSEC Your last dose was: Your next dose is: Take 1 Cap by mouth daily. Take only as needed 20 mg  
    
   
   
   
  
 VITAMIN B-12 100 mcg tablet Generic drug:  cyanocobalamin Your last dose was: Your next dose is: Take 100 mcg by mouth daily. 100 mcg STOP taking these medications   
 butalbital-acetaminophen-caff -40 mg per capsule Commonly known as:  [employer] Replaced by:  butalbital-acetaminophen-caffeine -40 mg per tablet EXCEDRIN MIGRAINE PO  
   
  
 naproxen 500 mg tablet Commonly known as:  NAPROSYN  
   
  
 predniSONE 10 mg tablet Commonly known as:  Ely Hicks Where to Get Your Medications Information on where to get these meds will be given to you by the nurse or doctor. ! Ask your nurse or doctor about these medications  
  acetaminophen 325 mg tablet aspirin delayed-release 81 mg tablet  
 atorvastatin 40 mg tablet  
 butalbital-acetaminophen-caffeine -40 mg per tablet  
 clonazePAM 2 mg tablet  
 clopidogrel 75 mg Tab  
 ferrous sulfate 325 mg (65 mg iron) tablet  
 furosemide 40 mg tablet  
 lisinopril 2.5 mg tablet  
 metoprolol tartrate 25 mg tablet

## 2018-03-25 NOTE — ED NOTES
Patient reports to ED via squad with c/o SOB for a few days, headache at 9/10, and left abdominal pain. She is A&O x4 and answers all questions appropriately. She denies any lung history states that she is smoking approx 5 cigarettes daily, strong smoke scent on clothes and person. Diminished breath sounds throughout, O2 sats % on RA. Pedal and ankle edema nonpitting at +2. States that she has been getting headaches every since a frozen chicken fell on her head approx 3 weeks ago. Also states that she was informed last year that she had a cyst on her left kidney and believes that this is the etiology for her abdominal pain.

## 2018-03-26 NOTE — PROGRESS NOTES
Physical Therapy:    Orders received and chart reviewed. Noted patient with pending NM lung scan to r/o PE. Will hold and follow up once results obtained. Discussed with RN.  Thank you    Jose D Avila, PT, DPT

## 2018-03-26 NOTE — ED PROVIDER NOTES
EMERGENCY DEPARTMENT HISTORY AND PHYSICAL EXAM      Date: 3/25/2018  Patient Name: yAla Sherwood    History of Presenting Illness     Chief Complaint   Patient presents with    Shortness of Breath    Headache    Abdominal Pain     left abdomen       History Provided By: Patient    HPI: Ayla Sherwood, 68 y.o. female with PMHx significant for CHF, presents via EMS to the ED with multiple complaints including worsening SOB x 3 days; 9/10 parietal and occipital HA x 3 days; left sided abdominal pain x few days. Pt also reports increased amount of belching and leg swelling. She denies any focal CP but does endorse some chest tightness. Pt does endorse a hx of migraine HA and has taken Excedrin for them in the past. Today pt denies taking any OTC medications. Pt lives alone. Pt denies any CP, cough, fever, chills, diarrhea, nausea, vomiting. Social Hx: - Tobacco (former smoker), - EtOH (-), - illicit drug use (-)    PCP: China Guzman MD    There are no other complaints, changes, or physical findings at this time.     Current Facility-Administered Medications   Medication Dose Route Frequency Provider Last Rate Last Dose    enoxaparin (LOVENOX) injection 30 mg  30 mg SubCUTAneous Q24H Frida Black MD   30 mg at 03/27/18 0958    metoprolol tartrate (LOPRESSOR) tablet 12.5 mg  12.5 mg Oral Q12H Yefri Nix MD   12.5 mg at 03/27/18 0946    nitroglycerin (NITROBID) 2 % ointment 1 Inch  1 Inch Topical Q6H PRN Yefri Nix MD        aspirin delayed-release tablet 81 mg  81 mg Oral DAILY Yefri Nix MD   81 mg at 03/27/18 0945    sodium chloride (NS) flush 5-10 mL  5-10 mL IntraVENous Q8H Evin WRIGHT MD   10 mL at 03/26/18 2200    sodium chloride (NS) flush 5-10 mL  5-10 mL IntraVENous PRN Evin WRIGHT MD   10 mL at 03/27/18 1012    acetaminophen (TYLENOL) tablet 650 mg  650 mg Oral Q4H PRN Soledad Cota MD   650 mg at 03/26/18 0407    ondansetron (ZOFRAN) injection 4 mg  4 mg IntraVENous Q4H PRN Evin Go MD ADRIANA   4 mg at 03/26/18 7718    bisacodyl (DULCOLAX) suppository 10 mg  10 mg Rectal DAILY PRN Maggie Junior MD        furosemide (LASIX) injection 40 mg  40 mg IntraVENous BID Maggie Junior MD   40 mg at 03/27/18 1172    azithromycin (ZITHROMAX) 500 mg in 0.9% sodium chloride (MBP/ADV) 250 mL  500 mg IntraVENous Q24H Donnald Chay WRIGHT  mL/hr at 03/26/18 2213 500 mg at 03/26/18 2213    cefTRIAXone (ROCEPHIN) 1 g in 0.9% sodium chloride (MBP/ADV) 50 mL MBP  1 g IntraVENous Q24H Donnald Chay WRIGHT  mL/hr at 03/27/18 0000 1 g at 03/27/18 0000    diphenhydrAMINE (BENADRYL) capsule 25 mg  25 mg Oral Q6H PRN Maggie Junior MD        albuterol-ipratropium (DUO-NEB) 2.5 MG-0.5 MG/3 ML  3 mL Nebulization Q4H PRN Maggie Junior MD        atorvastatin (LIPITOR) tablet 40 mg  40 mg Oral QPM Maggie Junior MD   40 mg at 03/26/18 1712    butalbital-acetaminophen-caffeine (FIORICET, ESGIC) -40 mg per tablet 1 Tab  1 Tab Oral Q6H PRN Maggie Junior MD   1 Tab at 03/27/18 0741    clonazePAM (KlonoPIN) tablet 1 mg  1 mg Oral BID PRN Maggie Junior MD        nitroglycerin (NITROSTAT) tablet 0.4 mg  0.4 mg SubLINGual PRN Maggie Junior MD        pantoprazole (PROTONIX) tablet 40 mg  40 mg Oral ACB Maggie Junior MD   40 mg at 03/27/18 3020       Past History     Past Medical History:  Past Medical History:   Diagnosis Date    CHF [facility]congest[facility] heart failure) ([facility] [address]     Other ill-defined conditions(799.89)     AMI    Psychiatric disorder     depression       Past Surgical History:  Past Surgical History:   Procedure Laterality Date    CARDIAC SURG PROCEDURE UNLIST      stents    HX APPENDECTOMY      HX CHOLECYSTECTOMY      HX GI      adhesions    HX GYN      hysterectomy/  10 years       Family History:  Family History   Problem Relation Age of Onset    No Known Problems Mother     No Known Problems Father        Social History:  Social History   Substance Use Topics    Smoking status: Former Smoker     Packs/day: 0.10     Quit date: 11/1/2014   Hector Paniagua Smokeless tobacco: Never Used    Alcohol use No       Allergies: Allergies   Allergen Reactions    Ciprofloxacin Shortness of Breath    Codeine Nausea Only    Influenza Virus Vaccine, Specific Unknown (comments)     Allergy documented in Admission Data base 1 screening    Pcn [Penicillins] Itching         Review of Systems   Review of Systems   Constitutional: Negative for activity change, chills and fever. HENT: Negative for congestion and sore throat. Eyes: Negative for pain and redness. Respiratory: Positive for chest tightness and shortness of breath. Negative for cough. Cardiovascular: Negative for chest pain and palpitations. Gastrointestinal: Positive for abdominal pain. Negative for diarrhea, nausea and vomiting. Genitourinary: Negative for dysuria, frequency and urgency. Musculoskeletal: Negative for back pain and neck pain. Skin: Negative for rash. Neurological: Positive for headaches. Negative for syncope and light-headedness. Psychiatric/Behavioral: Negative for confusion. All other systems reviewed and are negative. Physical Exam   Physical Exam   Constitutional: She is oriented to person, place, and time. She appears well-developed and well-nourished. No distress. HENT:   Head: Normocephalic. Nose: Nose normal.   Mouth/Throat: Oropharynx is clear and moist. No oropharyngeal exudate. Eyes: Conjunctivae are normal. Pupils are equal, round, and reactive to light. No scleral icterus. Neck: Normal range of motion. Neck supple. No JVD present. No tracheal deviation present. No thyromegaly present. Cardiovascular: Normal rate, regular rhythm and intact distal pulses. Exam reveals no gallop and no friction rub. No murmur heard. Pulmonary/Chest: Effort normal. No stridor. No respiratory distress. She has no wheezes. She has rales (bibasilar). Abdominal: Soft. Bowel sounds are normal. She exhibits no distension. There is no tenderness.  There is no rebound and no guarding. Musculoskeletal: Normal range of motion. She exhibits edema. 1+ edema to mid shin bilaterally. Lymphadenopathy:     She has no cervical adenopathy. Neurological: She is alert and oriented to person, place, and time. No cranial nerve deficit. She exhibits normal muscle tone. Coordination normal.   Skin: Skin is warm and dry. No rash noted. She is not diaphoretic. No erythema. Psychiatric: She has a normal mood and affect. Her behavior is normal.   Nursing note and vitals reviewed.         Diagnostic Study Results     Labs -     Recent Results (from the past 12 hour(s))   CBC W/O DIFF    Collection Time: 03/27/18  1:21 AM   Result Value Ref Range    WBC 9.9 3.6 - 11.0 K/uL    RBC 3.77 (L) 3.80 - 5.20 M/uL    HGB 9.3 (L) 11.5 - 16.0 g/dL    HCT 31.4 (L) 35.0 - 47.0 %    MCV 83.3 80.0 - 99.0 FL    MCH 24.7 (L) 26.0 - 34.0 PG    MCHC 29.6 (L) 30.0 - 36.5 g/dL    RDW 14.8 (H) 11.5 - 14.5 %    PLATELET 260 360 - 096 K/uL    NRBC 0.0 0  WBC    ABSOLUTE NRBC 0.00 0.00 - 2.02 K/uL   METABOLIC PANEL, BASIC    Collection Time: 03/27/18  1:21 AM   Result Value Ref Range    Sodium 139 136 - 145 mmol/L    Potassium 4.0 3.5 - 5.1 mmol/L    Chloride 106 97 - 108 mmol/L    CO2 24 21 - 32 mmol/L    Anion gap 9 5 - 15 mmol/L    Glucose 130 (H) 65 - 100 mg/dL    BUN 39 (H) 6 - 20 MG/DL    Creatinine 1.86 (H) 0.55 - 1.02 MG/DL    BUN/Creatinine ratio 21 (H) 12 - 20      GFR est AA 32 (L) >60 ml/min/1.73m2    GFR est non-AA 27 (L) >60 ml/min/1.73m2    Calcium 8.5 8.5 - 10.1 MG/DL   PHOSPHORUS    Collection Time: 03/27/18  1:21 AM   Result Value Ref Range    Phosphorus 5.2 (H) 2.6 - 4.7 MG/DL   MAGNESIUM    Collection Time: 03/27/18  1:21 AM   Result Value Ref Range    Magnesium 2.7 (H) 1.6 - 2.4 mg/dL       Radiologic Studies -   XR ABD ACUTE W 1 V CHEST   Final Result   Study Result      INDICATION:   Abdominal Pain      EXAM:  ACUTE ABDOMINAL SERIES      COMPARISON:  2/8/2018     FINDINGS:  Single view of the chest demonstrates chronic marked cardiomegaly. Emphysematous changes are noted in the lungs, which are clear. There is no free  intraperitoneal air. Supine and upright views of the abdomen demonstrate a  nonobstructive bowel gas pattern. There are no abnormal calcifications. The  osseous structures are unremarkable.     IMPRESSION  IMPRESSION:  No acute process.                CT Results  (Last 48 hours)               03/25/18 2239  CT ABD PELV WO CONT Final result    Impression:  IMPRESSION:   Small right pleural effusion and bibasilar atelectasis. No acute abnormality in   the abdomen or pelvis. Narrative:  INDICATION: Abdominal pain; Diverticulitis       COMPARISON: July 25, 2015       TECHNIQUE:    Noncontrast thin axial images were obtained through the abdomen and pelvis. Coronal and sagittal reconstructions were generated. CT dose reduction was   achieved through use of a standardized protocol tailored for this examination   and automatic exposure control for dose modulation. FINDINGS:    LUNG BASES: Small right pleural effusion and bibasilar atelectasis. Cardiomegaly. LIVER: No mass or biliary dilatation. GALLBLADDER: Surgically absent. SPLEEN: No enlargement or lesion. PANCREAS: No mass or ductal dilatation. ADRENALS: No mass. KIDNEYS: Left renal cyst. No nephrolithiasis or hydronephrosis. GI TRACT:  No bowel obstruction. Difficult to assess bowel wall thickening given   lack of oral contrast material.   PERITONEUM: No free air or free fluid. APPENDIX: Not clearly visualized. RETROPERITONEUM: No lymphadenopathy or aortic aneurysm. ADDITIONAL COMMENTS: N/A.       URINARY BLADDER: Unremarkable. REPRODUCTIVE ORGANS: Surgically absent. LYMPH NODES:  None enlarged. FREE FLUID:  None. BONES: No destructive bone lesion. ADDITIONAL COMMENTS: N/A.                CXR Results  (Last 48 hours)               03/27/18 0853  XR CHEST PA LAT Final result Impression:  Impression:       Continued increase in size in the cardiac silhouette, compared to February 2018. This could be related to heart failure, however pericardial effusion cannot be   excluded. Clinical correlation is warranted. Pulmonary edema is noted, as well   as small bilateral pleural effusions. Patchy bibasilar opacities likely reflect   atelectasis. Narrative:  Study: XR CHEST PA LAT       Indication: f/u pneumonia. Additional clinical history:       Comparison:  3/25/2018, 4/11/2017       Findings:   Two views of the chest were performed. The heart is mild to moderately enlarged,   and continues to increase in size. Severe coronary artery calcifications and/or   stents are noted. There is mild pulmonary edema. Patchy opacities are noted in   both lung bases, left greater than right, most likely related to atelectasis. Small bilateral pleural effusions are redemonstrated. No acute osseous   abnormality is seen. 03/25/18 2032  XR ABD ACUTE W 1 V CHEST Final result    Impression:  IMPRESSION:   No acute process. Narrative:  INDICATION:   Abdominal Pain        EXAM:  ACUTE ABDOMINAL SERIES        COMPARISON:  2/8/2018       FINDINGS:   Single view of the chest demonstrates chronic marked cardiomegaly. Emphysematous changes are noted in the lungs, which are clear. There is no free   intraperitoneal air. Supine and upright views of the abdomen demonstrate a   nonobstructive bowel gas pattern. There are no abnormal calcifications. The   osseous structures are unremarkable. Medical Decision Making   I am the first provider for this patient. I reviewed the vital signs, available nursing notes, past medical history, past surgical history, family history and social history. Vital Signs-Reviewed the patient's vital signs.   Patient Vitals for the past 12 hrs:   Temp Pulse Resp BP SpO2   03/27/18 0730 - 69 18 116/87 100 %   03/27/18 0217 98 °F (36.7 °C) 68 20 (!) 83/52 99 %   03/26/18 2335 97.6 °F (36.4 °C) 64 20 113/61 99 %       Pulse Oximetry Analysis - 99% on 2 liter via nasal cannulae. Records Reviewed: Nursing Notes, Old Medical Records, Previous Radiology Studies and Previous Laboratory Studies    Provider Notes (Medical Decision Making):   DDx: CHF, Diverticulitis, Metabolic abnormality, ACS. ED Course:   Initial assessment performed. The patients presenting problems have been discussed, and they are in agreement with the care plan formulated and outlined with them. I have encouraged them to ask questions as they arise throughout their visit. PROGRESS NOTE:  9:08 PM  Pt's HA resolved after Fioricet. Old records reviewed, hx of CHF and echo in 2014 with an EF of 30-35% with Dr. Marlon Orantes. ED EKG interpretation: 21:25  Rhythm: sinus rhythm with frequent PVC's; and regular . Rate (approx.): 87; Axis: normal; ID Interval: normal; QRS interval: incomplete RBBB, LAFB; ST/T wave: non-specific changes; When compared to ekg on  4/11/17, no significant change was found. This EKG was interpreted by Brooke Mandel MD,ED Provider. PROGRESS NOTE:  9:33 PM  Pt has an elevated troponin will consult [facility]. Old records reviewed, pt had a cardiac cath in April 19th 2010, Stents to her circumflex and obtuse marginal arteries. CONSULT NOTE:   9:35 PM  Brooke Mandel MD spoke with Dr. Charlie Traore,   Specialty: Cardiology  Discussed pt's hx, disposition, and available diagnostic and imaging results. Reviewed care plans. Consultant agrees with plans as outlined. He recommends getting a D-dimer and admitting to hospitalist.    Written by Elli Martinez ED Scribe, as dictated by Brooke Mandel MD.    CONSULT NOTE:   9:55 PM  Brooke Mandel MD spoke with Dr. Kulwant Hoffman,   Specialty: Hospitalist  Discussed pt's hx, disposition, and available diagnostic and imaging results. Reviewed care plans. Consultant will evaluate pt for admission.   Written by Frankie Castorena GONZALEZ Lee Scribe, as dictated by Melissa Bob MD.    Critical Care Time:   None. Disposition:  ADMIT NOTE:  9:55 PM  Patient is being admitted to the hospital by Dr. Toan Israel. The results of their tests and reasons for their admission have been discussed with them and/or available family. They convey agreement and understanding for the need to be admitted and for their admission diagnosis. Consultation has been made with the inpatient physician specialist for hospitalization. PLAN: Admit to the hospital.     Diagnosis     Clinical Impression:   1. Chronic systolic CHF (congestive heart failure) (HCC)    2. Elevated troponin. 3. Abdominal Pain, LLQ    Attestations: This note is prepared by Reynaldo Lopez acting as Scribe for MD Melissa Coronel MD : The scribe's documentation has been prepared under my direction and personally reviewed by me in its entirety. I confirm that the note above accurately reflects all work, treatment, procedures, and medical decision making performed by me.

## 2018-03-26 NOTE — CONSULTS
Cardiology Consult Note    CC: dyspnea; abd pain    Valdemar Richards MD  Reason for consult:  Elevated troponin  Requesting MD:  Dr. Brianne Bueno. Admit Date: 3/25/2018   Today's Date: 3/25/2018      Cardiac Assessment/Plan:   CAD: Remote PCI (?vessel); No exertional CP; Worsening GUNDERSON; H/O CM (moderate in 2014). No acute ecg changes; Trop 1.6 (CK pending); Frequent vent ectopy as below (mag pending). Rec: asa/1 dose lovenox; echo needed; Consider eval for PE; Will need to consider cath. Cont bblocker. Future ACEi/ARB as BP/Cr tolerate (hold for now for possible cath). CM/valve disease: echo as above. Chronic sys/diast CHF. Rhythm: sinus; remote Pafib; previously on amio. MACY, ?COPD, ?consideration of eval for PE, Dispo: all per primary team.     ____________________________________________________________________  German Verduzco is a 68 y.o. female presents with dyspnea/abd pain.     ______________________________________________________________________    The patient reports no exertional CP; Worse GUNDERSON for last 2 months; Occ worse foot edema (trivial currently). She notes intermittent epigastric pain (gone with belch; not exertional). Today she had dyspnea/nausea/bilateral arm tingling: all now improved spont. No PND, orthopnea, palpitations, pre-syncope, syncope. No current complaints. ECG: sinus; LAFB. Tele: sinus; frequent PVCs; occ couplet; rare triplet. CXR: \"Emphysematous changes are noted in the lungs, which are clear\"    Notable labs: wbc 13.6; Hg 9.7; Cr 1.5 from 1.1-1.4 last year. Ck pending; trop 1.57; proBNP > 30k.  ___________________________________________________  Notable prior cardiac history (in 2014):    1. Acute onset congestive heart failure. 2. Acute on chronic systolic heart failure. 3. Coronary atherosclerosis. 4. Atrial fibrillation with rapid ventricular response. 5. Remote coronary artery angioplasty and stenting. 6. Obesity. 7. Hypoxemia.   8. Headache.     Echo 3/2014: \"30 % to 35 %. There were no regional wall motion abnormalities. There was moderate diffuse  hypokinesis. There was moderate concentric hypertrophy. Mitral valve: There was moderate to severe regurgitation. Tricuspid valve: There was mild regurgitation. \"    She thinks she had an echo 1/2018 @ [facility].  ______________________________________________________________________  Patient Active Problem List    Diagnosis Date Noted    Coronary artery disease without angina pectoris 01/31/2017    Moderate persistent asthma 08/23/2016    Allergic rhinitis 04/28/2015    Elevated cholesterol 07/22/2014    Hypertension 03/20/2014    Anxiety 03/20/2014    GERD (gastroesophageal reflux disease) 74/17/9173    Systolic heart failure ([facility] [address] 03/11/2014        Past Medical History:   Diagnosis Date    CHF (congestive heart failure) ([facility] [address]     Other ill-defined conditions(799.89)     AMI    Psychiatric disorder     depression      Past Surgical History:   Procedure Laterality Date    CARDIAC SURG PROCEDURE UNLIST      stents    HX APPENDECTOMY      HX CHOLECYSTECTOMY      HX GI      adhesions    HX GYN      hysterectomy/  10 years     Allergies   Allergen Reactions    Ciprofloxacin Shortness of Breath    Codeine Nausea Only    Influenza Virus Vaccine, Specific Unknown (comments)     Allergy documented in Admission Data base 1 screening    Pcn [Penicillins] Itching      No family history on file.    Social History     Social History    Marital status:      Spouse name: N/A    Number of children: 3    Years of education: N/A     Occupational History     Retired     Social History Main Topics    Smoking status: Former Smoker     Packs/day: 0.10     Quit date: 11/1/2014    Smokeless tobacco: Never Used    Alcohol use No    Drug use: No    Sexual activity: Yes     Partners: Male     Birth control/ protection: None     Other Topics Concern    Not on file     Social History Narrative Lives alone, kids are moving     Current Facility-Administered Medications   Medication Dose Route Frequency    enoxaparin (LOVENOX) injection 70 mg  1 mg/kg SubCUTAneous NOW    aspirin delayed-release tablet 81 mg  81 mg Oral DAILY    metoprolol tartrate (LOPRESSOR) tablet 12.5 mg  12.5 mg Oral Q12H     Current Outpatient Prescriptions   Medication Sig    butalbital-acetaminophen-caff (FIORICET) -40 mg per capsule Take 1 Cap by mouth every four (4) hours as needed for Pain.  neomycin-polymyxin-hydrocortisone, buffered, (PEDIOTIC) 3.5-10,000-1 mg/mL-unit/mL-% otic suspension PLACE 3 DROPS IN RIGHT EAR THREE TIMES A DAY FOR 7 DAYS    predniSONE (DELTASONE) 10 mg tablet 4 tabs for 2 day, 3 tabs for 2 days, 2 tabs for 2 days, 1tab for 2 days    clonazePAM (KLONOPIN) 1 mg tablet TAKE TWO TABLETS BY MOUTH TWICE DAILY    naproxen (NAPROSYN) 500 mg tablet Take 1 Tab by mouth two (2) times daily (with meals). As needed    fluticasone (FLONASE) 50 mcg/actuation nasal spray 1 spray each nostril daily    albuterol (PROVENTIL HFA, VENTOLIN HFA, PROAIR HFA) 90 mcg/actuation inhaler Take 1 Puff by inhalation every six (6) hours as needed for Wheezing.  nitroglycerin (NITROSTAT) 0.4 mg SL tablet 1 Tab by SubLINGual route every five (5) minutes as needed. Indications: ANGINA    nystatin (MYCOSTATIN) topical cream Apply  to affected area two (2) times a day.  omeprazole (PRILOSEC) 20 mg capsule Take 1 Cap by mouth daily. Take only as needed    metoprolol tartrate (LOPRESSOR) 25 mg tablet Take 1 Tab by mouth daily.  potassium chloride (KLOR-CON) 10 mEq tablet Take 1 Tab by mouth daily.  furosemide (LASIX) 20 mg tablet Take 2 Tabs by mouth daily.  atorvastatin (LIPITOR) 40 mg tablet Take 1 Tab by mouth every evening.  omega-3 fatty acids-vitamin e (FISH OIL) 1,000 mg cap Take 1 Cap by mouth.  aspirin 81 mg chewable tablet Take 1 Tab by mouth daily.     cyanocobalamin (VITAMIN B-12) 100 mcg tablet Take 100 mcg by mouth daily.  ASPIRIN/ACETAMINOPHEN/CAFFEINE (EXCEDRIN MIGRAINE PO) Take 1 Tab by mouth as needed. Prior to Admission Medications:  Prior to Admission medications    Medication Sig Start Date End Date Taking? Authorizing Provider   butalbital-acetaminophen-caff (FIORICET) -40 mg per capsule Take 1 Cap by mouth every four (4) hours as needed for Pain. 2/8/18   Marcus Mireles DO   neomycin-polymyxin-hydrocortisone, buffered, (PEDIOTIC) 3.5-10,000-1 mg/mL-unit/mL-% otic suspension PLACE 3 DROPS IN RIGHT EAR THREE TIMES A DAY FOR 7 DAYS 2/5/18   Tj Ontiveros MD   predniSONE (DELTASONE) 10 mg tablet 4 tabs for 2 day, 3 tabs for 2 days, 2 tabs for 2 days, 1tab for 2 days 1/24/18   Tj Ontiveros MD   clonazePAM (KLONOPIN) 1 mg tablet TAKE TWO TABLETS BY MOUTH TWICE DAILY 1/24/18   Tj Ontiveros MD   naproxen (NAPROSYN) 500 mg tablet Take 1 Tab by mouth two (2) times daily (with meals). As needed 10/10/17   Tj Ontiveros MD   fluticasone [facility]) 50 mcg/actuation nasal spray 1 spray each nostril daily 9/14/17   Elvia Austin NP   albuterol (PROVENTIL HFA, VENTOLIN HFA, PROAIR HFA) 90 mcg/actuation inhaler Take 1 Puff by inhalation every six (6) hours as needed for Wheezing. 9/14/17   Elvia Austin NP   nitroglycerin (NITROSTAT) 0.4 mg SL tablet 1 Tab by SubLINGual route every five (5) minutes as needed. Indications: ANGINA 9/14/17   Elvia Austin NP   nystatin (MYCOSTATIN) topical cream Apply  to affected area two (2) times a day. 9/14/17   Elvia Austin NP   omeprazole (PRILOSEC) 20 mg capsule Take 1 Cap by mouth daily. Take only as needed 8/7/17   Tj Ontiveros MD   metoprolol tartrate (LOPRESSOR) 25 mg tablet Take 1 Tab by mouth daily. 5/31/17   Tj Ontiveros MD   potassium chloride (KLOR-CON) 10 mEq tablet Take 1 Tab by mouth daily. 5/31/17   Tj Ontiveros MD   furosemide (LASIX) 20 mg tablet Take 2 Tabs by mouth daily.  9/6/16   Tj Ontiveros MD   atorvastatin (LIPITOR) 40 mg tablet Take 1 Tab by mouth every evening. 6/3/16   Paul Nichols MD   omega-3 fatty acids-vitamin e (FISH OIL) 1,000 mg cap Take 1 Cap by mouth. Historical Provider   aspirin 81 mg chewable tablet Take 1 Tab by mouth daily. 3/12/14   Felicia Leo MD   cyanocobalamin (VITAMIN B-12) 100 mcg tablet Take 100 mcg by mouth daily. Reginald Dunbar MD   ASPIRIN/ACETAMINOPHEN/CAFFEINE (EXCEDRIN MIGRAINE PO) Take 1 Tab by mouth as needed. Reginald Dunbar MD        Review of Symptoms: Except as noted in HPI, patient denies recent fever but does note chills, vomiting, diarrhea, hemoptysis, hematemesis, dysuria, myalgias, focal neurologic symptoms, ecchymosis, angioedema, odynophagia, dysphagia, sore throat, earache,rash, melena, hematochezia, depression, GERD, cold intolerance, petechia, bleeding gums, or significant weight loss. +nausea     24 hr VS reviewed, overall VSSAF  Temp (24hrs), Av.6 °F (37 °C), Min:98.6 °F (37 °C), Max:98.6 °F (37 °C)    Patient Vitals for the past 8 hrs:   Pulse   18 2200 88   18 2130 87   18 2115 89   18 2045 93   18 2000 95    Patient Vitals for the past 8 hrs:   Resp   18 2200 20   18 2130 20   18 2045 28   18 23   18 1936 16    Patient Vitals for the past 8 hrs:   BP   18 2200 106/74   18 2130 131/66   18 2115 114/57   18 2045 112/74   18 2030 123/50   18 107/51   18 1936 113/50          Intake/Output Summary (Last 24 hours) at 18 2213  Last data filed at 18 2100   Gross per 24 hour   Intake              100 ml   Output              250 ml   Net             -150 ml         Physical Exam (complete single organ system exam)    Cons: The patient is no distress. Appears stated age. HEENT: Normal conjunctivae and palate. No xanthelasma. Neck: Flat JVP without appreciable HJR. Resp: Normal respiratory effort with clear lungs bilaterally. CV: Regular rate and rhythm.    PMI not palpated. Normal S1,S2  No gallop or rubs appreciated. 2/5 holosystolic murmur appreciated. Intact carotid upstroke bilaterally without appreciated bruits. Abdominal aorta not palpated; no abdominal bruit noted. Intact pedal pulses. Trivial peripheral edema. GI: No abd mass noted, soft; no organomegaly noted. Bowel sounds present. Muscular:  No significant kyphosis. Strength WNL for age. Ext: No cyanosis, clubbing, or stigmata of peripheral embolization. Derm: No ulcers or stasis dermatitis of lower extremities. Neuro: Alert and oriented x 3;  Grossly non-focal. Normal mood and affect. Labs:   Recent Results (from the past 24 hour(s))   CBC WITH AUTOMATED DIFF    Collection Time: 03/25/18  8:08 PM   Result Value Ref Range    WBC 13.6 (H) 3.6 - 11.0 K/uL    RBC 3.90 3.80 - 5.20 M/uL    HGB 9.7 (L) 11.5 - 16.0 g/dL    HCT 32.1 (L) 35.0 - 47.0 %    MCV 82.3 80.0 - 99.0 FL    MCH 24.9 (L) 26.0 - 34.0 PG    MCHC 30.2 30.0 - 36.5 g/dL    RDW 14.5 11.5 - 14.5 %    PLATELET 879 768 - 448 K/uL    MPV 13.0 (H) 8.9 - 12.9 FL    NRBC 0.0 0  WBC    ABSOLUTE NRBC 0.00 0.00 - 0.01 K/uL    NEUTROPHILS 82 (H) 32 - 75 %    LYMPHOCYTES 12 12 - 49 %    MONOCYTES 5 5 - 13 %    EOSINOPHILS 0 0 - 7 %    BASOPHILS 0 0 - 1 %    IMMATURE GRANULOCYTES 1 (H) 0.0 - 0.5 %    ABS. NEUTROPHILS 11.1 (H) 1.8 - 8.0 K/UL    ABS. LYMPHOCYTES 1.6 0.8 - 3.5 K/UL    ABS. MONOCYTES 0.7 0.0 - 1.0 K/UL    ABS. EOSINOPHILS 0.0 0.0 - 0.4 K/UL    ABS. BASOPHILS 0.1 0.0 - 0.1 K/UL    ABS. IMM.  GRANS. 0.1 (H) 0.00 - 0.04 K/UL    DF AUTOMATED     METABOLIC PANEL, COMPREHENSIVE    Collection Time: 03/25/18  8:08 PM   Result Value Ref Range    Sodium 140 136 - 145 mmol/L    Potassium 4.3 3.5 - 5.1 mmol/L    Chloride 104 97 - 108 mmol/L    CO2 25 21 - 32 mmol/L    Anion gap 11 5 - 15 mmol/L    Glucose 110 (H) 65 - 100 mg/dL    BUN 32 (H) 6 - 20 MG/DL    Creatinine 1.50 (H) 0.55 - 1.02 MG/DL    BUN/Creatinine ratio 21 (H) 12 - 20 GFR est AA 41 (L) >60 ml/min/1.73m2    GFR est non-AA 34 (L) >60 ml/min/1.73m2    Calcium 8.8 8.5 - 10.1 MG/DL    Bilirubin, total 0.4 0.2 - 1.0 MG/DL    ALT (SGPT) 21 12 - 78 U/L    AST (SGOT) 30 15 - 37 U/L    Alk.  phosphatase 80 45 - 117 U/L    Protein, total 7.3 6.4 - 8.2 g/dL    Albumin 3.5 3.5 - 5.0 g/dL    Globulin 3.8 2.0 - 4.0 g/dL    A-G Ratio 0.9 (L) 1.1 - 2.2     LIPASE    Collection Time: 03/25/18  8:08 PM   Result Value Ref Range    Lipase 135 73 - 393 U/L   LACTIC ACID    Collection Time: 03/25/18  8:08 PM   Result Value Ref Range    Lactic acid 1.4 0.4 - 2.0 MMOL/L   TROPONIN I    Collection Time: 03/25/18  8:08 PM   Result Value Ref Range    Troponin-I, Qt. 1.57 (H) <0.05 ng/mL   NT-PRO BNP    Collection Time: 03/25/18  8:08 PM   Result Value Ref Range    NT pro-BNP >88781 (H) 0 - 125 PG/ML   EKG, 12 LEAD, INITIAL    Collection Time: 03/25/18  9:25 PM   Result Value Ref Range    Ventricular Rate 87 BPM    Atrial Rate 87 BPM    P-R Interval 156 ms    QRS Duration 108 ms    Q-T Interval 388 ms    QTC Calculation (Bezet) 466 ms    Calculated P Axis 61 degrees    Calculated R Axis -63 degrees    Calculated T Axis 103 degrees    Diagnosis       Sinus rhythm with frequent and consecutive premature ventricular complexes   and premature atrial complexes  Incomplete right bundle branch block  Left anterior fascicular block  Abnormal QRS-T angle, consider primary T wave abnormality  Abnormal ECG  When compared with ECG of 11-APR-2017 15:29,  premature atrial complexes are now present  Incomplete right bundle branch block is now present       Recent Labs      03/25/18   2008   TROIQ  1.57*       Kenisha Au MD

## 2018-03-26 NOTE — H&P
Hospitalist Admission Note    NAME: Jluis Reyes   :  1945   MRN:  [id number]     Date/Time:  3/25/2018 10:39 PM    Patient PCP: Laisha Nelson MD  ______________________________________________________________________  Given the patient's current clinical presentation, I have a high level of concern for decompensation if discharged from the emergency department. Complex decision making was performed, which includes reviewing the patient's available past medical records, laboratory results, and x-ray films. My assessment of this patient's clinical condition and my plan of care is as follows. Assessment / Plan:  Acute on chronic systolic heart failure  Elevated troponin  CAD  Presumed PNA  CM/valve disease  Remote PAfib  COPD  -EKG with frequent PVCs  -probnp 3K, trop 1.57  -CXR showed R basilar patchy airspace disease and probable small R pleural effusion  -IV lasix BID   -obtain Bcx, start empiric abx  -one time dose therapeutic lovenox, pending V/Q scan to r/o acute PE  -check Echo, trend trop  -monitor lytes and I&O, daily wt  -cont' ASA, BB, statin, nebs    MACY on CKD 3  -monitor while diuresing  -hold nephrotoxic agents  -monitor bmp      Code Status: DNR   Surrogate Decision Maker:  Pt's daughter  DVT Prophylaxis: lovenox  GI Prophylaxis: not indicated  Baseline: independent, lives alone. Pt is not on home O2      Subjective:   CHIEF COMPLAINT:     HISTORY OF PRESENT ILLNESS:     Chrissy Grullon is a 68 y.o.  female with PMHx significant for CAD, remote PAF, CKD 3, COPD, present to the ED c/o worsening of SOB started 2 days ago. Other associated symptoms including HA, L sided abd pain, some productive cough, and worsening of b/l LE edema. She denies any recent fever, chills, cp, palpitations, n/v/d. In the ER, vitals: T 98.6, P95, /50, SpO2 100% on RA. Pt placed on [facility] during my encounter.   CXR showed R basilar patchy airspace disease and probable small R pleural effusion  EKG with PVCs, however neg for acute ischemia. We were asked to admit for work up and evaluation of the above problems. Past Medical History:   Diagnosis Date    CHF (congestive heart failure) (HCC)     Other ill-defined conditions(799.89)     AMI    Psychiatric disorder     depression        Past Surgical History:   Procedure Laterality Date    CARDIAC SURG PROCEDURE UNLIST      stents    HX APPENDECTOMY      HX CHOLECYSTECTOMY      HX GI      adhesions    HX GYN      hysterectomy/  10 years       Social History   Substance Use Topics    Smoking status: Former Smoker     Packs/day: 0.10     Quit date: 11/1/2014    Smokeless tobacco: Never Used    Alcohol use No        Family History   Problem Relation Age of Onset    No Known Problems Mother     No Known Problems Father      Allergies   Allergen Reactions    Ciprofloxacin Shortness of Breath    Codeine Nausea Only    Influenza Virus Vaccine, Specific Unknown (comments)     Allergy documented in Admission Data base 1 screening    Pcn [Penicillins] Itching        Prior to Admission medications    Medication Sig Start Date End Date Taking? Authorizing Provider   butalbital-acetaminophen-caff (FIORICET) -40 mg per capsule Take 1 Cap by mouth every four (4) hours as needed for Pain. 2/8/18   Shanae Ac,    neomycin-polymyxin-hydrocortisone, buffered, (PEDIOTIC) 3.5-10,000-1 mg/mL-unit/mL-% otic suspension PLACE 3 DROPS IN RIGHT EAR THREE TIMES A DAY FOR 7 DAYS 2/5/18   Laisha Nelson MD   predniSONE (DELTASONE) 10 mg tablet 4 tabs for 2 day, 3 tabs for 2 days, 2 tabs for 2 days, 1tab for 2 days 1/24/18   Laisha Nelson MD   clonazePAM (KLONOPIN) 1 mg tablet TAKE TWO TABLETS BY MOUTH TWICE DAILY 1/24/18   Laisha Nelson MD   naproxen (NAPROSYN) 500 mg tablet Take 1 Tab by mouth two (2) times daily (with meals).  As needed 10/10/17   Laisha Nelson MD   fluticasone [facility]) 50 mcg/actuation nasal spray 1 spray each nostril daily 9/14/17   Riley Carmona NP   albuterol (PROVENTIL HFA, VENTOLIN HFA, PROAIR HFA) 90 mcg/actuation inhaler Take 1 Puff by inhalation every six (6) hours as needed for Wheezing. 9/14/17   Riley Carmona NP   nitroglycerin (NITROSTAT) 0.4 mg SL tablet 1 Tab by SubLINGual route every five (5) minutes as needed. Indications: ANGINA 9/14/17   Riley Carmona NP   nystatin (MYCOSTATIN) topical cream Apply  to affected area two (2) times a day. 9/14/17   Riley Carmona NP   omeprazole (PRILOSEC) 20 mg capsule Take 1 Cap by mouth daily. Take only as needed 8/7/17   Travon Guerrier MD   metoprolol tartrate (LOPRESSOR) 25 mg tablet Take 1 Tab by mouth daily. 5/31/17   Travon Guerrier MD   potassium chloride (KLOR-CON) 10 mEq tablet Take 1 Tab by mouth daily. 5/31/17   Travon Guerrier MD   furosemide (LASIX) 20 mg tablet Take 2 Tabs by mouth daily. 9/6/16   Travon Guerrier MD   atorvastatin (LIPITOR) 40 mg tablet Take 1 Tab by mouth every evening. 6/3/16   Travon Guerrier MD   omega-3 fatty acids-vitamin e (FISH OIL) 1,000 mg cap Take 1 Cap by mouth. Historical Provider   aspirin 81 mg chewable tablet Take 1 Tab by mouth daily. 3/12/14   Cortes Villalobos MD   cyanocobalamin (VITAMIN B-12) 100 mcg tablet Take 100 mcg by mouth daily. Reginald Dunbar MD   ASPIRIN/ACETAMINOPHEN/CAFFEINE (EXCEDRIN MIGRAINE PO) Take 1 Tab by mouth as needed. Reginald Dunbar MD       REVIEW OF SYSTEMS:     I am not able to complete the review of systems because:    The patient is intubated and sedated    The patient has altered mental status due to his acute medical problems    The patient has baseline aphasia from prior stroke(s)    The patient has baseline dementia and is not reliable historian    The patient is in acute medical distress and unable to provide information           Total of 12 systems reviewed as follows:       POSITIVE= BOLD text  Negative = text not BOLD  General:  fever, chills, sweats, generalized weakness, weight loss/gain,      loss of appetite Eyes:    blurred vision, eye pain, loss of vision, double vision  ENT:    rhinorrhea, pharyngitis   Respiratory:   cough, sputum production, SOB, GUNDERSON, wheezing, pleuritic pain   Cardiology:   chest pain, palpitations, orthopnea, PND, edema, syncope   Gastrointestinal:  abdominal pain , N/V, diarrhea, dysphagia, constipation, bleeding   Genitourinary:  frequency, urgency, dysuria, hematuria, incontinence   Muskuloskeletal :  arthralgia, myalgia, back pain  Hematology:  easy bruising, nose or gum bleeding, lymphadenopathy   Dermatological: rash, ulceration, pruritis, color change / jaundice  Endocrine:   hot flashes or polydipsia   Neurological:  headache, dizziness, confusion, focal weakness, paresthesia,     Speech difficulties, memory loss, gait difficulty  Psychological: Feelings of anxiety, depression, agitation    Objective:   VITALS:    Visit Vitals    /74    Pulse 88    Temp 98.6 °F (37 °C)    Resp 20    Ht 5' 5\" (1.651 m)    Wt 72.4 kg (159 lb 9.8 oz)    SpO2 97%    BMI 26.56 kg/m2       PHYSICAL EXAM:    General:    Alert, cooperative, no distress, appears stated age. HEENT: Atraumatic, anicteric sclerae, pink conjunctivae     No oral ulcers, mucosa moist, throat clear  Neck:  Supple, symmetrical,  thyroid: non tender  Lungs:   + crackles. No wheezing or Rhonchi. No rales. Chest wall:  No tenderness. No accessory muscle use. Heart:   Regular  rhythm,  No  Murmur. No edema  Abdomen:   Soft, NT. ND  BS+  Extremities: No cyanosis. No clubbing,      Skin turgor normal, Radial dial pulse 2+  Skin:     Not pale. Not Jaundiced  No rashes   Psych:  Not depressed. Not anxious or agitated. Neurologic: No facial asymmetry. No aphasia or slurred speech. Symmetrical strength, Sensation grossly intact.  AAOx4.     _______________________________________________________________________  Care Plan discussed with:    Comments   Patient x    Family      RN x    Care Manager Consultant:  jovany Grover   _______________________________________________________________________  Expected  Disposition:   Home with Family    HH/PT/OT/RN x   SNF/LTC    SELINA    ________________________________________________________________________  TOTAL TIME:  72 Minutes    Critical Care Provided     Minutes non procedure based      Comments    x Reviewed previous records   >50% of visit spent in counseling and coordination of care x Discussion with patient and/or family and questions answered       ________________________________________________________________________  Signed: Doretha Capellan MD    Procedures: see electronic medical records for all procedures/Xrays and details which were not copied into this note but were reviewed prior to creation of Plan. LAB DATA REVIEWED:    Recent Results (from the past 24 hour(s))   CBC WITH AUTOMATED DIFF    Collection Time: 03/25/18  8:08 PM   Result Value Ref Range    WBC 13.6 (H) 3.6 - 11.0 K/uL    RBC 3.90 3.80 - 5.20 M/uL    HGB 9.7 (L) 11.5 - 16.0 g/dL    HCT 32.1 (L) 35.0 - 47.0 %    MCV 82.3 80.0 - 99.0 FL    MCH 24.9 (L) 26.0 - 34.0 PG    MCHC 30.2 30.0 - 36.5 g/dL    RDW 14.5 11.5 - 14.5 %    PLATELET 864 957 - 113 K/uL    MPV 13.0 (H) 8.9 - 12.9 FL    NRBC 0.0 0  WBC    ABSOLUTE NRBC 0.00 0.00 - 0.01 K/uL    NEUTROPHILS 82 (H) 32 - 75 %    LYMPHOCYTES 12 12 - 49 %    MONOCYTES 5 5 - 13 %    EOSINOPHILS 0 0 - 7 %    BASOPHILS 0 0 - 1 %    IMMATURE GRANULOCYTES 1 (H) 0.0 - 0.5 %    ABS. NEUTROPHILS 11.1 (H) 1.8 - 8.0 K/UL    ABS. LYMPHOCYTES 1.6 0.8 - 3.5 K/UL    ABS. MONOCYTES 0.7 0.0 - 1.0 K/UL    ABS. EOSINOPHILS 0.0 0.0 - 0.4 K/UL    ABS. BASOPHILS 0.1 0.0 - 0.1 K/UL    ABS. IMM.  GRANS. 0.1 (H) 0.00 - 0.04 K/UL    DF AUTOMATED     METABOLIC PANEL, COMPREHENSIVE    Collection Time: 03/25/18  8:08 PM   Result Value Ref Range    Sodium 140 136 - 145 mmol/L    Potassium 4.3 3.5 - 5.1 mmol/L    Chloride 104 97 - 108 mmol/L    CO2 25 21 - 32 mmol/L Anion gap 11 5 - 15 mmol/L    Glucose 110 (H) 65 - 100 mg/dL    BUN 32 (H) 6 - 20 MG/DL    Creatinine 1.50 (H) 0.55 - 1.02 MG/DL    BUN/Creatinine ratio 21 (H) 12 - 20      GFR est AA 41 (L) >60 ml/min/1.73m2    GFR est non-AA 34 (L) >60 ml/min/1.73m2    Calcium 8.8 8.5 - 10.1 MG/DL    Bilirubin, total 0.4 0.2 - 1.0 MG/DL    ALT (SGPT) 21 12 - 78 U/L    AST (SGOT) 30 15 - 37 U/L    Alk.  phosphatase 80 45 - 117 U/L    Protein, total 7.3 6.4 - 8.2 g/dL    Albumin 3.5 3.5 - 5.0 g/dL    Globulin 3.8 2.0 - 4.0 g/dL    A-G Ratio 0.9 (L) 1.1 - 2.2     LIPASE    Collection Time: 03/25/18  8:08 PM   Result Value Ref Range    Lipase 135 73 - 393 U/L   LACTIC ACID    Collection Time: 03/25/18  8:08 PM   Result Value Ref Range    Lactic acid 1.4 0.4 - 2.0 MMOL/L   TROPONIN I    Collection Time: 03/25/18  8:08 PM   Result Value Ref Range    Troponin-I, Qt. 1.57 (H) <0.05 ng/mL   NT-PRO BNP    Collection Time: 03/25/18  8:08 PM   Result Value Ref Range    NT pro-BNP >12074 (H) 0 - 125 PG/ML   MAGNESIUM    Collection Time: 03/25/18  8:08 PM   Result Value Ref Range    Magnesium 2.6 (H) 1.6 - 2.4 mg/dL   EKG, 12 LEAD, INITIAL    Collection Time: 03/25/18  9:25 PM   Result Value Ref Range    Ventricular Rate 87 BPM    Atrial Rate 87 BPM    P-R Interval 156 ms    QRS Duration 108 ms    Q-T Interval 388 ms    QTC Calculation (Bezet) 466 ms    Calculated P Axis 61 degrees    Calculated R Axis -63 degrees    Calculated T Axis 103 degrees    Diagnosis       Sinus rhythm with frequent and consecutive premature ventricular complexes   and premature atrial complexes  Incomplete right bundle branch block  Left anterior fascicular block  Abnormal QRS-T angle, consider primary T wave abnormality  Abnormal ECG  When compared with ECG of 11-APR-2017 15:29,  premature atrial complexes are now present  Incomplete right bundle branch block is now present     CK W/ CKMB & INDEX    Collection Time: 03/25/18 10:01 PM   Result Value Ref Range    CK 95 26 - 192 U/L    CK - MB 13.5 (H) <3.6 NG/ML    CK-MB Index 14.2 (H) 0 - 2.5     D DIMER    Collection Time: 03/25/18 10:01 PM   Result Value Ref Range    D-dimer 0.31 0.00 - 0.65 mg/L FEU

## 2018-03-26 NOTE — PROGRESS NOTES
TRANSFER - IN REPORT:  Verbal report received from Lilyl(name) on [address]  being received from ED(unit) for routine progression of care    Report consisted of patients Situation, Background, Assessment and   Recommendations(SBAR). Information from the following report(s) SBAR, Kardex, ED Summary, Procedure Summary, Intake/Output, MAR and Recent Results was reviewed with the receiving nurse. Opportunity for questions and clarification was provided. Assessment completed upon patients arrival to unit and care assumed. Primary Nurse Isis Mejias RN and Aston Llanos RN performed a dual skin assessment on this patient No impairment noted  Nicholas score is 23.

## 2018-03-26 NOTE — PROGRESS NOTES
Problem: Mobility Impaired (Adult and Pediatric)  Goal: *Acute Goals and Plan of Care (Insert Text)  Physical Therapy Goals  Initiated 3/26/2018  1. Patient will move from supine to sit and sit to supine  in bed with independence within 7 day(s). 2.  Patient will transfer from bed to chair and chair to bed with modified independence using the least restrictive device within 7 day(s). 3.  Patient will perform sit to stand with modified independence within 7 day(s). 4.  Patient will ambulate with modified independence for 200 feet with the least restrictive device within 7 day(s). physical Therapy EVALUATION  Patient: Emili Iraheta (71 y.o. female)  Date: 3/26/2018  Primary Diagnosis: Respiratory failure (HCC)        Precautions: Falls, DNR       ASSESSMENT :  Based on the objective data described below, the patient presents with generalized weakness, decreased tolerance to activity s/p admission for respiratory failure. Patient received supine in bed and agreeable to therapy. Patient was on 2L of oxygen at rest and spO2: 99%. Patient reported prior to admission, she lives alone in a 1 story home, ambulates independently without AD. Patient reported she has not been feeling well for ~2 months. Patient not on home oxygen. Patient tolerated evaluation fairly well. Patient with reports of 3/10 back pain prior to mobility. Patient completed supine to sit independently and while seated EOB pt reported feeling lightheaded. BP hypotensive, but stable. Patient able to assume standing position with standby assist and BP remained stable. Patient ambulated within the room with CGA-standby assist at fluctuating gait speed, able to navigate through obstacles in the room. Patient sat down on the toilet for a period of time and reported feeling lightheaded again and closed her eyes. Patient was able to get up from the toilet and walk back to the bed and returned to supine position quickly.  BP remained stable again and spO2: 97% on room air during activity. Placed nasal cannula back on patient on notified RN of spO2 results during activity. Patient will continue to benefit from PT to progress mobility, improve tolerance to activity and reach highest level of independence. Patient will benefit from HHPT upon discharge. Patient will benefit from skilled intervention to address the above impairments. Patients rehabilitation potential is considered to be Fair  Factors which may influence rehabilitation potential include:   []         None noted  []         Mental ability/status  [x]         Medical condition  []         Home/family situation and support systems  []         Safety awareness  []         Pain tolerance/management  []         Other:      PLAN :  Recommendations and Planned Interventions:  [x]           Bed Mobility Training             [x]    Neuromuscular Re-Education  [x]           Transfer Training                   []    Orthotic/Prosthetic Training  [x]           Gait Training                         []    Modalities  [x]           Therapeutic Exercises           []    Edema Management/Control  [x]           Therapeutic Activities            [x]    Patient and Family Training/Education  []           Other (comment):    Frequency/Duration: Patient will be followed by physical therapy  3 times a week to address goals. Discharge Recommendations: Home Health  Further Equipment Recommendations for Discharge: none     SUBJECTIVE:   Patient stated The nurses think I am funny since I stand up to pee.     OBJECTIVE DATA SUMMARY:   HISTORY:    Past Medical History:   Diagnosis Date    CHF (congestive heart failure) ([facility] [address]     Other ill-defined conditions(799.89)     AMI    Psychiatric disorder     depression     Past Surgical History:   Procedure Laterality Date    CARDIAC SURG PROCEDURE UNLIST      stents    HX APPENDECTOMY      HX CHOLECYSTECTOMY      HX GI      adhesions    HX GYN      hysterectomy/  10 years     Prior Level of Function/Home Situation: independent, ambulatory, drive, lives alone. Pt reported feeling weak for~ 2 months prior to admission. Personal factors and/or comorbidities impacting plan of care:     Home Situation  Home Environment: Private residence  # Steps to Enter: 0  One/Two Story Residence: One story  Living Alone: Yes  Support Systems: Family member(s)  Patient Expects to be Discharged to[de-identified] Private residence  Current DME Used/Available at Home: Grab bars  Tub or Shower Type: Shower (couple steps to get in)    EXAMINATION/PRESENTATION/DECISION MAKING:   Critical Behavior:              Hearing: Auditory  Auditory Impairment: None  Skin:    Edema:   Range Of Motion:  AROM: Within functional limits                       Strength:    Strength: Generally decreased, functional                    Tone & Sensation:                                  Coordination:     Vision:      Functional Mobility:  Bed Mobility:     Supine to Sit: Independent  Sit to Supine: Independent     Transfers:  Sit to Stand: Stand-by assistance  Stand to Sit: Stand-by assistance                       Balance:   Sitting: Intact  Standing: Impaired  Standing - Static: Good  Standing - Dynamic : Fair  Ambulation/Gait Training:  Distance (ft): 20 Feet (ft)  Assistive Device: Gait belt  Ambulation - Level of Assistance: Stand-by assistance;Contact guard assistance        Gait Abnormalities: Decreased step clearance; Path deviations        Base of Support: Narrowed     Speed/Kendra: Fluctuations  Step Length: Left shortened;Right shortened       Functional Measure:  Barthel Index:    Bathin  Bladder: 10  Bowels: 10  Groomin  Dressing: 10  Feeding: 10  Mobility: 0  Stairs: 5  Toilet Use: 10  Transfer (Bed to Chair and Back): 10  Total: 75       Barthel and G-code impairment scale:  Percentage of impairment CH  0% CI  1-19% CJ  20-39% CK  40-59% CL  60-79% CM  80-99% CN  100%   Barthel Score 0-100 100 99-80 79-60 59-40 20-39 1-19   0   Barthel Score 0-20 20 17-19 13-16 9-12 5-8 1-4 0      The Barthel ADL Index: Guidelines  1. The index should be used as a record of what a patient does, not as a record of what a patient could do. 2. The main aim is to establish degree of independence from any help, physical or verbal, however minor and for whatever reason. 3. The need for supervision renders the patient not independent. 4. A patient's performance should be established using the best available evidence. Asking the patient, friends/relatives and nurses are the usual sources, but direct observation and common sense are also important. However direct testing is not needed. 5. Usually the patient's performance over the preceding 24-48 hours is important, but occasionally longer periods will be relevant. 6. Middle categories imply that the patient supplies over 50 per cent of the effort. 7. Use of aids to be independent is allowed. Ania Cage., Barthel, DTRE. (1067). Functional evaluation: the Barthel Index. [facility] (14)2. Ayaz Horowitz vinod ALFONZO Wallace, Savana Coates., Maria R Lazcano., [place], [address] [place] [address] (1999). Measuring the change indisability after inpatient rehabilitation; comparison of the responsiveness of the Barthel Index and Functional Harris Measure. Journal of Neurology, Neurosurgery, and Psychiatry, 66(4), 138-667. Ghazala Hernandez, N.J.A, JULIO Santos, & Joselin Francisco, M.A. (2004.) Assessment of post-stroke quality of life in cost-effectiveness studies: The usefulness of the Barthel Index and the EuroQoL-5D. Quality of Life Research, 13, 862-28         G codes: In compliance with CMSs Claims Based Outcome Reporting, the following G-code set was chosen for this patient based on their primary functional limitation being treated:     The outcome measure chosen to determine the severity of the functional limitation was the Barthel with a score of 75/100 which was correlated with the impairment scale.    ? Mobility - Walking and Moving Around:     - CURRENT STATUS: CJ - 20%-39% impaired, limited or restricted    - GOAL STATUS: CI - 1%-19% impaired, limited or restricted    - D/C STATUS:  ---------------To be determined---------------       Based on the above components, the patient evaluation is determined to be of the following complexity level: LOW     Pain:  Pain Scale 1: Numeric (0 - 10)  Pain Intensity 1: 0  Pain Location 1: Back  Pain Orientation 1: Left;Upper  Pain Description 1: Aching  Pain Intervention(s) 1: Rest  Activity Tolerance:   Fair. BP hypotensive, but stable during positional changes despite reports of lightheadedness  Please refer to the flowsheet for vital signs taken during this treatment. After treatment:   []         Patient left in no apparent distress sitting up in chair  [x]         Patient left in no apparent distress in bed  [x]         Call bell left within reach  [x]         Nursing notified  []         Caregiver present  []         Bed alarm activated    COMMUNICATION/EDUCATION:   The patients plan of care was discussed with: Occupational Therapist and Registered Nurse. [x]         Fall prevention education was provided and the patient/caregiver indicated understanding. [x]         Patient/family have participated as able in goal setting and plan of care. [x]         Patient/family agree to work toward stated goals and plan of care. []         Patient understands intent and goals of therapy, but is neutral about his/her participation. []         Patient is unable to participate in goal setting and plan of care.     Thank you for this referral.  Marcello Parker, PT, DPT   Time Calculation: 21 mins

## 2018-03-26 NOTE — ED NOTES
Patient experiences 11 beats of monophasic PVC's. She is assessed and denies any chest pain or changes in her breathing pattern. States that she does feel her heart \"skip a beat\" every now and then.   Reviewed with Dr Tania Peterson, no new orders

## 2018-03-26 NOTE — ED NOTES
Patient states that she is having a hard time breathing and sitting up in bed in high fowlers. She is not tachypneic, O2 sats 98% on RA. Placed on 2L NC for comfort. Dr Gloria Pendleton at bedside.

## 2018-03-26 NOTE — PROGRESS NOTES
Occupational Therapy  Orders received and medical record reviewed. Will defer initiating OT Evaluation as pt is pending a lung scan to r/o PE. Will follow up as appropriate.

## 2018-03-26 NOTE — PROGRESS NOTES
CM attempted to meet with pt to complete initial assessment, dc planning. Pt is currently asleep with lights off at this time. CM will follow-up at a later time.      Dani Dallas MSW Supervisee in Social Work, [address] [place] [address]  [phone]

## 2018-03-26 NOTE — CARDIO/PULMONARY
C/P rehab note- chart reviewed. Pt is on CHF bundle list.    Adm with Acute/Chronic Systolic Heart failure. HX includes HTN,anxiety,GERD,Hypercholesterolemia,Asthma,CAD,AMI,stenting,remote a-fib,COPD,CKD 3. Former smoker. LVEF 3/10/2014 was 30-35%. Most recent Echo results pending. Pro BNP > 30,000. DIET CARDIAC Regular    Met with pt partially sitting up in bed. Heart Failure education folder, with Low Sodium brochure, given to Celesta Iberia. Educated using teach back method. Discussed diagnosis definition and assessed patient understanding. Reviewed importance of daily weight monitoring and Low Sodium diet (7764-6217 mg. daily). Encouraged activity and rest periods within symptom limitations and as ordered by physician. Reviewed Lasix, purpose of medication, potential side effects, compliance, and what to do if dose is missed. Discussed importance of reporting signs and symptoms of exacerbation, and when to report them to the doctor, to prevent re-hospitalization. Ibrahima Boudreaux was encouraged to keep all appointments with doctor    States she has a scale at home that sometimes is accurate-encouraged to purchase a new scale if she has the means to do so. Has family about 20 min away-lives alone still drives. Will read over folder more thoroughly and Cardiac Rehab will see her tomorrow to see what questions she may have. She did state her MD also told her if she increases in weight 2-3 pounds in a day to call.     Ibrahima Boudreaux could benefit from further education on the following HF topics: all

## 2018-03-26 NOTE — PROGRESS NOTES
Physical Therapy:    Attempted PT evaluation again. Patient unavailable as MD in the room with patient and then echo waiting to perform testing at bedside. Will continue to follow.  Thank you    Lonny Tavarez, PT, DPT

## 2018-03-26 NOTE — PROGRESS NOTES
Echo pending     Further plans after we see the echo. Acute / chronic systolic heart failure. CAD  Remote PCI  Acute Kidney injury   CKD stage 3  Anemia,  Likely due to CKD   Elevated Troponin. Possibly will need cath. Cannot locate echo . Not sure it was done today.

## 2018-03-26 NOTE — ED NOTES
TRANSFER - OUT REPORT:    Verbal report given to Cathi(name) on Cassie Hamm  being transferred to [facility](unit) for routine progression of care       Report consisted of patients Situation, Background, Assessment and   Recommendations(SBAR). Information from the following report(s) SBAR, Kardex, ED Summary and Recent Results was reviewed with the receiving nurse. Lines:   Peripheral IV 03/25/18 Left Antecubital (Active)   Site Assessment Clean, dry, & intact 3/25/2018  8:11 PM   Phlebitis Assessment 0 3/25/2018  8:11 PM   Infiltration Assessment 0 3/25/2018  8:11 PM   Dressing Status Clean, dry, & intact 3/25/2018  8:11 PM   Dressing Type 4 X 4 3/25/2018  8:11 PM        Opportunity for questions and clarification was provided.       Patient transported with:   Monitor  O2 @ 2 liters  Registered Nurse

## 2018-03-26 NOTE — PROGRESS NOTES
Cardiopulmonary Care Interdisciplinary rounds were held today to discuss patient plan of care and outcomes. The following members were present: NP/Physician, Pharmacy, Nursing and Case Management.     Expected Length of Stay:  - - -    Plan of Care: Continue current treatment plan LUQ pain, VQ completed, echo completed, wean O2

## 2018-03-26 NOTE — PROGRESS NOTES
03/26/18 0308   Vitals   Temp 97.5 °F (36.4 °C)   Temp Source Oral   Pulse (Heart Rate) 66   Heart Rate Source Monitor   Resp Rate 22   O2 Sat (%) 100 %   Level of Consciousness Alert   BP (!) 88/66   MAP (Calculated) 73   BP 1 Location Left arm   BP 1 Method Automatic   BP Patient Position At rest   MEWS Score 3

## 2018-03-26 NOTE — PROGRESS NOTES
Occupational Therapy EVALUATION/discharge  Patient: Mohan Garcia (89 y.o. female)  Date: 3/26/2018  Primary Diagnosis: Respiratory failure (HCC)        Precautions: standard, fall       ASSESSMENT:   Based on the objective data described below, the patient presents with complaints of lightheadedness and BP systolically in the 07U during activity and at rest.  Pt negotiated the furnishings in her room without difficulty and performed toilet transfer independently with stand by assistance. Pt felt that her lightheadedness was increased while seated on the toilet and returned to bed. O2 sats were in the 90s on room air, however, pt elected to use the O2 nasal cannula for comfort. Pt is likely slightly weaker than her baseline; she reports that she's been less active over the past few months. Pt does not need OT services at this time as PT is able to work on strength and endurance. No need for further acute OT services. Will d/c pt from OT. Kay Davenport Further skilled acute occupational therapy is not indicated at this time. Discharge Recommendations: None  Further Equipment Recommendations for Discharge: none      SUBJECTIVE:   Patient stated I do what I can.    (re: housekeeping, pt reports inactivity for the past couple of months)    OBJECTIVE DATA SUMMARY:   HISTORY:   Past Medical History:   Diagnosis Date    CHF [facility]congestive heart failure) ([facility] [address]     Other ill-defined conditions(799.89)     AMI    Psychiatric disorder     depression     Past Surgical History:   Procedure Laterality Date    CARDIAC SURG PROCEDURE UNLIST      stents    HX APPENDECTOMY      HX CHOLECYSTECTOMY      HX GI      adhesions    HX GYN      hysterectomy/  10 years       Prior Level of Function/Environment/Context: Pt reports that she is independent in adls and IADLs. She drives, shops, cooks and cleans as she's able.   Pt lives alone and reports that she has 3 daughters-2 local.    Occupations in which the patient is/was successful, what are the barriers preventing that success: lightheadedness/generalized weakness due to decreased activity level over the past several months)  Performance Patterns (routines, roles, habits, and rituals):   Personal Interests and/or values:   Expanded or extensive additional review of patient history: pt with low to intermediate risk of PE, low ddimer-had one therapeutic dose of lovenox. Home Situation  Home Environment: Private residence  # Steps to Enter: 0  One/Two Story Residence: One story  Living Alone: Yes  Support Systems: Family member(s)  Patient Expects to be Discharged to[de-identified] Private residence  Current DME Used/Available at Home: Grab bars  Tub or Shower Type: Shower (couple steps to get in)  [x]  Right hand dominant   []  Left hand dominant    EXAMINATION OF PERFORMANCE DEFICITS:  Cognitive/Behavioral Status:  Neurologic State:  (closes eyes often and feels lightheaded)  Orientation Level: Oriented to person;Oriented to place;Oriented to situation  Cognition: Follows commands  Perception: Appears intact  Perseveration: No perseveration noted  Safety/Judgement: Awareness of environment    Skin: generally intact    Edema: none observed    Hearing: Auditory  Auditory Impairment: None    Vision/Perceptual:    Tracking:  (funcitonally scans environment)                      Acuity: Impaired near vision    Corrective Lenses: Reading glasses    Range of Motion:  BUEs:    AROM: Within functional limits                         Strength:  Generalized weakness  Strength: Generally decreased, functional                Coordination:     Fine Motor Skills-Upper: Left Intact; Right Intact    Gross Motor Skills-Upper: Left Intact; Right Intact    Tone & Sensation:  Normal tone  Sensation-intact, no report of numbness or tingling                            Balance:  Sitting: Intact  Standing: Impaired  Standing - Static: Good  Standing - Dynamic : Fair    Functional Mobility and Transfers for ADLs:  Bed Mobility:  Supine to Sit: Independent  Sit to Supine: Independent  Scooting: Independent    Transfers:  Sit to Stand: Stand-by assistance  Stand to Sit: Stand-by assistance  Toilet Transfer : Stand-by assistance    ADL Assessment:  Feeding: Independent    Oral Facial Hygiene/Grooming: Independent;Setup    Bathing: Stand-by assistance    Upper Body Dressing: Independent    Lower Body Dressing: Independent    Toileting: Independent               ADL Intervention and task modifications:     Pt ambulated in room, rather quickly, negotiating moving between several obstacles in room prior to ambulation to the bathroom. BP on the low side and pt reported lightheadedness, especially post mobility when seated on the toilet. Pt reports that she usually stands to urinate. Cognitive Retraining  Safety/Judgement: Awareness of environment      Functional Measure:  Barthel Index:    Bathin  Bladder: 10  Bowels: 10  Groomin  Dressing: 10  Feeding: 10  Mobility: 0  Stairs: 5  Toilet Use: 10  Transfer (Bed to Chair and Back): 10  Total: 75       Barthel and G-code impairment scale:  Percentage of impairment CH  0% CI  1-19% CJ  20-39% CK  40-59% CL  60-79% CM  80-99% CN  100%   Barthel Score 0-100 100 99-80 79-60 59-40 20-39 1-19   0   Barthel Score 0-20 20 17-19 13-16 9-12 5-8 1-4 0      The Barthel ADL Index: Guidelines  1. The index should be used as a record of what a patient does, not as a record of what a patient could do. 2. The main aim is to establish degree of independence from any help, physical or verbal, however minor and for whatever reason. 3. The need for supervision renders the patient not independent. 4. A patient's performance should be established using the best available evidence. Asking the patient, friends/relatives and nurses are the usual sources, but direct observation and common sense are also important. However direct testing is not needed.   5. Usually the patient's performance over the preceding 24-48 hours is important, but occasionally longer periods will be relevant. 6. Middle categories imply that the patient supplies over 50 per cent of the effort. 7. Use of aids to be independent is allowed. Mamie Souza., Barthel, D.W. (7474). Functional evaluation: the Barthel Index. [facility] (14)2. ALFONZO Borges, Maribeth Port., Sonido [place]., [facility], [address] [place] [address] (1999). Measuring the change indisability after inpatient rehabilitation; comparison of the responsiveness of the Barthel Index and Functional [place] Measure. Journal of Neurology, Neurosurgery, and Psychiatry, 66(4), 547-699. Elma Smith, N.J.A, JULIO Santos, & Wesley Pruett MNATI. (2004.) Assessment of post-stroke quality of life in cost-effectiveness studies: The usefulness of the Barthel Index and the EuroQoL-5D. Quality of Life Research, 13, 993-11       G codes: In compliance with CMSs Claims Based Outcome Reporting, the following G-code set was chosen for this patient based on their primary functional limitation being treated: The outcome measure chosen to determine the severity of the functional limitation was the   Barthel Index with a score of 75/100 which was correlated with the impairment scale. ? Self Care:     - CURRENT STATUS: CJ - 20%-39% impaired, limited or restricted    - GOAL STATUS: CJ - 20%-39% impaired, limited or restricted    - D/C STATUS:  CJ - 20%-39% impaired, limited or restricted     Occupational Therapy Evaluation Charge Determination   History Examination Decision-Making   LOW Complexity : Brief history review  MEDIUM Complexity : 3-5 performance deficits relating to physical, cognitive , or psychosocial skils that result in activity limitations and / or participation restrictions MEDIUM Complexity : Patient may present with comorbidities that affect occupational performnce.  Miniml to moderate modification of tasks or assistance (eg, physical or verbal ) with assesment(s) is necessary to enable patient to complete evaluation       Based on the above components, the patient evaluation is determined to be of the following complexity level: LOW   Pain:  Pain Scale 1: Numeric (0 - 10)  Pain Intensity 1: 3  Pain Location 1: Back  Pain Orientation 1: Left;Upper  Pain Description 1: Aching  Pain Intervention(s) 1: Rest- Improved back pain post mobility in room   Activity Tolerance:   Pt's BP remained in the 34B systolically, her O2 was in the 90s on room, but pt wished to wear the O2   Please refer to the flowsheet for vital signs taken during this treatment. After treatment:   []  Patient left in no apparent distress sitting up in chair  [x]  Patient left in no apparent distress in bed  [x]  Call bell left within reach  [x]  Nursing notified  []  Caregiver present  []  Bed alarm activated    COMMUNICATION/EDUCATION:   Communication/Collaboration:  []      Home safety education was provided and the patient/caregiver indicated understanding. [x]      Patient/family have participated as able and agree with findings and recommendations. []      Patient is unable to participate in plan of care at this time.   Findings and recommendations were discussed with: Registered Nurse    Nathalia Morales OTR/L  Time Calculation: 24 mins

## 2018-03-26 NOTE — PROGRESS NOTES
Hospitalist Progress Note    NAME: Nicki Mendoza   :  1945   MRN:  [id number]       Assessment / Plan:  Acute on chronic systolic heart failure  Elevated troponin  CAD  Presumed PNA  CM/valve disease  Remote PAfib  COPD  -EKG with frequent PVCs and PACs  -probnp 3K, trop 1.57  -CXR showed R basilar patchy airspace disease and probable small R pleural effusion  -IV lasix BID   -Bcx no growth so far, cont empiric abx- rocephin and zithromax  -one time dose therapeutic lovenox, pending V/Q scan to r/o acute PE, on lovenox ppx dose  -Echo-pending, trend trop 1.57--> 1.28  -monitor lytes and I&O, daily wt  -cont' ASA, BB, statin, nebs     MACY on CKD 3  -monitor while diuresing  -hold nephrotoxic agents  -monitor bmp  -Cr at 1.5        Code Status: DNR   Surrogate Decision Maker:  Pt's daughter  DVT Prophylaxis: lovenox  GI Prophylaxis: not indicated  Baseline: independent, lives alone. Pt is not on home O2      Body mass index is 26.56 kg/(m^2). Subjective:     Chief Complaint / Reason for Physician Visit  \"dyspnea\". Discussed with RN events overnight. Review of Systems:  Symptom Y/N Comments  Symptom Y/N Comments   Fever/Chills n   Chest Pain n    Poor Appetite    Edema n    Cough n   Abdominal Pain n    Sputum    Joint Pain     SOB/GUNDERSON y   Pruritis/Rash     Nausean/vomit n   Tolerating PT/OT     Diarrhea    Tolerating Diet     Constipation    Other       Could NOT obtain due to:      Objective:     VITALS:   Last 24hrs VS reviewed since prior progress note.  Most recent are:  Patient Vitals for the past 24 hrs:   Temp Pulse Resp BP SpO2   18 1105 98.2 °F (36.8 °C) 65 21 110/67 100 %   18 0803 - 62 - - -   18 0733 97.9 °F (36.6 °C) (!) 48 22 101/66 100 %   18 0308 97.5 °F (36.4 °C) 66 22 (!) 88/66 100 %   18 2318 97.6 °F (36.4 °C) 89 20 113/66 98 %   18 2200 - 88 20 106/74 97 %   18 -  20 131/66 100 %   18 -  - 114/57 -   18 2045 - 93 28 112/74 97 %   03/25/18 2030 - - - 123/50 -   03/25/18 2000 - 95 23 107/51 98 %   03/25/18 1936 98.6 °F (37 °C) - 16 113/50 100 %       Intake/Output Summary (Last 24 hours) at 03/26/18 1240  Last data filed at 03/25/18 2320   Gross per 24 hour   Intake              100 ml   Output              750 ml   Net             -650 ml        PHYSICAL EXAM:  General: WD, WN. Alert, cooperative, no acute distress    EENT:  EOMI. Anicteric sclerae. MMM  Resp:  CTA bilaterally, no wheezing or rales. No accessory muscle use  CV:  Regular  rhythm,  No edema  GI:  Soft, Non distended, Non tender.  +Bowel sounds  Neurologic:  Alert and oriented X 3, normal speech, grossly intact  Psych:   Good insight. Not anxious nor agitated  Skin:  No rashes. No jaundice    Reviewed most current lab test results and cultures  YES  Reviewed most current radiology test results   YES  Review and summation of old records today    NO  Reviewed patient's current orders and MAR    YES  PMH/ reviewed - no change compared to H&P  ________________________________________________________________________  Care Plan discussed with:    Comments   Patient x    Family      RN x    Care Manager     Consultant                        Multidiciplinary team rounds were held today with , nursing, pharmacist and clinical coordinator. Patient's plan of care was discussed; medications were reviewed and discharge planning was addressed.      ________________________________________________________________________  Total NON critical care TIME:  30   Minutes    Total CRITICAL CARE TIME Spent:   Minutes non procedure based      Comments   >50% of visit spent in counseling and coordination of care     ________________________________________________________________________  Mercy Pal MD     Procedures: see electronic medical records for all procedures/Xrays and details which were not copied into this note but were reviewed prior to creation of Plan. LABS:  I reviewed today's most current labs and imaging studies.   Pertinent labs include:  Recent Labs      03/26/18   0009  03/25/18 2008   WBC  12.5*  13.6*   HGB  9.7*  9.7*   HCT  31.4*  32.1*   PLT  201  214     Recent Labs      03/26/18   0009  03/25/18 2008   NA  138  140   K  4.7  4.3   CL  105  104   CO2  26  25   GLU  133*  110*   BUN  32*  32*   CREA  1.50*  1.50*   CA  8.7  8.8   MG   --   2.6*   ALB   --   3.5   TBILI   --   0.4   SGOT   --   30   ALT   --   21       Signed: Janny Pitts MD

## 2018-03-26 NOTE — ED NOTES
Assisted up to [facility] to urinate with stand by assist.  Patient is weak but steady, voided approx 250cc. Continues to have multiple PVCs in a row, states that she feels like her heart is skipping a beat but denies chest pain or nausea.

## 2018-03-26 NOTE — PROGRESS NOTES
Pt is a 69 yo  female admitted on 3/25/18 for respiratory failure. Pt lives alone in a one-story house ([address]), daughter lives in [address] [place] [address] and provides support as needed, as well as one of her Judaism friends. Pt reports she went to the [facility] for SNF rehab and left within 24 hours because she \"did not like it. \" No reported history of HH or acute inpatient rehab. Pt has no reported DME at home. Pt to dc home by private vehicle with family \"depending on what time\", CM to clarify. Pt to transport self or use family support for follow-up care appointments. Pt's preferred Rx is [facility] ([place]). CM met with pt to verify demographic info and complete initial assessment, dc planning. Pt is alert and oriented x 4. Pt sees Dr. Aj Olivares (PCP) outpatient. Pt to be evaluated by PT/OT. Pt is currently on 2LPM continuous oxygen. Per nursing, it appears to be more for comfort care at this time, will continue to wean. Pt reported that she'd be interested in having it at discharge \"in case. \" CM provided information to pt re: Medicare Home Oxygen Qualifications, Oxygen Challenge testing. Pt verbalized understanding. CM will continue to follow-up to ensure additional CM needs are met. Reason for Admission:  Respiratory failure      RRAT Score:  8      Plan for utilizing home health:  Pending PT/OT evaluation      Likelihood of Readmission:  Low         Care Management Interventions  PCP Verified by CM: Yes  Palliative Care Criteria Met (RRAT>21 & CHF Dx)?: No  Mode of Transport at Discharge:  Other (see comment) (By private vehicle with family or friend pending time of discharge)  Transition of Care Consult (CM Consult): Discharge Planning  Discharge Durable Medical Equipment: No (None reported at home)  Health Maintenance Reviewed: Yes  Physical Therapy Consult: Yes  Occupational Therapy Consult: Yes  Speech Therapy Consult: No  Current Support Network: Lives Alone, Own Home, Other ([address] ([address]); lives alone; daughter lives in [address], Zoroastrianism friend nearby)  Confirm Follow Up Transport: Self  Plan discussed with Pt/Family/Caregiver: Yes  Discharge Location  Discharge Placement:  (TBD)    JORGE Iniguez Supervisee in Social Work, [address] [place] [address]  [phone]

## 2018-03-26 NOTE — PROGRESS NOTES
Bedside shift change report given to Merline Buggy, RN (oncoming nurse) by Vijay Wilcox RN (offgoing nurse). Report included the following information SBAR. Bedside shift change report given to Dolores Combs RN (oncoming nurse). Report included the following information SBAR. SHIFT SUMMARY:            [address] NURSING NOTE   Admission Date 3/25/2018   Admission Diagnosis Respiratory failure ([facility] [address]   Consults IP CONSULT TO CARDIOLOGY  IP CONSULT TO HOSPITALIST      Cardiac Monitoring [x] Yes [] No      Purposeful Hourly Rounding [x] Yes    Ruperto Score Total Score: 1   Ruperto score 3 or > [x] Bed Alarm [] Avasys [] 1:1 sitter [] Patient refused (Signed refusal form in chart)   Nicholas Score Nicholas Score: 20   Nicholas score 14 or < [] PMT consult [] Wound Care consult    []  Specialty bed  [] Nutrition consult      Influenza Vaccine Received Flu Vaccine for Current Season (usually Sept-March): No    Patient/Guardian Refused (Notify MD): Yes      Oxygen needs? [x] Room air Oxygen @  []1L    []2L    []3L   []4L    []5L   []6L via  NC   Chronic home O2 use? [] Yes [x] No  Perform O2 challenge test and document in progress note using smartphrase (.Homeoxygen)      Last bowel movement Last Bowel Movement Date: 03/26/18      Urinary Catheter             LDAs               Peripheral IV 03/25/18 Left Antecubital (Active)   Site Assessment Clean, dry, & intact 3/26/2018  3:03 PM   Phlebitis Assessment 0 3/26/2018  3:03 PM   Infiltration Assessment 0 3/26/2018  3:03 PM   Dressing Status Clean, dry, & intact 3/26/2018  3:03 PM   Dressing Type Tape 3/26/2018  3:03 PM   Hub Color/Line Status Pink 3/26/2018  3:03 PM                         Readmission Risk Assessment Tool Score Low Risk            8       Total Score        3 Has Seen PCP in Last 6 Months (Yes=3, No=0)    5 Pt. Coverage (Medicare=5 , Medicaid, or Self-Pay=4)        Criteria that do not apply:    . Living with Significant Other. Assisted Living. LTAC. SNF.  or   Rehab Patient Length of Stay (>5 days = 3)    IP Visits Last 12 Months (1-3=4, 4=9, >4=11)    Charlson Comorbidity Score (Age + Comorbid Conditions)       Expected Length of Stay 4d 12h   Actual Length of Stay 1

## 2018-03-26 NOTE — PROGRESS NOTES
Pharmacy Clarification of the Prior to Admission Medication Regimen Retrospective to the Admission Medication Reconciliation    The patient was interviewed regarding clarification of the prior to admission medication regimen and was questioned regarding use of any other inhalers, topical products, over the counter medications, herbal medications, vitamin products or ophthalmic/nasal/otic medication use. There was prescription claims data for a new medication, lisinopril, which patient was unfamiliar with. Pharmacy called [facility] in [place], [phone], to determine if the medication was picked up and who prescribed the medication (see pertinent findings below). Information Obtained From: Patient, , [facility], RX Query    Recommendations/Findings:    The following amendments were made to the patient's active medication list on file at [facility]:     1) Additions:    lisinopril (PRINIVIL, ZESTRIL) 2.5 mg tablet (see pertinent findings below)   cetirizine (ZYRTEC) 10 mg tablet    2) Removals:    Aspirin 81 mg chewable tablet (see pertinent findings below)   Atorvastatin (LIPITOR) 40 mg tablet   Butalbital-acetaminophen-caff (FIORICET) -40 mg capsule   Metoprolol tartrate (LOPRESSOR) 25 mg tablet   Omega-3 fatty acids-vitamin e (FISH OIL) 1,000 mg cap   Potassium chloride (KLOR-CON) 10 mEq tablet    3) Changes:   ASPIRIN/ACETAMINOPHEN/CAFFEINE (EXCEDRIN MIGRAINE PO) (Old regimen: 1 tab by mouth as needed /New regimen: 2 tabs by mouth daily as needed for migraine)   fluticasone (FLONASE) 50 mcg/actuation nasal spray (Old regimen: 1 spray each nostril daily /New regimen: 2 sprays each nostril daily as needed)   furosemide (LASIX) 20 mg tablet (Old regimen: 2 tabs by mouth daily /New regimen: 2 tabs by mouth BID)   neomycin-polymyxin-hydrocortisone, buffered, (PEDIOTIC) 3.5-10,000-1 mg/mL-unit/mL-% otic suspension (Old regimen: 3 drops in right ear TID for 7 days /New regimen: 3 drops in right ear TID prn ear irritation and dryness)   nystatin (MYCOSTATIN) topical cream (Old regimen: Apply to affected area BID /New regimen: Apply to affected area daily prn vaginal itching/rash)   predniSONE (DELTASONE) 10 mg tablet (Old regimen: 4 tabs for 2 days, 3 tabs for 2 days, 2 tabs for 2 days, and 1 tab for 2 days /New regimen: 2 tabs by mouth once daily as needed for \"not feeling good\")    4) Pertinent Pharmacy Findings:   Updated patients preferred outpatient pharmacy to: [address] [place] [address]   lisinopril (PRINIVIL, ZESTRIL) 2.5 mg tablet:  Patient was prescribed this medication by Dr. Kimberlyn Fischer on 3/20/18. She has not picked it up and thus has not started taking the medication.  neomycin-polymyxin-hydrocortisone, buffered, (PEDIOTIC) 3.5-10,000-1 mg/mL-unit/mL-% otic suspension:  Patient reports that her PCP prescribed this medication because she had irritation/dryness in her right ear. The prescription was written to be taken daily for 7 days, but she reports that she only uses it as needed when she has irritation/dryness.  predniSONE (DELTASONE) 10 mg tablet:  Patient was prescribed a prednisone taper (see changes above for directions) on 3/1/18. She reports that she does not follow the taper instructions. Instead, she takes 2 tablets of the prednisone as needed \"for breathing or when I'm not feeling good. \"   Aspirin 81 mg tablet:  Patient reports that she was supposed to be taking this medication, \"but my PCP said I don't need to take it because it's in the Excedrin I take. \"     PTA medication list was corrected to the following:     Prior to Admission Medications   Prescriptions Last Dose Informant Patient Reported? Taking? ASPIRIN/ACETAMINOPHEN/CAFFEINE (EXCEDRIN MIGRAINE PO) 3/25/2018 at Unknown time Self Yes Yes   Sig: Take 2 Tabs by mouth daily as needed (migraine).    albuterol (PROVENTIL HFA, VENTOLIN HFA, PROAIR HFA) 90 mcg/actuation inhaler 3/12/2018 at Unknown time Self No Yes   Sig: Take 1 Puff by inhalation every six (6) hours as needed for Wheezing. cetirizine (ZYRTEC) 10 mg tablet 3/19/2018 at Unknown time Self Yes Yes   Sig: Take 10 mg by mouth daily as needed for Allergies. clonazePAM (KLONOPIN) 1 mg tablet 3/25/2018 at Unknown time Self No Yes   Sig: TAKE TWO TABLETS BY MOUTH TWICE DAILY   cyanocobalamin (VITAMIN B-12) 100 mcg tablet 3/25/2018 at Unknown time Self Yes Yes   Sig: Take 100 mcg by mouth daily. fluticasone (FLONASE) 50 mcg/actuation nasal spray 3/25/2018 at Unknown time Self Yes Yes   Si Sprays by Both Nostrils route daily as needed for Allergies. furosemide (LASIX) 20 mg tablet 3/25/2018 at Unknown time Self Yes Yes   Sig: Take 40 mg by mouth two (2) times a day. lisinopril (PRINIVIL, ZESTRIL) 2.5 mg tablet  Other Yes No   Sig: Take 2.5 mg by mouth daily. naproxen (NAPROSYN) 500 mg tablet 3/19/2018 at Unknown time Self No Yes   Sig: Take 1 Tab by mouth two (2) times daily (with meals). As needed   neomycin-polymyxin-hydrocortisone, buffered, (PEDIOTIC) 3.5-10,000-1 mg/mL-unit/mL-% otic suspension 3/12/2018 at Unknown time Self Yes Yes   Sig: Administer 3 Drops in right ear three (3) times daily as needed (ear irritation and dryness). nitroglycerin (NITROSTAT) 0.4 mg SL tablet 2018 at Unknown time Self No Yes   Si Tab by SubLINGual route every five (5) minutes as needed. Indications: ANGINA   nystatin (MYCOSTATIN) topical cream 3/12/2018 at Unknown time Self Yes Yes   Sig: Apply  to affected area daily as needed (vaginal itching/rash). omeprazole (PRILOSEC) 20 mg capsule 3/19/2018 at Unknown time Self No Yes   Sig: Take 1 Cap by mouth daily. Take only as needed   predniSONE (DELTASONE) 10 mg tablet 3/25/2018 at Unknown time Self Yes Yes   Sig: Take 20 mg by mouth daily as needed (\"not feeling good\").       Facility-Administered Medications: None          Thank you,  Pin Pin Kelley Mata  PharmD Candidate [id number]

## 2018-03-27 NOTE — PROGRESS NOTES
Physical Therapy:    Attempted PT session. Spoke with RN and patient not feeling well and having v-tach and frequent PVCs. Will defer and continue to follow once medically appropriate.  Thank you    Jeffrey Garcia, PT, DPT

## 2018-03-27 NOTE — PROGRESS NOTES
Pharmacy  Enoxaparin (Lovenox®) Monitoring/Dosing      Indication: VTE ppx     Current Dose: Enoxaparin 40 mg subcutaneously every 24 hours    Creatinine Clearance (mL/min): 24 mL/min      Labs:  Recent Labs      03/27/18   0121  03/26/18   0009  03/25/18 2008   CREA  1.86*  1.50*  1.50*   HGB  9.3*  9.7*  9.7*   PLT  177  201  214     Wt Readings from Last 1 Encounters:   03/27/18 72.8 kg (160 lb 8 oz)     Ht Readings from Last 1 Encounters:   03/25/18 165.1 cm (65\")       Impression/Plan: Will change to 30 mg daily for CrCl < 30 mL/min per renal dosing protocol. Thanks,  Kay Ramirez PHARMD      [email or web address]%20Protcol. pdf

## 2018-03-27 NOTE — PROGRESS NOTES
1925: Bedside shift change report given to Jordan Hernandez RN (oncoming nurse) by Yadi Ingram RN (offgoing nurse). Report included the following information SBAR, Kardex, Intake/Output, MAR and Cardiac Rhythm SR with frequent PVCs. 1940: Dr. Marlon Daniel paged for patient having a 5 beat run of [facility] and frequent PVCs. 0945: Patient returned from [facility]. 0740: Patient left for ECHO.

## 2018-03-27 NOTE — PROGRESS NOTES
Hospitalist Progress Note    NAME: Delfin Martinez   :  1945   MRN:  [id number]       Assessment / Plan:  Acute on chronic systolic heart failure  Elevated troponin  CAD  Presumed PNA  CM/valve disease  Remote PAfib  COPD  -EKG with frequent PVCs and PACs  -probnp 3K, trop 1.57  -CXR showed R basilar patchy airspace disease and probable small R pleural effusion  -IV lasix BID, consider   -Bcx no growth so far, cont empiric abx- rocephin and zithromax  -one time dose therapeutic lovenox, V/Q scan Low to intermediate probability for pulmonary embolism. Emphysema.  -Echo-pending, trend trop 1.57--> 1.28  -monitor lytes and I&O, daily wt  -cont' ASA, BB, statin, nebs  - Echo LV, moderately dilated. Systolic function was severely reduced. EF 25 %-30 %. There was severe diffuse hypokinesis. Wall thickness was mildly increased. RV: size was at the upper limits of normal. Systolic function was reduced. LA moderately dilated. MV moderate to severe regurgitation. AV The valve was trileaflet. Leaflets exhibited mildly increased thickness, calcification, normal cuspal separation, and sclerosis. TV There was mild regurgitation. There was mild pulmonary  Hypertension. No significant pericardial effusion  Plan for cardiac cath once Cr is better     MACY on CKD 3  -monitor while diuresing  -hold nephrotoxic agents  -monitor bmp  -Cr at 1.5-->1.8    Anemia   Check stool for occult blood. Check iron profile, vit B12, folate       Code Status: DNR   Surrogate Decision Maker:  Pt's daughter  DVT Prophylaxis: lovenox  GI Prophylaxis: not indicated  Baseline: independent, lives alone. Pt is not on home O2      Body mass index is 26.71 kg/(m^2). Subjective:     Chief Complaint / Reason for Physician Visit  Stillwatermanuel Nava is better\". Discussed with RN events overnight.      Review of Systems:  Symptom Y/N Comments  Symptom Y/N Comments   Fever/Chills n   Chest Pain n    Poor Appetite    Edema n    Cough n   Abdominal Pain n Sputum    Joint Pain     SOB/GUNDERSON y   Pruritis/Rash     Nausean/vomit n   Tolerating PT/OT     Diarrhea    Tolerating Diet     Constipation    Other       Could NOT obtain due to:      Objective:     VITALS:   Last 24hrs VS reviewed since prior progress note. Most recent are:  Patient Vitals for the past 24 hrs:   Temp Pulse Resp BP SpO2   03/27/18 1158 98.1 °F (36.7 °C) 65 18 97/55 100 %   03/27/18 0730 - 69 18 116/87 100 %   03/27/18 0217 98 °F (36.7 °C) 68 20 (!) 83/52 99 %   03/26/18 2335 97.6 °F (36.4 °C) 64 20 113/61 99 %   03/26/18 1949 98.3 °F (36.8 °C) 76 20 122/82 100 %   03/26/18 1450 - 69 - 95/63 97 %   03/26/18 1444 - 78 - 96/64 -   03/26/18 1438 - (!) 51 - 90/58 99 %   03/26/18 1405 98.7 °F (37.1 °C) 76 22 124/78 100 %       Intake/Output Summary (Last 24 hours) at 03/27/18 1401  Last data filed at 03/27/18 1026   Gross per 24 hour   Intake              480 ml   Output             1500 ml   Net            -1020 ml        PHYSICAL EXAM:  General: WD, WN. Alert, cooperative, no acute distress    EENT:  EOMI. Anicteric sclerae. MMM  Resp:  CTA bilaterally, no wheezing or rales. No accessory muscle use  CV:  Regular  rhythm,  No edema  GI:  Soft, Non distended, Non tender.  +Bowel sounds  Neurologic:  Alert and oriented X 3, normal speech, grossly intact  Psych:   Good insight. Not anxious nor agitated  Skin:  No rashes. No jaundice    Reviewed most current lab test results and cultures  YES  Reviewed most current radiology test results   YES  Review and summation of old records today    NO  Reviewed patient's current orders and MAR    YES  PMH/SH reviewed - no change compared to H&P  ________________________________________________________________________  Care Plan discussed with:    Comments   Patient x    Family      RN x    Care Manager     Consultant                        Multidiciplinary team rounds were held today with , nursing, pharmacist and clinical coordinator.   Patient's plan of care was discussed; medications were reviewed and discharge planning was addressed. ________________________________________________________________________  Total NON critical care TIME:  30   Minutes    Total CRITICAL CARE TIME Spent:   Minutes non procedure based      Comments   >50% of visit spent in counseling and coordination of care     ________________________________________________________________________  Prabha Torres MD     Procedures: see electronic medical records for all procedures/Xrays and details which were not copied into this note but were reviewed prior to creation of Plan. LABS:  I reviewed today's most current labs and imaging studies.   Pertinent labs include:  Recent Labs      03/27/18   0121  03/26/18   0009  03/25/18 2008   WBC  9.9  12.5*  13.6*   HGB  9.3*  9.7*  9.7*   HCT  31.4*  31.4*  32.1*   PLT  177  201  214     Recent Labs      03/27/18   0121  03/26/18   0009  03/25/18 2008   NA  139  138  140   K  4.0  4.7  4.3   CL  106  105  104   CO2  24  26  25   GLU  130*  133*  110*   BUN  39*  32*  32*   CREA  1.86*  1.50*  1.50*   CA  8.5  8.7  8.8   MG  2.7*   --   2.6*   PHOS  5.2*   --    --    ALB   --    --   3.5   TBILI   --    --   0.4   SGOT   --    --   30   ALT   --    --   21       Signed: Prabha Torres MD

## 2018-03-27 NOTE — PROGRESS NOTES
Cardiopulmonary Care Interdisciplinary rounds were held today to discuss patient plan of care and outcomes. The following members were present: NP/Physician, Pharmacy, Nursing and Case Management.     Expected Length of Stay:  4d 12h    Plan of Care: Continue current treatment plan  Cardiac cath once creatinine improve; d/c home w/ HH/PT

## 2018-03-27 NOTE — PROGRESS NOTES
CM notified pt's NN Dunia Avila) of inpatient admission.      Bridgette Lundberg, MSW Supervisee in Social Work, [address] [place] [address]  [phone]

## 2018-03-27 NOTE — PROGRESS NOTES
Progress Note      3/27/2018 11:16 AM  NAME: Kaye Sousa   MRN:  [id number]   Admit Diagnosis: Respiratory failure [facility])     Assessment:     Acute / chronic systolic heart failure. CAD  Remote PCI   2010       CX marginal stented. LAD is chronically occlusion with collateral filling. RCA was OK. LV EF was 30 - 35% in 2010  Acute Kidney injury - creatinine is increasing. CKD stage 3  Anemia,  Likely due to CKD   Elevated Troponin. Echo shows LV EF 25%   Mod - severe MR. Mild Pulmon HTN       Plan:     Continue on meds. Would plan to cath , but with increasing creatinine here we need to wait. [x]        High complexity decision making was performed    Subjective:     Kaye Sousa denies chest pain, dyspnea. 3/17 lethargic this AM.   Doesn't open eyes, but converses and is oriented x3. C/o fatigue. Discussed with RN events overnight. Patient Active Problem List   Diagnosis Code    Systolic heart failure (HCC) I50.20    Hypertension I10    Anxiety F41.9    GERD (gastroesophageal reflux disease) K21.9    Elevated cholesterol E78.00    Allergic rhinitis J30.9    Moderate persistent asthma J45.40    Coronary artery disease without angina pectoris I25.10    Respiratory failure ([facility] [address] J96.90       Review of Systems:    Symptom Y/N Comments  Symptom Y/N Comments   Fever/Chills N   Chest Pain N    Poor Appetite N   Edema N    Cough N   Abdominal Pain N    Sputum N   Joint Pain N    SOB/GUNDERSON N   Pruritis/Rash N    Nausea/vomit N   Tolerating PT/OT Y    Diarrhea N   Tolerating Diet Y    Constipation N   Other       Could NOT obtain due to:      Objective:      Physical Exam:    Last 24hrs VS reviewed since prior progress note.  Most recent are:    Visit Vitals    /87 (BP 1 Location: Left arm, BP Patient Position: Supine)    Pulse 69    Temp 98 °F (36.7 °C)    Resp 18    Ht 5' 5\" (1.651 m)    Wt 72.8 kg (160 lb 8 oz)    SpO2 100%    Breastfeeding No    BMI 26.71 kg/m2       Intake/Output Summary (Last 24 hours) at 03/27/18 1116  Last data filed at 03/27/18 1026   Gross per 24 hour   Intake              480 ml   Output             1100 ml   Net             -620 ml        General Appearance: Well developed, well nourished, alert & oriented x 3,    no acute distress. Ears/Nose/Mouth/Throat: Hearing grossly normal.  Neck: Supple. Chest: Lungs clear to auscultation bilaterally. Cardiovascular: Regular rate and rhythm, S1S2 normal, no murmur. Abdomen: Soft, non-tender, bowel sounds are active. Extremities: No edema bilaterally. Skin: Warm and dry. PMH/SH reviewed - no change compared to H&P    Data Review    Telemetry: normal sinus rhythm     Lab Data Personally Reviewed:    Recent Labs      03/27/18   0121 03/26/18   0009   WBC  9.9  12.5*   HGB  9.3*  9.7*   HCT  31.4*  31.4*   PLT  177  201   LABRCNT(INR:3,PTP:3,APTT:3,)  Recent Labs      03/27/18 0121 03/26/18   0009  03/25/18 2008   NA  139  138  140   K  4.0  4.7  4.3   CL  106  105  104   CO2  24  26  25   BUN  39*  32*  32*   CREA  1.86*  1.50*  1.50*   GLU  130*  133*  110*   CA  8.5  8.7  8.8   MG  2.7*   --   2.6*   LABRCNT(CPK:3,CpKMB:3,ckndx:3,troiq:3)  Lab Results   Component Value Date/Time    Cholesterol, total 127 03/26/2018 12:09 AM    HDL Cholesterol 43 03/26/2018 12:09 AM    LDL, calculated 61.2 03/26/2018 12:09 AM    Triglyceride 114 03/26/2018 12:09 AM    CHOL/HDL Ratio 3.0 03/26/2018 12:09 AM   LABRCNT(sgot:3,gpt:3,ap:3,tbiL:3,TP:3,ALB:3,GLOB:3,ggt:3,aml:3,amyp:3,lpse:3,hlpse:3)No results for input(s): PH, PCO2, PO2 in the last 72 hours.   Lab Results   Component Value Date/Time    Cholesterol, total 127 03/26/2018 12:09 AM    HDL Cholesterol 43 03/26/2018 12:09 AM    LDL, calculated 61.2 03/26/2018 12:09 AM    Triglyceride 114 03/26/2018 12:09 AM    CHOL/HDL Ratio 3.0 03/26/2018 12:09 AM   MEDTABLETimjunior Catalan Aas, MD  No results for input(s): PH, PCO2, PO2 in the last 72 hours.     Medications Personally Reviewed:    Current Facility-Administered Medications   Medication Dose Route Frequency    enoxaparin (LOVENOX) injection 30 mg  30 mg SubCUTAneous Q24H    metoprolol tartrate (LOPRESSOR) tablet 12.5 mg  12.5 mg Oral Q12H    nitroglycerin (NITROBID) 2 % ointment 1 Inch  1 Inch Topical Q6H PRN    aspirin delayed-release tablet 81 mg  81 mg Oral DAILY    sodium chloride (NS) flush 5-10 mL  5-10 mL IntraVENous Q8H    sodium chloride (NS) flush 5-10 mL  5-10 mL IntraVENous PRN    acetaminophen (TYLENOL) tablet 650 mg  650 mg Oral Q4H PRN    ondansetron (ZOFRAN) injection 4 mg  4 mg IntraVENous Q4H PRN    bisacodyl (DULCOLAX) suppository 10 mg  10 mg Rectal DAILY PRN    furosemide (LASIX) injection 40 mg  40 mg IntraVENous BID    azithromycin (ZITHROMAX) 500 mg in 0.9% sodium chloride (MBP/ADV) 250 mL  500 mg IntraVENous Q24H    cefTRIAXone (ROCEPHIN) 1 g in 0.9% sodium chloride (MBP/ADV) 50 mL MBP  1 g IntraVENous Q24H    diphenhydrAMINE (BENADRYL) capsule 25 mg  25 mg Oral Q6H PRN    albuterol-ipratropium (DUO-NEB) 2.5 MG-0.5 MG/3 ML  3 mL Nebulization Q4H PRN    atorvastatin (LIPITOR) tablet 40 mg  40 mg Oral QPM    butalbital-acetaminophen-caffeine (FIORICET, ESGIC) -40 mg per tablet 1 Tab  1 Tab Oral Q6H PRN    clonazePAM (KlonoPIN) tablet 1 mg  1 mg Oral BID PRN    nitroglycerin (NITROSTAT) tablet 0.4 mg  0.4 mg SubLINGual PRN    pantoprazole (PROTONIX) tablet 40 mg  40 mg Oral RAJ Henriquez MD

## 2018-03-28 NOTE — PROGRESS NOTES
Attempted to see pt this pm and pt refused. Wanted to know why she wasn't informed we were coming. She was very irritable and said she would rather have us leave. Will continue to follow.

## 2018-03-28 NOTE — PROGRESS NOTES
Progress Note      3/28/2018 11:16 AM  NAME: Ibrahima Boudreaux   MRN:  [id number]   Admit Diagnosis: Respiratory failure [facility])     Assessment:     Acute / chronic systolic heart failure. CAD  Remote PCI   2010       CX marginal stented. LAD is chronically occlusion with collateral filling. RCA was OK. LV EF was 30 - 35% in 2010  Acute Kidney injury - creatinine is increasing. CKD stage 3  Anemia,  Likely due to CKD   Elevated Troponin. Echo shows LV EF 25%   Mod - severe MR. Mild Pulmon HTN         Plan:     Continue on meds. Would plan to cath , but with increasing creatinine here we need to wait. [x]        High complexity decision making was performed    Subjective:     Ibrahima Boudreaux denies chest pain, dyspnea. 3/27 lethargic this AM.   Doesn't open eyes, but converses and is oriented x3. C/o fatigue. 3/28 Seems about the same . Still lethargic in the bed , but converses , answers questions. Her daughter seems think her mental status is about her usual stated. Creatinine ticked down a little today. I discussed cath with patient and daughter today. If creatinine is better could do it tomorrow. Would like to see her mobile. Needs to be OOB and walking . Maybe PT? Discussed with RN events overnight.      Patient Active Problem List   Diagnosis Code    Systolic heart failure (HCC) I50.20    Hypertension I10    Anxiety F41.9    GERD (gastroesophageal reflux disease) K21.9    Elevated cholesterol E78.00    Allergic rhinitis J30.9    Moderate persistent asthma J45.40    Coronary artery disease without angina pectoris I25.10    Respiratory failure ([facility] [address] J96.90       Review of Systems:    Symptom Y/N Comments  Symptom Y/N Comments   Fever/Chills N   Chest Pain N    Poor Appetite N   Edema N    Cough N   Abdominal Pain N    Sputum N   Joint Pain N    SOB/GUNDERSON N   Pruritis/Rash N    Nausea/vomit N   Tolerating PT/OT Y    Diarrhea N Tolerating Diet Y    Constipation N   Other       Could NOT obtain due to:      Objective:      Physical Exam:    Last 24hrs VS reviewed since prior progress note. Most recent are:    Visit Vitals    /50 (BP 1 Location: Right arm, BP Patient Position: At rest)    Pulse (!) 58    Temp 98.2 °F (36.8 °C)    Resp 20    Ht 5' 5\" (1.651 m)    Wt 72.8 kg (160 lb 8 oz)    SpO2 97%    Breastfeeding No    BMI 26.71 kg/m2       Intake/Output Summary (Last 24 hours) at 03/28/18 1046  Last data filed at 03/28/18 0052   Gross per 24 hour   Intake              400 ml   Output              800 ml   Net             -400 ml        General Appearance: Well developed, well nourished, alert & oriented x 3,    no acute distress. Ears/Nose/Mouth/Throat: Hearing grossly normal.  Neck: Supple. Chest: Lungs clear to auscultation bilaterally. Cardiovascular: Regular rate and rhythm, S1S2 normal, no murmur. Abdomen: Soft, non-tender, bowel sounds are active. Extremities: No edema bilaterally. Skin: Warm and dry.     PMH/SH reviewed - no change compared to H&P    Data Review    Telemetry: normal sinus rhythm     Lab Data Personally Reviewed:    Recent Labs      03/28/18   0400  03/27/18   0121   WBC  9.6  9.9   HGB  9.1*  9.3*   HCT  30.7*  31.4*   PLT  174  177   LABRCNT(INR:3,PTP:3,APTT:3,)  Recent Labs      03/28/18   0400  03/27/18   0121  03/26/18   0009  03/25/18 2008   NA  136  139  138  140   K  4.6  4.0  4.7  4.3   CL  105  106  105  104   CO2  25  24  26  25   BUN  39*  39*  32*  32*   CREA  1.76*  1.86*  1.50*  1.50*   GLU  99  130*  133*  110*   CA  8.7  8.5  8.7  8.8   MG  2.2  2.7*   --   2.6*   LABRCNT(CPK:3,CpKMB:3,ckndx:3,troiq:3)  Lab Results   Component Value Date/Time    Cholesterol, total 127 03/26/2018 12:09 AM    HDL Cholesterol 43 03/26/2018 12:09 AM    LDL, calculated 61.2 03/26/2018 12:09 AM    Triglyceride 114 03/26/2018 12:09 AM    CHOL/HDL Ratio 3.0 03/26/2018 12:09 AM LABRCNT(sgot:3,gpt:3,ap:3,tbiL:3,TP:3,ALB:3,GLOB:3,ggt:3,aml:3,amyp:3,lpse:3,hlpse:3)No results for input(s): PH, PCO2, PO2 in the last 72 hours. Lab Results   Component Value Date/Time    Cholesterol, total 127 03/26/2018 12:09 AM    HDL Cholesterol 43 03/26/2018 12:09 AM    LDL, calculated 61.2 03/26/2018 12:09 AM    Triglyceride 114 03/26/2018 12:09 AM    CHOL/HDL Ratio 3.0 03/26/2018 12:09 AM   MEDTABLEArun Mcdonnell MD  No results for input(s): PH, PCO2, PO2 in the last 72 hours.     Medications Personally Reviewed:    Current Facility-Administered Medications   Medication Dose Route Frequency    enoxaparin (LOVENOX) injection 30 mg  30 mg SubCUTAneous Q24H    metoprolol tartrate (LOPRESSOR) tablet 12.5 mg  12.5 mg Oral Q12H    nitroglycerin (NITROBID) 2 % ointment 1 Inch  1 Inch Topical Q6H PRN    aspirin delayed-release tablet 81 mg  81 mg Oral DAILY    sodium chloride (NS) flush 5-10 mL  5-10 mL IntraVENous Q8H    sodium chloride (NS) flush 5-10 mL  5-10 mL IntraVENous PRN    acetaminophen (TYLENOL) tablet 650 mg  650 mg Oral Q4H PRN    ondansetron (ZOFRAN) injection 4 mg  4 mg IntraVENous Q4H PRN    bisacodyl (DULCOLAX) suppository 10 mg  10 mg Rectal DAILY PRN    furosemide (LASIX) injection 40 mg  40 mg IntraVENous BID    azithromycin (ZITHROMAX) 500 mg in 0.9% sodium chloride (MBP/ADV) 250 mL  500 mg IntraVENous Q24H    cefTRIAXone (ROCEPHIN) 1 g in 0.9% sodium chloride (MBP/ADV) 50 mL MBP  1 g IntraVENous Q24H    diphenhydrAMINE (BENADRYL) capsule 25 mg  25 mg Oral Q6H PRN    albuterol-ipratropium (DUO-NEB) 2.5 MG-0.5 MG/3 ML  3 mL Nebulization Q4H PRN    atorvastatin (LIPITOR) tablet 40 mg  40 mg Oral QPM    butalbital-acetaminophen-caffeine (FIORICET, ESGIC) -40 mg per tablet 1 Tab  1 Tab Oral Q6H PRN    clonazePAM (KlonoPIN) tablet 1 mg  1 mg Oral BID PRN    nitroglycerin (NITROSTAT) tablet 0.4 mg  0.4 mg SubLINGual PRN    pantoprazole (PROTONIX) tablet 40 mg  40 mg Oral ACB         Alia Chase MD

## 2018-03-28 NOTE — PROGRESS NOTES
Bedside shift change report given to Lexii Hughes RN (oncoming nurse). Report included the following information SBAR. SHIFT SUMMARY:            [facility] NURSING NOTE   Admission Date 3/25/2018   Admission Diagnosis Respiratory failure ([address]   Consults IP CONSULT TO CARDIOLOGY  IP CONSULT TO HOSPITALIST  IP CONSULT TO NEPHROLOGY      Cardiac Monitoring [x] Yes [] No      Purposeful Hourly Rounding [x] Yes    Ruperto Score Total Score: 1   Ruperto score 3 or > [] Bed Alarm [] Avasys [] 1:1 sitter [] Patient refused (Signed refusal form in chart)   Nicholas Score Nicholas Score: 21   Nicholas score 14 or < [] PMT consult [] Wound Care consult    []  Specialty bed  [] Nutrition consult      Influenza Vaccine Received Flu Vaccine for Current Season (usually Sept-March): No    Patient/Guardian Refused (Notify MD): Yes      Oxygen needs? [] Room air Oxygen @  []1L    [x]2L    []3L   []4L    []5L   []6L via  NC   Chronic home O2 use? [x] Yes [] No  Perform O2 challenge test and document in progress note using smartphrase (.Homeoxygen)      Last bowel movement Last Bowel Movement Date: 03/27/18      Urinary Catheter             LDAs               Peripheral IV 03/25/18 Left Antecubital (Active)   Site Assessment Clean, dry, & intact 3/28/2018  4:00 PM   Phlebitis Assessment 0 3/28/2018  4:00 PM   Infiltration Assessment 0 3/28/2018  4:00 PM   Dressing Status Clean, dry, & intact 3/28/2018  4:00 PM   Dressing Type Tape 3/28/2018  4:00 PM   Hub Color/Line Status Pink 3/28/2018  4:00 PM   Alcohol Cap Used No 3/27/2018 10:26 AM                         Readmission Risk Assessment Tool Score Low Risk            8       Total Score        3 Has Seen PCP in Last 6 Months (Yes=3, No=0)    5 Pt. Coverage (Medicare=5 , Medicaid, or Self-Pay=4)        Criteria that do not apply:    . Living with Significant Other. Assisted Living. LTAC. SNF.  or   Rehab    Patient Length of Stay (>5 days = 3)    IP Visits Last 12 Months (1-3=4, 4=9, >4=11) Charlson Comorbidity Score (Age + Comorbid Conditions)       Expected Length of Stay 4d 12h   Actual Length of Stay 3

## 2018-03-28 NOTE — PROGRESS NOTES
Whit ojeda 2hours this shift, no new changes noted, patient NPO for poss. Heart cath. Today, medicated x 1 for c/o generalized pain see mar.

## 2018-03-28 NOTE — CONSULTS
[id number]- pt seen/consult dictated    A:  MACY on CKD-3  A on C systolic CHF/CAD- EF down to 25-30% on repeat Echo  NSTEMI- with trop leak  HTN  Anemia- suspect has component of anemia of CKD; iron stores are low. On B12 supplement. Folate is nl  SOB- suspect largely due to fluid overload from CHF + underlying COPD; suspicion for PNA is low (afebrile, wbc mild elevatn on admit, no productive cough). Improved with IV lasix. On RA at present  High Phos- likely has SHPT from CKD    Plan:  High risk for BALJEET with cardiac cath. revd risks of BLAJEET with pt. Cardiology  Tentatively planning tomm; to reduce risks of BALJEET, will d/c IV Lasix (got AM dose).  She has improved clinically and not in overt fluid overload  Will resume lasix in 1-2 days post cath based on volume status  Ct current meds; avoid ACE-I or ARB  Check UA with micro  F/u B12 level  Start Ferrous sulfate 325 mg Qday  Daily labs  Check Phos, PTH with am labs  Monitor IOs as able  No nsaids- advised to avoid even as outpt    Mal Hooks MD  [phone]

## 2018-03-28 NOTE — CONSULTS
[address].  MR#: [id number]  : 1945  ACCOUNT #: [de-identified]   DATE OF SERVICE: 2018    REQUESTED BY:  Los Schuler MD, for the evaluation and management of renal failure in a patient who will need heart cath. Patient was seen and examined. History was obtained from the patient and chart review. Patient appears slightly confused and a very poor historian. HISTORY OF PRESENT ILLNESS:  The patient is a 35-year-old  female with past medical history significant for CAD, cardiomyopathy, history of AFib, COPD, active smoker, who also seems to have stage III CKD, presented to the ED with complaint of worsening shortness of breath which started 2 days ago. Associated symptoms included headache, left-sided abdominal pain and worsening bilateral lower extremity edema. She denied any fevers, chills, chest pain, palpitations, nausea, vomiting or diarrhea. Appetite has been poor. No blood in the stool. She was found to have evidence of right basilar patchy airspace disease, as well as small right pleural effusion. V/Q scan was low to intermediate probability for PE. She had mild leukocytosis. She was started on IV antibiotics and IV Lasix for diuresis. Her repeat echo showed EF down to 25-30%. Troponin I slightly is elevated, suggestive of non-ST elevation MI. She has what appears to be MACY on CKD. Her previous baseline creatinine seems to be ranging from 1.18 to 1.34. Her creatinine on admit was 1.5, peaked at 1.86 yesterday and down to 1.76 today. She is feeling better. Breathing and edema have improved. She seems to be slightly altered and confused. Able to answer questions, but not accurately. REVIEW OF SYSTEMS:  She denies any blood in the urine. Has not seen a nephrologist as outpatient. Has been taking p.r.n. ibuprofen for the last couple of years. No history of kidney stones.   No foamy or cloudy urine.    PAST MEDICAL HISTORY:  As noted above. In addition, she has depression. PAST SURGICAL HISTORY:  1.  Status post appendectomy. 2.  Cholecystectomy. 3.  History of adhesions. 4.  History of a hysterectomy 10 years ago. SOCIAL HISTORY:  She is listed as a former smoker and quit in 2014, but reported to me that she has been smoking half pack per day. Denies alcohol use. . Lives alone. Functionally independent. FAMILY HISTORY:  Denies any known CKD or ESRD. ALLERGIES:  SHE IS ALLERGIC TO CIPRO, CODEINE, INFLUENZA VIRUS VACCINE AND PENICILLIN. HOME MEDICATIONS:  Included Fioricet p.r.n. for pain, prednisone in tapering course, Klonopin 1 mg b.i.d., ibuprofen p.r.n. as noted in HPI. She took 2-3 days per week for the last couple of years. Again, waiting history about dosing on ibuprofen. Nitroglycerin sublingual p.r.n., Prilosec 20 mg daily, metoprolol 25 mg daily, potassium chloride 10 mEq daily, Lasix 20 mg 2 tablets daily, Lipitor 40 mg at bedtime, fish oil 1 capsule daily, vitamin B12 at 100 mcg daily, Excedrin Migraine 1 tablet p.o. p.r.n. for headache. REVIEW OF SYSTEMS:  As noted in HPI. Remainder of review of system obtained and negative. PHYSICAL EXAMINATION:  GENERAL:  Elderly female, appears stated age, in no acute distress. VITAL SIGNS:  She is afebrile, pulse of 60, BP was low normal earlier 64-54 systolic. Most recent BP is 135/56, respiration of 18, saturating 98% on room air. Weight is 72.8 kilo. HEENT:  Head is atraumatic, normocephalic. No scleral icterus. Mucous membranes slightly dry. No oral exudate. NECK:  No JVD or cervical lymphadenopathy. LUNGS:  Decreased breath sound, more at right base. HEART:  Regular rate and rhythm, normal S1, S2. Unable to appreciate any murmur or rub. ABDOMEN:  Soft, nontender, active bowel sounds. EXTREMITIES:  No clubbing, cyanosis. No significant peripheral edema. CENTRAL NERVOUS SYSTEM:  Alert, awake. Appears slightly confused. No myoclonus. LABORATORY DATA:  CBC is normal except anemia with a hemoglobin of 9.1, white count was slightly high on admit which has normalized. Urinalysis in the past back in 02/2018 was essentially normal with trace proteinuria. BUN 39, creatinine down to 1.76. Phosphorus is elevated at 5, magnesium of 2.2.  TSH is normal.  Urine and blood cultures are in process. CT abdomen and pelvis without contrast showed small right pleural effusion and bibasilar atelectasis, otherwise no abnormality. No hydronephrosis either. Echocardiogram shows a drop in EF to 25-30% compared to 40% before. Moderately dilated LV with severely reduced systolic function. Severe diffuse hypokinesis. Moderate to severe mitral regurgitation. ASSESSMENT:  1. MACY on CKD III. 2.  Acute on chronic systolic CHF. 3.  Non-ST elevation MI.  4.  Hypertension. 5.  Anemia with history of B12 deficiency and likely has component of anemia of CKD. Iron stores are low. 6.  High phosphorus. Suspect she has underlying secondary hyperparathyroidism. 7.  Small right pleural effusion with shortness of breath. She was fluid overloaded on admit and has improved with the diuresis and antibiotics. My suspicion for pneumonia is less likely and preferably antibiotics can be deescalated. Cardiology is considering cardiac cath. I reviewed the risk of contrast nephropathy with the patient. She would be higher risk, which can be minimized ideally by IV hydration which she will not be able to tolerate. In fact, she is receiving IV Lasix. If possible, she seems to have clinically improved and we can temporarily hold Lasix while cardiology is planning cardiac cath tomorrow. This will reduce the risk of contrast nephropathy. The electrolytes are acceptable. RECOMMENDATION:    1. Discontinue IV Lasix, in anticipation of cardiac cath tomorrow.   Patient at present has improved from cardiopulmonary standpoint and is not hypoxic and on room air. She got the a.m. dose of IV Lasix already. 2.  Cannot hydrate pre- and post-cath, because of fluid overload. 3.  Avoid ACE inhibitor or ARB for now. 4.  Add ferrous sulfate 325 mg daily. 5.  Repeat UA with microscopy. 6.  Get B12 level with a.m. labs. Folate was normal.  Will need EPO in the future. 7.  Avoid nephrotoxins, monitor I's and O's.  8.  Consider de-escalation of antibiotics. Thanks for renal consultation. Discussed with the patient and Dr. Bia Urban.       Adin Watts MD       BP / SN  D: 03/28/2018 12:36     T: 03/28/2018 13:16  JOB #: [id number]

## 2018-03-28 NOTE — CARDIO/PULMONARY
C/P rehab note- chart reviewed.     Pt is on CHF bundle list.     Adm with Acute/Chronic Systolic Heart failure.     HX includes HTN,anxiety,GERD,Hypercholesterolemia,Asthma,CAD,AMI,stenting,remote a-fib,COPD,CKD 3.     Former smoker.     LVEF 3/10/2014 was 30-35%.     Most recent Echo results pending. Pro BNP > 30,000.     DIET CARDIAC Regular    CHF folder given on 3/26/2018    Noted that pt currently NPO for possible Cardiac cath today. Attempt to meet with pt-lying in bed- stated she was unsure when her procedure was today. Then fell asleep while I was talking. Gentle name call- started snoring. Will follow.

## 2018-03-28 NOTE — PROGRESS NOTES
Family states Dr Deyanira Alatorre told them the patient's procedure will not be done today. Paged Dr Mateo Bah for verification    [id number] progress note from Dr. Mateo Bah confirms plan not to have procedure today.

## 2018-03-28 NOTE — PROGRESS NOTES
Hospitalist Progress Note    NAME: Hermila Cuevas   :  1945   MRN:  [id number]       Assessment / Plan:  Acute on chronic systolic heart failure improving  Elevated troponin  CAD  Presumed PNA  CM/valve disease  Remote PAfib  COPD  -Plan for cardiac cath once renal Fx is better, off diuresis  -EKG with frequent PVCs and PACs  -probnp 3K, trop 1.57  -CXR showed R basilar patchy airspace disease and probable small R pleural effusion  -IV lasix BID, consider   -Bcx no growth so far, cont empiric abx- rocephin and zithromax  -one time dose therapeutic lovenox, V/Q scan Low to intermediate probability for pulmonary embolism. Emphysema. - trend trop 1.57--> 1.28  -monitor lytes and I&O, daily wt  -cont' ASA, BB, statin, nebs  - Echo LV, moderately dilated. Systolic function was severely reduced. EF 25 %-30 %. There was severe diffuse hypokinesis. Wall thickness was mildly increased. RV: size was at the upper limits of normal. Systolic function was reduced. LA moderately dilated. MV moderate to severe regurgitation. AV The valve was trileaflet. Leaflets exhibited mildly increased thickness, calcification, normal cuspal separation, and sclerosis. TV There was mild regurgitation. There was mild pulmonary  Hypertension. No significant pericardial effusion  Appreciate renal consult     MACY on CKD 3, likely CRS  -monitor while diuresing  -hold nephrotoxic agents  -monitor bmp  -Cr at 1.5-->1.8-->1.7    Anemia KAYDEN  Check stool for occult blood. Started on iron supplements    Headache  Hx migraine  Reports object hitting her head, had CT evaluation at outside facility, which was negative per pt  Increase Fioricet dose/frequency  No Neurological sx, if headache persist consider CT head. Obtain CT head report         Code Status: DNR   Surrogate Decision Maker:  Pt's daughter  DVT Prophylaxis: lovenox  GI Prophylaxis: not indicated  Baseline: independent, lives alone.   Pt is not on home O2      Body mass index is 26.71 kg/(m^2). Subjective:     Chief Complaint / Reason for Physician Visit  Riley Daugherty is better\". Discussed with RN events overnight. Review of Systems:  Symptom Y/N Comments  Symptom Y/N Comments   Fever/Chills n   Chest Pain n    Poor Appetite    Edema n    Cough n   Abdominal Pain n    Sputum    Joint Pain     SOB/GUNDERSON y   Pruritis/Rash     Nausean/vomit n   Tolerating PT/OT     Diarrhea    Tolerating Diet     Constipation    Other       Could NOT obtain due to:      Objective:     VITALS:   Last 24hrs VS reviewed since prior progress note. Most recent are:  Patient Vitals for the past 24 hrs:   Temp Pulse Resp BP SpO2   03/28/18 1101 97.6 °F (36.4 °C) 60 18 135/56 98 %   03/28/18 0712 98.2 °F (36.8 °C) (!) 58 20 110/50 97 %   03/28/18 0339 98.1 °F (36.7 °C) 63 20 (!) 89/62 95 %   03/27/18 2343 97.4 °F (36.3 °C) 60 20 (!) 96/37 97 %   03/27/18 1941 97.5 °F (36.4 °C) 73 20 (!) 86/54 95 %   03/27/18 1806 - - - (!) 119/98 -   03/27/18 1517 97.6 °F (36.4 °C) 67 22 95/51 100 %       Intake/Output Summary (Last 24 hours) at 03/28/18 1346  Last data filed at 03/28/18 1345   Gross per 24 hour   Intake              400 ml   Output             1150 ml   Net             -750 ml        PHYSICAL EXAM:  General: WD, WN. Alert, cooperative, no acute distress    EENT:  EOMI. Anicteric sclerae. MMM  Resp:  CTA bilaterally, no wheezing or rales. No accessory muscle use  CV:  Regular  rhythm,  No edema  GI:  Soft, Non distended, Non tender.  +Bowel sounds  Neurologic:  Alert and oriented X 3, normal speech, grossly intact  Psych:   Good insight. Not anxious nor agitated  Skin:  No rashes.   No jaundice    Reviewed most current lab test results and cultures  YES  Reviewed most current radiology test results   YES  Review and summation of old records today    NO  Reviewed patient's current orders and MAR    YES  PMH/SH reviewed - no change compared to H&P  ________________________________________________________________________  Care Plan discussed with:    Comments   Patient x    Family  x DTR in room   RN x    Care Manager     Consultant                        Multidiciplinary team rounds were held today with , nursing, pharmacist and clinical coordinator. Patient's plan of care was discussed; medications were reviewed and discharge planning was addressed. ________________________________________________________________________  Total NON critical care TIME:  30   Minutes    Total CRITICAL CARE TIME Spent:   Minutes non procedure based      Comments   >50% of visit spent in counseling and coordination of care     ________________________________________________________________________  Javi Lizama MD     Procedures: see electronic medical records for all procedures/Xrays and details which were not copied into this note but were reviewed prior to creation of Plan. LABS:  I reviewed today's most current labs and imaging studies.   Pertinent labs include:  Recent Labs      03/28/18 0400  03/27/18 0121 03/26/18 0009   WBC  9.6  9.9  12.5*   HGB  9.1*  9.3*  9.7*   HCT  30.7*  31.4*  31.4*   PLT  174  177  201     Recent Labs      03/28/18 0400  03/27/18 0121 03/26/18 0009  03/25/18 2008   NA  136  139  138  140   K  4.6  4.0  4.7  4.3   CL  105  106  105  104   CO2  25  24  26  25   GLU  99  130*  133*  110*   BUN  39*  39*  32*  32*   CREA  1.76*  1.86*  1.50*  1.50*   CA  8.7  8.5  8.7  8.8   MG  2.2  2.7*   --   2.6*   PHOS  5.0*  5.2*   --    --    ALB   --    --    --   3.5   TBILI   --    --    --   0.4   SGOT   --    --    --   30   ALT   --    --    --   21       Signed: Javi Lizama MD

## 2018-03-29 NOTE — PROGRESS NOTES
Pt returned from recovery, pt Alert, diaphoretic, VSS, denies CP, SOB, pt using abd muscles to breath, skin pale, cath site CDI and soft, lungs with wheezing RUL, resp called for treatment. Neida León RN    Primary Nurse Leela Barker, RN and Terry Tran, RN performed a dual skin assessment on this patient No impairment noted  Nicholas score is 21    4:21 PM  Pt still very diaphoertic, increased respirations, pt with lots of PVC's. EKG    4:27 PM  Respiratory at bedside with breathing treatment. Bladder scan results 138 ml    Called Dr. Ayla Pitt, new orders received. 5:28 PM  IV lasix given, purwick in place, ABG's drawn    6:20 PM  Dr. Ayla Pitt at bedside. No new orders    6:55 PM  Pt with c/o of nausea, pt sitting up in bed. Checked pt site, large golf ball size hematoma noted at right groin. Hand held pressure held, Dr. Ayla Pitt paged and zofran given. 7:05 PM  Dr. Barrios Brochgarth at bedside.     7:10 PM  Site soft femstop placed per Dr. Renny Worthy at 48 mmg    Report given to [facility]

## 2018-03-29 NOTE — PROGRESS NOTES
TRANSFER - OUT REPORT:    Verbal report given to Sandeep Alfaro RN(name) on Christ Olivas  being transferred to IVCU(unit) for routine progression of care       Report consisted of patients Situation, Background, Assessment and   Recommendations(SBAR). Information from the following report(s) Procedure Summary and MAR was reviewed with the receiving nurse. Lines:   Peripheral IV 03/25/18 Left Antecubital (Active)   Site Assessment Clean, dry, & intact 3/29/2018  8:00 AM   Phlebitis Assessment 0 3/29/2018  8:00 AM   Infiltration Assessment 0 3/29/2018  8:00 AM   Dressing Status Clean, dry, & intact 3/29/2018  8:00 AM   Dressing Type Transparent;Tape 3/29/2018  8:00 AM   Hub Color/Line Status Pink 3/29/2018  8:00 AM   Alcohol Cap Used No 3/27/2018 10:26 AM        Opportunity for questions and clarification was provided.       Patient transported with:   Registered Nurse

## 2018-03-29 NOTE — PROGRESS NOTES
Hospitalist Progress Note    NAME: Gina Nicholson   :  1945   MRN:  [id number]       Assessment / Plan:  Acute on chronic systolic heart failure improving  Elevated troponin  CAD  Presumed PNA  CM/valve disease  Remote PAfib  COPD  -Plan for cardiac cath today as Cr is better  -EKG with frequent PVCs and PACs  -probnp 3K, trop 1.57  -CXR showed R basilar patchy airspace disease and probable small R pleural effusion  -IV lasix BID on hold for cardiac cath  -Bcx no growth so far, cont empiric abx- rocephin and zithromax  -one time dose therapeutic lovenox, V/Q scan Low to intermediate probability for pulmonary embolism. Emphysema. - trend trop 1.57--> 1.28  -monitor lytes and I&O, daily wt  -cont' ASA, BB, statin, nebs, brilinta  - Echo LV, moderately dilated. Systolic function was severely reduced. EF 25 %-30 %. There was severe diffuse hypokinesis. Wall thickness was mildly increased. RV: size was at the upper limits of normal. Systolic function was reduced. LA moderately dilated. MV moderate to severe regurgitation. AV The valve was trileaflet. Leaflets exhibited mildly increased thickness, calcification, normal cuspal separation, and sclerosis. TV There was mild regurgitation. There was mild pulmonary  Hypertension. No significant pericardial effusion  Appreciate renal consult     MACY on CKD 3, likely CRS  -monitor renal fx with cardiac cath planned 3/29  -hold nephrotoxic agents  -monitor bmp  -Cr at 1.4  -Appreciate nephrology consult    Anemia KAYDEN  Check stool for occult blood. Started on iron supplements    Headache  Hx migraine  Reports object hitting her head, had CT evaluation at outside facility, which was negative per pt  Increased Fioricet dose/frequency 3/28  CT head from [facility] 3/17/18 negative for acute findings  No Neurological sx, if headache persist or sx consider CT head.     Leukocytosis   WBC trended up, on abx for presumed PNA, monitor for now Pt is afebrile     Code Status: DNR Surrogate Decision Maker:  Pt's daughter  DVT Prophylaxis: lovenox  GI Prophylaxis: not indicated  Baseline: independent, lives alone. Pt is not on home O2      Body mass index is 26.44 kg/(m^2). Subjective:     Chief Complaint / Reason for Physician Visit  Riley Daugherty is better\". Discussed with RN events overnight. Review of Systems:  Symptom Y/N Comments  Symptom Y/N Comments   Fever/Chills n   Chest Pain n    Poor Appetite    Edema n    Cough n   Abdominal Pain n    Sputum    Joint Pain     SOB/GUNDERSON y better  Pruritis/Rash     Nausean/vomit n   Tolerating PT/OT     Diarrhea    Tolerating Diet     Constipation    Other       Could NOT obtain due to:      Objective:     VITALS:   Last 24hrs VS reviewed since prior progress note. Most recent are:  Patient Vitals for the past 24 hrs:   Temp Pulse Resp BP SpO2   03/29/18 1629 - - - - 97 %   03/29/18 1601 - 70 - 132/90 95 %   03/29/18 1545 - 74 - 150/76 96 %   03/29/18 1530 97.6 °F (36.4 °C) 73 19 141/82 96 %   03/29/18 1500 - 64 24 126/83 97 %   03/29/18 1445 - 73 16 123/81 97 %   03/29/18 1430 - 60 18 128/55 97 %   03/29/18 1415 - 62 20 113/58 96 %   03/29/18 1400 - 62 18 114/69 98 %   03/29/18 1345 - 60 20 117/50 97 %   03/29/18 1330 - 65 21 123/87 97 %   03/29/18 0724 97.7 °F (36.5 °C) 62 20 (!) 112/99 99 %   03/29/18 0325 - - - - 97 %   03/29/18 0249 98.1 °F (36.7 °C) 73 20 113/55 94 %   03/28/18 2321 97.9 °F (36.6 °C) 64 20 110/61 96 %   03/28/18 2016 97.5 °F (36.4 °C) 63 22 113/67 98 %     No intake or output data in the 24 hours ending 03/29/18 1631     PHYSICAL EXAM:  General: WD, WN. Alert, cooperative, no acute distress    EENT:  EOMI. Anicteric sclerae. MMM  Resp:  CTA bilaterally, no wheezing or rales. No accessory muscle use  CV:  Regular  rhythm,  No edema  GI:  Soft, Non distended, Non tender.  +Bowel sounds  Neurologic:  Alert and oriented X 3, normal speech, grossly intact  Psych:   Good insight. Not anxious nor agitated  Skin:  No rashes. No jaundice    Reviewed most current lab test results and cultures  YES  Reviewed most current radiology test results   YES  Review and summation of old records today    NO  Reviewed patient's current orders and MAR    YES  PMH/SH reviewed - no change compared to H&P  ________________________________________________________________________  Care Plan discussed with:    Comments   Patient x    Family  x 2 DTRs in room   RN x    Care Manager     Consultant                        Multidiciplinary team rounds were held today with , nursing, pharmacist and clinical coordinator. Patient's plan of care was discussed; medications were reviewed and discharge planning was addressed. ________________________________________________________________________  Total NON critical care TIME:  30   Minutes    Total CRITICAL CARE TIME Spent:   Minutes non procedure based      Comments   >50% of visit spent in counseling and coordination of care     ________________________________________________________________________  Alisa Ballard MD     Procedures: see electronic medical records for all procedures/Xrays and details which were not copied into this note but were reviewed prior to creation of Plan. LABS:  I reviewed today's most current labs and imaging studies.   Pertinent labs include:  Recent Labs      03/29/18   0342  03/28/18   0400  03/27/18   0121   WBC  12.3*  9.6  9.9   HGB  9.8*  9.1*  9.3*   HCT  31.8*  30.7*  31.4*   PLT  210  174  177     Recent Labs      03/29/18   0342  03/28/18   0400  03/27/18   0121   NA  135*  136  139   K  4.1  4.6  4.0   CL  104  105  106   CO2  22  25  24   GLU  123*  99  130*   BUN  40*  39*  39*   CREA  1.47*  1.76*  1.86*   CA  9.1  8.9  8.7  8.5   MG   --   2.2  2.7*   PHOS  4.6  5.0*  5.2*       Signed: Alisa Ballard MD

## 2018-03-29 NOTE — PROGRESS NOTES
Progress Note      3/29/2018 11:16 AM  NAME: Ayla Sherwood   MRN:  [id number]   Admit Diagnosis: Respiratory failure [facility])     Assessment:     Acute / chronic systolic heart failure. CAD  Remote PCI   2010       CX marginal stented. LAD is chronically occlusion with collateral filling. RCA was OK. LV EF was 30 - 35% in 2010  Acute Kidney injury - creatinine is increasing. CKD stage 3  Anemia,  Likely due to CKD   Elevated Troponin. Echo shows LV EF 25%   Mod - severe MR. Mild Pulmon HTN         Plan:     Continue on meds. Would plan to cath , but with increasing creatinine here we need to wait. [x]        High complexity decision making was performed    Subjective:     Ayla Sherwood denies chest pain, dyspnea. 3/27 lethargic this AM.   Doesn't open eyes, but converses and is oriented x3. C/o fatigue. 3/28 Seems about the same . Still lethargic in the bed , but converses , answers questions. Her daughter seems think her mental status is about her usual stated. Creatinine ticked down a little today. I discussed cath with patient and daughter today. If creatinine is better could do it tomorrow. Would like to see her mobile. Needs to be OOB and walking . Maybe PT?    3/29  Has been OOB in chair. Back in bed now. Awake and answers questions. No neuro deficit. No chest pain . Breathing is better. Creatinine is down to 1.4    Will plan to proceed with cath today and assess coronaries. PCI if indicated. Discussed with family in the room. Can't really hydrate given current volume status. Will use Visapaque. Discussed with RN events overnight.      Patient Active Problem List   Diagnosis Code    Systolic heart failure (HCC) I50.20    Hypertension I10    Anxiety F41.9    GERD (gastroesophageal reflux disease) K21.9    Elevated cholesterol E78.00    Allergic rhinitis J30.9    Moderate persistent asthma J45.40    Coronary artery disease without angina pectoris I25.10    Respiratory failure ([facility] [address] J96.90       Review of Systems:    Symptom Y/N Comments  Symptom Y/N Comments   Fever/Chills N   Chest Pain N    Poor Appetite N   Edema N    Cough N   Abdominal Pain N    Sputum N   Joint Pain N    SOB/GUNDERSON N   Pruritis/Rash N    Nausea/vomit N   Tolerating PT/OT Y    Diarrhea N   Tolerating Diet Y    Constipation N   Other       Could NOT obtain due to:      Objective:      Physical Exam:    Last 24hrs VS reviewed since prior progress note. Most recent are:    Visit Vitals    BP (!) 112/99    Pulse 62    Temp 97.7 °F (36.5 °C)    Resp 20    Ht 5' 5\" (1.651 m)    Wt 72.1 kg (158 lb 14.4 oz)    SpO2 99%    Breastfeeding No    BMI 26.44 kg/m2       Intake/Output Summary (Last 24 hours) at 03/29/18 1054  Last data filed at 03/28/18 1345   Gross per 24 hour   Intake                0 ml   Output              350 ml   Net             -350 ml        General Appearance: Well developed, well nourished, alert & oriented x 3,    no acute distress. Ears/Nose/Mouth/Throat: Hearing grossly normal.  Neck: Supple. Chest: Lungs clear to auscultation bilaterally. Cardiovascular: Regular rate and rhythm, S1S2 normal, no murmur. Abdomen: Soft, non-tender, bowel sounds are active. Extremities: No edema bilaterally. Skin: Warm and dry.     PMH/SH reviewed - no change compared to H&P    Data Review    Telemetry: normal sinus rhythm     Lab Data Personally Reviewed:    Recent Labs      03/29/18   0342  03/28/18   0400   WBC  12.3*  9.6   HGB  9.8*  9.1*   HCT  31.8*  30.7*   PLT  210  174   LABRCNT(INR:3,PTP:3,APTT:3,)  Recent Labs      03/29/18   0342  03/28/18   0400  03/27/18   0121   NA  135*  136  139   K  4.1  4.6  4.0   CL  104  105  106   CO2  22  25  24   BUN  40*  39*  39*   CREA  1.47*  1.76*  1.86*   GLU  123*  99  130*   CA  9.1  8.9  8.7  8.5   MG   --   2.2  2.7*   LABRCNT(CPK:3,CpKMB:3,ckndx:3,troiq:3)  Lab Results Component Value Date/Time    Cholesterol, total 127 03/26/2018 12:09 AM    HDL Cholesterol 43 03/26/2018 12:09 AM    LDL, calculated 61.2 03/26/2018 12:09 AM    Triglyceride 114 03/26/2018 12:09 AM    CHOL/HDL Ratio 3.0 03/26/2018 12:09 AM   LABRCNT(sgot:3,gpt:3,ap:3,tbiL:3,TP:3,ALB:3,GLOB:3,ggt:3,aml:3,amyp:3,lpse:3,hlpse:3)No results for input(s): PH, PCO2, PO2 in the last 72 hours. Lab Results   Component Value Date/Time    Cholesterol, total 127 03/26/2018 12:09 AM    HDL Cholesterol 43 03/26/2018 12:09 AM    LDL, calculated 61.2 03/26/2018 12:09 AM    Triglyceride 114 03/26/2018 12:09 AM    CHOL/HDL Ratio 3.0 03/26/2018 12:09 AM   MEDTABLETimothy Arlen Blue MD  No results for input(s): PH, PCO2, PO2 in the last 72 hours.     Medications Personally Reviewed:    Current Facility-Administered Medications   Medication Dose Route Frequency    ferrous sulfate tablet 325 mg  1 Tab Oral DAILY WITH BREAKFAST    enoxaparin (LOVENOX) injection 30 mg  30 mg SubCUTAneous Q24H    metoprolol tartrate (LOPRESSOR) tablet 12.5 mg  12.5 mg Oral Q12H    nitroglycerin (NITROBID) 2 % ointment 1 Inch  1 Inch Topical Q6H PRN    aspirin delayed-release tablet 81 mg  81 mg Oral DAILY    sodium chloride (NS) flush 5-10 mL  5-10 mL IntraVENous Q8H    sodium chloride (NS) flush 5-10 mL  5-10 mL IntraVENous PRN    acetaminophen (TYLENOL) tablet 650 mg  650 mg Oral Q4H PRN    ondansetron (ZOFRAN) injection 4 mg  4 mg IntraVENous Q4H PRN    bisacodyl (DULCOLAX) suppository 10 mg  10 mg Rectal DAILY PRN    azithromycin (ZITHROMAX) 500 mg in 0.9% sodium chloride (MBP/ADV) 250 mL  500 mg IntraVENous Q24H    cefTRIAXone (ROCEPHIN) 1 g in 0.9% sodium chloride (MBP/ADV) 50 mL MBP  1 g IntraVENous Q24H    diphenhydrAMINE (BENADRYL) capsule 25 mg  25 mg Oral Q6H PRN    albuterol-ipratropium (DUO-NEB) 2.5 MG-0.5 MG/3 ML  3 mL Nebulization Q4H PRN    atorvastatin (LIPITOR) tablet 40 mg  40 mg Oral QPM    butalbital-acetaminophen-caffeine (FIORICET, ESGIC) -40 mg per tablet 1 Tab  1 Tab Oral Q6H PRN    clonazePAM (KlonoPIN) tablet 1 mg  1 mg Oral BID PRN    nitroglycerin (NITROSTAT) tablet 0.4 mg  0.4 mg SubLINGual PRN    pantoprazole (PROTONIX) tablet 40 mg  40 mg Oral ACB         Maryse Mckeon MD

## 2018-03-29 NOTE — PROGRESS NOTES
Very SOB this afternoon. CXR again shows pulmonary edema. ABG not too bad. Given a dose of IV Lasix and has diuresed. Feeling better now. On nasal O2 at 2 l/m with O2 sat of 98%. She frequently asks for pain med for back pain the last couple days. Limited tolerance for pain. Would be best to avoid over medication.

## 2018-03-29 NOTE — PROGRESS NOTES
L cath        Severe stenosis in Prox and mid RCA . Sequential lesions, up to 95% stenosis. PCI with 2 Synergy TANNER starting proximal and extending through the mid RCA. 0% residual SHARLA 3 flow. LAD is chronically occluded ( since cath 2010. )    Stented Cx is widely patent. LV EDP  20 mmHg. NO V Gram done. Angioseal.     Angiomax     Brilinta.

## 2018-03-29 NOTE — PROGRESS NOTES
Name: Maxi Adam   MRN: [id number]  : 1945      ASSESSMENT:  MACY on CKD-3  A on C systolic CHF/CAD- EF down to 25-30% on repeat Echo  NSTEMI- with trop leak  HTN  Anemia- suspect has component of anemia of CKD; iron stores are low. On B12 supplement. Folate is nl  SOB- suspect largely due to fluid overload from CHF + underlying COPD; suspicion for PNA is low (afebrile, wbc mild elevatn on admit, no productive cough). Improved with IV lasix. On RA at present  High Phos- likely has SHPT from CKD    We stopped lasix yesterday. Cr down to 1.4 pre cath. Spoke with pt/2 of her dtrs at bedside in AM. revd risks of BALJEET with dtr. Cant give IVF. But given improved renal fxn, pts risk is minimized.      Plan:  Monitor renal fxn next 1-2 days post cath  Hold lasix for now. Resume in 1-2 days  Ct current meds; avoid ACE-I or ARB  Check UA with micro  B12 is nl; ct Fe sulfate (started here)  PTH is mild up. Phos improved  Monitor IOs as able  No nsaids- advised to avoid even as outpt    Subjective:  Feels OK. Seen in AM. dtrs at bedside.  Questions about cath and MACY    ROS:   No nausea, no vomiting  No chest pain, improved shortness of breath    Exam:  Visit Vitals    /81    Pulse 73    Temp 97.7 °F (36.5 °C)    Resp 16    Ht 5' 5\" (1.651 m)    Wt 72.1 kg (158 lb 14.4 oz)    SpO2 97%    Breastfeeding No    BMI 26.44 kg/m2       Elderly in NAD  No icterus, mmm  Clear  RRR, no rub  Soft, NT  Tr edema  AOx3    Current Facility-Administered Medications   Medication Dose Route Frequency Last Dose    [START ON 3/30/2018] ticagrelor (BRILINTA) tablet 90 mg  90 mg Oral Q12H      sodium chloride (NS) flush 5-10 mL  5-10 mL IntraVENous Q8H      sodium chloride (NS) flush 5-10 mL  5-10 mL IntraVENous PRN      zolpidem (AMBIEN) tablet 5 mg  5 mg Oral QHS PRN      bivalirudin (ANGIOMAX) 250 mg injection          nitroglycerin 1 mg/10mL injection          ticagrelor (BRILINTA) 90 mg tablet          ferrous sulfate tablet 325 mg  1 Tab Oral DAILY WITH BREAKFAST 325 mg at 03/29/18 0955    enoxaparin (LOVENOX) injection 30 mg  30 mg SubCUTAneous Q24H Stopped at 03/29/18 1000    metoprolol tartrate (LOPRESSOR) tablet 12.5 mg  12.5 mg Oral Q12H 12.5 mg at 03/29/18 0955    nitroglycerin (NITROBID) 2 % ointment 1 Inch  1 Inch Topical Q6H PRN      aspirin delayed-release tablet 81 mg  81 mg Oral DAILY 81 mg at 03/29/18 0955    sodium chloride (NS) flush 5-10 mL  5-10 mL IntraVENous Q8H 10 mL at 03/29/18 0027    sodium chloride (NS) flush 5-10 mL  5-10 mL IntraVENous PRN 10 mL at 03/28/18 1353    acetaminophen (TYLENOL) tablet 650 mg  650 mg Oral Q4H  mg at 03/26/18 0407    ondansetron (ZOFRAN) injection 4 mg  4 mg IntraVENous Q4H PRN 4 mg at 03/26/18 2986    bisacodyl (DULCOLAX) suppository 10 mg  10 mg Rectal DAILY PRN      azithromycin (ZITHROMAX) 500 mg in 0.9% sodium chloride (MBP/ADV) 250 mL  500 mg IntraVENous Q24H 500 mg at 03/29/18 0022    cefTRIAXone (ROCEPHIN) 1 g in 0.9% sodium chloride (MBP/ADV) 50 mL MBP  1 g IntraVENous Q24H 1 g at 03/29/18 0143    diphenhydrAMINE (BENADRYL) capsule 25 mg  25 mg Oral Q6H PRN      albuterol-ipratropium (DUO-NEB) 2.5 MG-0.5 MG/3 ML  3 mL Nebulization Q4H PRN 3 mL at 03/29/18 0325    atorvastatin (LIPITOR) tablet 40 mg  40 mg Oral QPM 40 mg at 03/28/18 1720    butalbital-acetaminophen-caffeine (FIORICET, ESGIC) -40 mg per tablet 1 Tab  1 Tab Oral Q6H PRN 1 Tab at 03/28/18 1600    clonazePAM (KlonoPIN) tablet 1 mg  1 mg Oral BID PRN 1 mg at 03/28/18 2044    nitroglycerin (NITROSTAT) tablet 0.4 mg  0.4 mg SubLINGual PRN      pantoprazole (PROTONIX) tablet 40 mg  40 mg Oral ACB 40 mg at 03/29/18 0955       Labs/Data:    Lab Results   Component Value Date/Time    WBC 12.3 (H) 03/29/2018 03:42 AM    Hemoglobin (POC) 11.9 03/08/2014 10:14 AM    HGB 9.8 (L) 03/29/2018 03:42 AM    Hematocrit (POC) 35 03/08/2014 10:14 AM    HCT 31.8 (L) 03/29/2018 03:42 AM    PLATELET 424 34/93/5483 03:42 AM    MCV 81.1 03/29/2018 03:42 AM       Lab Results   Component Value Date/Time    Sodium 135 (L) 03/29/2018 03:42 AM    Potassium 4.1 03/29/2018 03:42 AM    Chloride 104 03/29/2018 03:42 AM    CO2 22 03/29/2018 03:42 AM    Anion gap 9 03/29/2018 03:42 AM    Glucose 123 (H) 03/29/2018 03:42 AM    BUN 40 (H) 03/29/2018 03:42 AM    Creatinine 1.47 (H) 03/29/2018 03:42 AM    BUN/Creatinine ratio 27 (H) 03/29/2018 03:42 AM    GFR est AA 42 (L) 03/29/2018 03:42 AM    GFR est non-AA 35 (L) 03/29/2018 03:42 AM    Calcium 9.1 03/29/2018 03:42 AM    Calcium 8.9 03/29/2018 03:42 AM       Wt Readings from Last 3 Encounters:   03/29/18 72.1 kg (158 lb 14.4 oz)   02/08/18 72.6 kg (160 lb)   01/24/18 72.6 kg (160 lb)       No intake or output data in the 24 hours ending 03/29/18 1518    Patient seen and examined. Chart reviewed. Labs, data and other pertinent notes reviewed in last 24 hrs.     PMH/SH/FH reviewed and unchanged compared to H&P    Discussed with pt/dtr      Bryce Zarate MD

## 2018-03-29 NOTE — CARDIO/PULMONARY
C/P rehab note- chart reviewed.      Pt is on CHF bundle list.      Adm with Acute/Chronic Systolic Heart failure.      HX includes HTN,anxiety,GERD,Hypercholesterolemia,Asthma,CAD,AMI,stenting,remote a-fib,COPD,CKD 3.      Former smoker.      LVEF 3/10/2014 was 30-35%.      Most recent Echo results pending. Pro BNP > 30,000.      DIET CARDIAC Regular     CHF folder given on 3/26/2018    Cardiac cath was cancelled yesterday per notes due to increasing creatinine. Met with pt and family- pt to have cardiac cath today at 11:45. This was a follow-up visit to answer questions and reinforce prior teaching re: CHF, S&Ss, medication management, Low NA diet, daily weights, when to call the doctor and balancing rest/activity. No new questions for me-will follow for further teaching post cath. Understanding verbalized.

## 2018-03-29 NOTE — PROGRESS NOTES
Cardiopulmonary Care Interdisciplinary rounds were held today to discuss patient plan of care and outcomes. The following members were present: NP/Physician, Pharmacy, Nursing and Case Management.     Expected Length of Stay:  4d 12h    Plan of Care: Continue current treatment plan  Cath today

## 2018-03-29 NOTE — PROGRESS NOTES
3:15 AM-Pt having increase SOB and abd pain. Crackles at bases on assessment. MD notified; orders received.

## 2018-03-29 NOTE — PROGRESS NOTES
TRANSFER - IN REPORT:    Verbal report received from [facility] on Nicki Mendoza  being received from [address] [place] [address] for routine progression of care. Report consisted of patients Situation, Background, Assessment and Recommendations(SBAR). Information from the following report(s) Procedure Summary and MAR was reviewed with the receiving clinician. Opportunity for questions and clarification was provided. Assessment completed upon patients arrival to [address] [place] [address] and care assumed. Cardiac Cath Lab Recovery Arrival Note:    Nicki Mendoza arrived to [facility] recovery area. Patient procedure= LHC/PCI. Patient on cardiac monitor, non-invasive blood pressure, SPO2 monitor. On O2 @ 2 lpm via nc. IV  of ns on pump at 25 and bivalrudin at 7.2 ml per hour ml/hr. Patient status doing well without problems. Patient is A&Ox 3. Patient reports no c/o. PROCEDURE SITE CHECK:    Procedure site:without any bleeding and no hematoma, no pain/discomfort reported at procedure site. No change in patient status. Continue to monitor patient and status.

## 2018-03-29 NOTE — PROGRESS NOTES
Physical Therapy:    Noted patient planned for cardiac cath today. Will defer and continue to follow.  Thank you    Woody Hinds, PT, DPT

## 2018-03-30 NOTE — PROGRESS NOTES
TRANSFER - OUT REPORT:    Verbal report given to [facility] RN(name) on Emili Iraheta  being transferred to PCU(unit) for routine progression of care       Report consisted of patients Situation, Background, Assessment and   Recommendations(SBAR). Information from the following report(s) SBAR, Kardex, [facility] and Recent Results was reviewed with the receiving nurse. Lines:   Peripheral IV 03/30/18 Right Hand (Active)   Site Assessment Clean, dry, & intact 3/30/2018  9:00 AM   Phlebitis Assessment 0 3/30/2018  9:00 AM   Infiltration Assessment 0 3/30/2018  9:00 AM   Dressing Status Clean, dry, & intact 3/30/2018  9:00 AM   Dressing Type Tape;Transparent 3/30/2018  9:00 AM   Hub Color/Line Status Blue;Capped 3/30/2018  9:00 AM        Opportunity for questions and clarification was provided.       Patient transported with:   Monitor  Registered Nurse

## 2018-03-30 NOTE — PROGRESS NOTES
Bedside and Verbal shift change report given to SYDNEE nash (oncoming nurse). Report included the following information SBAR, Kardex, ED Summary, Procedure Summary, Intake/Output, MAR, Recent Results and Cardiac Rhythm sinus cl pvcs. SHIFT SUMMARY:  11: dr Hazen Hamman paged on pager     21 [phone]: 5 beats vtach, then 6 beats  -vitals stable, pt denies complaints  -Mag 2.1  -k 4.2    107: paged dr Cheryl Lipscomb   -rn notified office rn of pt's asymptomatic vtach with stable labs with request for MD callback with confirmation of receipt of info. 156: tt dr Cheryl Lipscomb re vtach  353: paged dr Cheryl Lipscomb     437: rn spoke to dr Cheryl Lipscomb. No new orders received regarding pt's two episodes of vtach noted above. md states pt has ventr. Ectopy sometimes. md states it is OK for pt to have klonopin.

## 2018-03-30 NOTE — PROGRESS NOTES
TRANSFER - IN REPORT:    Verbal report received from [facility] (name) on [facility]  being received from [facility] (unit) for routine progression of care      Report consisted of patients Situation, Background, Assessment and   Recommendations(SBAR). Information from the following report(s) SBAR, Kardex, [facility], Recent Results and Cardiac Rhythm SR was reviewed with the receiving nurse. Opportunity for questions and clarification was provided. Assessment completed upon patients arrival to unit and care assumed. TRANSFER - OUT REPORT:    Verbal report given to [facility], RN (name) on [facility]  being transferred to [address] [place] [address] (unit) for routine progression of care       Report consisted of patients Situation, Background, Assessment and   Recommendations(SBAR). Information from the following report(s) SBAR, Kardex, MAR, Recent Results and Cardiac Rhythm SR with PVC was reviewed with the receiving nurse. Lines:   Peripheral IV 03/30/18 Right Hand (Active)   Site Assessment Clean, dry, & intact 3/30/2018  9:40 AM   Phlebitis Assessment 0 3/30/2018  9:40 AM   Infiltration Assessment 0 3/30/2018  9:40 AM   Dressing Status Clean, dry, & intact 3/30/2018  9:40 AM   Dressing Type Transparent 3/30/2018  9:40 AM   Hub Color/Line Status Blue 3/30/2018  9:40 AM        Opportunity for questions and clarification was provided.       Patient transported with:   PCS [employer]

## 2018-03-30 NOTE — PROGRESS NOTES
Spoke with daughter regarding missing medication of Klonopin that pt sts was in her purse and is now gone. Per daughter she spoke with Dr Ragini Serrano last night and he sts not good for pt to continue taking medication. Daughter took medication out of her moms purse but does NOT want patient to know she has the medication. Adv daughter would put a note into the chart to clarify medication was not misplaced during her hospital stay. Daughter is ok with this. Message given to primary nurse Chelita Dowd RN.

## 2018-03-30 NOTE — PROCEDURES
[facility]     Davon Walls.  MR#: [id number]  : 1945  ACCOUNT #: [de-identified]   DATE OF SERVICE: 2018    PROCEDURE:    1. Left heart catheterization with coronary artery angiography. 2.  PCI with stenting of the right coronary artery. No ventriculography performed. PREOPERATIVE DIAGNOSES:  1.  Non-ST elevation myocardial infarction. 2.  Coronary artery atherosclerosis. POSTOPERATIVE DIAGNOSES:    1.  Non-ST elevation myocardial infarction. 2.  Coronary artery atherosclerosis. ESTIMATED BLOOD LOSS:  Minimal.    SPECIMENS:  None. SEDATION:  Versed and fentanyl administered with continuous supervision. Please see the cath log for details. TECHNIQUE:  A 5-Qatari sheath placed in the right femoral artery. Coronary angiograms performed with 5-Qatari catheters in the usual fashion. A pigtail catheter placed in the left ventricle for measurement of pressures. Ventriculography was deferred due to the patient's underlying renal disease. Visipaque was utilized for contrast.    The patient received intravenous heparin for anticoagulation. The sheath was changed to a 6-Qatari sheath. A 6-Qatari JR4 guiding catheter was engaged in the right coronary artery. A "[employer] wire was advanced down the right coronary artery and positioned distally. Balloon dilatation was performed throughout the proximal and mid right coronary with a 2.5 mm balloon. We then delivered a 3.0 x 24 length Synergy drug-eluting stent into the distal portion of the mid right coronary artery. We followed that with a 3.5 x 38 mm Synergy stent in an overlapping fashion, which took us back to the very proximal segment of the right coronary artery, this was deployed at 15 atmospheres. We then went down and postdilated throughout the stents using a 4 mm diameter noncompliant balloon at 14 atmospheres. Repeat angiograms were performed in several projections.   The catheter was removed. The sheath was removed. Hemostasis was obtained with placement of an Angio-Seal closure device. COMPLICATIONS:  None. HEMODYNAMICS:  As recorded. DESCRIPTION:    LEFT MAIN TRUNK:  Normal.    LEFT ANTERIOR DESCENDING:  Moderate size artery. It becomes totally occluded after a diagonal branch, there is some collateral filling distally. CIRCUMFLEX ARTERY:  The circumflex artery has a stent in the proximal AV segment. There is mild restenosis of about 30% with the stented segment. Marginal artery itself has some mild narrowing, it is otherwise free of disease. INTERMEDIATE ARTERY:  There is a ramus intermediate artery, which is moderate in size and has mild atherosclerotic disease, but nothing significant. RIGHT CORONARY ARTERY:  Dominant. This is a large artery. There is 70% stenosis in the proximal segment. .  There are sequential lesions in the mid RCA with 90-95% stenosis. Distally, the vessel is free of disease. There is moderate sized posterior descending and moderate sized posterolateral branches, which appear normal.    PERCUTANEOUS CORONARY INTERVENTION OF THE RIGHT CORONARY ARTERY:  The right coronary artery had significant stenosis in the proximal and mid segment with sequential lesions with up to 90-95% stenosis. Following angioplasty and stenting with two Synergy drug-eluting stents beginning in the proximal artery extending down through the mid right coronary, there is 0% residual narrowing. Distal flow was SHARLA grade III. CONCLUSION:  1. Severe multivessel coronary artery atherosclerotic disease. 2.  Chronic total occlusion of the left anterior descending artery as described in 2010. 3.  Patent circumflex artery stents with no significant obstruction. 4.  Severe multiple obstructive lesions in the dominant right coronary artery.   5.  Successful PCI and stenting of the right coronary artery with two large Synergy drug-eluting stents as described.       Kathi Bill MD       [address] [facility] / [facility]  D: 03/29/2018 20:56     T: 03/29/2018 22:44  JOB #: [id number]

## 2018-03-30 NOTE — PROGRESS NOTES
Primary Nurse Shawna Braswell RN and SYDNEE cope performed a dual skin assessment on this patient Impairment noted- see wound doc flow sheet    Pt has age spots of hyperpigmentation, skin tags and moles scattered on back    R groin cath access with dressing CDI with surrounding ecchymosis from hematoma last night. Heels pink-red and blanching    Scattered ecchmosis on BUEs    Very small scab on pt's R elbow    Great toe on R foot blanches in 4-5 seconds.      Nicholas score is 18

## 2018-03-30 NOTE — PROGRESS NOTES
Progress Note      3/30/2018 11:16 AM  NAME: Mohan Garcia   MRN:  [id number]   Admit Diagnosis: Respiratory failure [facility])     Assessment:     Acute / chronic systolic heart failure. CAD  NSTEMI. Remote PCI   2010       CX marginal stented. LAD is chronically occlusion with collateral filling. RCA was OK. LV EF was 30 - 35% in 2010    Cath 3/29  Severe stenoses, sequential, RCA . PCI with 2 Synergy TANNER . Acute Kidney injury - creatinine continues to improve even after cath. CKD stage 3  Anemia,  Likely due to CKD        Echo shows LV EF 25%   Mod - severe MR. Mild Pulmon HTN         Plan:     Continue on meds. Advance activity     Maybe home tomorrow. [x]        High complexity decision making was performed    Subjective:     Mohan Garcia denies chest pain, dyspnea. 3/27 lethargic this AM.   Doesn't open eyes, but converses and is oriented x3. C/o fatigue. 3/28 Seems about the same . Still lethargic in the bed , but converses , answers questions. Her daughter seems think her mental status is about her usual stated. Creatinine ticked down a little today. I discussed cath with patient and daughter today. If creatinine is better could do it tomorrow. Would like to see her mobile. Needs to be OOB and walking . Maybe PT?    3/29  Has been OOB in chair. Back in bed now. Awake and answers questions. No neuro deficit. No chest pain . Breathing is better. Creatinine is down to 1.4    Will plan to proceed with cath today and assess coronaries. PCI if indicated. Discussed with family in the room. Can't really hydrate given current volume status. Will use Visapaque. 3/30  Cath yesterday showed severe diesease RCA . PCI with 2 large Synergy TANNER . Bruising R groin. No hematoma. Lungs clear today. Advance as tolerated         Discussed with RN events overnight.      Patient Active Problem List   Diagnosis Code    Systolic heart failure ([facility]) I50.20    Hypertension I10    Anxiety F41.9    GERD (gastroesophageal reflux disease) K21.9    Elevated cholesterol E78.00    Allergic rhinitis J30.9    Moderate persistent asthma J45.40    Coronary artery disease without angina pectoris I25.10    Respiratory failure ([facility]) J96.90       Review of Systems:    Symptom Y/N Comments  Symptom Y/N Comments   Fever/Chills N   Chest Pain N    Poor Appetite N   Edema N    Cough N   Abdominal Pain N    Sputum N   Joint Pain N    SOB/GUNDERSON N   Pruritis/Rash N    Nausea/vomit N   Tolerating PT/OT Y    Diarrhea N   Tolerating Diet Y    Constipation N   Other       Could NOT obtain due to:      Objective:      Physical Exam:    Last 24hrs VS reviewed since prior progress note. Most recent are:    Visit Vitals    /55    Pulse 72    Temp 98.4 °F (36.9 °C)    Resp 18    Ht 5' 5\" (1.651 m)    Wt 72.1 kg (158 lb 14.4 oz)    SpO2 94%    Breastfeeding No    BMI 26.44 kg/m2       Intake/Output Summary (Last 24 hours) at 03/30/18 0834  Last data filed at 03/29/18 2220   Gross per 24 hour   Intake              250 ml   Output              700 ml   Net             -450 ml        General Appearance: Well developed, well nourished, alert & oriented x 3,    no acute distress. Ears/Nose/Mouth/Throat: Hearing grossly normal.  Neck: Supple. Chest: Lungs clear to auscultation bilaterally. Cardiovascular: Regular rate and rhythm, S1S2 normal, no murmur. Abdomen: Soft, non-tender, bowel sounds are active. Extremities: No edema bilaterally. Skin: Warm and dry.     PMH/SH reviewed - no change compared to H&P    Data Review    Telemetry: normal sinus rhythm     Lab Data Personally Reviewed:    Recent Labs      03/30/18   0354  03/29/18   0342   WBC  11.7*  12.3*   HGB  9.5*  9.8*   HCT  31.0*  31.8*   PLT  218  210   LABRCNT(INR:3,PTP:3,APTT:3,)  Recent Labs      03/30/18   0354  03/29/18   0342  03/28/18   0400   NA  137  135*  136   K 4.2  4.1  4.6   CL  105  104  105   CO2  26  22  25   BUN  34*  40*  39*   CREA  1.31*  1.47*  1.76*   GLU  99  123*  99   CA  8.8  9.1  8.9  8.7   MG  2.1   --   2.2   LABRCNT(CPK:3,CpKMB:3,ckndx:3,troiq:3)  Lab Results   Component Value Date/Time    Cholesterol, total 127 03/26/2018 12:09 AM    HDL Cholesterol 43 03/26/2018 12:09 AM    LDL, calculated 61.2 03/26/2018 12:09 AM    Triglyceride 114 03/26/2018 12:09 AM    CHOL/HDL Ratio 3.0 03/26/2018 12:09 AM   LABRCNT(sgot:3,gpt:3,ap:3,tbiL:3,TP:3,ALB:3,GLOB:3,ggt:3,aml:3,amyp:3,lpse:3,hlpse:3)  Recent Labs      03/29/18   1728   PH  7.45   PCO2  31*   PO2  88     Lab Results   Component Value Date/Time    Cholesterol, total 127 03/26/2018 12:09 AM    HDL Cholesterol 43 03/26/2018 12:09 AM    LDL, calculated 61.2 03/26/2018 12:09 AM    Triglyceride 114 03/26/2018 12:09 AM    CHOL/HDL Ratio 3.0 03/26/2018 12:09 AM   Deborah Morataya MD  Recent Labs      03/29/18   1728   PH  7.45   PCO2  31*   PO2  88       Medications Personally Reviewed:    Current Facility-Administered Medications   Medication Dose Route Frequency    sodium chloride (NS) flush 5-10 mL  5-10 mL IntraVENous Q8H    sodium chloride (NS) flush 5-10 mL  5-10 mL IntraVENous PRN    zolpidem (AMBIEN) tablet 5 mg  5 mg Oral QHS PRN    bivalirudin (ANGIOMAX) 250 mg injection        nitroglycerin 1 mg/10mL injection        ticagrelor (BRILINTA) 90 mg tablet        clopidogrel (PLAVIX) tablet 75 mg  75 mg Oral DAILY    ferrous sulfate tablet 325 mg  1 Tab Oral DAILY WITH BREAKFAST    metoprolol tartrate (LOPRESSOR) tablet 12.5 mg  12.5 mg Oral Q12H    nitroglycerin (NITROBID) 2 % ointment 1 Inch  1 Inch Topical Q6H PRN    aspirin delayed-release tablet 81 mg  81 mg Oral DAILY    sodium chloride (NS) flush 5-10 mL  5-10 mL IntraVENous Q8H    sodium chloride (NS) flush 5-10 mL  5-10 mL IntraVENous PRN    acetaminophen (TYLENOL) tablet 650 mg  650 mg Oral Q4H PRN    ondansetron (ZOFRAN) injection 4 mg  4 mg IntraVENous Q4H PRN    bisacodyl (DULCOLAX) suppository 10 mg  10 mg Rectal DAILY PRN    azithromycin (ZITHROMAX) 500 mg in 0.9% sodium chloride (MBP/ADV) 250 mL  500 mg IntraVENous Q24H    cefTRIAXone (ROCEPHIN) 1 g in 0.9% sodium chloride (MBP/ADV) 50 mL MBP  1 g IntraVENous Q24H    diphenhydrAMINE (BENADRYL) capsule 25 mg  25 mg Oral Q6H PRN    albuterol-ipratropium (DUO-NEB) 2.5 MG-0.5 MG/3 ML  3 mL Nebulization Q4H PRN    atorvastatin (LIPITOR) tablet 40 mg  40 mg Oral QPM    butalbital-acetaminophen-caffeine (FIORICET, ESGIC) -40 mg per tablet 1 Tab  1 Tab Oral Q6H PRN    clonazePAM (KlonoPIN) tablet 1 mg  1 mg Oral BID PRN    nitroglycerin (NITROSTAT) tablet 0.4 mg  0.4 mg SubLINGual PRN    pantoprazole (PROTONIX) tablet 40 mg  40 mg Oral ACB         Sheila Ledesma MD

## 2018-03-30 NOTE — PROGRESS NOTES
Hospitalist Progress Note    NAME: Nicki Mendoza   :  1945   MRN:  [id number]       Assessment / Plan:  Acute on chronic systolic heart failure: improving  NSTEMI in pt with CAD s/p [facility] with 2 TANNER 3/29: CP free  CM/valve disease  Remote PAfib  Continue with lasix  Continue with ASA, BB, Statin, Plavix     MACY on CKD 3, likely CRS  Improving  Monitor labs  Nephrology on the case    Anemia KAYDEN  Stable  Check FOBT  Continue iron supplements    Hallucination in the setting of Dementia likely alzheimer's likely sun-downing  Will add low dose seroquel   DC ambien  Continue prn klonipin as at home. Try to DC ASAP  Will need CM for DC planning can not go back home alone at least initially. Headache  Hx migraine  Resolved  Continue Fioricet dose/frequency 3/28  CT head from [facility] 3/17/18 negative for acute findings  No Neurological sx, if headache persist or sx consider CT head. Leukocytosis and presumed CAP  Resolving  Continue ABX treatment rocephin and azithromycin  Continue with bronchodilators    COPD not in exacerbation     Code Status: DNR   Surrogate Decision Maker:  Pt's daughter  DVT Prophylaxis: lovenox  GI Prophylaxis: not indicated  Baseline: independent, lives alone. Pt is not on home O2      Body mass index is 25.72 kg/(m^2). Subjective:     Chief Complaint / Reason for Physician Visit  No cp or sob and reports feeling better but appears confused and per nurse has occasional hallucinations. Review of Systems:  Symptom Y/N Comments  Symptom Y/N Comments   Fever/Chills n   Chest Pain n    Poor Appetite    Edema n    Cough n   Abdominal Pain n    Sputum    Joint Pain     SOB/GUNDERSON y better  Pruritis/Rash     Nausean/vomit n   Tolerating PT/OT     Diarrhea    Tolerating Diet y    Constipation    Other       Could NOT obtain due to:      Objective:     VITALS:   Last 24hrs VS reviewed since prior progress note.  Most recent are:  Patient Vitals for the past 24 hrs:   Temp Pulse Resp BP SpO2   03/30/18 1533 98.2 °F (36.8 °C) 63 20 104/48 98 %   03/30/18 1305 - 67 - 122/80 96 %   03/30/18 1055 97.4 °F (36.3 °C) (!) 57 20 110/68 99 %   03/30/18 0933 97.8 °F (36.6 °C) 69 17 124/75 100 %   03/30/18 0715 - 72 - 129/55 -   03/30/18 0352 98.4 °F (36.9 °C) 63 18 125/59 94 %   03/29/18 2315 97.5 °F (36.4 °C) 61 18 109/56 100 %   03/29/18 2300 - 65 - 113/64 99 %   03/29/18 2246 - 66 - 105/71 99 %   03/29/18 2231 - 66 - 122/83 100 %   03/29/18 2215 - 68 - 110/66 99 %   03/29/18 2207 - 84 - 122/74 99 %   03/29/18 2145 - 79 - 93/64 98 %   03/29/18 2130 - 79 - 114/70 98 %   03/29/18 2115 - 75 - 119/65 98 %   03/29/18 2100 - 78 - 141/61 97 %   03/29/18 2045 - 76 - (!) 138/92 98 %   03/29/18 2035 - 76 - 142/79 98 %   03/29/18 2030 - 76 - 123/61 98 %   03/29/18 2015 - 70 - 124/55 100 %   03/29/18 2000 - 77 - 133/42 99 %   03/29/18 1946 - 71 - 121/86 100 %   03/29/18 1930 - 72 - 128/55 100 %   03/29/18 1925 - 78 - (!) 146/112 100 %   03/29/18 1917 - 87 - 129/79 97 %   03/29/18 1900 - 80 - (!) 124/98 95 %   03/29/18 1845 - 83 - 123/77 99 %   03/29/18 1800 - 77 - (!) 138/94 100 %   03/29/18 1746 - 79 - 155/85 100 %   03/29/18 1730 - 85 - (!) 112/100 99 %   03/29/18 1715 - 77 - 100/79 99 %   03/29/18 1700 - 70 - 133/66 99 %   03/29/18 1645 - 73 - 128/53 100 %   03/29/18 1630 - 70 - (!) 146/91 97 %   03/29/18 1629 - - - - 97 %       Intake/Output Summary (Last 24 hours) at 03/30/18 1627  Last data filed at 03/29/18 2220   Gross per 24 hour   Intake              250 ml   Output              700 ml   Net             -450 ml        PHYSICAL EXAM:  General: WD, WN. Alert, cooperative, no acute distress    EENT:  EOMI. Anicteric sclerae. MMM  Resp:  CTA bilaterally with normal effort and good BS  CV:  s1s2 RRR No edema  GI:  Soft, Non distended, Non tender.  +Bowel sounds  Neurologic:  Alert and oriented X 3, normal speech, grossly intact  Psych:   Good insight. Not anxious nor agitated  Skin:  No rashes.   No jaundice    Reviewed most current lab test results and cultures  YES  Reviewed most current radiology test results   YES  Review and summation of old records today    NO  Reviewed patient's current orders and MAR    YES  PMH/SH reviewed - no change compared to H&P  ________________________________________________________________________  Care Plan discussed with:    Comments   Patient x    Family  x 2 DTRs in room   RN x    Care Manager     Consultant                        Multidiciplinary team rounds were held today with , nursing, pharmacist and clinical coordinator. Patient's plan of care was discussed; medications were reviewed and discharge planning was addressed. ________________________________________________________________________  Total NON critical care TIME:  40   Minutes    Total CRITICAL CARE TIME Spent:   Minutes non procedure based      Comments   >50% of visit spent in counseling and coordination of care     ________________________________________________________________________  Poornima Aguirre MD     Procedures: see electronic medical records for all procedures/Xrays and details which were not copied into this note but were reviewed prior to creation of Plan. LABS:  I reviewed today's most current labs and imaging studies.   Pertinent labs include:  Recent Labs      03/30/18   0354  03/29/18   0342  03/28/18   0400   WBC  11.7*  12.3*  9.6   HGB  9.5*  9.8*  9.1*   HCT  31.0*  31.8*  30.7*   PLT  218  210  174     Recent Labs      03/30/18   0354  03/29/18   0342  03/28/18   0400   NA  137  135*  136   K  4.2  4.1  4.6   CL  105  104  105   CO2  26  22  25   GLU  99  123*  99   BUN  34*  40*  39*   CREA  1.31*  1.47*  1.76*   CA  8.8  9.1  8.9  8.7   MG  2.1   --   2.2   PHOS  3.9  4.6  5.0*       Signed: Poornima Aguirre MD

## 2018-03-30 NOTE — TELEPHONE ENCOUNTER
Pts daughter called in reference to her mother being in the hospital. Dr. Calvin Mansfield had prescribed her clonazepam but the cardiologist said she shouldn't be taking it. The medication can slow her breathing down. Please call her at [phone].

## 2018-03-30 NOTE — CARDIO/PULMONARY
Cardiopulmonary Rehab Nursing Entry:    Adm with Acute/Chronic Systolic Heart failure, NSTEMI, s/p PCI/TANNER.     HX includes HTN,anxiety,GERD,Hypercholesterolemia,Asthma,CAD,AMI,stenting,remote a-fib,COPD,CKD 3.      Former smoker.      LVEF 3/10/2014 was 30-35%.      Most recent Echo results pending. Pro BNP > 30,000. This was a follow-up visit to answer questions and reinforce prior teaching re: CHF, S&Ss, medication management, Low NA diet, daily weights and when to call the doctor. Pt with basic understanding, reporting her mother had heart disease and she was used to not using salt when cooking. Pt rambling on about her procedure(cardiac cath) yesterday. Adult daughter in room at time of visit did confirm knowledge of cath, stents and NSTEMI. Pt drowsy and appears weak. Per daughter she has been ill and had increased weakness since January. Discussed the possibility of in-patient rehab or [facility] visits to be discussed with primary care team and casemanager. Daughter verbalized understanding. CR will continue to follow and provide teaching as appropriate.

## 2018-03-30 NOTE — PROGRESS NOTES
TRANSFER - IN REPORT:    Verbal report received from ham (name) on Nemarcowgina Marus  being received from pcu(unit) for routine progression of care      Report consisted of patients Situation, Background, Assessment and   Recommendations(SBAR). Information from the following report(s) SBAR, Kardex, ED Summary, Procedure Summary, Intake/Output, MAR, Recent Results and Cardiac Rhythm nsr was reviewed with the receiving nurse. Opportunity for questions and clarification was provided. Assessment completed upon patients arrival to unit and care assumed.

## 2018-03-30 NOTE — PROGRESS NOTES
1917: received report from [address] [place] [address], Femstop applied due to hematoma. 2045: removed 10mmhg form femstop    2100: removed 10mmhg from Femstop    2115: removed 10mmhg from  Femstop     2130: Removed femstop from R groin. No signs of bleeding. Will continue to monitor.

## 2018-03-30 NOTE — PROGRESS NOTES
Name: Emili Iraheta   MRN: [id number]  : 1945      ASSESSMENT:  MACY on CKD-3  A on C systolic CHF/CAD- EF down to 25-30% on repeat Echo  NSTEMI- with trop leak; s/p cath with TANNER  HTN  Anemia- suspect has component of anemia of CKD; iron stores are low. On B12 supplement. B12/Folate is nl  SOB- suspect largely due to fluid overload from CHF + underlying COPD; suspicion for PNA is low (afebrile, wbc mild elevatn on admit, no productive cough). On ABx per 1ry team  High Phos/SHPT- phos better. Mild elevation of PTH    Renal fxn improved in spite of Cath. BP stable. Compensated from CHF standpoint.      Plan:  Resume lasix 40 mg PO Q day from tomm and increase to BID in future as needed  Ct current meds; avoid ACE-I or ARB  ct Ferrous sulfate (started here)  Monitor IOs as able  No nsaids- advised to avoid even as outpt  Will need outpt renal f/u after d/c  AM labs    Subjective:  Resting.  No events overnight    ROS:   Deferred    Exam:  Visit Vitals    /80 (BP 1 Location: Right arm)    Pulse 67    Temp 97.4 °F (36.3 °C)    Resp 20    Ht 5' 5\" (1.651 m)    Wt 70.1 kg (154 lb 8.7 oz)    SpO2 96%    Breastfeeding No    BMI 25.72 kg/m2       Elderly in NAD  Clear  RRR, no rub  Tr edema  Resting     Current Facility-Administered Medications   Medication Dose Route Frequency Last Dose    sodium chloride (NS) flush 5-10 mL  5-10 mL IntraVENous Q8H 10 mL at 189    sodium chloride (NS) flush 5-10 mL  5-10 mL IntraVENous PRN      zolpidem (AMBIEN) tablet 5 mg  5 mg Oral QHS PRN      bivalirudin (ANGIOMAX) 250 mg injection          nitroglycerin 1 mg/10mL injection          ticagrelor (BRILINTA) 90 mg tablet          clopidogrel (PLAVIX) tablet 75 mg  75 mg Oral DAILY 75 mg at 18 0845    ferrous sulfate tablet 325 mg  1 Tab Oral DAILY WITH BREAKFAST 325 mg at 03/30/18 0845    metoprolol tartrate (LOPRESSOR) tablet 12.5 mg  12.5 mg Oral Q12H 12.5 mg at 03/30/18 0846    nitroglycerin (NITROBID) 2 % ointment 1 Inch  1 Inch Topical Q6H PRN      aspirin delayed-release tablet 81 mg  81 mg Oral DAILY 81 mg at 03/30/18 0845    sodium chloride (NS) flush 5-10 mL  5-10 mL IntraVENous Q8H 10 mL at 03/29/18 2224    sodium chloride (NS) flush 5-10 mL  5-10 mL IntraVENous PRN 10 mL at 03/28/18 1353    acetaminophen (TYLENOL) tablet 650 mg  650 mg Oral Q4H  mg at 03/30/18 1314    ondansetron (ZOFRAN) injection 4 mg  4 mg IntraVENous Q4H PRN 4 mg at 03/30/18 0920    bisacodyl (DULCOLAX) suppository 10 mg  10 mg Rectal DAILY PRN      azithromycin (ZITHROMAX) 500 mg in 0.9% sodium chloride (MBP/ADV) 250 mL  500 mg IntraVENous Q24H 500 mg at 03/29/18 2220    cefTRIAXone (ROCEPHIN) 1 g in 0.9% sodium chloride (MBP/ADV) 50 mL MBP  1 g IntraVENous Q24H 1 g at 03/29/18 2339    diphenhydrAMINE (BENADRYL) capsule 25 mg  25 mg Oral Q6H PRN      albuterol-ipratropium (DUO-NEB) 2.5 MG-0.5 MG/3 ML  3 mL Nebulization Q4H PRN 3 mL at 03/29/18 1626    atorvastatin (LIPITOR) tablet 40 mg  40 mg Oral QPM 40 mg at 03/29/18 2207    butalbital-acetaminophen-caffeine (FIORICET, ESGIC) -40 mg per tablet 1 Tab  1 Tab Oral Q6H PRN 1 Tab at 03/30/18 1040    clonazePAM (KlonoPIN) tablet 1 mg  1 mg Oral BID PRN 1 mg at 03/29/18 2207    nitroglycerin (NITROSTAT) tablet 0.4 mg  0.4 mg SubLINGual PRN      pantoprazole (PROTONIX) tablet 40 mg  40 mg Oral ACB 40 mg at 03/30/18 0845       Labs/Data:    Lab Results   Component Value Date/Time    WBC 11.7 (H) 03/30/2018 03:54 AM    Hemoglobin (POC) 11.9 03/08/2014 10:14 AM    HGB 9.5 (L) 03/30/2018 03:54 AM    Hematocrit (POC) 35 03/08/2014 10:14 AM    HCT 31.0 (L) 03/30/2018 03:54 AM    PLATELET 730 84/44/0409 03:54 AM    MCV 80.7 03/30/2018 03:54 AM       Lab Results   Component Value Date/Time    Sodium 137 03/30/2018 03:54 AM    Potassium 4.2 03/30/2018 03:54 AM    Chloride 105 03/30/2018 03:54 AM    CO2 26 03/30/2018 03:54 AM    Anion gap 6 03/30/2018 03:54 AM    Glucose 99 03/30/2018 03:54 AM    BUN 34 (H) 03/30/2018 03:54 AM    Creatinine 1.31 (H) 03/30/2018 03:54 AM    BUN/Creatinine ratio 26 (H) 03/30/2018 03:54 AM    GFR est AA 48 (L) 03/30/2018 03:54 AM    GFR est non-AA 40 (L) 03/30/2018 03:54 AM    Calcium 8.8 03/30/2018 03:54 AM       Wt Readings from Last 3 Encounters:   03/30/18 70.1 kg (154 lb 8.7 oz)   02/08/18 72.6 kg (160 lb)   01/24/18 72.6 kg (160 lb)         Intake/Output Summary (Last 24 hours) at 03/30/18 1526  Last data filed at 03/29/18 2220   Gross per 24 hour   Intake              250 ml   Output              700 ml   Net             -450 ml       Patient seen and examined. Chart reviewed. Labs, data and other pertinent notes reviewed in last 24 hrs.     PMH/SH/FH reviewed and unchanged compared to H&P      Anette Bonilla MD

## 2018-03-31 NOTE — PROGRESS NOTES
0330: Patient had 11 run of V-tach and returned to sinus. Patient alert asymptomatic,vital signs normal, and Patient not in pain. Will continue to monitor    8988: Patient had 13 run of V-tach Patient alert asymptomatic,vital signs normal, and Patient not in pain. Resent 0336 potassium 138 and no mag was ordered. Add on lab for mag was ordered. Will continue to monitor.

## 2018-03-31 NOTE — PROGRESS NOTES
Name: Kye Nguyen   MRN: [id number]  : 1945      ASSESSMENT:  MACY on CKD-3: Renal indices holding steady despite cardiac cath  A on C systolic CHF/CAD- EF down to 25-30% on repeat Echo  NSTEMI- with trop leak; s/p cath with TANNER  HTN  Anemia- suspect has component of anemia of CKD; iron stores are low. On B12 supplement. B12/Folate is nl  SOB- suspect largely due to fluid overload from CHF + underlying COPD; suspicion for PNA is low (afebrile, wbc mild elevatn on admit, no productive cough). On ABx per 1ry team  High Phos/SHPT- phos better. Mild elevation of PTH       Plan:  Resume lasix 40 mg PO Q day and increase to BID in future as needed  Ct current meds; avoid ACE-I or ARB  ct Ferrous sulfate (started here)  Monitor IOs as able  No nsaids- advised to avoid even as outpt  Will need outpt renal f/u after d/c  AM labs    Subjective:  Resting. No events overnight.  No complaints    ROS:   Deferred    Exam:  Visit Vitals    /56 (BP 1 Location: Left arm, BP Patient Position: At rest)    Pulse 77    Temp 97.4 °F (36.3 °C)    Resp 18    Ht 5' 5\" (1.651 m)    Wt 70.1 kg (154 lb 8.7 oz)    SpO2 100%    Breastfeeding No    BMI 25.72 kg/m2       Elderly in NAD  Clear  RRR, no rub  Tr edema  Resting     Current Facility-Administered Medications   Medication Dose Route Frequency Last Dose    clonazePAM (KlonoPIN) tablet 2 mg  2 mg Oral BID 2 mg at 18 1225    QUEtiapine (SEROquel) tablet 25 mg  25 mg Oral BID 25 mg at 18 1018    sodium chloride (NS) flush 5-10 mL  5-10 mL IntraVENous Q8H 10 mL at 18 2354    sodium chloride (NS) flush 5-10 mL  5-10 mL IntraVENous PRN 10 mL at 18 1023    bivalirudin (ANGIOMAX) 250 mg injection          nitroglycerin 1 mg/10mL injection          ticagrelor (BRILINTA) 90 mg tablet          clopidogrel (PLAVIX) tablet 75 mg  75 mg Oral DAILY 75 mg at 03/31/18 1017    ferrous sulfate tablet 325 mg  1 Tab Oral DAILY WITH BREAKFAST 325 mg at 03/31/18 1018    metoprolol tartrate (LOPRESSOR) tablet 12.5 mg  12.5 mg Oral Q12H 12.5 mg at 03/31/18 1017    nitroglycerin (NITROBID) 2 % ointment 1 Inch  1 Inch Topical Q6H PRN      aspirin delayed-release tablet 81 mg  81 mg Oral DAILY 81 mg at 03/31/18 1017    sodium chloride (NS) flush 5-10 mL  5-10 mL IntraVENous Q8H 10 mL at 03/30/18 2354    sodium chloride (NS) flush 5-10 mL  5-10 mL IntraVENous PRN 10 mL at 03/28/18 1353    acetaminophen (TYLENOL) tablet 650 mg  650 mg Oral Q4H  mg at 03/31/18 1022    ondansetron (ZOFRAN) injection 4 mg  4 mg IntraVENous Q4H PRN 4 mg at 03/30/18 0920    bisacodyl (DULCOLAX) suppository 10 mg  10 mg Rectal DAILY PRN      azithromycin (ZITHROMAX) 500 mg in 0.9% sodium chloride (MBP/ADV) 250 mL  500 mg IntraVENous Q24H 500 mg at 03/30/18 2348    cefTRIAXone (ROCEPHIN) 1 g in 0.9% sodium chloride (MBP/ADV) 50 mL MBP  1 g IntraVENous Q24H 1 g at 03/31/18 0106    diphenhydrAMINE (BENADRYL) capsule 25 mg  25 mg Oral Q6H PRN      albuterol-ipratropium (DUO-NEB) 2.5 MG-0.5 MG/3 ML  3 mL Nebulization Q4H PRN 3 mL at 03/29/18 1626    atorvastatin (LIPITOR) tablet 40 mg  40 mg Oral QPM 40 mg at 03/30/18 1733    butalbital-acetaminophen-caffeine (FIORICET, ESGIC) -40 mg per tablet 1 Tab  1 Tab Oral Q6H PRN 1 Tab at 03/30/18 1733    nitroglycerin (NITROSTAT) tablet 0.4 mg  0.4 mg SubLINGual PRN      pantoprazole (PROTONIX) tablet 40 mg  40 mg Oral ACB 40 mg at 03/31/18 1017       Labs/Data:    Lab Results   Component Value Date/Time    WBC 10.1 03/31/2018 03:36 AM    Hemoglobin (POC) 11.9 03/08/2014 10:14 AM    HGB 9.0 (L) 03/31/2018 03:36 AM    Hematocrit (POC) 35 03/08/2014 10:14 AM    HCT 29.5 (L) 03/31/2018 03:36 AM    PLATELET 019 80/31/1506 03:36 AM    MCV 81.3 03/31/2018 03:36 AM       Lab Results Component Value Date/Time    Sodium 138 03/31/2018 03:36 AM    Potassium 4.1 03/31/2018 03:36 AM    Chloride 105 03/31/2018 03:36 AM    CO2 26 03/31/2018 03:36 AM    Anion gap 7 03/31/2018 03:36 AM    Glucose 107 (H) 03/31/2018 03:36 AM    BUN 38 (H) 03/31/2018 03:36 AM    Creatinine 1.38 (H) 03/31/2018 03:36 AM    BUN/Creatinine ratio 28 (H) 03/31/2018 03:36 AM    GFR est AA 45 (L) 03/31/2018 03:36 AM    GFR est non-AA 37 (L) 03/31/2018 03:36 AM    Calcium 8.9 03/31/2018 03:36 AM       Wt Readings from Last 3 Encounters:   03/30/18 70.1 kg (154 lb 8.7 oz)   02/08/18 72.6 kg (160 lb)   01/24/18 72.6 kg (160 lb)         Intake/Output Summary (Last 24 hours) at 03/31/18 1433  Last data filed at 03/31/18 0800   Gross per 24 hour   Intake              480 ml   Output              200 ml   Net              280 ml       Patient seen and examined. Chart reviewed. Labs, data and other pertinent notes reviewed in last 24 hrs.     PMH/SH/FH reviewed and unchanged compared to H&P      eJff Morales MD

## 2018-03-31 NOTE — PROGRESS NOTES
Cardiology Progress Note      3/31/2018 4:29 PM    Admit Date: 3/25/2018          Subjective: No chest pain, other specific c/o          Visit Vitals    /47 (BP 1 Location: Left arm, BP Patient Position: At rest)    Pulse 60    Temp 97.5 °F (36.4 °C)    Resp 18    Ht 5' 5\" (1.651 m)    Wt 70.1 kg (154 lb 8.7 oz)    SpO2 94%    Breastfeeding No    BMI 25.72 kg/m2     03/29 1901 - 03/31 0700  In: 250 [I.V.:250]  Out: 700 [Urine:700]        Objective:      Physical Exam:  VS as above  Chest CTA  Card RRR no gallop     Data Review:   Labs:    BUN 38  Creat 1.4  Hgb 9.0     Telemetry: SR R 71, some NSVT       Assessment:     Acute / chronic systolic heart failure. EF 25%  Small NSTEMI. Remote PCI   2010       CX marginal stented. LAD is chronically occlusion with collateral filling. Cath 3/29  Severe stenoses, sequential, RCA . PCI with 2 Synergy TANNER .    Acute Kidney injury - creatinine continues to improve even after cath. CKD stage 3  Anemia,  Likely due to CKD   Dyslipidemia  COPD  NSVT         Plan: Cont current Rx.  Stable cardiac wise

## 2018-03-31 NOTE — PROGRESS NOTES
Problem: Falls - Risk of  Goal: *Absence of Falls  Document Ruperto Fall Risk and appropriate interventions in the flowsheet.    Outcome: Progressing Towards Goal  Fall Risk Interventions:       Mentation Interventions: Adequate sleep, hydration, pain control, Bed/chair exit alarm, Door open when patient unattended, Evaluate medications/consider consulting pharmacy, Increase mobility, More frequent rounding, Reorient patient, Room close to nurse's station, Self-releasing belt, Toileting rounds    Medication Interventions: Bed/chair exit alarm, Evaluate medications/consider consulting pharmacy, Patient to call before getting OOB, Utilize gait belt for transfers/ambulation, Teach patient to arise slowly    Elimination Interventions: Bed/chair exit alarm, Call light in reach, Elevated toilet seat, Toileting schedule/hourly rounds, Patient to call for help with toileting needs

## 2018-03-31 NOTE — PROGRESS NOTES
Hospitalist Progress Note    NAME: Jessica Drummond   :  1945   MRN:  [id number]       Assessment / Plan:  Acute on chronic systolic heart failure: improving  NSTEMI in pt with CAD s/p [facility] with 2 TANNER 3/29: CP free  CM/valve disease  Remote PAfib  Stable  Continue with lasix  Continue with ASA, BB, Statin, Plavix     MCAY on CKD 3, likely CRS  Better  Monitor labs  Nephrology on the case    Anemia KAYDEN  Stable  F/U FOBT  Continue iron supplements    Hallucination in the setting of Dementia likely alzheimer's likely sun-downing  Better  RESTART KLONOPIN scheduled and not  PRN  Continue low dose seroquel   DC ambien  Continue prn klonipin as at home. Try to DC ASAP  Will need CM for DC planning can not go back home alone at least initially. Headache  Hx migraine  Resolved  Continue Fioricet dose/frequency 3/28  CT [facility] 3/17/18 negative for acute findings  No Neurological sx, if headache persist or sx consider CT head. Leukocytosis and presumed CAP  Better   Continue ABX treatment rocephin and azithromycin  Continue with bronchodilators    COPD not in exacerbation     Code Status: DNR   Surrogate Decision Maker:  Pt's daughter  DVT Prophylaxis: lovenox  GI Prophylaxis: not indicated  Baseline: independent, lives alone. Pt is not on home O2      Body mass index is 25.72 kg/(m^2). Subjective:     Chief Complaint / Reason for Physician Visit  AMS is much better but still with some paranoia and slight confusion her and family feel that it's due to not taking klonopin. No CP or SOB and OTW no complaints.     Review of Systems:  Symptom Y/N Comments  Symptom Y/N Comments   Fever/Chills n   Chest Pain n    Poor Appetite    Edema n    Cough n   Abdominal Pain n    Sputum    Joint Pain     SOB/GUNDERSON y better  Pruritis/Rash     Nausean/vomit n   Tolerating PT/OT     Diarrhea n   Tolerating Diet y    Constipation n   Other       Could NOT obtain due to:      Objective:     VITALS:   Last 24hrs VS reviewed since prior progress note. Most recent are:  Patient Vitals for the past 24 hrs:   Temp Pulse Resp BP SpO2   03/31/18 1234 97.4 °F (36.3 °C) 77 18 114/56 100 %   03/31/18 0836 97.6 °F (36.4 °C) (!) 54 18 (!) 142/92 100 %   03/31/18 0526 - (!) 56 18 108/50 100 %   03/31/18 0524 97.6 °F (36.4 °C) - - - -   03/31/18 0320 97.9 °F (36.6 °C) (!) 54 20 102/64 99 %   03/30/18 2341 97.6 °F (36.4 °C) (!) 54 20 104/83 100 %   03/30/18 2045 97.8 °F (36.6 °C) (!) 57 20 137/64 95 %   03/30/18 2016 97.7 °F (36.5 °C) (!) 59 20 105/52 100 %   03/30/18 1533 98.2 °F (36.8 °C) 63 20 104/48 98 %   03/30/18 1305 - 67 - 122/80 96 %     No intake or output data in the 24 hours ending 03/31/18 1245     PHYSICAL EXAM:  General: WD, WN. Alert, cooperative, no acute distress    EENT:  EOMI. Anicteric sclerae. MMM  Resp:  CTA bilaterally with normal effort and good BS  CV:  s1s2 RRR No edema  GI:  Soft, Non distended, Non tender.  +Bowel sounds  Neurologic:  Alert and oriented X 3, normal speech, grossly intact  Psych:   Good insight. Not anxious nor agitated  Skin:  No rashes. No jaundice    Reviewed most current lab test results and cultures  YES  Reviewed most current radiology test results   YES  Review and summation of old records today    NO  Reviewed patient's current orders and MAR    YES  PMH/SH reviewed - no change compared to H&P  ________________________________________________________________________  Care Plan discussed with:    Comments   Patient x    Family  x 2 DTRs in room   RN x    Care Manager     Consultant                        Multidiciplinary team rounds were held today with , nursing, pharmacist and clinical coordinator. Patient's plan of care was discussed; medications were reviewed and discharge planning was addressed.      ________________________________________________________________________  Total NON critical care TIME:  40   Minutes    Total CRITICAL CARE TIME Spent:   Minutes non procedure based      Comments   >50% of visit spent in counseling and coordination of care     ________________________________________________________________________  Poornima Aguirre MD     Procedures: see electronic medical records for all procedures/Xrays and details which were not copied into this note but were reviewed prior to creation of Plan. LABS:  I reviewed today's most current labs and imaging studies.   Pertinent labs include:  Recent Labs      03/31/18 0336 03/30/18   0354  03/29/18   0342   WBC  10.1  11.7*  12.3*   HGB  9.0*  9.5*  9.8*   HCT  29.5*  31.0*  31.8*   PLT  186  218  210     Recent Labs      03/31/18 0336 03/30/18   0354  03/29/18   0342   NA  138  137  135*   K  4.1  4.2  4.1   CL  105  105  104   CO2  26  26  22   GLU  107*  99  123*   BUN  38*  34*  40*   CREA  1.38*  1.31*  1.47*   CA  8.9  8.8  9.1  8.9   MG  2.3  2.1   --    PHOS   --   3.9  4.6       Signed: Poornima Aguirre MD

## 2018-03-31 NOTE — PROGRESS NOTES
Pt noted by telemetery to have periods of bradycardia down to 39 but rebounds. Sinus rhythm on telly with frequent multiform PVC's with pauses. Dr Osiel Cevallos at bedside and recommend call cardiology. Dr. Nasima Eagle on-call notified and he will come see pt today. Pt otherwise asx and VSS with pulse in 50's.

## 2018-04-01 NOTE — PROGRESS NOTES
Name: Cassie Hamm   MRN: [id number]  : 1945      ASSESSMENT:  MACY on CKD-3: Renal indices holding steady -> Cr down to 1.3mg/dl. Despite cardiac cath   A on C systolic CHF/CAD- EF down to 25-30% on repeat Echo  NSTEMI- with trop leak; s/p cath with TANNER  HTN  Anemia- suspect has component of anemia of CKD; iron stores are low. On B12 supplement. B12/Folate is nl  SOB- suspect largely due to fluid overload from CHF + underlying COPD; suspicion for PNA is low (afebrile, wbc mild elevatn on admit, no productive cough). On ABx per 1ry team  High Phos/SHPT- phos better. Mild elevation of PTH       Plan:  Ct lasix 40 mg PO Q day   Ct current meds; avoid ACE-I or ARB  ct Ferrous sulfate (started here)  Monitor IOs as able  No nsaids- advised to avoid even as outpt  Discharge planning  Will need outpt renal f/u after d/c-> has contact info  AM labs    Subjective:  No events overnight. Feels well.  No complaints    ROS:   No SOB, No CP  No N/V/D    Exam:  Visit Vitals    /74 (BP 1 Location: Left arm, BP Patient Position: At rest)    Pulse 84    Temp 98.1 °F (36.7 °C)    Resp 18    Ht 5' 5\" (1.651 m)    Wt 70.9 kg (156 lb 6.4 oz)    SpO2 96%    Breastfeeding No    BMI 26.03 kg/m2       Elderly in NAD  Clear  RRR, no rub  Improved edema  Resting     Current Facility-Administered Medications   Medication Dose Route Frequency Last Dose    clonazePAM (KlonoPIN) tablet 2 mg  2 mg Oral BID 2 mg at 18 0945    furosemide (LASIX) tablet 40 mg  40 mg Oral DAILY 40 mg at 18 0944    QUEtiapine (SEROquel) tablet 25 mg  25 mg Oral BID 25 mg at 18 0945    sodium chloride (NS) flush 5-10 mL  5-10 mL IntraVENous Q8H 5 mL at 18 1400    sodium chloride (NS) flush 5-10 mL  5-10 mL IntraVENous PRN 10 mL at 18 1023    bivalirudin (ANGIOMAX) 250 mg injection          nitroglycerin 1 mg/10mL injection          ticagrelor (BRILINTA) 90 mg tablet          clopidogrel (PLAVIX) tablet 75 mg  75 mg Oral DAILY 75 mg at 04/01/18 0944    ferrous sulfate tablet 325 mg  1 Tab Oral DAILY WITH BREAKFAST 325 mg at 04/01/18 0944    metoprolol tartrate (LOPRESSOR) tablet 12.5 mg  12.5 mg Oral Q12H 12.5 mg at 04/01/18 0945    nitroglycerin (NITROBID) 2 % ointment 1 Inch  1 Inch Topical Q6H PRN      aspirin delayed-release tablet 81 mg  81 mg Oral DAILY 81 mg at 04/01/18 0944    sodium chloride (NS) flush 5-10 mL  5-10 mL IntraVENous Q8H 5 mL at 04/01/18 1400    sodium chloride (NS) flush 5-10 mL  5-10 mL IntraVENous PRN 10 mL at 04/01/18 0946    acetaminophen (TYLENOL) tablet 650 mg  650 mg Oral Q4H  mg at 04/01/18 0652    ondansetron (ZOFRAN) injection 4 mg  4 mg IntraVENous Q4H PRN 4 mg at 03/30/18 0920    bisacodyl (DULCOLAX) suppository 10 mg  10 mg Rectal DAILY PRN      azithromycin (ZITHROMAX) 500 mg in 0.9% sodium chloride (MBP/ADV) 250 mL  500 mg IntraVENous Q24H 500 mg at 03/31/18 2253    cefTRIAXone (ROCEPHIN) 1 g in 0.9% sodium chloride (MBP/ADV) 50 mL MBP  1 g IntraVENous Q24H 1 g at 04/01/18 0011    diphenhydrAMINE (BENADRYL) capsule 25 mg  25 mg Oral Q6H PRN      albuterol-ipratropium (DUO-NEB) 2.5 MG-0.5 MG/3 ML  3 mL Nebulization Q4H PRN 3 mL at 03/29/18 1626    atorvastatin (LIPITOR) tablet 40 mg  40 mg Oral QPM 40 mg at 03/31/18 1837    butalbital-acetaminophen-caffeine (FIORICET, ESGIC) -40 mg per tablet 1 Tab  1 Tab Oral Q6H PRN 1 Tab at 03/30/18 1733    nitroglycerin (NITROSTAT) tablet 0.4 mg  0.4 mg SubLINGual PRN      pantoprazole (PROTONIX) tablet 40 mg  40 mg Oral ACB 40 mg at 04/01/18 0960       Labs/Data:    Lab Results   Component Value Date/Time    WBC 9.8 04/01/2018 04:32 AM    Hemoglobin (POC) 11.9 03/08/2014 10:14 AM    HGB 9.4 (L) 04/01/2018 04:32 AM    Hematocrit (POC) 35 03/08/2014 10:14 AM    HCT 31.0 (L) 04/01/2018 04:32 AM    PLATELET 635 12/25/9758 04:32 AM    MCV 82.2 04/01/2018 04:32 AM       Lab Results   Component Value Date/Time    Sodium 138 04/01/2018 04:32 AM    Potassium 3.8 04/01/2018 04:32 AM    Chloride 103 04/01/2018 04:32 AM    CO2 27 04/01/2018 04:32 AM    Anion gap 8 04/01/2018 04:32 AM    Glucose 103 (H) 04/01/2018 04:32 AM    BUN 33 (H) 04/01/2018 04:32 AM    Creatinine 1.21 (H) 04/01/2018 04:32 AM    BUN/Creatinine ratio 27 (H) 04/01/2018 04:32 AM    GFR est AA 53 (L) 04/01/2018 04:32 AM    GFR est non-AA 44 (L) 04/01/2018 04:32 AM    Calcium 8.8 04/01/2018 04:32 AM       Wt Readings from Last 3 Encounters:   04/01/18 70.9 kg (156 lb 6.4 oz)   02/08/18 72.6 kg (160 lb)   01/24/18 72.6 kg (160 lb)         Intake/Output Summary (Last 24 hours) at 04/01/18 1415  Last data filed at 03/31/18 1749   Gross per 24 hour   Intake                0 ml   Output              100 ml   Net             -100 ml       Patient seen and examined. Chart reviewed. Labs, data and other pertinent notes reviewed in last 24 hrs.     PMH/SH/FH reviewed and unchanged compared to H&P      Ambrose Conde MD

## 2018-04-01 NOTE — PROGRESS NOTES
Cardiology Progress Note      4/1/2018 5:22 PM    Admit Date: 3/25/2018          Subjective: No chest pain or SOB. No other new c/o. Visit Vitals    /54 (BP 1 Location: Left arm, BP Patient Position: At rest)    Pulse 71    Temp 98.2 °F (36.8 °C)    Resp 18    Ht 5' 5\" (1.651 m)    Wt 70.9 kg (156 lb 6.4 oz)    SpO2 97%    Breastfeeding No    BMI 26.03 kg/m2     03/30 1901 - 04/01 0700  In: 480 [P.O.:480]  Out: 300 [Urine:300]        Objective:      Physical Exam:  VS as above  Chest coarse bibasilar crackles  Card RRR no gallop heard     Data Review:   Labs:    Hgb 9.4  BUN 33  Creat 1.2     Telemetry: SR R 70       Assessment:     Acute / chronic systolic heart failure. EF 25%  Small NSTEMI. Remote PCI   2010       CX marginal stented.      LAD is chronically occlusion with collateral filling. Cath 3/29  Severe stenoses, sequential, RCA .   PCI with 2 Synergy TANNER .     Acute Kidney injury - creatinine continues to improve even after cath. CKD stage 3  Anemia,  Likely due to CKD   Dyslipidemia  COPD  NSVT         Plan:  Cont current Rx. Nothing to add.  OK for d/c from my standpoint

## 2018-04-01 NOTE — PROGRESS NOTES
Hospitalist Progress Note    NAME: Kaye Sousa   :  1945   MRN:  [id number]       Assessment / Plan:  Acute on chronic systolic heart failure: improved  NSTEMI in pt with CAD s/p [facility] with 2 TANNER 3/29: CP free  CM/valve disease  Remote PAF  Stable  Continue with lasix  Continue with ASA, BB, Statin, Plavix  Cardio on the case     MACY on CKD 3, likely CRS  Better  Monitor labs  Nephrology on the case    Anemia KAYDEN  Stable  F/U FOBT  Continue iron supplements    Hallucination in the setting of Dementia likely alzheimer's likely sun-downing  Continues to improve  Continue klonopin  Continue low dose seroquel   DC'd ambien  Try to DC ASAP  Will need CM for DC planning can not go back home alone at least initially. Headache  Hx migraine  Resolved  Continue Fioricet dose/frequency 3/28  CT head from [facility] 3/17/18 negative for acute findings  No Neurological sx, if headache persist or sx consider CT head. Leukocytosis and presumed CAP  Resolved  Continue ABX treatment rocephin and azithromycin  Continue with bronchodilators    COPD not in exacerbation: stable     Code Status: DNR   Surrogate Decision Maker:  Pt's daughter  DVT Prophylaxis: lovenox  GI Prophylaxis: not indicated  Baseline: independent, lives alone. Pt is not on home O2  DISPO: DC home tomorrow if AMS remains clear    Body mass index is 26.03 kg/(m^2). Subjective:     Chief Complaint / Reason for Physician Visit  AMS continues to improve. No CP or SOB. Possible DC home tomorrow. Review of Systems:  Symptom Y/N Comments  Symptom Y/N Comments   Fever/Chills n   Chest Pain n    Poor Appetite    Edema n    Cough n   Abdominal Pain n    Sputum    Joint Pain     SOB/GUNDERSON y better  Pruritis/Rash     Nausean/vomit n   Tolerating PT/OT     Diarrhea n   Tolerating Diet y    Constipation n   Other       Could NOT obtain due to:      Objective:     VITALS:   Last 24hrs VS reviewed since prior progress note.  Most recent are:  Patient Vitals for the past 24 hrs:   Temp Pulse Resp BP SpO2   04/01/18 1111 98.1 °F (36.7 °C) 84 18 120/74 96 %   04/01/18 0726 98.3 °F (36.8 °C) 82 18 110/64 96 %   04/01/18 0357 97.4 °F (36.3 °C) 63 20 117/59 93 %   03/31/18 2341 97.5 °F (36.4 °C) 63 20 101/56 94 %   03/31/18 2046 98.3 °F (36.8 °C) (!) 58 20 117/51 98 %   03/31/18 1918 97.7 °F (36.5 °C) (!) 51 22 93/47 93 %   03/31/18 1516 97.5 °F (36.4 °C) 60 18 124/47 94 %   03/31/18 1234 97.4 °F (36.3 °C) 77 18 114/56 100 %       Intake/Output Summary (Last 24 hours) at 04/01/18 1156  Last data filed at 03/31/18 1749   Gross per 24 hour   Intake                0 ml   Output              100 ml   Net             -100 ml        PHYSICAL EXAM:  General: WD, WN. Alert, cooperative, no acute distress    EENT:  EOMI. Anicteric sclerae. MMM  Resp:  CTA bilaterally with normal effort and good BS  CV:  s1s2 RRR No edema  GI:  Soft, Non distended, Non tender.  +Bowel sounds  Neurologic:  Alert and oriented X 3, normal speech, grossly intact  Psych:   Good insight. Not anxious nor agitated  Skin:  No rashes. No jaundice    Reviewed most current lab test results and cultures  YES  Reviewed most current radiology test results   YES  Review and summation of old records today    NO  Reviewed patient's current orders and MAR    YES  PMH/SH reviewed - no change compared to H&P  ________________________________________________________________________  Care Plan discussed with:    Comments   Patient x    Family  x 2 DTRs in room   RN x    Care Manager     Consultant                        Multidiciplinary team rounds were held today with , nursing, pharmacist and clinical coordinator. Patient's plan of care was discussed; medications were reviewed and discharge planning was addressed.      ________________________________________________________________________  Total NON critical care TIME:  40   Minutes    Total CRITICAL CARE TIME Spent:   Minutes non procedure based Comments   >50% of visit spent in counseling and coordination of care     ________________________________________________________________________  Christianne Amador MD     Procedures: see electronic medical records for all procedures/Xrays and details which were not copied into this note but were reviewed prior to creation of Plan. LABS:  I reviewed today's most current labs and imaging studies.   Pertinent labs include:  Recent Labs      04/01/18 0432 03/31/18 0336  03/30/18   0354   WBC  9.8  10.1  11.7*   HGB  9.4*  9.0*  9.5*   HCT  31.0*  29.5*  31.0*   PLT  218  186  218     Recent Labs      04/01/18 0432 03/31/18 0336 03/30/18   0354   NA  138  138  137   K  3.8  4.1  4.2   CL  103  105  105   CO2  27  26  26   GLU  103*  107*  99   BUN  33*  38*  34*   CREA  1.21*  1.38*  1.31*   CA  8.8  8.9  8.8   MG  2.3  2.3  2.1   PHOS  3.0   --   3.9   ALB  3.1*   --    --    TBILI  0.3   --    --    SGOT  20   --    --    ALT  29   --    --        Signed: Christianne Amador MD

## 2018-04-01 NOTE — PROGRESS NOTES
*CM acknowledged consult*  CM observed pt therapy notes. Pt has not activly worked with therapy since 3?28, and will need updated notes if SNF is being recommended by pt and or family. Pt has refused and has been out of her room for testing. CM will speak with pt in regards to her d/c needs and plans.     JORGE Rawls CM  [phone]

## 2018-04-02 NOTE — PROGRESS NOTES
Initial Nutrition Assessment:    INTERVENTIONS/RECOMMENDATIONS:   · Continue current diet    ASSESSMENT:   Chart reviewed, medically noted for Acute on chronic systolic heart failure, NSTEMI, MACY on CKD3 and PMH shown below. Assessing pt due to LOS. Visit kept short because pt was trying to rest. She does report of a good appetite and consuming >50% of meals. This is confirmed by RN documentation in EMR flowsheet. Weight history shows not significant weight loss PTA.      Past Medical History:   Diagnosis Date    CHF (congestive heart failure) (HCC)     Other ill-defined conditions(799.89)     AMI    Psychiatric disorder     depression       Diet Order: Cardiac  % Eaten:  Patient Vitals for the past 72 hrs:   % Diet Eaten   03/31/18 0800 98 %     Pertinent Medications: [x]Reviewed: Fe, Lasix, PPI  Pertinent Labs: [x]Reviewed:   Food Allergies: [x]NKFA  []Other   Last BM: 4/1  Edema:        []RUE   []LUE   []RLE   []LLE      Pressure Injury:      [] Stage I   [] Stage II   [] Stage III   [] Stage IV      Wt Readings from Last 30 Encounters:   04/01/18 70.9 kg (156 lb 6.4 oz)   02/08/18 72.6 kg (160 lb)   01/24/18 72.6 kg (160 lb)   10/10/17 77.1 kg (170 lb)   09/14/17 76.9 kg (169 lb 9.6 oz)   08/07/17 76.2 kg (168 lb)   05/31/17 73.9 kg (163 lb)   05/11/17 72.6 kg (160 lb)   04/21/17 76.2 kg (168 lb)   01/31/17 77.1 kg (170 lb)   11/11/16 78.5 kg (173 lb)   09/06/16 74.8 kg (165 lb)   07/01/16 74.8 kg (165 lb)   06/24/16 76.2 kg (168 lb)   06/03/16 76.2 kg (168 lb)   04/13/16 78.5 kg (173 lb)   12/11/15 79.4 kg (175 lb)   08/24/15 80.5 kg (177 lb 8 oz)   07/25/15 72.6 kg (160 lb)   04/28/15 78 kg (172 lb)   01/27/15 79.4 kg (175 lb)   10/03/14 80.5 kg (177 lb 8 oz)   08/11/14 72.6 kg (160 lb)   07/22/14 78.7 kg (173 lb 8 oz)   05/22/14 76.7 kg (169 lb)   03/24/14 76.9 kg (169 lb 8 oz)   03/20/14 75.8 kg (167 lb)   03/12/14 76.2 kg (168 lb 1.6 oz)   12/26/13 77.1 kg (170 lb)   01/06/11 77.1 kg (170 lb) Anthropometrics:   Height: 5' 5\" (165.1 cm) Weight: 70.9 kg (156 lb 6.4 oz)   IBW (%IBW):   ( ) UBW (%UBW):   (  %)   Last Weight Metrics:  Weight Loss Metrics 4/1/2018 2/8/2018 1/24/2018 10/10/2017 9/14/2017 8/7/2017 5/31/2017   Today's Wt 156 lb 6.4 oz 160 lb 160 lb 170 lb 169 lb 9.6 oz 168 lb 163 lb   BMI 26.03 kg/m2 26.63 kg/m2 25.82 kg/m2 27.44 kg/m2 27.37 kg/m2 27.12 kg/m2 26.31 kg/m2       BMI: Body mass index is 26.03 kg/(m^2). This BMI is indicative of:   []Underweight    []Normal    [x]Overweight    [] Obesity   [] Extreme Obesity (BMI>40)     Estimated Nutrition Needs (Based on):   1450 Kcals/day (BMR: 1215 x 1.2) , 70 g (1 g/kg) Protein  Carbohydrate: At Least 130 g/day  Fluids: 1450 mL/day (1ml/kcal) or per primary team    NUTRITION DIAGNOSES:   Problem:  No nutritional diagnosis at this time      Etiology: related to       Signs/Symptoms: as evidenced by        NUTRITION INTERVENTIONS:  Meals/Snacks: General/healthful diet                  GOAL:   consume >50% of meals in 3-5 days     LEARNING NEEDS (Diet, Food/Nutrient-Drug Interaction):    [x] None Identified   [] Identified and Education Provided/Documented   [] Identified and Pt declined/was not appropriate     Cultureal, Oriental orthodox, OR Ethnic Dietary Needs:    [x] None Identified   [] Identified and Addressed     [x] Interdisciplinary Care Plan Reviewed/Documented    [x] Discharge Planning:  Continue heart healthy diet    MONITORING /EVALUATION:      Food/Nutrient Intake Outcomes:  Total energy intake  Physical Signs/Symptoms Outcomes: Weight/weight change, Electrolyte and renal profile, Glucose profile, GI    NUTRITION RISK:    [] High              [x] Moderate    to       [x]  Low  []  Minimal/Uncompromised    PT SEEN FOR:    []  MD Consult: []Calorie Count      []Diabetic Diet Education        []Diet Education     []Electrolyte Management     []General Nutrition Management and Supplements     []Management of Tube Feeding     []TPN Recommendations    []  RN Referral:  []MST score >=2     []Enteral/Parenteral Nutrition PTA     []Pregnant: Gestational DM or Multigestation     []Pressure Ulcer/Wound Care needs        []  Low BMI  [x]  LOS Referral       Danny Zee RDN  Pager [phone]  Weekend Pager [phone]

## 2018-04-02 NOTE — PROGRESS NOTES
Problem: Mobility Impaired (Adult and Pediatric)  Goal: *Acute Goals and Plan of Care (Insert Text)  Physical Therapy Goals  Initiated 3/26/2018  Goals remain appropriate for next 7 days- 4/2/18  1. Patient will move from supine to sit and sit to supine  in bed with independence within 7 day(s). 2.  Patient will transfer from bed to chair and chair to bed with modified independence using the least restrictive device within 7 day(s). 3.  Patient will perform sit to stand with modified independence within 7 day(s). 4.  Patient will ambulate with modified independence for 200 feet with the least restrictive device within 7 day(s). physical Therapy TREATMENT  Patient: Alireza Dent (51 y.o. female)  Date: 4/2/2018  Diagnosis: Respiratory failure ([address] <principal problem not specified>       Precautions:    Chart, physical therapy assessment, plan of care and goals were reviewed. ASSESSMENT: Patient demonstrating mild confusion throughout tx session. She is aware of confusion and appears upset and anxious regarding it. Patient requiring close supervision for bed mobility and CGA for transfers and ambulation. Gait is unsteady demonstrating narrowed ABRAM, path deviations and several balance checks requiring CGA to maintain balance. At this time patient would benefit from use of RW to improve gait stability. Patient reports complete independence at baseline. She is currently below her functional baseline and would benefit from further rehab to improve overall independence. Question if patient has a level of mild confusion which is exacerbated during hospitalization. Recommend SNF following discharge and patient may benefit from increased supervision once home if confusion persists.     Progression toward goals:  []    Improving appropriately and progressing toward goals  [x]    Improving slowly and progressing toward goals  []    Not making progress toward goals and plan of care will be adjusted PLAN:  Patient continues to benefit from skilled intervention to address the above impairments. Continue treatment per established plan of care. Discharge Recommendations:  Skilled Nursing Facility  Further Equipment Recommendations for Discharge:  TBD by rehab     SUBJECTIVE:   Patient stated I think I will do well at rehab.     OBJECTIVE DATA SUMMARY:   Critical Behavior:  Neurologic State: Alert, Confused  Orientation Level: Oriented X4  Cognition: Follows commands  Safety/Judgement: Awareness of environment  Functional Mobility Training:  Bed Mobility:     Supine to Sit: Supervision  Sit to Supine: Supervision  Scooting: Supervision        Transfers:  Sit to Stand: Contact guard assistance  Stand to Sit: Contact guard assistance        Bed to Chair: Contact guard assistance                    Balance:  Sitting: Intact  Standing: Impaired  Standing - Static: Fair  Standing - Dynamic : Fair  Ambulation/Gait Training:  Distance (ft): 75 Feet (ft)  Assistive Device: Gait belt  Ambulation - Level of Assistance: Contact guard assistance        Gait Abnormalities: Decreased step clearance; Path deviations (several balance checks)        Base of Support: Narrowed     Speed/Kendra: Pace decreased (<100 feet/min)  Step Length: Left shortened;Right shortened      Gait is slow and and unsteady demonstrating narrowed ABRAM with path deviations and several balance checks. Pain:  Pain Scale 1: Numeric (0 - 10)  Pain Intensity 1: 6  Pain Location 1: Head  Pain Orientation 1: Anterior  Pain Description 1: Aching  Pain Intervention(s) 1: Medication (see MAR)  Activity Tolerance:   Post activity HR=47; ;XJ=271/55 and O2 sats=96% on RA  Please refer to the flowsheet for vital signs taken during this treatment.   After treatment:   [x]    Patient left in no apparent distress sitting up in chair  []    Patient left in no apparent distress in bed  [x]    Call bell left within reach  [x]    Nursing notified  []    Caregiver present  [x]    Bed alarm activated    COMMUNICATION/COLLABORATION:   The patients plan of care was discussed with: Registered Nurse, Physician and     Devon Stallworth, PT   Time Calculation: 29 mins

## 2018-04-02 NOTE — CARDIO/PULMONARY
Cardiopulmonary Rehab:     Adm with Acute/Chronic Systolic Heart failure, NSTEMI, s/p PCI/TANNER.     HX includes HTN, anxiety, GERD, Hypercholesterolemia, Asthma, CAD, AMI, stenting, remote a-fib, COPD, CKD 3.      Former smoker.      LVEF 3/10/2014 was 30-35%.      Most recent Echo results pending. Pro BNP > 30,000. Pt visited. This was a follow-up visit to answer questions and reinforce prior teaching re: CHF, S&Ss, medication management, Low NA diet, daily weights and when to call the doctor. Stressed importance of requesting weights before breakfast at SNF. And to report any increased SOB and swelling to the staff. Also suggested that she let the staff know if her food tastes salty. Pt verbalized understanding.

## 2018-04-02 NOTE — PROGRESS NOTES
Bedside shift change report given to Lidia Reyes RN (oncoming nurse) by Susanne Eduardo RN (offgoing nurse). Report included the following information SBAR.     1430 Pt had 18 second run of vtach. Dr. Melecio luz. Bedside shift change report given to SYDNEE Pressley (oncoming nurse). Report included the following information SBAR. SHIFT SUMMARY:            [address] NURSING NOTE   Admission Date 3/25/2018   Admission Diagnosis Respiratory failure ([address]   Consults IP CONSULT TO CARDIOLOGY  IP CONSULT TO HOSPITALIST  IP CONSULT TO NEPHROLOGY      Cardiac Monitoring [x] Yes [] No      Purposeful Hourly Rounding [x] Yes    Ruperto Score Total Score: 3   Ruperto score 3 or > [x] Bed Alarm [] Avasys [] 1:1 sitter [] Patient refused (Signed refusal form in chart)   Nicholas Score Nicholas Score: 18   Nicholas score 14 or < [] PMT consult [] Wound Care consult    []  Specialty bed  [] Nutrition consult      Influenza Vaccine Received Flu Vaccine for Current Season (usually Sept-March): No    Patient/Guardian Refused (Notify MD): Yes      Oxygen needs? [x] Room air Oxygen @  []1L    []2L    []3L   []4L    []5L   []6L via  NC   Chronic home O2 use? [] Yes [x] No  Perform O2 challenge test and document in progress note using smartphrase (.Homeoxygen)      Last bowel movement Last Bowel Movement Date: 04/01/18      Urinary Catheter             LDAs               Peripheral IV 03/30/18 Right Hand (Active)   Site Assessment Clean, dry, & intact 4/2/2018  3:08 PM   Phlebitis Assessment 0 4/2/2018  3:08 PM   Infiltration Assessment 0 4/2/2018  3:08 PM   Dressing Status Clean, dry, & intact 4/2/2018  3:08 PM   Dressing Type Tape 4/2/2018  3:08 PM   Hub Color/Line Status Blue;Flushed 4/2/2018  3:08 PM                         Readmission Risk Assessment Tool Score Low Risk            11       Total Score        3 Has Seen PCP in Last 6 Months (Yes=3, No=0)    3 Patient Length of Stay (>5 days = 3)    5 Pt.  Coverage (Medicare=5 , Medicaid, or Self-Pay=4) Criteria that do not apply:    . Living with Significant Other. Assisted Living. LTAC. SNF.  or   Rehab    IP Visits Last 12 Months (1-3=4, 4=9, >4=11)    Charlson Comorbidity Score (Age + Comorbid Conditions)       Expected Length of Stay 4d 12h   Actual Length of Stay 8

## 2018-04-02 NOTE — PROGRESS NOTES
Hospitalist Progress Note    NAME: Ericka Jaimse   :  1945   MRN:  [id number]       Assessment / Plan:  Acute on chronic systolic heart failure: improved  NSTEMI in pt with CAD s/p [facility] with 2 TANNER 3/29: CP free  CM/valve disease  Remote PAF  Remains stable  Continue with lasix  Continue with ASA, BB, Statin, Plavix  Cardio cleared her for DC home but she is confused.     MACY on CKD 3, likely CRS  Better  Monitor labs  Nephrology on the case    Anemia KAYDEN  Stable  Continue iron supplements    Hallucination in the setting of possible Dementia likely sun-downing  Worse today but waxes and wanes  Will discontinue seroquel  Continue klonopin    Headache  Hx migraine  Resolved  Continue Fioricet dose/frequency 3/28  CT head from THE [facility] 3/17/18 negative for acute findings  No Neurological sx, if headache persist or sx consider CT head. Leukocytosis and presumed CAP  COPD not in exacerbation: stable  Resolved  DC ABX treatment rocephin and azithromycin  Continue with bronchodilators prn     Code Status: DNR   Surrogate Decision Maker:  Pt's daughter  DVT Prophylaxis: lovenox  GI Prophylaxis: not indicated  Baseline: independent, lives alone. Pt is not on home O2  DISPO: SNF    Body mass index is 26.03 kg/(m^2). Subjective:     Chief Complaint / Reason for Physician Visit  AMS continues to improve. No CP or SOB. Possible DC home tomorrow. Review of Systems:  Symptom Y/N Comments  Symptom Y/N Comments   Fever/Chills n   Chest Pain n    Poor Appetite    Edema n    Cough n   Abdominal Pain n    Sputum    Joint Pain     SOB/GUNDERSON y better  Pruritis/Rash     Nausean/vomit n   Tolerating PT/OT     Diarrhea n   Tolerating Diet y    Constipation n   Other       Could NOT obtain due to:      Objective:     VITALS:   Last 24hrs VS reviewed since prior progress note.  Most recent are:  Patient Vitals for the past 24 hrs:   Temp Pulse Resp BP SpO2   18 1517 98.3 °F (36.8 °C) 60 18 90/53 99 %   18 1102 - (!) 49 - 107/55 98 %   04/02/18 1035 98 °F (36.7 °C) (!) 56 17 120/43 97 %   04/02/18 0734 98.2 °F (36.8 °C) (!) 56 18 91/56 94 %   04/02/18 0341 98 °F (36.7 °C) (!) 57 18 98/46 95 %   04/01/18 2249 98.2 °F (36.8 °C) 65 18 130/49 94 %   04/01/18 1943 98.3 °F (36.8 °C) 62 18 127/44 96 %     No intake or output data in the 24 hours ending 04/02/18 1732     PHYSICAL EXAM:  General: Alert but confused, cooperative, no acute distress    EENT:  EOMI. Anicteric sclerae. MMM  Resp:  CTA bilaterally with normal effort and good BS  CV:  s1s2 RRR No edema  GI:  Soft, Non distended, Non tender.  +Bowel sounds  Neurologic:  Alert and oriented X 2, normal speech, grossly intact  Psych:   Good insight. Not anxious nor agitated  Skin:  No rashes. No jaundice    Reviewed most current lab test results and cultures  YES  Reviewed most current radiology test results   YES  Review and summation of old records today    NO  Reviewed patient's current orders and MAR    YES  PMH/SH reviewed - no change compared to H&P  ________________________________________________________________________  Care Plan discussed with:    Comments   Patient x    Family      RN x    Care Manager     Consultant                        Multidiciplinary team rounds were held today with , nursing, pharmacist and clinical coordinator. Patient's plan of care was discussed; medications were reviewed and discharge planning was addressed. ________________________________________________________________________  Total NON critical care TIME:    Minutes    Total CRITICAL CARE TIME Spent:   Minutes non procedure based      Comments   >50% of visit spent in counseling and coordination of care     ________________________________________________________________________  Adebayo Méndez MD     Procedures: see electronic medical records for all procedures/Xrays and details which were not copied into this note but were reviewed prior to creation of Plan. LABS:  I reviewed today's most current labs and imaging studies.   Pertinent labs include:  Recent Labs      04/02/18 0345 04/01/18 0432 03/31/18 0336   WBC  8.6  9.8  10.1   HGB  9.0*  9.4*  9.0*   HCT  29.3*  31.0*  29.5*   PLT  189  218  186     Recent Labs      04/02/18 0345 04/01/18 0432 03/31/18 0336   NA  140  138  138   K  3.5  3.8  4.1   CL  106  103  105   CO2  27  27  26   GLU  99  103*  107*   BUN  29*  33*  38*   CREA  1.10*  1.21*  1.38*   CA  8.7  8.8  8.9   MG  2.3  2.3  2.3   PHOS  3.3  3.0   --    ALB  3.0*  3.1*   --    TBILI  0.3  0.3   --    SGOT  14*  20   --    ALT  23  29   --        Signed: Kendrick Interiano MD

## 2018-04-02 NOTE — PROGRESS NOTES
Cardiology Progress Note      4/2/2018 12:58 PM    Admit Date: 3/25/2018          Subjective:  Stable, ambulating s difficulty         Visit Vitals    /55 (BP Patient Position: Sitting;Post activity)    Pulse (!) 49    Temp 98 °F (36.7 °C)    Resp 17    Ht 5' 5\" (1.651 m)    Wt 70.9 kg (156 lb 6.4 oz)    SpO2 98%    Breastfeeding No    BMI 26.03 kg/m2              Objective:      Physical Exam:  VS as above  Chest crackles at bases   Card RRR skipped beats , no gallop     Data Review:   Labs:    Hgb 9.0  BUN 29  Creat 1.0     Telemetry: SR R 69 freq PVCs       Assessment:     Acute / chronic systolic heart failure. EF 25%  Small NSTEMI. Remote PCI   2010  CX marginal stented.  LAD is chronically occlusion with collateral filling. Cath 3/29  Severe stenoses, sequential, RCA .   PCI with 2 [employer]nergy TANNER .   Acute Kidney injury - resolved   CKD stage 3  Anemia,  Likely due to CKD   Dyslipidemia  COPD  NSVT     Plan: Cont current Rx. OK for d/c from CV standpoint. F/U Dr Grover Wayne 4-6 weeks. Will see prn until d/c .  D/C on Plavix, ASA and current cardiac meds

## 2018-04-02 NOTE — PROGRESS NOTES
Received report from Abbie, [facility]. Assumed care of patient.

## 2018-04-02 NOTE — PROGRESS NOTES
CM noted consult: DC Planning: Can not go home alone initially and will need HHC if not going to SNF. CM talked to Pt and she is in agreement to go to SNF rehab and then transition home with [facility]. CM gave Pt copy of choice list.     Pt asked CM to call her [address] [place] [address] Avenue, Deion Goodson, [phone], and to discuss rehab that would be most conveniant for the DTR. CM called DTR and she works in [place] and lives in [address] [facility]. She indicated that [address] [place] [address],[address] [place] [address] would be first choice and [facility] would be second choice. CM had Pt sign FOC for SNF. Signed copy on bedside chart. CM sent referral to [address] [place] [address] via [facility]. Madhavi Stanley indicated that Pt looked good but that Pt would need to work with therapy before they can officially accept her. CM called Therapy and let them know to try to work with them tomorrow. 12:29 PM   CM sent Grupanya message to [address] [place] [address] for them to review Physical Therapy notes. CM spoke to MD and he indicated that Pt may be ready for discharge on 4/3/18.     1:47 PM   [address] [place] [address] confirmed that they can accept Pt when stable. CM spoke to DTR and explained that Pt may be discharged tomorrow if stable. DTR is aware of Pt confusion and is concerned. CM gave her the name an number of [employer]. DTR is going to call and look for resources to help keep Pt at home for as long as possible. DTR indicated that she had to work tomorrow. [facility] will not be able to transport Pt. CM will tentatively set up medical transport through [facility] with a  time of 11 AM.  CM/RN can cancel it if we need to tomorrow. 2:28 PM   CM spoke to MD and let him know about tentative transport and he requested that we change the transport time to 3 PM.  CM changed the transport time per MD request to 3 PM.   CM can cancel the transport request if Pt is not medically stable. PCS form is located on bedside chart. 7:06 PM   RAMANA clicked Accepted and Selected in [facility]. CM will continue to monitor discharge plan.       Jorge Alberto, [address]

## 2018-04-02 NOTE — PROGRESS NOTES
Name: Ayla Sherwood   MRN: [id number]  : 1945      ASSESSMENT:    MACY on CKD-3: Creatinine improved (peaked at 1.9; now 1.1). s/p cardiac cath 3/29  Acute on Chronic systolic CHF/CAD- EF down to 25-30% on repeat Echo  NSTEMI- with trop leak; s/p cath with TANNER  HTN  Anemia- suspect has component of anemia of CKD; iron stores are low. On B12 supplement. B12/Folate nl  SOB- suspect largely due to fluid overload from CHF + underlying COPD  High Phos/SHPT- phos better. Mild elevation of PTH       Plan:    OK to restart low dose ACE inh from my standpoint (restarted Lisinopril 2.5 mg daily)    lasix 40 mg PO Q day     On Ferrous sulfate (started here)    No nsaids- advised to avoid even as outpt    Will need outpt renal f/u after d/c-> my office will arrange    Subjective:  No events overnight. Feels well. No complaints. We discussed the above.     ROS:   No SOB, No CP  No N/V/D    Exam:  Visit Vitals    BP 98/46 (BP 1 Location: Left arm, BP Patient Position: At rest)    Pulse (!) 57    Temp 98 °F (36.7 °C)    Resp 18    Ht 5' 5\" (1.651 m)    Wt 70.9 kg (156 lb 6.4 oz)    SpO2 95%    Breastfeeding No    BMI 26.03 kg/m2       Elderly in NAD  Clear  RRR, no rub  Improved edema  Resting     Current Facility-Administered Medications   Medication Dose Route Frequency Last Dose    clonazePAM (KlonoPIN) tablet 2 mg  2 mg Oral BID 2 mg at 18 175    furosemide (LASIX) tablet 40 mg  40 mg Oral DAILY 40 mg at 18 0944    QUEtiapine (SEROquel) tablet 25 mg  25 mg Oral BID 25 mg at 18 1754    sodium chloride (NS) flush 5-10 mL  5-10 mL IntraVENous Q8H 10 mL at 18 0053    sodium chloride (NS) flush 5-10 mL  5-10 mL IntraVENous PRN 10 mL at 18 1023    bivalirudin (ANGIOMAX) 250 mg injection          nitroglycerin 1 mg/10mL injection          ticagrelor (BRILINTA) 90 mg tablet          clopidogrel (PLAVIX) tablet 75 mg  75 mg Oral DAILY 75 mg at 04/01/18 0944    ferrous sulfate tablet 325 mg  1 Tab Oral DAILY WITH BREAKFAST 325 mg at 04/01/18 0944    metoprolol tartrate (LOPRESSOR) tablet 12.5 mg  12.5 mg Oral Q12H 12.5 mg at 04/01/18 2158    nitroglycerin (NITROBID) 2 % ointment 1 Inch  1 Inch Topical Q6H PRN      aspirin delayed-release tablet 81 mg  81 mg Oral DAILY 81 mg at 04/01/18 0944    sodium chloride (NS) flush 5-10 mL  5-10 mL IntraVENous Q8H 10 mL at 04/01/18 2200    sodium chloride (NS) flush 5-10 mL  5-10 mL IntraVENous PRN 10 mL at 04/01/18 0946    acetaminophen (TYLENOL) tablet 650 mg  650 mg Oral Q4H  mg at 04/01/18 2158    ondansetron (ZOFRAN) injection 4 mg  4 mg IntraVENous Q4H PRN 4 mg at 03/30/18 0920    bisacodyl (DULCOLAX) suppository 10 mg  10 mg Rectal DAILY PRN 10 mg at 04/01/18 1754    azithromycin (ZITHROMAX) 500 mg in 0.9% sodium chloride (MBP/ADV) 250 mL  500 mg IntraVENous Q24H 500 mg at 04/02/18 0053    cefTRIAXone (ROCEPHIN) 1 g in 0.9% sodium chloride (MBP/ADV) 50 mL MBP  1 g IntraVENous Q24H 1 g at 04/02/18 0205    diphenhydrAMINE (BENADRYL) capsule 25 mg  25 mg Oral Q6H PRN      albuterol-ipratropium (DUO-NEB) 2.5 MG-0.5 MG/3 ML  3 mL Nebulization Q4H PRN 3 mL at 03/29/18 1626    atorvastatin (LIPITOR) tablet 40 mg  40 mg Oral QPM 40 mg at 04/01/18 1753    butalbital-acetaminophen-caffeine (FIORICET, ESGIC) -40 mg per tablet 1 Tab  1 Tab Oral Q6H PRN 1 Tab at 03/30/18 1733    nitroglycerin (NITROSTAT) tablet 0.4 mg  0.4 mg SubLINGual PRN      pantoprazole (PROTONIX) tablet 40 mg  40 mg Oral ACB 40 mg at 04/01/18 0944       Labs/Data:    Lab Results   Component Value Date/Time    WBC 8.6 04/02/2018 03:45 AM    Hemoglobin (POC) 11.9 03/08/2014 10:14 AM    HGB 9.0 (L) 04/02/2018 03:45 AM    Hematocrit (POC) 35 03/08/2014 10:14 AM    HCT 29.3 (L) 04/02/2018 03:45 AM    PLATELET 624 04/02/2018 03:45 AM    MCV 82.1 04/02/2018 03:45 AM       Lab Results   Component Value Date/Time    Sodium 140 04/02/2018 03:45 AM    Potassium 3.5 04/02/2018 03:45 AM    Chloride 106 04/02/2018 03:45 AM    CO2 27 04/02/2018 03:45 AM    Anion gap 7 04/02/2018 03:45 AM    Glucose 99 04/02/2018 03:45 AM    BUN 29 (H) 04/02/2018 03:45 AM    Creatinine 1.10 (H) 04/02/2018 03:45 AM    BUN/Creatinine ratio 26 (H) 04/02/2018 03:45 AM    GFR est AA 59 (L) 04/02/2018 03:45 AM    GFR est non-AA 49 (L) 04/02/2018 03:45 AM    Calcium 8.7 04/02/2018 03:45 AM       Wt Readings from Last 3 Encounters:   04/01/18 70.9 kg (156 lb 6.4 oz)   02/08/18 72.6 kg (160 lb)   01/24/18 72.6 kg (160 lb)       No intake or output data in the 24 hours ending 04/02/18 0725    Patient seen and examined. Chart reviewed. Labs, data and other pertinent notes reviewed in last 24 hrs.     PMH/SH/FH reviewed and unchanged compared to H&P      Lillian Wu MD

## 2018-04-03 PROBLEM — G43.909 MIGRAINE: Status: ACTIVE | Noted: 2018-01-01

## 2018-04-03 NOTE — DISCHARGE INSTRUCTIONS
Weigh yourself Daily  Keep Log/Record    Notify Dr. Edwards Scales for a weight gain of 3 pounds or greater in one day or 5 pounds in 5 days. Low Sodium Diet as per CHF Education          HOSPITALIST DISCHARGE INSTRUCTIONS    NAME: Ibrahima Boudreaux   :  1945   MRN:  [id number]     Date/Time:  4/3/2018 9:33 AM    ADMIT DATE: 3/25/2018   DISCHARGE DATE: 4/3/2018     Attending Physician: Katy Pitts MD    DISCHARGE DIAGNOSIS:  CAD, NSTEMI, CHF, P. A. Fib, MACY/CKD, Anemia, Headache/Migraine, Dementia, COPD      Medications: Per above medication reconciliation. Pain Management: per above medications    Recommended diet: Cardiac Diet    Recommended activity: Activity as tolerated    Wound care: None    Indwelling devices:  None    Supplemental Oxygen: None    Required Lab work: Per SNF routine    Glucose management:  None    Code status: DNR    CHF specific discharge instructions    Weight:  · Daily weights, Notify your Doctor if Wt gain of 3 lb in a day or 5 lb in a week  · Daily Intake & Output    Diet :  · Low salt cardiac diet   · Fluid restriction of 1500 ml daily          Outside physician follow up: Follow-up Information     Follow up With Details Comments Contact Info    [address] [facility]   [address]  [address]  [address], [address] [address]  [phone]          F/U PCP  F/U Cardiology       Skilled nursing facility/ SNF MD responsible for above on discharge. Information obtained by :  I understand that if any problems occur once I am at home I am to contact my physician. I understand and acknowledge receipt of the instructions indicated above.                                                                                                                                            Physician's or R.N.'s Signature                                                                  Date/Time Patient or Repres

## 2018-04-03 NOTE — PROGRESS NOTES
Cm spoke with [facility] admission and informed pt is discharging today. Matthew Collier at admission informed pt is going to room 104 at [facility]. Confirmed with pt and her daughter regarding transportation. Transport has been arranged with a  time of 1230 by SONI. Floor nurse can call report to [address] at [phone], please send most recent MAR, copy of d/c instructions, SNF hand off report, and any prescriptions that need to go with the pt. Provider info updated to pt's AVS. PCS form attached to pt's bedside chart. 11.30 AM    [facility] called and changed  time to 12 PM. RN notified. As per pt's request Cm spoke with David Arian for a private room. Pt is going to room 108. As per \[facility]\"" Financial will cover the cost.    Care Management Interventions  PCP Verified by CM:  Yes  Palliative Care Criteria Met (RRAT>21 & CHF Dx)?: No  Mode of Transport at Discharge: S  Transition of Care Consult (CM Consult): SNF  Partner SNF: Yes  Discharge Durable Medical Equipment: No (None reported at home)  Health Maintenance Reviewed: Yes  Physical Therapy Consult: Yes  Occupational Therapy Consult: Yes  Speech Therapy Consult: No  Current Support Network: Lives Alone, Own Home, Other ([address] [facility] [place] [address] [facility] [address] ([address]); lives alone; daughter lives in [facility], [employer] friend nearby)  Confirm Follow Up Transport: Self  Plan discussed with Pt/Family/Caregiver: Yes  Freedom of Choice Offered: Yes  Discharge Location  Discharge Placement: Skilled nursing facility    Dario Jacqueser MSW  ED Case Manager   Qop [phone]

## 2018-04-03 NOTE — ROUTINE PROCESS
PCP GEORGE appt scheduled with Dr. Adin Pickens  on April 17, 2018 at 2:00pm. Appt added to [address] [facility], [address] Specialist

## 2018-04-03 NOTE — DISCHARGE SUMMARY
Hospitalist Discharge Summary     Patient ID:  Paula Burgos  [id number]  68 y.o.  1945    PCP on record: Aries Christian MD    Admit date: 3/25/2018  Discharge date and time: 4/3/2018      DISCHARGE DIAGNOSIS:    CAD, NSTEMI, CHF, P. A. Fib, AMCY/CKD, Anemia, Headache/Migraine, Dementia, COPD      CONSULTATIONS:  IP CONSULT TO CARDIOLOGY  IP CONSULT TO HOSPITALIST  IP CONSULT TO NEPHROLOGY    Excerpted HPI from H&P of Danilo Monsalve MD:  Chong Chester is a 68 y.o.  female with PMHx significant for CAD, remote PAF, CKD 3, COPD, present to the ED c/o worsening of SOB started 2 days ago. Other associated symptoms including HA, L sided abd pain, some productive cough, and worsening of b/l LE edema. She denies any recent fever, chills, cp, palpitations, n/v/d. In the ER, vitals: T 98.6, P95, /50, SpO2 100% on RA. Pt placed on [facility] during my encounter. CXR showed R basilar patchy airspace disease and probable small R pleural effusion  EKG with PVCs, however neg for acute ischemia. We were asked to admit for work up and evaluation of the above problems. ______________________________________________________________________  DISCHARGE SUMMARY/HOSPITAL COURSE:  for full details see H&P, daily progress notes, labs, consult notes. Acute on chronic systolic heart failure: improved  NSTEMI in pt with CAD s/p [facility] with 2 TANNER 3/29: CP free  CM/valve disease  Remote PAF  Remains stable  Continue with lasix  Continue with ASA, BB, Statin, Plavix  Cardio cleared her for DC home but she is confused.   MACY on CKD 3, likely CRS  Better  Monitor labs  Nephrology on the case  Anemia KAYDEN  Stable  Continue iron supplements  Hallucination in the setting of possible Dementia likely sun-downing  Worse today but waxes and wanes  Will discontinue seroquel  Continue klonopin  Headache  Hx migraine  Resolved  Continue Fioricet dose/frequency 3/28  CT head from [facility] 3/17/18 negative for acute findings  No Neurological sx, if headache persist or sx consider CT head. Leukocytosis and presumed CAP  COPD not in exacerbation: stable  Resolved  DC ABX treatment rocephin and azithromycin  Continue with bronchodilators prn  Code Status: DNR   Surrogate Decision Maker:  Pt's daughter  DVT Prophylaxis: lovenox  GI Prophylaxis: not indicated  Baseline: independent, lives alone.  Pt is not on home O2  DISPO: SNF     Body mass index is 26.03 kg/(m^2). D/c to SNF today and F/U with PCP and Cardiology  _______________________________________________________________________  Patient seen and examined by me on discharge day. Pertinent Findings:  Gen:    Not in distress  Chest: Clear lungs  CVS:   Regular rhythm, IGGY. No edema  Abd:  Soft, not distended, not tender  Neuro:  Alert, GCS [id number]  _______________________________________________________________________  DISCHARGE MEDICATIONS:   Current Discharge Medication List      START taking these medications    Details   acetaminophen (TYLENOL) 325 mg tablet Take 2 Tabs by mouth every four (4) hours as needed. Qty: 40 Tab, Refills: 0      aspirin delayed-release 81 mg tablet Take 1 Tab by mouth daily. Qty: 30 Tab, Refills: 1      clopidogrel (PLAVIX) 75 mg tab Take 1 Tab by mouth daily. Qty: 30 Tab, Refills: 1      ferrous sulfate 325 mg (65 mg iron) tablet Take 1 Tab by mouth daily (with breakfast). Qty: 30 Tab, Refills: 1      metoprolol tartrate (LOPRESSOR) 25 mg tablet Take 0.5 Tabs by mouth every twelve (12) hours. Qty: 60 Tab, Refills: 1      butalbital-acetaminophen-caffeine (FIORICET, ESGIC) -40 mg per tablet Take 2 Tabs by mouth every eight (8) hours as needed for Headache.   Qty: 30 Tab, Refills: 0    Associated Diagnoses: Migraine without status migrainosus, not intractable, unspecified migraine type         CONTINUE these medications which have CHANGED    Details   clonazePAM (KLONOPIN) 2 mg tablet Take 1 Tab by mouth every twelve (12) hours as needed. Max Daily Amount: 4 mg. Qty: 60 Tab, Refills: 0    Associated Diagnoses: Anxiety      furosemide (LASIX) 40 mg tablet Take 1 Tab by mouth daily. Qty: 30 Tab, Refills: 1      lisinopril (PRINIVIL, ZESTRIL) 2.5 mg tablet Take 1 Tab by mouth daily. Qty: 30 Tab, Refills: 1      atorvastatin (LIPITOR) 40 mg tablet Take 1 Tab by mouth every evening. Qty: 30 Tab, Refills: 1         CONTINUE these medications which have NOT CHANGED    Details   fluticasone (FLONASE) 50 mcg/actuation nasal spray 2 Sprays by Both Nostrils route daily as needed for Allergies. neomycin-polymyxin-hydrocortisone, buffered, (PEDIOTIC) 3.5-10,000-1 mg/mL-unit/mL-% otic suspension Administer 3 Drops in right ear three (3) times daily as needed (ear irritation and dryness). nystatin (MYCOSTATIN) topical cream Apply  to affected area daily as needed (vaginal itching/rash). cetirizine (ZYRTEC) 10 mg tablet Take 10 mg by mouth daily as needed for Allergies. albuterol (PROVENTIL HFA, VENTOLIN HFA, PROAIR HFA) 90 mcg/actuation inhaler Take 1 Puff by inhalation every six (6) hours as needed for Wheezing. Qty: 1 Inhaler, Refills: 5      nitroglycerin (NITROSTAT) 0.4 mg SL tablet 1 Tab by SubLINGual route every five (5) minutes as needed. Indications: ANGINA  Qty: 25 Tab, Refills: 5      omeprazole (PRILOSEC) 20 mg capsule Take 1 Cap by mouth daily. Take only as needed  Qty: 30 Cap, Refills: 11      cyanocobalamin (VITAMIN B-12) 100 mcg tablet Take 100 mcg by mouth daily.          STOP taking these medications       predniSONE (DELTASONE) 10 mg tablet Comments:   Reason for Stopping:         naproxen (NAPROSYN) 500 mg tablet Comments:   Reason for Stopping:         ASPIRIN/ACETAMINOPHEN/CAFFEINE (EXCEDRIN MIGRAINE PO) Comments:   Reason for Stopping:         butalbital-acetaminophen-caff (FIORICET) -40 mg per capsule Comments:   Reason for Stopping:         predniSONE (DELTASONE) 10 mg tablet Comments:   Reason for Stopping:               My Recommended Diet, Activity, Wound Care, and follow-up labs are listed in the patient's Discharge Insturctions which I have personally completed and reviewed.     ______________________________________________________________________    Risk of deterioration: Moderate    Condition at Discharge:  Stable  ______________________________________________________________________    Disposition  SNF/LTC  ______________________________________________________________________    Care Plan discussed with:   Patient, RN, Care Manager, Consultant    ______________________________________________________________________    Code Status: DNR/DNI  ______________________________________________________________________      Follow up with:   PCP : Manuel Hernandez MD  Follow-up Information     Follow up With Details Comments Contact Info    [address] [facility]   [address]  [address]  [address], [address]   [place] [address] [phone]        F/U PCP  F/U Cardiology        Total time in minutes spent coordinating this discharge (includes going over instructions, follow-up, prescriptions, and preparing report for sign off to her PCP) :  35 minutes    Signed:  Kirill Nicolas MD

## 2018-04-03 NOTE — PROGRESS NOTES
Hospital to SNF SBAR Handoff - Manjula Sears                                                                        68 y.o.   female    [address]   Room: 2292/01    [facility] CARDIOPULMONARY CARE  Unit Phone# :  [phone]      [address]  [facility] [address] [facility]  [address] [address]  Dept: [phone]  Loc: [phone]                    SITUATION     Admitted:  3/25/2018         Attending Provider:  Blanco Gutierrez MD       Consultations:  IP CONSULT TO CARDIOLOGY  IP CONSULT TO HOSPITALIST  IP CONSULT TO NEPHROLOGY    PCP:  Eric Rivas MD   [phone]    Treatment Team: Attending Provider: Blanco Gutierrez MD; Consulting Provider: Frederick Hernandez MD; Consulting Provider: Lv Oliva MD; Utilization Review: Jacqualyn Gottron; Care Manager: Avery Syed; Consulting Provider: Sherida Cowden, MD; Care Manager: Ariel Liao; Care Manager: Maria Esther Mckeon; Care Manager: Seward Galeazzi    Admitting Dx:  Respiratory failure [facility])       Principal Problem: <principal problem not specified>    * No surgery found * of      BY: * Surgery not found *             ON: * No surgery found *                  Code Status: DNR                Advance Directives:   Advance Care Planning 3/25/2018   Patient's Healthcare Decision Maker is: Verbal statement (Legal Next of Kin remains as decision maker)   Primary Decision Maker Name [facility]    Primary Decision Maker Phone Number [phone]   Primary Decision Maker Relationship to Patient Adult child   Confirm Advance Directive None    (Send w/patient)   No Doesnt Have       Isolation:  There are currently no Active Isolations       MDRO: No current active infections    Pain Medications given:  n/a    Last dose: n/a     Special Equipment needed: no  Type of equipment:       BACKGROUND     Allergies:   Allergies   Allergen Reactions    Ciprofloxacin Shortness of Breath    Codeine Nausea Only    Influenza Virus Vaccine, Specific Unknown (comments)     Allergy documented in Admission Data base 1 screening    Pcn [Penicillins] Itching       Past Medical History:   Diagnosis Date    CHF (congestive heart failure) (HCC)     Other ill-defined conditions(799.89)     AMI    Psychiatric disorder     depression       Past Surgical History:   Procedure Laterality Date    CARDIAC SURG PROCEDURE UNLIST      stents    HX APPENDECTOMY      HX CHOLECYSTECTOMY      HX GI      adhesions    HX GYN      hysterectomy/  10 years       Prescriptions Prior to Admission   Medication Sig    fluticasone (FLONASE) 50 mcg/actuation nasal spray 2 Sprays by Both Nostrils route daily as needed for Allergies.  furosemide (LASIX) 20 mg tablet Take 40 mg by mouth two (2) times a day.  neomycin-polymyxin-hydrocortisone, buffered, (PEDIOTIC) 3.5-10,000-1 mg/mL-unit/mL-% otic suspension Administer 3 Drops in right ear three (3) times daily as needed (ear irritation and dryness).  nystatin (MYCOSTATIN) topical cream Apply  to affected area daily as needed (vaginal itching/rash).  predniSONE (DELTASONE) 10 mg tablet Take 20 mg by mouth daily as needed (\"not feeling good\").  cetirizine (ZYRTEC) 10 mg tablet Take 10 mg by mouth daily as needed for Allergies.  clonazePAM (KLONOPIN) 1 mg tablet TAKE TWO TABLETS BY MOUTH TWICE DAILY    naproxen (NAPROSYN) 500 mg tablet Take 1 Tab by mouth two (2) times daily (with meals). As needed    albuterol (PROVENTIL HFA, VENTOLIN HFA, PROAIR HFA) 90 mcg/actuation inhaler Take 1 Puff by inhalation every six (6) hours as needed for Wheezing.  nitroglycerin (NITROSTAT) 0.4 mg SL tablet 1 Tab by SubLINGual route every five (5) minutes as needed. Indications: ANGINA    omeprazole (PRILOSEC) 20 mg capsule Take 1 Cap by mouth daily. Take only as needed    cyanocobalamin (VITAMIN B-12) 100 mcg tablet Take 100 mcg by mouth daily.     ASPIRIN/ACETAMINOPHEN/CAFFEINE (EXCEDRIN MIGRAINE PO) Take 2 Tabs by mouth daily as needed (migraine).  lisinopril (PRINIVIL, ZESTRIL) 2.5 mg tablet Take 2.5 mg by mouth daily. Hard scripts included in transfer packet yes    Vaccinations:    Immunization History   Administered Date(s) Administered    Pneumococcal Conjugate (PCV-13) 01/31/2017    Pneumococcal Polysaccharide (PPSV-23) 04/28/2015    Tdap 04/28/2015       Readmission Risks:    Known Risks: n/a        The Charlson CoMorbitiy Index tool is an evidenced based tool that has more automatic generated information. The tool looks at many different items such as the age of the patient, how many times they were admitted in the last calendar year, current length of stay in the hospital and their diagnosis. All of these items are pulled automatically from information documented in the chart from various places and will generate a score that predicts whether a patient is at low (less than 13), medium (13-20) or high (21 or greater) risk of being readmitted.         ASSESSMENT                Temp: 98.1 °F (36.7 °C) (04/03/18 1127) Pulse (Heart Rate): (!) 59 (04/03/18 1127)     Resp Rate: 20 (04/03/18 1127)           BP: 95/41 (04/03/18 1127)     O2 Sat (%): 95 % (04/03/18 1127)     Weight: 70.8 kg (156 lb)    Height: 5' 5\" (165.1 cm) (04/01/18 5163)       If above not within 1 hour of discharge:    BP:_____  P:____  R:____ T:_____ O2 Sat: ___%  O2: ______    Active Orders   Diet    DIET CARDIAC Regular         Orientation: only aware of  Time, person, place    Active Behaviors: None                                   Active Lines/Drains:  (Peg Tube / [place] / CL or S/L?): no    Urinary Status: Voiding     Last BM: Last Bowel Movement Date: 04/01/18     Skin Integrity: Intact, Incision (comment) (R groin)             Mobility: Slightly limited   Weight Bearing Status: WBAT (Weight Bearing as Tolerated)      Gait Training  Assistive Device: Gait belt  Ambulation - Level of Assistance: Contact guard assistance  Distance (ft): 75 Feet (ft)         Lab Results   Component Value Date/Time    Glucose 98 04/03/2018 03:40 AM    INR 1.1 08/24/2010 09:20 AM    INR 1.2 (H) 05/06/2010 12:13 PM    HGB 9.0 (L) 04/02/2018 03:45 AM    HGB 9.4 (L) 04/01/2018 04:32 AM        RECOMMENDATION     See After Visit Summary (AVS) for:  · Discharge instructions  · After [address]   · Special equipment needed (entered pre-discharge by Care Management)  · Medication Reconciliation    · Follow up Appointment(s)         Report given/sent by:  Katie Roper                    Verbal report given to: Abimael Sharma RN  FAXED to:  n/a         Estimated discharge time:  4/3/2018 at 1200

## 2018-04-05 NOTE — Clinical Note
Reviewed [place] Back questions to ensure patient arrived at SNF safely. Patient's daughter arrived day after admission to SNF, 4/4, and took patient home, stating she did not feel that she needed to be in SNF. Patient/daughter did not allow any discharge planning or preparations to be done. SNF staff sent medication list to PCP Dr. Laisha Nelson, and nurse navigator Jesse Mcconnell. SNF staff also sent referral to [facility].

## 2018-04-05 NOTE — PROGRESS NOTES
Hospital Discharge Follow-Up      Date/Time:  2018 1:09 PM    Patient was admitted to [address] [place] [address],[address] on 18 for Skilled Care for NSTEMI with CHF and Respiratory Failure, Rehab and generalized weakness; discharged on 18. The physician discharge summary from hospital was available at the time of outreach. Patient was contacted within 1 business days of discharge. Inpatient RRAT score:  3    Top Challenges reviewed with the provider   Dementia (Confused)  CHF  COPD         Was this a readmission? no    Method of communication with provider : staff message     Nurse Navigator (NN) contacted the patient daughter(JUANCARLOS) Desean Rivas by telephone to perform post hospital discharge assessment. Verified name and  with patient daughter as identifiers. Provided introduction to self, and explanation of the Nurse Navigator role. Reviewed discharge instructions and red flags with  patient daughter who verbalized understanding. Patient daughter given an opportunity to ask questions and does not have any further questions or concerns at this time. The patient daughter agrees to contact the PCP office for questions related to their healthcare. NN provided contact information for future reference. Summary of patients top problems:  1. Dementia  2. CHF  3. COPD    Home Health orders at discharge: yes  [address]: [facility]  Date of initial visit: verified referral     Durable Medical Equipment ordered/company: NONE    Barriers to care? Dementia (Confused)     Advance Care Planning: DNR in [place] [address]. Does patient have an Advance Directive:  no     Medication:     New Medications at Discharge:   acetaminophen 325 mg tablet  Take 1 tab. by mouth every 4 hrs as needed   Commonly known as:  TYLENOL     -  aspirin delayed-release 81 mg tablet take 1 tab by mouth daily    -FIORICET, butalbital-acetaminophen-caffeine -40 mg per tablet  Take 2 tabs by mouth every 8 hrs PRN headache*    -ferrous sulfate 325 mg (65 mg iron) tablet take 1 tab by mouth daily (with breakfast)  -  metoprolol tartrate 25 mg tablet  Take 0.5 tab by mouth every 12 hrs. Commonly known as:  LOPRESSOR     Changed Medications at Discharge:     clonazePAM 2 mg tablet (Klonopin) take 1 tab by mouth every 12 hrs as needed daily  fluticasone 50 mcg/actuation nasal spray (Flonase) 2 sprays both nostrils daily as needed /Allergies  furosemide 40 mg tablet take 1 tab by mouth daily  nystatin topical cream apply to affected area daily as needed (vaginal itching/rash)    Discontinued Medications at Discharge:   butalbital-acetaminophen-caff -40 mg per capsule   Commonly known as:  FIORICET   Replaced by:  butalbital-acetaminophen-caffeine -40 mg per tablet     EXCEDRIN MIGRAINE PO          naproxen 500 mg tablet   Commonly known as:  NAPROSYN     predniSONE 10 mg tablet   Commonly known as:  DELTASONE     Medication reconciliation was not performed with (PHI) daughter, who verbalizes she do not have list of pt. meds or any medications at this time. Referral to Pharm D needed: no     Current Outpatient Prescriptions   Medication Sig    acetaminophen (TYLENOL) 325 mg tablet Take 2 Tabs by mouth every four (4) hours as needed.  aspirin delayed-release 81 mg tablet Take 1 Tab by mouth daily.  clonazePAM (KLONOPIN) 2 mg tablet Take 1 Tab by mouth every twelve (12) hours as needed. Max Daily Amount: 4 mg.  clopidogrel (PLAVIX) 75 mg tab Take 1 Tab by mouth daily.  ferrous sulfate 325 mg (65 mg iron) tablet Take 1 Tab by mouth daily (with breakfast).  furosemide (LASIX) 40 mg tablet Take 1 Tab by mouth daily.  lisinopril (PRINIVIL, ZESTRIL) 2.5 mg tablet Take 1 Tab by mouth daily.  metoprolol tartrate (LOPRESSOR) 25 mg tablet Take 0.5 Tabs by mouth every twelve (12) hours.  atorvastatin (LIPITOR) 40 mg tablet Take 1 Tab by mouth every evening.     butalbital-acetaminophen-caffeine (FIORICET, ESGIC) -40 mg per tablet Take 2 Tabs by mouth every eight (8) hours as needed for Headache.  fluticasone (FLONASE) 50 mcg/actuation nasal spray 2 Sprays by Both Nostrils route daily as needed for Allergies.  neomycin-polymyxin-hydrocortisone, buffered, (PEDIOTIC) 3.5-10,000-1 mg/mL-unit/mL-% otic suspension Administer 3 Drops in right ear three (3) times daily as needed (ear irritation and dryness).  nystatin (MYCOSTATIN) topical cream Apply  to affected area daily as needed (vaginal itching/rash).  cetirizine (ZYRTEC) 10 mg tablet Take 10 mg by mouth daily as needed for Allergies.  albuterol (PROVENTIL HFA, VENTOLIN HFA, PROAIR HFA) 90 mcg/actuation inhaler Take 1 Puff by inhalation every six (6) hours as needed for Wheezing.  nitroglycerin (NITROSTAT) 0.4 mg SL tablet 1 Tab by SubLINGual route every five (5) minutes as needed. Indications: ANGINA    omeprazole (PRILOSEC) 20 mg capsule Take 1 Cap by mouth daily. Take only as needed    cyanocobalamin (VITAMIN B-12) 100 mcg tablet Take 100 mcg by mouth daily. No current facility-administered medications for this visit. There are no discontinued medications. CHF specific discharge instructions     Weight:  · Daily weights, Notify your Doctor if Wt gain of 3 lb in a day or 5 lb in a week  · Daily Intake & Output    Diet :  · Low salt cardiac diet   · Fluid restriction of 1500 ml daily         PCP/Specialist follow up: Future Appointments  Date Time Provider Baylee Tovar   4/6/2018 10:30 AM MD [place]   4/17/2018 2:00 PM MD [place]      Follow up with Cardiology Steph Plasencia) [phone]    Goals Addressed      Establish PCP relationships and regularly scheduled appointments. 4/5 NN reminded pt.daughter appt. with PCP (Dr. Prerna Price) 4/6/18 10:30 am, need f/u appt. with Dr. Steph Plasencia [phone].,[id number] care is a key part of your treatment and safety.  Be sure to make and go to all appointments, and call your doctor if you are having problems. It's also a good idea to know your test results and keep a list of the medicines you take-           Knowledge and adherence to medication plan. 4/5  Patient will understand general knowledge of medications, s/e and take meds as prescribed. NN unable reviewed medications list with (PHI) pt.daughter reports patient does not have any of her medications or discharge summary from [facility]. NN received call from [facility] Kristen Abraham) requesting NN to call pt.daughter to review hospital d/c summary with pt. Daughter d/t she does not have a copy nor does she have a copy of d/c summary from [facility]; appears the pt.and daughter left abruptly. Pt. Daughter concern that pt.do not have her medications, reports some lower ext.swelling,NO SOB, no SOB with minimal exertion, no chest discomfort, pt.daughter unaware if pt.have wt.gain of 3 lbs in 24 hrs or 5 lbs in week. NN reminded pt.daughter the importance weight daily  ,limit fluids 1500ml/day, encourage elevation of lower extremities, monitoring sodium intake. NN informed pt.daughter PCP off at this time, if pt. experience any increased SOB, increased confusion, fatigue, chest discomfort , more swelling than usual lower extremities, to go to urgent care to have pt.evaluated. NN reminded pt.daughter appt.tomorrow morning at 10:30am 4/6/18. NN will provide pt.and daughter educational material on CHF during office visit. [address] [place] [address] resources in place to maintain patient in the community (ie., home health, equipment, DME, refer to, etc.)                  4/5 Patient staying with daughter Wilmer Rock for now, pt.confused at times as charted in hospital notes Hx. Dementia, has appt. with PCP Dr. Eric Martin 4/6/18 10:30 am, f/u discharge from [facility]. Daughter very active and supportive with pt.care. NN called Alvah Gaucher at [address] [place] [address] ([phone]) reports have not received referral. NN called Tika at [address] [place] [address] ([phone]) to verify referral reports they have received information today and has contact PCP office. -[address]    4/5 Report from Keegan Ansari [address] [place] [address],[address]) Per Keegan Ansari at [facility], Reviewed [place] Back questions to ensure patient arrived at SNF safely. Patient's daughter arrived day after admission to SNF, 4/4, and took patient home, stating she did not feel that she needed to be in SNF. Patient/daughter did not allow any discharge planning or preparations to be done. SNF staff sent medication list to PCP Dr. Gonzalez Elliott, and nurse navigator Valerie Bloch. SNF staff also sent referral to [facility].

## 2018-04-05 NOTE — PROGRESS NOTES
Community Care Team Documentation for Patient in [facility]  Discharge Note    Per SNF staff, patient discharged from [facility] PCP : Sabine Carias MD    Nurse Navigator in PCP office: Víctor Castellanos  Note routed to Nurse Navigator team.    Per Israel Thomas at [facility], Reviewed [place] Back questions to ensure patient arrived at SNF safely. Patient's daughter arrived day after admission to SNF, 4/4, and took patient home, stating she did not feel that she needed to be in SNF. Patient/daughter did not allow any discharge planning or preparations to be done. SNF staff sent medication list to PCP Dr. Sabine Carias, and nurse navigator Víctor Castellanos. SNF staff also sent referral to [facility]. Community Care Team will sign off at this time. Medications were not reconciled and general patient assessment was not completed during this skilled nursing facility outreach.

## 2018-04-05 NOTE — TELEPHONE ENCOUNTER
----- Message from Juanis Vazquez sent at 4/5/2018  8:01 AM EDT -----  Regarding: Dr Santosh Pardo, daughter, is requesting a call from the nurse prior to pt's appt tomorrow at 10:30.     Best contact # [phone]

## 2018-04-06 NOTE — MR AVS SNAPSHOT
[address] 1434 [facility] 
[phone] Patient: Payal Navarro MRN: [id number] NOZ:[id number] Visit Information Date & Time Provider Department Dept. Phone Encounter #  
 4/6/2018 10:30 AM Pushpa Penny MD [facility] [phone] [id number] Follow-up Instructions Return in about 10 days (around 4/16/2018) for routine follow up.  
  
 4/17/2018  2:00 PM  
TRANSITIONAL CARE MANAGEMENT with Pushpa Penny MD  
1900 [facility] 3651 [facility]) Appt Note: Transitional Care F/U @2:00pm on 4/17/2018 Dx Resp Fail  
 1362 [facility]. [address] [place] [id number]  
[phone]  
  
   
 [address] [place] [address] [address] Upcoming Health Maintenance Date Due  
 GLAUCOMA SCREENING Q2Y 1/3/2010 MEDICARE YEARLY EXAM 3/14/2018 BREAST CANCER SCRN MAMMOGRAM 11/11/2018 COLONOSCOPY 12/24/2019 DTaP/Tdap/Td series (2 - Td) 4/28/2025 Allergies as of 4/6/2018  Review Complete On: 4/6/2018 By: Slama Archibald LPN Severity Noted Reaction Type Reactions Ciprofloxacin  05/06/2010    Shortness of Breath Codeine  05/06/2010    Nausea Only Influenza Virus Vaccine, Specific  03/10/2014   Not Verified Unknown (comments) Allergy documented in Admission Data base 1 screening Pcn [Penicillins]  05/06/2010    Itching Current Immunizations  Reviewed on 5/6/2010 Name Date Influenza Vaccine  Deferred (Contraindication) Pneumococcal Conjugate (PCV-13) 1/31/2017 Pneumococcal Polysaccharide (PPSV-23) 4/28/2015,  Deferred (Contraindication) Tdap 4/28/2015 Not reviewed this visit You Were Diagnosed With   
  
 Codes Comments Urine frequency    -  Primary ICD-10-CM: R35.0 ICD-9-CM: 788.41 Systolic heart failure, unspecified HF chronicity (HCC)     ICD-10-CM: I50.20 ICD-9-CM: 428.20 Anxiety     ICD-10-CM: F41.9 ICD-9-CM: 300.00 Essential hypertension     ICD-10-CM: I10 
ICD-9-CM: 401.9 Coronary artery disease involving native coronary artery of native heart without angina pectoris     ICD-10-CM: I25.10 ICD-9-CM: 414.01 Moderate persistent asthma without complication     ARS-71-DE: J45.40 ICD-9-CM: 493.90 Vitals BP Pulse Temp Resp Height(growth percentile) Weight(growth percentile)  
 118/68 79 96.7 °F (35.9 °C) (Oral) 18 5' 5\" (1.651 m) 155 lb (70.3 kg) SpO2 BMI OB Status Smoking Status 97% 25.79 kg/m2 Hysterectomy Current Some Day Smoker Vitals History BMI and BSA Data Body Mass Index Body Surface Area 25.79 kg/m 2 1.8 m 2 Preferred Pharmacy Pharmacy Name Phone [facility] [phone] Your Updated Medication List  
  
   
This list is accurate as of 4/6/18 11:50 AM.  Always use your most recent med list.  
  
  
  
  
 acetaminophen 325 mg tablet Commonly known as:  TYLENOL Take 2 Tabs by mouth every four (4) hours as needed. albuterol 90 mcg/actuation inhaler Commonly known as:  PROVENTIL HFA, VENTOLIN HFA, PROAIR HFA Take 1 Puff by inhalation every six (6) hours as needed for Wheezing. aspirin delayed-release 81 mg tablet Take 1 Tab by mouth daily. atorvastatin 40 mg tablet Commonly known as:  LIPITOR Take 1 Tab by mouth every evening. cetirizine 10 mg tablet Commonly known as:  ZYRTEC Take 10 mg by mouth daily as needed for Allergies. clonazePAM 2 mg tablet Commonly known as:  Raydell Jonathan Take 1 Tab by mouth daily as needed. clopidogrel 75 mg Tab Commonly known as:  PLAVIX Take 1 Tab by mouth daily. ferrous sulfate 325 mg (65 mg iron) tablet Take 1 Tab by mouth Daily (before breakfast). fluticasone 50 mcg/actuation nasal spray Commonly known as:  TRAN.SL 2 Sprays by Both Nostrils route daily as needed for Allergies. furosemide 40 mg tablet Commonly known as:  LASIX Take 1 Tab by mouth daily. metoprolol tartrate 25 mg tablet Commonly known as:  LOPRESSOR Take 0.5 Tabs by mouth every twelve (12) hours. nitrofurantoin (macrocrystal-monohydrate) 100 mg capsule Commonly known as:  MACROBID Take 1 Cap by mouth two (2) times a day. nitroglycerin 0.4 mg SL tablet Commonly known as:  NITROSTAT  
1 Tab by SubLINGual route every five (5) minutes as needed. Indications: ANGINA  
  
 nystatin topical cream  
Commonly known as:  MYCOSTATIN Apply  to affected area daily as needed (vaginal itching/rash). omeprazole 20 mg capsule Commonly known as:  PRILOSEC Take 1 Cap by mouth daily. Take only as needed VITAMIN B-12 100 mcg tablet Generic drug:  cyanocobalamin Take 100 mcg by mouth daily. Prescriptions Printed Refills  
 clonazePAM (KLONOPIN) 2 mg tablet 0 Sig: Take 1 Tab by mouth daily as needed. Class: Print Route: Oral  
 clopidogrel (PLAVIX) 75 mg tab 5 Sig: Take 1 Tab by mouth daily. Class: Print Route: Oral  
 ferrous sulfate 325 mg (65 mg iron) tablet 5 Sig: Take 1 Tab by mouth Daily (before breakfast). Class: Print Route: Oral  
 furosemide (LASIX) 40 mg tablet 5 Sig: Take 1 Tab by mouth daily. Class: Print Route: Oral  
 metoprolol tartrate (LOPRESSOR) 25 mg tablet 5 Sig: Take 0.5 Tabs by mouth every twelve (12) hours. Class: Print Route: Oral  
 atorvastatin (LIPITOR) 40 mg tablet 5 Sig: Take 1 Tab by mouth every evening. Class: Print Route: Oral  
 nitrofurantoin, macrocrystal-monohydrate, (MACROBID) 100 mg capsule 0 Sig: Take 1 Cap by mouth two (2) times a day. Class: Print  Route: Oral  
 albuterol (PROVENTIL HFA, VENTOLIN HFA, PROAIR HFA) 90 mcg/actuation inhaler 5  
 Sig: Take 1 Puff by inhalation every six (6) hours as needed for Wheezing. Class: Print Route: Inhalation We Performed the Following AMB POC URINALYSIS DIP STICK AUTO W/O MICRO [53598 CPT(R)] CULTURE, URINE [id number] CPT(R)] Follow-up Instructions Return in about 10 days (around 4/16/2018) for routine follow up. Introducing \[facility] [place] [facility]\"" & HEALTH SERVICES! Ferny Hartley introduces [employer] patient portal. Now you can access parts of your medical record, email your doctor's office, and request medication refills online. 1. In your internet browser, go to [email or web address] 2. Click on the First Time User? Click Here link in the Sign In box. You will see the New Member Sign Up page. 3. Enter your [employer] Access Code exactly as it appears below. You will not need to use this code after youve completed the sign-up process. If you do not sign up before the expiration date, you must request a new code. · [employer] Access Code: [email or web address] Expires: 4/24/2018  3:39 PM 
 
4. Enter the last four digits of your Social Security Number (xxxx) and Date of Birth (mm/dd/yyyy) as indicated and click Submit. You will be taken to the next sign-up page. 5. Create a [employer] ID. This will be your [employer] login ID and cannot be changed, so think of one that is secure and easy to remember. 6. Create a [employer] password. You can change your password at any time. 7. Enter your Password Reset Question and Answer. This can be used at a later time if you forget your password. 8. Enter your e-mail address. You will receive e-mail notification when new information is available in [address] 9. Click Sign Up. You can now view and download portions of your medical record. 10. Click the Download Summary menu link to download a portable copy of your medical information.  
 
If you have questions, please visit the Frequently Asked Questions section of the [employer]. Remember, [email or web address] is NOT to be used for urgent needs. For medical emergencies, dial 911. Now available from your iPhone and Android! Please provide this summary of care documentation to your next provider. Your primary care clinician is listed as Laura Magana. If you have any questions after today's visit, please call [phone].

## 2018-04-06 NOTE — PROGRESS NOTES
Chief Complaint   Patient presents with    Respiratory Distress     [address] follow up     I have reviewed the patient's medical history in detail and updated the computerized patient record. Health Maintenance reviewed. 1. Have you been to the ER, urgent care clinic since your last visit? Hospitalized since your last visit?no    2. Have you seen or consulted any other health care providers outside of the [address] [place] [address] [facility] since your last visit? Include any pap smears or colon screening. No    Encouraged pt to discuss pt's wishes with spouse/partner/family and bring them in the next appt to follow thru with the Advanced Directive    Fall Risk Assessment, last 12 mths 4/6/2018   Able to walk? Yes   Fall in past 12 months? No   Fall with injury? -   Number of falls in past 12 months -   Fall Risk Score -       PHQ over the last two weeks 4/6/2018   Little interest or pleasure in doing things Several days   Feeling down, depressed or hopeless Several days   Total Score PHQ 2 2       Abuse Screening Questionnaire 1/24/2018   Do you ever feel afraid of your partner? N   Are you in a relationship with someone who physically or mentally threatens you? N   Is it safe for you to go home?  Y       ADL Assessment 1/24/2018   Feeding yourself No Help Needed   Getting from bed to chair No Help Needed   Getting dressed No Help Needed   Bathing or showering No Help Needed   Walk across the room (includes cane/walker) No Help Needed   Using the telphone No Help Needed   Taking your medications No Help Needed   Preparing meals No Help Needed   Managing money (expenses/bills) No Help Needed   Moderately strenuous housework (laundry) No Help Needed   Shopping for personal items (toiletries/medicines) No Help Needed   Shopping for groceries No Help Needed   Driving No Help Needed   Climbing a flight of stairs No Help Needed   Getting to places beyond walking distances No Help Needed

## 2018-04-06 NOTE — PROGRESS NOTES
Subjective:     Ibrahima Boudreaux is an 68 y.o. female who is seen for follow up of CHF. Here with her daughter today. She has hypertension, hyperlipidemia, coronary artery disease and CHF. Started having shortness of breath for which she was seen in the emergency room. Was thought to have some heart failure and was admitted. Is also thought to have some coronary disease. She was placed on Plavix. Was noted to be a little confused as well. Spent a few days in the hospital and was stable and was discharged to the nursing home. Spent a couple days there and was subsequently discharged home. It seems to be a mixup in terms of her medications. She has none of her medications although it looks like they have been called in. [facility] call they do not have them. She has been doing okay since discharge a little short of breath not too bad. For the last 2 days has been having some pain with urination. Think she has a urinary tract infection. While in the hospital she was on antibiotics, none currently however. Diet and Lifestyle: generally follows a low sodium diet, smoker Few cigarettes a day. She has reduced her clonazepam usage. Knows that that is not a good medicine for older individuals. Weight monitoring: has been stable    Cardiovascular System Review  Cardiovascular ROS - not taking medications regularly as instructed, no chest pain on exertion, notes stable dyspnea on exertion, no change, no swelling of ankles. No bleeding or tarry black stools.     Patient Active Problem List    Diagnosis Date Noted    Migraine 04/03/2018    Respiratory failure ([facility] [address] 03/25/2018    Coronary artery disease without angina pectoris 01/31/2017    Moderate persistent asthma 08/23/2016    Allergic rhinitis 04/28/2015    Elevated cholesterol 07/22/2014    Hypertension 03/20/2014    Anxiety 03/20/2014    GERD (gastroesophageal reflux disease) 27/41/9927    Systolic heart failure ([address] 03/11/2014     Current Outpatient Prescriptions   Medication Sig Dispense Refill    clonazePAM (KLONOPIN) 2 mg tablet Take 1 Tab by mouth daily as needed. 30 Tab 0    clopidogrel (PLAVIX) 75 mg tab Take 1 Tab by mouth daily. 30 Tab 5    ferrous sulfate 325 mg (65 mg iron) tablet Take 1 Tab by mouth Daily (before breakfast). 60 Tab 5    furosemide (LASIX) 40 mg tablet Take 1 Tab by mouth daily. 30 Tab 5    metoprolol tartrate (LOPRESSOR) 25 mg tablet Take 0.5 Tabs by mouth every twelve (12) hours. 60 Tab 5    atorvastatin (LIPITOR) 40 mg tablet Take 1 Tab by mouth every evening. 30 Tab 5    nitrofurantoin, macrocrystal-monohydrate, (MACROBID) 100 mg capsule Take 1 Cap by mouth two (2) times a day. 14 Cap 0    albuterol (PROVENTIL HFA, VENTOLIN HFA, PROAIR HFA) 90 mcg/actuation inhaler Take 1 Puff by inhalation every six (6) hours as needed for Wheezing. 1 Inhaler 5    nitroglycerin (NITROSTAT) 0.4 mg SL tablet 1 Tab by SubLINGual route every five (5) minutes as needed. Indications: Angina 25 Tab 5    acetaminophen (TYLENOL) 325 mg tablet Take 2 Tabs by mouth every four (4) hours as needed. 40 Tab 0    aspirin delayed-release 81 mg tablet Take 1 Tab by mouth daily. 30 Tab 1    fluticasone (FLONASE) 50 mcg/actuation nasal spray 2 Sprays by Both Nostrils route daily as needed for Allergies.  nystatin (MYCOSTATIN) topical cream Apply  to affected area daily as needed (vaginal itching/rash).  cetirizine (ZYRTEC) 10 mg tablet Take 10 mg by mouth daily as needed for Allergies.  omeprazole (PRILOSEC) 20 mg capsule Take 1 Cap by mouth daily. Take only as needed 30 Cap 11    cyanocobalamin (VITAMIN B-12) 100 mcg tablet Take 100 mcg by mouth daily.        Allergies   Allergen Reactions    Ciprofloxacin Shortness of Breath    Codeine Nausea Only    Influenza Virus Vaccine, Specific Unknown (comments)     Allergy documented in Admission Data base 1 screening    Pcn [Penicillins] Itching     Social History   Substance Use Topics    Smoking status: Current Some Day Smoker     Packs/day: 0.10     Last attempt to quit: 11/1/2014    Smokeless tobacco: Never Used    Alcohol use No        Lab Results  Component Value Date/Time   WBC 8.6 04/02/2018 03:45 AM   HGB 9.0 (L) 04/02/2018 03:45 AM   Hemoglobin (POC) 11.9 03/08/2014 10:14 AM   HCT 29.3 (L) 04/02/2018 03:45 AM   Hematocrit (POC) 35 03/08/2014 10:14 AM   PLATELET 768 62/86/3459 03:45 AM   MCV 82.1 04/02/2018 03:45 AM     Lab Results  Component Value Date/Time   Cholesterol, total 127 03/26/2018 12:09 AM   HDL Cholesterol 43 03/26/2018 12:09 AM   LDL, calculated 61.2 03/26/2018 12:09 AM   Triglyceride 114 03/26/2018 12:09 AM   CHOL/HDL Ratio 3.0 03/26/2018 12:09 AM     Lab Results  Component Value Date/Time   ALT (SGPT) 23 04/02/2018 03:45 AM   AST (SGOT) 14 (L) 04/02/2018 03:45 AM   Alk.  phosphatase 75 04/02/2018 03:45 AM   Bilirubin, total 0.3 04/02/2018 03:45 AM   Albumin 3.0 (L) 04/02/2018 03:45 AM   Protein, total 6.3 (L) 04/02/2018 03:45 AM   INR 1.1 08/24/2010 09:20 AM   Prothrombin time 11.3 (H) 08/24/2010 09:20 AM   PLATELET 450 61/98/0084 03:45 AM       Lab Results  Component Value Date/Time   GFR est non-AA 51 (L) 04/03/2018 03:40 AM   GFRNA, POC 49 (L) 03/08/2014 10:14 AM   GFR est AA >60 04/03/2018 03:40 AM   GFRAA, POC 60 (L) 03/08/2014 10:14 AM   Creatinine 1.05 (H) 04/03/2018 03:40 AM   Creatinine (POC) 1.1 03/08/2014 10:14 AM   BUN 23 (H) 04/03/2018 03:40 AM   BUN (POC) 16 03/08/2014 10:14 AM   Sodium 140 04/03/2018 03:40 AM   Sodium (POC) 140 03/08/2014 10:14 AM   Potassium 3.7 04/03/2018 03:40 AM   Potassium (POC) 3.9 03/08/2014 10:14 AM   Chloride 107 04/03/2018 03:40 AM   Chloride (POC) 108 (H) 03/08/2014 10:14 AM   CO2 28 04/03/2018 03:40 AM   Magnesium 2.3 04/02/2018 03:45 AM   Phosphorus 3.3 04/02/2018 03:45 AM   PTH, Intact 114.9 (H) 03/29/2018 03:42 AM     Lab Results   Component Value Date/Time    Glucose 98 04/03/2018 03:40 AM    Glucose (POC) 139 (H) 03/30/2018 07:24 AM         Review of Systems, additional:  Pertinent items are noted in HPI. Objective:     Visit Vitals    /57 (BP 1 Location: Left arm, BP Patient Position: Sitting)    Pulse 79    Temp 96.7 °F (35.9 °C) (Oral)    Resp 18    Ht 5' 5\" (1.651 m)    Wt 155 lb (70.3 kg)    SpO2 97%    BMI 25.79 kg/m2     General:  alert, fatigued, cooperative, no distress, appears stated age, mental status seems pretty baseline to me. Neck: supple, no significant adenopathy, no JVD   Chest Wall: inspection normal - no chest wall deformities or tenderness, respiratory effort normal   Lung: clear to auscultation bilaterally   Heart:  normal rate, regular rhythm, normal S1, S2, no murmurs, rubs, clicks or gallops   Abdomen: soft, non-tender. Bowel sounds normal. No masses,  no organomegaly   Extremities: extremities normal, atraumatic, no cyanosis or edema       Lab review: orders written for new lab studies as appropriate; see orders. Labs from today were reviewed  yes, labs done previously were reviewed  yes, Labs done in ER were reviewed  yes, Additional labs are ordered  yes,      Assessment/Plan:     hypertension stable, coronary artery disease stable. Chronic Conditions Addressed Today     1. Systolic heart failure (HCC)     Relevant Medications     clopidogrel (PLAVIX) 75 mg tab     furosemide (LASIX) 40 mg tablet     metoprolol tartrate (LOPRESSOR) 25 mg tablet     atorvastatin (LIPITOR) 40 mg tablet     nitroglycerin (NITROSTAT) 0.4 mg SL tablet    2. Hypertension     Relevant Medications     clopidogrel (PLAVIX) 75 mg tab     furosemide (LASIX) 40 mg tablet     metoprolol tartrate (LOPRESSOR) 25 mg tablet     atorvastatin (LIPITOR) 40 mg tablet     nitroglycerin (NITROSTAT) 0.4 mg SL tablet    3. Anxiety     Relevant Medications     clonazePAM (KLONOPIN) 2 mg tablet    4.  Moderate persistent asthma     Relevant Medications     albuterol (PROVENTIL HFA, VENTOLIN HFA, PROAIR HFA) 90 mcg/actuation inhaler    5. Coronary artery disease without angina pectoris     Relevant Medications     clopidogrel (PLAVIX) 75 mg tab     furosemide (LASIX) 40 mg tablet     metoprolol tartrate (LOPRESSOR) 25 mg tablet     atorvastatin (LIPITOR) 40 mg tablet     nitroglycerin (NITROSTAT) 0.4 mg SL tablet      Acute Diagnoses Addressed Today     Urine frequency    -  Primary        Relevant Orders        AMB POC URINALYSIS DIP STICK AUTO W/O MICRO (Completed)        CULTURE, URINE (Completed)        Orders Placed This Encounter    CULTURE, URINE    AMB POC URINALYSIS DIP STICK AUTO W/O MICRO    clonazePAM (KLONOPIN) 2 mg tablet     Sig: Take 1 Tab by mouth daily as needed. Dispense:  30 Tab     Refill:  0    clopidogrel (PLAVIX) 75 mg tab     Sig: Take 1 Tab by mouth daily. Dispense:  30 Tab     Refill:  5    ferrous sulfate 325 mg (65 mg iron) tablet     Sig: Take 1 Tab by mouth Daily (before breakfast). Dispense:  60 Tab     Refill:  5    furosemide (LASIX) 40 mg tablet     Sig: Take 1 Tab by mouth daily. Dispense:  30 Tab     Refill:  5    metoprolol tartrate (LOPRESSOR) 25 mg tablet     Sig: Take 0.5 Tabs by mouth every twelve (12) hours. Dispense:  60 Tab     Refill:  5    atorvastatin (LIPITOR) 40 mg tablet     Sig: Take 1 Tab by mouth every evening. Dispense:  30 Tab     Refill:  5    nitrofurantoin, macrocrystal-monohydrate, (MACROBID) 100 mg capsule     Sig: Take 1 Cap by mouth two (2) times a day. Dispense:  14 Cap     Refill:  0    albuterol (PROVENTIL HFA, VENTOLIN HFA, PROAIR HFA) 90 mcg/actuation inhaler     Sig: Take 1 Puff by inhalation every six (6) hours as needed for Wheezing. Dispense:  1 Inhaler     Refill:  5    nitroglycerin (NITROSTAT) 0.4 mg SL tablet     Si Tab by SubLINGual route every five (5) minutes as needed.  Indications: Angina     Dispense:  25 Tab     Refill:  5

## 2018-04-06 NOTE — LETTER
4/10/2018 2:29 PM 
 
Ms. Sven Shankar [address] [facility] [id number] Dear Sven Shankar: Your results are normal/stable. If not signed up, consider getting my chart to get your results on-line. We can help you to sign up.  
    
   
 
 
 
Please find your most recent results below. Resulted Orders AMB POC URINALYSIS DIP STICK AUTO W/O MICRO Result Value Ref Range Color (UA POC) Dark CIT Group Clarity (UA POC) Slightly Cloudy Glucose (UA POC) Negative Negative Bilirubin (UA POC) 2+ Negative Comment:  
   small Ketones (UA POC) Negative Negative Specific gravity (UA POC) 1.030 1.001 - 1.035 Blood (UA POC) Negative Negative pH (UA POC) 5.0 4.6 - 8.0 Protein (UA POC) 1+ Negative Comment:  
   100 mg/dl Urobilinogen (UA POC) 0.2 mg/dL 0.2 - 1 Nitrites (UA POC) Negative Negative Leukocyte esterase (UA POC) Negative Negative CULTURE, URINE Result Value Ref Range Urine Culture, Routine Mixed urogenital mohan Less than 10,000 colonies/mL Narrative Performed at:  [address] [place] [address]  [id number] : Deven Thompson MD, Phone:  [phone] Please call me if you have any questions: [phone] Sincerely, Lefty Retana MD

## 2018-04-09 NOTE — TELEPHONE ENCOUNTER
Requested Prescriptions     Pending Prescriptions Disp Refills    cephALEXin (KEFLEX) 500 mg capsule 21 Cap 0     Sig: Take 1 Cap by mouth three (3) times daily for 7 days. Pt would also like to speak with Dr. Chucky Ireland about nutritional help to get her back on her feet. Please call her at [phone].

## 2018-04-10 NOTE — TELEPHONE ENCOUNTER
----- Message from Robbie Polo sent at 4/10/2018  4:24 PM EDT -----  Regarding: Dr Miguel Merino of [address] [place] [address] is requesting a call   back regarding above named pt at your earliest convenience at [phone].     Best contact [phone]

## 2018-04-12 NOTE — TELEPHONE ENCOUNTER
Called Ms Jason Hollis again at given number - no answer -  for a return call  Morris Healy LPN  7/92/6949  7:59 PM

## 2018-04-16 NOTE — PROGRESS NOTES
Goals      Establish PCP relationships and regularly scheduled appointments. 4/5 NN reminded pt.daughter appt. with PCP (Dr. Allyn Fox) 4/6/18 10:30 am, need f/u appt. with Dr. Kendall Wick [phone].,SRYVW- care is a key part of your treatment and safety. Be sure to make and go to all appointments, and call your doctor if you are having problems. It's also a good idea to know your test results and keep a list of the medicines you take-      4/13 Unable to reach, LVM. -[address]    4/16 NN reminded pt. appt.with PCP 4/17/18 @ 2 pm, need schedule f/u with cardiologist; Kendall Wick, NN will provided pt.with contact information during office visit. NN encourage monitor weights daily and record readings, monitor weight increase 3 lbs in one day and up 5 lbs in one week notify PCP or cardiologist office. Pt verbalizes understanding reports she has been through this before. -[address]          Knowledge and adherence to medication plan. 4/5  Patient will understand general knowledge of medications, s/e and take meds as prescribed. NN unable reviewed medications list with (PHI) pt.daughter reports patient does not have any of her medications or discharge summary from [facility]. NN received call from [facility] Madison Chan requesting NN to call pt.daughter to review hospital d/c summary with pt. Daughter d/t she does not have a copy nor does she have a copy of d/c summary from [facility]; appears the pt.and daughter left abruptly. Pt. Daughter concern that pt.do not have her medications, reports some lower ext.swelling,NO SOB, no SOB with minimal exertion, no chest discomfort, pt.daughter unaware if pt.have wt.gain of 3 lbs in 24 hrs or 5 lbs in week. NN reminded pt.daughter the importance weight daily  ,limit fluids 1500ml/day, encourage elevation of lower extremities, monitoring sodium intake. NN informed pt.daughter PCP off at this time, if pt. experience any increased SOB, increased confusion, fatigue, chest discomfort , more swelling than usual lower extremities, to go to urgent care to have pt.evaluated. NN reminded pt.daughter appt.tomorrow morning at 10:30am 4/6/18. NN will provide pt.and daughter educational material on CHF during office visit. -[address]     4/13 Unable to reach, LVM. -[address] [facility]    4/16 [facility] med reconcile completed, pt. Reports she taking medications as prescribed, no s/s of s/e will notify office with any questions or concerns. -[address] resources in place to maintain patient in the community (ie., home health, equipment, DME, refer to, etc.)            4/5 Patient staying with daughter Rosario Fret for now, pt.confused at times as charted in hospital notes Hx. Dementia, has appt. with PCP Dr. Garret Fields 4/6/18 10:30 am, f/u discharge from [facility]. Daughter very active and supportive with pt.care. NN called Marlene Hurt at [facility] ([phone]) reports have not received referral. NN called Tika at [address] [place] [address] ([phone]) to verify referral reports they have received information today and has contact PCP office. -[address] [facility]    4/5 Report [facility], Reviewed Elnora Back questions to ensure patient arrived at SNF safely. Patient's daughter arrived day after admission to SNF, 4/4, and took patient home, stating she did not feel that she needed to be in SNF. Patient/daughter did not allow any discharge planning or preparations to be done. SNF staff sent medication list to PCP Dr. Keena Back, and nurse navigator Gina Taylor. SNF staff also sent referral to [facility].       4/13 Unable to reach, LVM. -[address] [facility]    4/16 Patient received [facility] SN and PT 2/3 times a week; pt live at home alone, daughter active with pt.care. Pt. Reports she is doing well at home. Formerly [facility]

## 2018-04-23 PROBLEM — J96.90 RESPIRATORY FAILURE (HCC): Status: RESOLVED | Noted: 2018-01-01 | Resolved: 2018-01-01

## 2018-04-23 PROBLEM — J44.9 CHRONIC OBSTRUCTIVE PULMONARY DISEASE (HCC): Status: ACTIVE | Noted: 2018-01-01
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
## 2018-04-23 NOTE — PATIENT INSTRUCTIONS

## 2018-04-23 NOTE — TELEPHONE ENCOUNTER
Pt called back to advise dr China Fournier that the cardiologist that she saw was  Herman Croft phone # [phone]

## 2018-04-23 NOTE — PROGRESS NOTES
Chief Complaint   Patient presents with   [address] Annual Wellness Visit     I have reviewed the patient's medical history in detail and updated the computerized patient record. Health Maintenance reviewed. 1. Have you been to the ER, urgent care clinic since your last visit? Hospitalized since your last visit?no    2. Have you seen or consulted any other health care providers outside of the [address] [place] [address] since your last visit? Include any pap smears or colon screening. No    Encouraged pt to discuss pt's wishes with spouse/partner/family and bring them in the next appt to follow thru with the Advanced Directive    Fall Risk Assessment, last 12 mths 4/23/2018   Able to walk? Yes   Fall in past 12 months? No   Fall with injury? -   Number of falls in past 12 months -   Fall Risk Score -       PHQ over the last two weeks 4/23/2018   Little interest or pleasure in doing things Several days   Feeling down, depressed or hopeless Several days   Total Score PHQ 2 2       Abuse Screening Questionnaire 1/24/2018   Do you ever feel afraid of your partner? N   Are you in a relationship with someone who physically or mentally threatens you? N   Is it safe for you to go home?  Y       ADL Assessment 1/24/2018   Feeding yourself No Help Needed   Getting from bed to chair No Help Needed   Getting dressed No Help Needed   Bathing or showering No Help Needed   Walk across the room (includes cane/walker) No Help Needed   Using the telphone No Help Needed   Taking your medications No Help Needed   Preparing meals No Help Needed   Managing money (expenses/bills) No Help Needed   Moderately strenuous housework (laundry) No Help Needed   Shopping for personal items (toiletries/medicines) No Help Needed   Shopping for groceries No Help Needed   Driving No Help Needed   Climbing a flight of stairs No Help Needed   Getting to places beyond walking distances No Help Needed

## 2018-04-23 NOTE — MR AVS SNAPSHOT
[address] 7089 [facility] 
[phone] Patient: Keyla Schofield MRN: [id number] OBD:4/1/1935 Visit Information Date & Time Provider Department Dept. Phone Encounter #  
 4/23/2018 11:00 AM Terra Guzman MD [place] [address] [phone] [id number] Follow-up Instructions Return in about 2 months (around 6/23/2018) for routine follow up. Upcoming Health Maintenance Date Due  
 GLAUCOMA SCREENING Q2Y 1/3/2010 MEDICARE YEARLY EXAM 3/14/2018 BREAST CANCER SCRN MAMMOGRAM 11/11/2018 COLONOSCOPY 12/24/2019 DTaP/Tdap/Td series (2 - Td) 4/28/2025 Allergies as of 4/23/2018  Review Complete On: 4/23/2018 By: Terra Guzman MD  
  
 Severity Noted Reaction Type Reactions Ciprofloxacin  05/06/2010    Shortness of Breath Codeine  05/06/2010    Nausea Only Influenza Virus Vaccine, Specific  03/10/2014   Not Verified Unknown (comments) Allergy documented in Admission Data base 1 screening Pcn [Penicillins]  05/06/2010    Itching Current Immunizations  Reviewed on 5/6/2010 Name Date Influenza Vaccine  Deferred (Contraindication) Pneumococcal Conjugate (PCV-13) 1/31/2017 Pneumococcal Polysaccharide (PPSV-23) 4/28/2015,  Deferred (Contraindication) Tdap 4/28/2015 Not reviewed this visit You Were Diagnosed With   
  
 Codes Comments Medicare annual wellness visit, subsequent    -  Primary ICD-10-CM: Z00.00 ICD-9-CM: V70.0 Screening for alcoholism     ICD-10-CM: Z13.89 ICD-9-CM: V79.1 Screening for depression     ICD-10-CM: Z13.89 ICD-9-CM: V79.0 Urinary tract infection without hematuria, site unspecified     ICD-10-CM: N39.0 ICD-9-CM: 599.0 Anxiety     ICD-10-CM: F41.9 ICD-9-CM: 300.00 Systolic heart failure, unspecified HF chronicity (HCC)     ICD-10-CM: I50.20 ICD-9-CM: 428.20 Coronary artery disease involving native coronary artery of native heart without angina pectoris     ICD-10-CM: I25.10 ICD-9-CM: 414.01 Chronic obstructive pulmonary disease, unspecified COPD type ([facility] 75.)     ICD-10-CM: J44.9 ICD-9-CM: 707 Vitals BP Pulse Temp Resp Height(growth percentile) Weight(growth percentile) 121/82 (BP 1 Location: Left arm, BP Patient Position: Sitting) 64 97.7 °F (36.5 °C) (Oral) 16 5' 5\" (1.651 m) 155 lb (70.3 kg) SpO2 BMI OB Status Smoking Status 97% 25.79 kg/m2 Hysterectomy Current Some Day Smoker Vitals History BMI and BSA Data Body Mass Index Body Surface Area 25.79 kg/m 2 1.8 m 2 Preferred Pharmacy Pharmacy Name Phone [facility] [place] [facility] [phone] Your Updated Medication List  
  
   
This list is accurate as of 4/23/18 12:39 PM.  Always use your most recent med list.  
  
  
  
  
 acetaminophen 325 mg tablet Commonly known as:  TYLENOL Take 2 Tabs by mouth every four (4) hours as needed. albuterol 90 mcg/actuation inhaler Commonly known as:  PROVENTIL HFA, VENTOLIN HFA, PROAIR HFA Take 1 Puff by inhalation every six (6) hours as needed for Wheezing. aspirin delayed-release 81 mg tablet Take 1 Tab by mouth daily. atorvastatin 40 mg tablet Commonly known as:  LIPITOR Take 1 Tab by mouth every evening. cetirizine 10 mg tablet Commonly known as:  ZYRTEC Take 10 mg by mouth daily as needed for Allergies. clonazePAM 1 mg tablet Commonly known as:  Rollene Backbone Take 1 Tab by mouth three (3) times daily. Max Daily Amount: 3 mg. As needed  
  
 clopidogrel 75 mg Tab Commonly known as:  PLAVIX Take 1 Tab by mouth daily. ferrous sulfate 325 mg (65 mg iron) tablet Take 1 Tab by mouth Daily (before breakfast). fluticasone 50 mcg/actuation nasal spray Commonly known as:  Lovetta End 2 Sprays by Both Nostrils route daily as needed for Allergies. furosemide 40 mg tablet Commonly known as:  LASIX Take 1 Tab by mouth daily. metoprolol tartrate 25 mg tablet Commonly known as:  LOPRESSOR Take 0.5 Tabs by mouth every twelve (12) hours. naproxen 500 mg tablet Commonly known as:  NAPROSYN Take 500 mg by mouth two (2) times daily as needed (pain). with meals  
  
 nitroglycerin 0.4 mg SL tablet Commonly known as:  NITROSTAT  
1 Tab by SubLINGual route every five (5) minutes as needed. Indications: Angina  
  
 nystatin topical cream  
Commonly known as:  MYCOSTATIN Apply  to affected area daily as needed (vaginal itching/rash). omeprazole 20 mg capsule Commonly known as:  PRILOSEC Take 1 Cap by mouth daily. Take only as needed  
  
 phenazopyridine 95 mg tab Commonly known as:  PYRIDIUM Take 1 Tab by mouth three (3) times daily as needed for Pain. predniSONE 5 mg tablet Commonly known as:  Ara Look Take 1 Tab by mouth daily. Take as needed for wheezing VITAMIN B-12 100 mcg tablet Generic drug:  cyanocobalamin Take 100 mcg by mouth daily. Prescriptions Printed Refills  
 clonazePAM (KLONOPIN) 1 mg tablet 1 Sig: Take 1 Tab by mouth three (3) times daily. Max Daily Amount: 3 mg. As needed Class: Print Route: Oral  
  
Prescriptions Sent to Pharmacy Refills  
 predniSONE (DELTASONE) 5 mg tablet 1 Sig: Take 1 Tab by mouth daily. Take as needed for wheezing Class: Normal  
 Pharmacy: [facility] DR SETH TUCKER [address] [place] [address], [facility] Ph #: [phone] Route: Oral  
 phenazopyridine (PYRIDIUM) 95 mg tab 0 Sig: Take 1 Tab by mouth three (3) times daily as needed for Pain. Class: Normal  
 Pharmacy: [facility] DR SETH TUCKER [address] [place] [address], [facility] Ph #: [phone] Route: Oral  
  
We Performed the Following AMB POC URINALYSIS DIP STICK AUTO W/O MICRO [91982 CPT(R)] CULTURE, URINE [id number] CPT(R)] [address] [[id number] [facility]] [place] ANNUAL ALCOHOL SCREEN 15 MIN [id number] [facility]] Follow-up Instructions Return in about 2 months (around 6/23/2018) for routine follow up. To-Do List   
 04/23/2018 To Be Determined Appointment with Santos Shaw LPN at [place] [address]  
  
 04/24/2018 9:00 AM  
  Appointment with Olivia Parker PT at [place] [address]  
  
 04/25/2018 To Be Determined Appointment with Santos Sahw LPN at [place] [address]  
  
 04/26/2018 To Be Determined Appointment with Olivia Parker PT at [place] [address]  
  
 04/27/2018 To Be Determined Appointment with Giles Au LPN at [place] [address]  
  
 04/30/2018 To Be Determined Appointment with Santos Shaw LPN at [place] [address]  
  
 05/02/2018 To Be Determined Appointment with Santos Shaw LPN at [place] [address]  
  
 05/04/2018 To Be Determined Appointment with Santos Shaw LPN at [place] [address]  
  
 05/07/2018 To Be Determined Appointment with Santos Shaw LPN at [place] [address]  
  
 05/10/2018 To Be Determined Appointment with Pierre Garnica RN at [place] [address]  
  
 05/10/2018 To Be Determined Appointment with Pierre Garnica RN at [place] [address]  
  
 05/14/2018 To Be Determined Appointment with Santos Shaw LPN at [place] [address]  
  
 05/17/2018 To Be Determined Appointment with Santos Shaw LPN at [place] [address]  
  
 05/23/2018 To Be Determined Appointment with Tatianna Berman LPN at [place] [address]  
  
 05/30/2018 To Be Determined Appointment with Tatianna Berman LPN at [place] [address]  
  
 06/06/2018 To Be Determined Appointment with Dallas Fitzpatrick RN at [place] [address] Patient Instructions Medicare Wellness Visit, Female The best way to live healthy is to have a healthy lifestyle by eating a well-balanced diet, exercising regularly, limiting alcohol and stopping smoking. Regular physical exams and screening tests are another way to keep healthy. Preventive exams provided by your health care provider can find health problems before they become diseases or illnesses. Preventive services including immunizations, screening tests, monitoring and exams can help you take care of your own health. All people over age 72 should have a pneumovax  and and a prevnar shot to prevent pneumonia. These are once in a lifetime unless you and your provider decide differently. All people over 65 should have a yearly flu shot and a tetanus vaccine every 10 years. A bone mass density to screen for osteoporosis or thinning of the bones should be done every 2 years after 65. Screening for diabetes mellitus with a blood sugar test should be done every year. Glaucoma is a disease of the eye due to increased ocular pressure that can lead to blindness and it should be done every year by an eye professional. 
 
Cardiovascular screening tests that check for elevated lipids (fatty part of blood) which can lead to heart disease and strokes should be done every 5 years. Colorectal screening that evaluates for blood or polyps in your colon should be done yearly as a stool test or every five years as a flexible sigmoidoscope or every 10 years as a colonoscopy up to age 76.  
 
Breast cancer screening with a mammogram is recommended biennially  for women age 54-69. Screening for cervical cancer with a pap smear and pelvic exam is recommended for women after age 72 years every 2 years up to age 79 or when the provider and patient decide to stop. If there is a history of cervical abnormalities or other increased risk for cancer then the test is recommended yearly. Hepatitis C screening is also recommended for anyone born between [birth year] through [address]. A shingles vaccine is also recommended once in a lifetime after age 61. Your Medicare Wellness Exam is recommended annually. Here is a list of your current Health Maintenance items with a due date: 
Health Maintenance Due Topic Date Due  Glaucoma Screening   01/03/2010 [facility] Annual Well Visit  03/14/2018 Introducing [facility]! [employer] introduces [employer] patient portal. Now you can access parts of your medical record, email your doctor's office, and request medication refills online. 1. In your internet browser, go to [email or web address] My Single Point/[employer] 2. Click on the First Time User? Click Here link in the Sign In box. You will see the New Member Sign Up page. 3. Enter your Uni2 Access Code exactly as it appears below. You will not need to use this code after youve completed the sign-up process. If you do not sign up before the expiration date, you must request a new code. · Uni2 Access Code: [email or web address] Expires: 4/24/2018  3:39 PM 
 
4. Enter the last four digits of your Social Security Number (xxxx) and Date of Birth (mm/dd/yyyy) as indicated and click Submit. You will be taken to the next sign-up page. 5. Create a Gistt ID. This will be your Uni2 login ID and cannot be changed, so think of one that is secure and easy to remember. 6. Create a Uni2 password. You can change your password at any time. 7. Enter your Password Reset Question and Answer. This can be used at a later time if you forget your password. 8. Enter your e-mail address. You will receive e-mail notification when new information is available in [address] 9. Click Sign Up. You can now view and download portions of your medical record. 10. Click the Download Summary menu link to download a portable copy of your medical information. If you have questions, please visit the Frequently Asked Questions section of the [employer] website. Remember, [employer] is NOT to be used for urgent needs. For medical emergencies, dial 911. Now available from your iPhone and Android! Please provide this summary of care documentation to your next provider. Your primary care clinician is listed as Mamie Gary. If you have any questions after today's visit, please call [phone].

## 2018-04-24 NOTE — TELEPHONE ENCOUNTER
Called to request medical records available for patient from [facility] - Dr Kimmy Howard - appointment to be scheduled - nurse will call back to work into schedule  Buzz Munoz LPN  4/87/7392  4:82 PM

## 2018-04-24 NOTE — TELEPHONE ENCOUNTER
Mailed copy of referral to patients home address of [address], [address]  Nellie Gu LPN  3/63/5662  7:15 PM

## 2018-04-24 NOTE — TELEPHONE ENCOUNTER
Appointment scheduled for Tuesday May 1 2018 at 1045 am with Dr Jaskaran Brooke - patient notified  Makenna Interiano, LISA  2/90/3874  1:22 PM

## 2018-04-27 NOTE — PROGRESS NOTES
Goals      Establish PCP relationships and regularly scheduled appointments. 4/5 NN reminded pt.daughter appt. with PCP (Dr. Harish Santos) 4/6/18 10:30 am, need f/u appt. with Dr. Devonte Kramer [phone].,PEMZFK-OL care is a key part of your treatment and safety. Be sure to make and go to all appointments, and call your doctor if you are having problems. It's also a good idea to know your test results and keep a list of the medicines you take-      4/13 Unable to reach, LVM. -[address]    4/16 NN reminded pt. appt.with PCP 4/17/18 @ 2 pm, need schedule f/u with cardiologist; Devonte Kramer, NN will provided pt.with contact information during office visit. NN encourage monitor weights daily and record readings, monitor weight increase 3 lbs in one day and up 5 lbs in one week notify PCP or cardiologist office. Pt verbalizes understanding reports she has been through this before. -[address]      4/17 Patient cancelled appt. with PCP, rescheduled on 4/23/18. -[address]    4/23 Patient attend f/u with PCP.-    4/27 Patient reports she has scheduled f/u with Cardiologist; Dr. Devonte Kramer on 5/1/18. NN encourage to keep record of weights to take with her to her f/u, and to write down or any questions she might have for the cardiologist.-       Knowledge and adherence to medication plan. 4/5  Patient will understand general knowledge of medications, s/e and take meds as prescribed. NN unable reviewed medications list with (PHI) pt.daughter reports patient does not have any of her medications or discharge summary from [facility]. NN received call from [facility] Keyonna Coppola requesting NN to call pt.daughter to review hospital d/c summary with pt. Daughter d/t she does not have a copy nor does she have a copy of d/c summary from [facility]; appears the pt.and daughter left abruptly.  Pt. Daughter concern that pt.do not have her medications, reports some lower ext.swelling,NO SOB, no SOB with minimal exertion, no chest discomfort, pt.daughter unaware if pt.have wt.gain of 3 lbs in 24 hrs or 5 lbs in week. NN reminded pt.daughter the importance weight daily  ,limit fluids 1500ml/day, encourage elevation of lower extremities, monitoring sodium intake. NN informed pt.daughter PCP off at this time, if pt. experience any increased SOB, increased confusion, fatigue, chest discomfort , more swelling than usual lower extremities, to go to urgent care to have pt.evaluated. NN reminded pt.daughter appt.tomorrow morning at 10:30am 4/6/18. NN will provide pt.and daughter educational material on CHF during office visit. -[address] [facility] [address] [facility] [address]     4/13 Unable to reach, LVM. -[address] [facility]    4/16 BS  med reconcile completed, pt. Reports she taking medications as prescribed, no s/s of s/e will notify office with any questions or concerns. -[address] [facility]    4/23 Patient attend f/u with PCP, received new orders:  predniSONE (DELTASONE) 5 mg tablet 1      Sig: Take 1 Tab by mouth daily. Take as needed for wheezing     phenazopyridine (PYRIDIUM) 95 mg tab 0      Sig: Take 1 Tab by mouth three (3) times daily as needed for Pain      4/27 Patient reports she is taking medications as prescribe, no c/o wheezing, no pain or burning when voiding. Patient reports she is doing good. NN encourage pt.experience any s/e or have any questions or concern please call the office; NN provided pt.with contact information. Patient verbalizes understanding. -[address] resources in place to maintain patient in the community (ie., home health, equipment, DME, refer to, etc.)            4/5 Patient staying with daughter Darrell Samuels for now, pt.confused at times as charted in hospital notes Hx. Dementia, has appt. with PCP Dr. Ananya Hackett 4/6/18 10:30 am, f/u discharge from [facility]. Daughter very active and supportive with pt.care. NN called Marian Shi at [facility] ([phone]) reports have not received referral. NN called Tika at [address] [place] [address] ([phone]) to verify referral reports they have received information today and has contact PCP office. -[address]    4/5 Report from Angela Beaulieu [facility]) Per Angela Beaulieu at [facility], Reviewed [place] questions to ensure patient arrived at SNF safely. Patient's daughter arrived day after admission to SNF, 4/4, and took patient home, stating she did not feel that she needed to be in SNF. Patient/daughter did not allow any discharge planning or preparations to be done. SNF staff sent medication list to PCP Dr. Tayla Montalvo, and nurse navigator Rito Cornejo. SNF staff also sent referral to [facility].       4/13 Unable to reach, LVM. -[address]    4/16 Patient received [facility] SN and PT 2/3 times a week; pt live at home alone, daughter active with pt.care. Pt. Reports she is doing well at home. [facility] Sonam JACKSON    4/23 Patient seen in office PCP f/[place]    4/27 Patient reports she continue with [facility] but has completed PT, reports she will continue with [facility] nursing for one more week. Patient reports she continue to check her weight daily; stable at 145 lbs, NN reminded notify PCP office of weight gain of 3 lbs in one day, or 5 lbs in one week, any increased SOB or increased swelling extremities. Patient verbalizes understanding and states she will call if experiencing any these symptoms. -[address]

## 2018-05-07 NOTE — PROGRESS NOTES
Goals      Establish PCP relationships and regularly scheduled appointments. 4/5 NN reminded pt.daughter appt. with PCP (Dr. Juliane Renee) 4/6/18 10:30 am, need f/u appt. with Dr. Jon Edmondson [phone].,[id number] care is a jha part of your treatment and safety. Be sure to make and go to all appointments, and call your doctor if you are having problems. It's also a good idea to know your test results and keep a list of the medicines you take-      4/13 Unable to reach, LVM. -[address]    4/16 NN reminded pt. appt.with PCP 4/17/18 @ 2 pm, need schedule f/u with cardiologist; Jon Edmondson, NN will provided pt.with contact information during office visit. NN encourage monitor weights daily and record readings, monitor weight increase 3 lbs in one day and up 5 lbs in one week notify PCP or cardiologist office. Pt verbalizes understanding reports she has been through this before. -[address] [facility] [address]      4/17 Patient cancelled appt. with PCP, rescheduled on 4/23/18. -[address]    4/23 Patient attend f/u with PCP.-    4/27 Patient reports she has scheduled f/u with Cardiologist; Dr. Jon Edmondson on 5/1/18. NN encourage to keep record of weights to take with her to her f/u, and to write down or any questions she might have for the cardiologist.-    5/7 Patient reports she has attend f/u appointments. -[address]       Knowledge and adherence to medication plan. 4/5  Patient will understand general knowledge of medications, s/e and take meds as prescribed. NN unable reviewed medications list with (PHI) pt.daughter reports patient does not have any of her medications or discharge summary from [facility]. NN received call from [facility] Kamini Salinas) requesting NN to call pt.daughter to review hospital d/c summary with pt. Daughter d/t she does not have a copy nor does she have a copy of d/c summary from [facility]; appears the pt.and daughter left abruptly.  Pt. Daughter concern that pt.do not have her medications, reports some lower ext.swelling,NO SOB, no SOB with minimal exertion, no chest discomfort, pt.daughter unaware if pt.have wt.gain of 3 lbs in 24 hrs or 5 lbs in week. NN reminded pt.daughter the importance weight daily  ,limit fluids 1500ml/day, encourage elevation of lower extremities, monitoring sodium intake. NN informed pt.daughter PCP off at this time, if pt. experience any increased SOB, increased confusion, fatigue, chest discomfort , more swelling than usual lower extremities, to go to urgent care to have pt.evaluated. NN reminded pt.daughter appt.tomorrow morning at 10:30am 4/6/18. NN will provide pt.and daughter educational material on CHF during office visit. -[address]     4/13 Unable to reach, LVM. -[address]    4/16 BS  med reconcile completed, pt. Reports she taking medications as prescribed, no s/s of s/e will notify office with any questions or concerns. -[address]    4/23 Patient attend f/u with PCP, received new orders:  predniSONE (DELTASONE) 5 mg tablet 1      Sig: Take 1 Tab by mouth daily. Take as needed for wheezing     phenazopyridine (PYRIDIUM) 95 mg tab 0      Sig: Take 1 Tab by mouth three (3) times daily as needed for Pain      4/27 Patient reports she is taking medications as prescribe, no c/o wheezing, no pain or burning when voiding. Patient reports she is doing good. NN encourage pt.experience any s/e or have any questions or concern please call the office; NN provided pt.with contact information. Patient verbalizes understanding. -[address]     5/7 Patient reports she taking medications as directed, but doen't feel lasix helping much, has swelling lower extremities from knees down to feet. NN encourage elevation, decreased fluid intake, monitor sodium intake,  and see PCP. Pt. Declined at this time. -[address] resources in place to maintain patient in the community (ie., home health, equipment, DME, refer to, etc.)            4/5 Patient staying with daughter Sachin Pleasure for now, pt.confused at times as charted in hospital notes Hx. Dementia, has appt. with PCP Dr. Ney Ugarte 4/6/18 10:30 am, f/u discharge from [facility]. Daughter very active and supportive with pt.care. NN called Kenyetta Miles at [facility] ([phone]) reports have not received referral. NN called Tkia at [address] [place] [address] ([phone]) to verify referral reports they have received information today and has contact PCP office. -[facility]    4/5 Report from Roya Reis [facility]) Per Roya Reis at [facility], Reviewed [place] [address] questions to ensure patient arrived at SNF safely. Patient's daughter arrived day after admission to SNF, 4/4, and took patient home, stating she did not feel that she needed to be in SNF. Patient/daughter did not allow any discharge planning or preparations to be done. SNF staff sent medication list to PCP Dr. Kadeem Combs, and nurse navigator Sharath Randhawa. SNF staff also sent referral to [facility].       4/13 Unable to reach, LVM. -[facility]    4/16 Patient received [facility] SN and PT 2/3 times a week; pt live at home alone, daughter active with pt.care. Pt. Reports [facility] is [facility]    4/23 Patient seen in office PCP f/u-    4/27 Patient reports she continue with [facility] but has completed PT, reports she will continue with [facility] nursing for one more week. Patient reports she continue to check her weight daily; stable at 145 lbs, NN reminded notify PCP office of weight gain of 3 lbs in one day, or 5 lbs in one week, any increased SOB or increased swelling extremities. Patient verbalizes understanding and states she will call if experiencing any these symptoms. -[facility]    5/7 Patient reports weight on Sat. 5/5 148, today Monday 5/7 157, swelling from knees down to feet, >L knee, some SOB on exertion, pt.speaking clearly in complete sentences, NN encourage  pt.schedule appt. to be seen by PCP asap. Reminded taking medications as directed pt.reports she does not feel Lasix helping much. Patient declined to schedule appt. at this time.  NN reminded monitor wts. daily, limit sodium intake, fluid restriction of 1500 ml.also information mailed to pt.to review. NN called pt.daughter (PHI) Ms. Edinson Morillo to notify pt.wt.gain and suggested see PCP, daughter reports she is not in town at this time and if mom starts feeling bad she knows how to call 911 service to escort to hospital. NN reminded pt.daughter we would like to prevent pt. from reaching that point. But if needed, yes she has 911 service available at all times. NN notified PCP, barbara f/u. -[address]    5/7 Patient return call, reports she will call 911 service d/t she is now experiencing some dizziness.  NN will notify PCP pt.going to ER.for toni.EVIN

## 2018-05-07 NOTE — PATIENT INSTRUCTIONS
Heart Failure: Care Instructions  Your Care Instructions    Heart failure occurs when your heart does not pump as much blood as the body needs. Failure does not mean that the heart has stopped pumping but rather that it is not pumping as well as it should. Over time, this causes fluid buildup in your lungs and other parts of your body. Fluid buildup can cause shortness of breath, fatigue, swollen ankles, and other problems. By taking medicines regularly, reducing sodium (salt) in your diet, checking your weight every day, and making lifestyle changes, you can feel better and live longer. Follow-up care is a key part of your treatment and safety. Be sure to make and go to all appointments, and call your doctor if you are having problems. It's also a good idea to know your test results and keep a list of the medicines you take. How can you care for yourself at home? Medicines  ? · Be safe with medicines. Take your medicines exactly as prescribed. Call your doctor if you think you are having a problem with your medicine. ? · Do not take any vitamins, over-the-counter medicine, or herbal products without talking to your doctor first. Shai Latrell not take ibuprofen (Advil or Motrin) and naproxen (Aleve) without talking to your doctor first. They could make your heart failure worse. ? · You may be taking some of the following medicine. ¨ Beta-blockers can slow heart rate, decrease blood pressure, and improve your condition. Taking a beta-blocker may lower your chance of needing to be hospitalized. ¨ Angiotensin-converting enzyme inhibitors (ACEIs) reduce the heart's workload, lower blood pressure, and reduce swelling. Taking an ACEI may lower your chance of needing to be hospitalized again. ¨ Angiotensin II receptor blockers (ARBs) work like ACEIs. Your doctor may prescribe them instead of ACEIs. ¨ Diuretics, also called water pills, reduce swelling.   ¨ Potassium supplements replace this important mineral, which is sometimes lost with diuretics. ¨ Aspirin and other blood thinners prevent blood clots, which can cause a stroke or heart attack. ? You will get more details on the specific medicines your doctor prescribes. Diet  ? · Your doctor may suggest that you limit sodium to 2,000 milligrams (mg) a day or less. That is less than 1 teaspoon of salt a day, including all the salt you eat in cooking or in packaged foods. People get most of their sodium from processed foods. Fast food and restaurant meals also tend to be very high in sodium. ? · Ask your doctor how much liquid you can drink each day. You may have to limit liquids. ?Weight  ? · Weigh yourself without clothing at the same time each day. Record your weight. Call your doctor if you have a sudden weight gain, such as more than 2 to 3 pounds in a day or 5 pounds in a week. (Your doctor may suggest a different range of weight gain.) A sudden weight gain may mean that your heart failure is getting worse. ? Activity level  ? · Start light exercise (if your doctor says it is okay). Even if you can only do a small amount, exercise will help you get stronger, have more energy, and manage your weight and your stress. Walking is an easy way to get exercise. Start out by walking a little more than you did before. Bit by bit, increase the amount you walk. ? · When you exercise, watch for signs that your heart is working too hard. You are pushing yourself too hard if you cannot talk while you are exercising. If you become short of breath or dizzy or have chest pain, stop, sit down, and rest.   ? · If you feel \"wiped out\" the day after you exercise, walk slower or for a shorter distance until you can work up to a better pace. ? · Get enough rest at night. Sleeping with 1 or 2 pillows under your upper body and head may help you breathe easier. ? Lifestyle changes  ? · Do not smoke. Smoking can make a heart condition worse.  If you need help quitting, talk to your doctor about stop-smoking programs and medicines. These can increase your chances of quitting for good. Quitting smoking may be the most important step you can take to protect your heart. ? · Limit alcohol to 2 drinks a day for men and 1 drink a day for women. Too much alcohol can cause health problems. ? · Avoid getting sick from colds and the flu. Get a pneumococcal vaccine shot. If you have had one before, ask your doctor whether you need another dose. Get a flu shot each year. If you must be around people with colds or the flu, wash your hands often. When should you call for help? Call 911 if you have symptoms of sudden heart failure such as:  ? · You have severe trouble breathing. ? · You cough up pink, foamy mucus. ? · You have a new irregular or rapid heartbeat. ?Call your doctor now or seek immediate medical care if:  ? · You have new or increased shortness of breath. ? · You are dizzy or lightheaded, or you feel like you may faint. ? · You have sudden weight gain, such as more than 2 to 3 pounds in a day or 5 pounds in a week. (Your doctor may suggest a different range of weight gain.)   ? · You have increased swelling in your legs, ankles, or feet. ? · You are suddenly so tired or weak that you cannot do your usual activities. ? Watch closely for changes in your health, and be sure to contact your doctor if you develop new symptoms. Where can you learn more? Go to http://marta-naomy.info/. Enter O431 in the search box to learn more about \"Heart Failure: Care Instructions. \"  Current as of: September 21, 2016  Content Version: 11.4  © 0473-6114 [employer]. Care instructions adapted under license by [email or web address] (which disclaims liability or warranty for this information).  If you have questions about a medical condition or this instruction, always ask your healthcare professional. [address] any warranty or liability for your use of this information. Limiting Sodium and Fluids With Heart Failure: Care Instructions  Your Care Instructions    Sodium causes your body to hold on to extra water. This may cause your heart failure symptoms to get worse. Limiting sodium may help you feel better and lower your risk of having to go to the hospital.  People get most of their sodium from processed foods. Fast food and restaurant meals also tend to be very high in sodium. Your doctor may suggest that you limit sodium to 2,000 milligrams (mg) a day or less. That is less than 1 teaspoon of salt a day, including all the salt you eat in cooked or packaged foods. Usually, you have to limit the amount of liquids you drink only if your heart failure is severe. Limiting sodium alone often is enough to help your body get rid of extra fluids. However, your doctor may tell you to limit your fluid intake to a set amount each day. Follow-up care is a key part of your treatment and safety. Be sure to make and go to all appointments, and call your doctor if you are having problems. It's also a good idea to know your test results and keep a list of the medicines you take. How can you care for yourself at home? Read food labels  · Read food labels on cans and food packages. The labels tell you how much sodium is in each serving. Make sure that you look at the serving size. If you eat more than the serving size, you have eaten more sodium than is listed for one serving. · Food labels also tell you the Percent Daily Value. If the Percent Daily Value says 50%, it means that you will get at least 50% of all the sodium you need for the entire day in one serving. Choose products with low Percent Daily Values for sodium. · Be aware that sodium can come in forms other than salt, including monosodium glutamate (MSG), sodium citrate, and sodium bicarbonate (baking soda). MSG is often added to Asian food. You can sometimes ask for food without MSG or salt.   Buy low-sodium foods  · Buy foods that are labeled \"unsalted\" (no salt added), \"sodium-free\" (less than 5 mg of sodium per serving), or \"low-sodium\" (less than 140 mg of sodium per serving). A food labeled \"light sodium\" has less than half of the full-sodium version of that food. Foods labeled \"reduced-sodium\" may still have too much sodium. · Buy fresh vegetables or plain, frozen vegetables. Buy low-sodium versions of canned vegetables, soups, and other canned goods. Prepare low-sodium meals  · Use less salt each day when cooking. Reducing salt in this way will help you adjust to the taste. Do not add salt after cooking. Take the salt shaker off the table. · Flavor your food with garlic, lemon juice, onion, vinegar, herbs, and spices instead of salt. Do not use soy sauce, steak sauce, onion salt, garlic salt, mustard, or ketchup on your food. · Make your own salad dressings, sauces, and ketchup without adding salt. · Use less salt (or none) when recipes call for it. You can often use half the salt a recipe calls for without losing flavor. Other dishes like rice, pasta, and grains do not need added salt. · Rinse canned vegetables. This removes some-but not all-of the salt. · Avoid water that has a naturally high sodium content or that has been treated with water softeners, which add sodium. Call your local water company to find out the sodium content of your water supply. If you buy bottled water, read the label and choose a sodium-free brand. Avoid high-sodium foods, such as:  · Smoked, cured, salted, and canned meat, fish, and poultry. · Ham, vu, hot dogs, and luncheon meats. · Regular, hard, and processed cheese and regular peanut butter. · Crackers with salted tops. · Frozen prepared meals. · Canned and dried soups, broths, and bouillon, unless labeled sodium-free or low-sodium. · Canned vegetables, unless labeled sodium-free or low-sodium. · Salted snack foods such as chips and pretzels.   · Western Heidi fries, pizza, tacos, and other fast foods. · Pickles, olives, ketchup, and other condiments, especially soy sauce, unless labeled sodium-free or low-sodium. If you cannot cook for yourself  · Have family members or friends help you, or have someone cook low-sodium meals. · Check with your local senior nutrition program to find out where meals are served and whether they offer a low-sodium option. You can often find these programs through your local health department or hospital.  · Have meals delivered to your home. Most [facility] have a Meals on [address]. These programs provide one hot meal a day for older adults, delivered to their homes. Ask whether these meals are low-sodium. Let them know that you are on a low-sodium diet. Limiting fluid intake  · Find a method that works for you. You might simply write down how much you drink every time you do. Some people keep a container filled with the amount of fluid allowed for that day. If they drink from a source other than the container, then they pour out that amount. · Measure your regular drinking glasses to find out how much fluid each one holds. Once you know this, you will not have to measure every time. · Besides water, milk, juices, and other drinks, some foods have a lot of fluid. Count any foods that will melt (such as ice cream or gelatin dessert) or liquid foods (such as soup) as part of your fluid intake for the day. Where can you learn more? Go to http://marta-naomy.info/. Enter A166 in the search box to learn more about \"Limiting Sodium and Fluids With Heart Failure: Care Instructions. \"  Current as of: September 21, 2016  Content Version: 11.4  © 4766-9737 [employer]. Care instructions adapted under license by [employer] (which disclaims liability or warranty for this information).  If you have questions about a medical condition or this instruction, always ask your healthcare professional. [employer], [facility] disclaims any warranty or liability for your use of this information. Low Sodium Diet (2,000 Milligram): Care Instructions  Your Care Instructions    Too much sodium causes your body to hold on to extra water. This can raise your blood pressure and force your heart and kidneys to work harder. In very serious cases, this could cause you to be put in the hospital. It might even be life-threatening. By limiting sodium, you will feel better and lower your risk of serious problems. The most common source of sodium is salt. People get most of the salt in their diet from canned, prepared, and packaged foods. Fast food and restaurant meals also are very high in sodium. Your doctor will probably limit your sodium to less than 2,000 milligrams (mg) a day. This limit counts all the sodium in prepared and packaged foods and any salt you add to your food. Follow-up care is a key part of your treatment and safety. Be sure to make and go to all appointments, and call your doctor if you are having problems. It's also a good idea to know your test results and keep a list of the medicines you take. How can you care for yourself at home? Read food labels  · Read labels on cans and food packages. The labels tell you how much sodium is in each serving. Make sure that you look at the serving size. If you eat more than the serving size, you have eaten more sodium. · Food labels also tell you the Percent Daily Value for sodium. Choose products with low Percent Daily Values for sodium. · Be aware that sodium can come in forms other than salt, including monosodium glutamate (MSG), sodium citrate, and sodium bicarbonate (baking soda). MSG is often added to Asian food. When you eat out, you can sometimes ask for food without MSG or added salt.   Buy low-sodium foods  · Buy foods that are labeled \"unsalted\" (no salt added), \"sodium-free\" (less than 5 mg of sodium per serving), or \"low-sodium\" (less than 140 mg of sodium per serving). Foods labeled \"reduced-sodium\" and \"light sodium\" may still have too much sodium. Be sure to read the label to see how much sodium you are getting. · Buy fresh vegetables, or frozen vegetables without added sauces. Buy low-sodium versions of canned vegetables, soups, and other canned goods. Prepare low-sodium meals  · Cut back on the amount of salt you use in cooking. This will help you adjust to the taste. Do not add salt after cooking. One teaspoon of salt has about 2,300 mg of sodium. · Take the salt shaker off the table. · Flavor your food with garlic, lemon juice, onion, vinegar, herbs, and spices. Do not use soy sauce, lite soy sauce, steak sauce, onion salt, garlic salt, celery salt, mustard, or ketchup on your food. · Use low-sodium salad dressings, sauces, and ketchup. Or make your own salad dressings and sauces without adding salt. · Use less salt (or none) when recipes call for it. You can often use half the salt a recipe calls for without losing flavor. Other foods such as rice, pasta, and grains do not need added salt. · Rinse canned vegetables, and cook them in fresh water. This removes some-but not all-of the salt. · Avoid water that is naturally high in sodium or that has been treated with water softeners, which add sodium. Call your local water company to find out the sodium content of your water supply. If you buy bottled water, read the label and choose a sodium-free brand. Avoid high-sodium foods  · Avoid eating:  ¨ Smoked, cured, salted, and canned meat, fish, and poultry. ¨ Ham, vu, hot dogs, and luncheon meats. ¨ Regular, hard, and processed cheese and regular peanut butter. ¨ Crackers with salted tops, and other salted snack foods such as pretzels, chips, and salted popcorn. ¨ Frozen prepared meals, unless labeled low-sodium. ¨ Canned and dried soups, broths, and bouillon, unless labeled sodium-free or low-sodium.   ¨ Canned vegetables, unless labeled sodium-free or low-sodium. ¨ Western Heidi fries, pizza, tacos, and other fast foods. ¨ Pickles, olives, ketchup, and other condiments, especially soy sauce, unless labeled sodium-free or low-sodium. Where can you learn more? Go to http://marta-naomy.info/. Enter R752 in the search box to learn more about \"Low Sodium Diet (2,000 Milligram): Care Instructions. \"  Current as of: May 12, 2017  Content Version: 11.4  © 4765-6924 [employer]. Care instructions adapted under license by [employer] (which disclaims liability or warranty for this information). If you have questions about a medical condition or this instruction, always ask your healthcare professional. [address] any warranty or liability for your use of this information. Learning About Heart Failure Zones  What are heart failure zones? Heart failure zones give you an easy way to see changes in your heart failure symptoms. They also tell you when you need to get help. Check every day to see which zone you are in. Green zone. You are doing well. This is where you want to be. · Your weight is stable. This means it is not going up or down. · You breathe easily. · You are sleeping well. You are able to lie flat without shortness of breath. · You can do your usual activities. Yellow zone. Be careful. Your symptoms are changing. Call your doctor. · You have new or increased shortness of breath. · You are dizzy or lightheaded, or you feel like you may faint. · You have sudden weight gain, such as more than 2 to 3 pounds in a day or 5 pounds in a week. (Your doctor may suggest a different range of weight gain.)  · You have increased swelling in your legs, ankles, or feet. · You are so tired or weak that you cannot do your usual activities. · You are not sleeping well. Shortness of breath wakes you up at night. You need extra pillows.   Your doctor's name: ____________________________________________________________  Your doctor's contact information: _________________________________________________  Red zone. This is an emergency. Call 911. You have symptoms of sudden heart failure, such as:  · You have severe trouble breathing. · You cough up pink, foamy mucus. · You have a new irregular or fast heartbeat. You have symptoms of a heart attack. These may include:  · Chest pain or pressure, or a strange feeling in the chest.  · Sweating. · Shortness of breath. · Nausea or vomiting. · Pain, pressure, or a strange feeling in the back, neck, jaw, or upper belly or in one or both shoulders or arms. · Lightheadedness or sudden weakness. · A fast or irregular heartbeat. If you have symptoms of a heart attack: After you call 911, the  may tell you to chew 1 adult-strength or 2 to 4 low-dose aspirin. Wait for an ambulance. Do not try to drive yourself. Follow-up care is a key part of your treatment and safety. Be sure to make and go to all appointments, and call your doctor if you are having problems. It's also a good idea to know your test results and keep a list of the medicines you take. Where can you learn more? Go to http://marta-naomy.info/. Enter T174 in the search box to learn more about \"Learning About Heart Failure Zones. \"  Current as of: September 21, 2016  Content Version: 11.4  © 1397-8629 [employer]. Care instructions adapted under license by [employer] (which disclaims liability or warranty for this information). If you have questions about a medical condition or this instruction, always ask your healthcare professional. [place] [address] any warranty or liability for your use of this information.

## 2018-05-08 NOTE — ED NOTES
Pt ambulated with steady gait and in no apparent distress. Pulse oxygen ranged from 92-95% without difficulty.

## 2018-05-08 NOTE — PROGRESS NOTES
HISTORY OF PRESENT ILLNESS  Paulo Gonzalez is a 68 y.o. female. Ankle swelling    The history is provided by the patient. This is a recurrent problem. The current episode started more than 2 days ago. The problem has been gradually worsening (Leg swelling on the left). The pain is present in the left ankle, left foot, left lower leg and left knee. The patient is experiencing no pain. Associated symptoms comments: abdo and chest pain. Treatments tried: went to ER increased diuretic and given AB. There has been no history of extremity trauma. Abdo CP doing better. Ct scan mild effusion. EKG looked okay labs including d-dimer were normal.  She has been on a blood thinner since getting a stent. Patient Active Problem List   Diagnosis Code    Systolic heart failure (HCC) I50.20    Hypertension I10    Anxiety F41.9    GERD (gastroesophageal reflux disease) K21.9    Elevated cholesterol E78.00    Allergic rhinitis J30.9    Moderate persistent asthma J45.40    Coronary artery disease without angina pectoris I25.10    Migraine G43.909    Chronic obstructive pulmonary disease (HCC) J44.9     Current Outpatient Prescriptions   Medication Sig Dispense Refill    cephALEXin (KEFLEX) 500 mg capsule Take 1 Cap by mouth two (2) times a day for 7 days. 14 Cap 0    clonazePAM (KLONOPIN) 1 mg tablet Take 1 Tab by mouth three (3) times daily. Max Daily Amount: 3 mg. As needed 90 Tab 1    predniSONE (DELTASONE) 5 mg tablet Take 1 Tab by mouth daily. Take as needed for wheezing 30 Tab 1    phenazopyridine (PYRIDIUM) 95 mg tab Take 1 Tab by mouth three (3) times daily as needed for Pain. 20 Tab 0    aspirin-acetaminophen-caffeine (EXCEDRIN ES) 250-250-65 mg per tablet Take 1 Tab by mouth.  clopidogrel (PLAVIX) 75 mg tab Take 1 Tab by mouth daily. 30 Tab 5    ferrous sulfate 325 mg (65 mg iron) tablet Take 1 Tab by mouth Daily (before breakfast).  60 Tab 5    furosemide (LASIX) 40 mg tablet Take 1 Tab by mouth daily. 30 Tab 5    atorvastatin (LIPITOR) 40 mg tablet Take 1 Tab by mouth every evening. 30 Tab 5    albuterol (PROVENTIL HFA, VENTOLIN HFA, PROAIR HFA) 90 mcg/actuation inhaler Take 1 Puff by inhalation every six (6) hours as needed for Wheezing. 1 Inhaler 5    nitroglycerin (NITROSTAT) 0.4 mg SL tablet 1 Tab by SubLINGual route every five (5) minutes as needed. Indications: Angina 25 Tab 5    acetaminophen (TYLENOL) 325 mg tablet Take 2 Tabs by mouth every four (4) hours as needed. 40 Tab 0    aspirin delayed-release 81 mg tablet Take 1 Tab by mouth daily. 30 Tab 1    fluticasone (FLONASE) 50 mcg/actuation nasal spray 2 Sprays by Both Nostrils route daily as needed for Allergies.  nystatin (MYCOSTATIN) topical cream Apply  to affected area daily as needed (vaginal itching/rash).  cetirizine (ZYRTEC) 10 mg tablet Take 10 mg by mouth daily as needed for Allergies.  omeprazole (PRILOSEC) 20 mg capsule Take 1 Cap by mouth daily. Take only as needed 30 Cap 11    cyanocobalamin (VITAMIN B-12) 100 mcg tablet Take 100 mcg by mouth daily. Allergies   Allergen Reactions    Ciprofloxacin Shortness of Breath    Codeine Nausea Only    Influenza Virus Vaccine, Specific Unknown (comments)     Allergy documented in Admission Data base 1 screening    Pcn [Penicillins] Itching     Social History     Social History    Marital status:      Spouse name: N/A    Number of children: 3    Years of education: N/A     Occupational History     Retired     Social History Main Topics    Smoking status: Current Some Day Smoker     Packs/day: 0.10     Last attempt to quit: 11/1/2014    Smokeless tobacco: Never Used    Alcohol use No    Drug use: No    Sexual activity: No     Other Topics Concern    Not on file     Social History Narrative    Lives alone, kids are moving       Review of Systems   Constitutional: Negative for fever. Respiratory: Positive for shortness of breath. Cardiovascular: Positive for leg swelling. Left   Gastrointestinal: Negative for blood in stool. Musculoskeletal: Negative for falls. Physical Exam  Visit Vitals    /64    Pulse 64    Temp 97 °F (36.1 °C) (Oral)    Resp 24    Ht 5' 5\" (1.651 m)    Wt 157 lb (71.2 kg)    SpO2 95%    BMI 26.13 kg/m2     WD WN female NAD  Heart RRR without murmers clicks or rubs  Lungs CTA  Abdo soft nontender  Ext no edema  10 cm distal1.5  cm diff in leg diameter > left  ASSESSMENT and PLAN  Encounter Diagnoses   Name Primary?  Systolic heart failure, unspecified HF chronicity (HCC) Yes    Pulmonary emphysema, unspecified emphysema type ([address]     Left leg swelling     Coronary artery disease involving native coronary artery of native heart without angina pectoris      Orders Placed This Encounter    DUPLEX LOWER EXT VENOUS BILAT   To rule out DVT. Chest and abdominal pain seems to have recovered. Finish antibiotic. Coronary artery disease stable. Follow-up Disposition:  Return in about 3 days (around 5/11/2018) for routine follow up.

## 2018-05-08 NOTE — ED PROVIDER NOTES
EMERGENCY DEPARTMENT HISTORY AND PHYSICAL EXAM      Date: 5/7/2018  Patient Name: Ashlyn Quinonez    History of Presenting Illness     Chief Complaint   Patient presents with    Abdominal Pain     Arrival via EMS with complaints of intermittent abdoinal pain       History Provided By: Patient, EMS and Records    HPI: Ashlyn Quinonez, 68 y.o. female with PMHx significant for CHF on Lasix, MI, depression and SHx of hysterectomy and cholecystectomy presents via EMS to the ED with cc of intermittent RUQ abdominal pain that radiates through right chest, right ribs into back that began yesterday. Pt notes pain is worse with ambulation, and that she also began to feel SOB with ambulation yesterday. Pt notes that she has experienced mild intermittent SOB since undergoing stent placement ~1 month ago, but denies that SOB has previously been this severe. Pt is prescribed Plavix; states that she is compliant with all of her medications. Pt reports that her last BM was yesterday and was normal. Pt denies a hx of blood clots. Pt specifically denies fever, chills, cough, N/V/D, hematuria, dysuria, hematochezia, and hemoptysis. Chief Complaint: abdominal pain  Duration: 1 Day  Timing:  Intermittent  Location: RUQ radiating through ribs and R L back  Quality: N/A  Severity: moderate  Modifying Factors: worse with ambulation  Associated Symptoms: SOB     There are no other complaints, changes, or physical findings at this time. PCP: Jaja Marcano MD    Current Outpatient Prescriptions   Medication Sig Dispense Refill    cephALEXin (KEFLEX) 500 mg capsule Take 1 Cap by mouth two (2) times a day for 7 days. 14 Cap 0    clonazePAM (KLONOPIN) 1 mg tablet Take 1 Tab by mouth three (3) times daily. Max Daily Amount: 3 mg. As needed 90 Tab 1    predniSONE (DELTASONE) 5 mg tablet Take 1 Tab by mouth daily.  Take as needed for wheezing 30 Tab 1    aspirin-acetaminophen-caffeine (EXCEDRIN ES) 250-250-65 mg per tablet Take 1 Tab by mouth.  clopidogrel (PLAVIX) 75 mg tab Take 1 Tab by mouth daily. 30 Tab 5    ferrous sulfate 325 mg (65 mg iron) tablet Take 1 Tab by mouth Daily (before breakfast). 60 Tab 5    furosemide (LASIX) 40 mg tablet Take 1 Tab by mouth daily. 30 Tab 5    metoprolol tartrate (LOPRESSOR) 25 mg tablet Take 0.5 Tabs by mouth every twelve (12) hours. 60 Tab 5    atorvastatin (LIPITOR) 40 mg tablet Take 1 Tab by mouth every evening. 30 Tab 5    albuterol (PROVENTIL HFA, VENTOLIN HFA, PROAIR HFA) 90 mcg/actuation inhaler Take 1 Puff by inhalation every six (6) hours as needed for Wheezing. 1 Inhaler 5    aspirin delayed-release 81 mg tablet Take 1 Tab by mouth daily. 30 Tab 1    fluticasone (FLONASE) 50 mcg/actuation nasal spray 2 Sprays by Both Nostrils route daily as needed for Allergies.  cetirizine (ZYRTEC) 10 mg tablet Take 10 mg by mouth daily as needed for Allergies.  omeprazole (PRILOSEC) 20 mg capsule Take 1 Cap by mouth daily. Take only as needed 30 Cap 11    cyanocobalamin (VITAMIN B-12) 100 mcg tablet Take 100 mcg by mouth daily.  phenazopyridine (PYRIDIUM) 95 mg tab Take 1 Tab by mouth three (3) times daily as needed for Pain. 20 Tab 0    nitroglycerin (NITROSTAT) 0.4 mg SL tablet 1 Tab by SubLINGual route every five (5) minutes as needed. Indications: Angina 25 Tab 5    acetaminophen (TYLENOL) 325 mg tablet Take 2 Tabs by mouth every four (4) hours as needed. 40 Tab 0    nystatin (MYCOSTATIN) topical cream Apply  to affected area daily as needed (vaginal itching/rash).          Past History     Past Medical History:  Past Medical History:   Diagnosis Date    CHF (congestive heart failure) ([address]     Other ill-defined conditions(799.89)     AMI    Psychiatric disorder     depression       Past Surgical History:  Past Surgical History:   Procedure Laterality Date    CARDIAC SURG PROCEDURE UNLIST  03/2018    stents    HX APPENDECTOMY      HX CHOLECYSTECTOMY      HX GI adhesions    HX GYN      hysterectomy/  10 years       Family History:  Family History   Problem Relation Age of Onset    No Known Problems Mother     No Known Problems Father     Seizures Daughter     Cancer Daughter        Social History:  Social History   Substance Use Topics    Smoking status: Current Some Day Smoker     Packs/day: 0.10     Last attempt to quit: 11/1/2014    Smokeless tobacco: Never Used    Alcohol use No       Allergies: Allergies   Allergen Reactions    Ciprofloxacin Shortness of Breath    Codeine Nausea Only    Influenza Virus Vaccine, Specific Unknown (comments)     Allergy documented in Admission Data base 1 screening    Pcn [Penicillins] Itching         Review of Systems   Review of Systems   Constitutional: Negative for chills, fatigue and fever. HENT: Negative for congestion, rhinorrhea and sore throat. Eyes: Negative for pain, discharge and visual disturbance. Respiratory: Negative for cough, chest tightness, shortness of breath and wheezing. Cardiovascular: Positive for chest pain (RUQ pain/right chest pain). Negative for palpitations and leg swelling. Gastrointestinal: Positive for abdominal pain (RUQ). Negative for constipation, diarrhea, nausea and vomiting.        -hemoptysis   Genitourinary: Negative for dysuria, frequency and hematuria. Musculoskeletal: Positive for back pain (radiating from RUQ). Negative for arthralgias and myalgias. Skin: Negative for rash. Neurological: Positive for dizziness. Negative for weakness, light-headedness and headaches. Psychiatric/Behavioral: Negative. All other systems reviewed and are negative. Physical Exam   Physical Exam   Constitutional: She is oriented to person, place, and time. She appears well-developed and well-nourished. No distress. HENT:   Head: Normocephalic and atraumatic. Eyes: EOM are normal. Right eye exhibits no discharge. Left eye exhibits no discharge. No scleral icterus.    Neck: Normal range of motion. Neck supple. No tracheal deviation present. Cardiovascular: Normal rate, regular rhythm, normal heart sounds and intact distal pulses. Exam reveals no gallop and no friction rub. No murmur heard. Pulses:       Dorsalis pedis pulses are 2+ on the right side, and 2+ on the left side. Pulmonary/Chest: Effort normal. No respiratory distress. She has no wheezes. She has no rales. Decreased breath sounds in R inferior lung field   Abdominal: Soft. She exhibits no distension. There is tenderness in the right upper quadrant. There is no rebound and no guarding. Musculoskeletal: Normal range of motion. 2 to 3+ BLLE edema. Erythema and warmth to bilateral lower extremities   Lymphadenopathy:     She has no cervical adenopathy. Neurological: She is alert and oriented to person, place, and time. No focal neuro deficits   Skin: Skin is warm and dry. No rash noted. Psychiatric: She has a normal mood and affect. Nursing note and vitals reviewed.         Diagnostic Study Results   Labs -   Recent Results (from the past 12 hour(s))   EKG, 12 LEAD, INITIAL    Collection Time: 05/07/18  9:02 PM   Result Value Ref Range    Ventricular Rate 81 BPM    Atrial Rate 55 BPM    QRS Duration 108 ms    Q-T Interval 478 ms    QTC Calculation (Bezet) 555 ms    Calculated R Axis -68 degrees    Calculated T Axis 133 degrees    Diagnosis            CBC WITH AUTOMATED DIFF    Collection Time: 05/07/18  9:34 PM   Result Value Ref Range    WBC 12.3 (H) 3.6 - 11.0 K/uL    RBC 4.19 3.80 - 5.20 M/uL    HGB 10.1 (L) 11.5 - 16.0 g/dL    HCT 33.7 (L) 35.0 - 47.0 %    MCV 80.4 80.0 - 99.0 FL    MCH 24.1 (L) 26.0 - 34.0 PG    MCHC 30.0 30.0 - 36.5 g/dL    RDW 16.4 (H) 11.5 - 14.5 %    PLATELET 935 748 - 696 K/uL    MPV 11.8 8.9 - 12.9 FL    NRBC 0.0 0  WBC    ABSOLUTE NRBC 0.00 0.00 - 0.01 K/uL    NEUTROPHILS 67 32 - 75 %    LYMPHOCYTES 24 12 - 49 %    MONOCYTES 8 5 - 13 %    EOSINOPHILS 1 0 - 7 % BASOPHILS 1 0 - 1 %    IMMATURE GRANULOCYTES 0 0.0 - 0.5 %    ABS. NEUTROPHILS 8.2 (H) 1.8 - 8.0 K/UL    ABS. LYMPHOCYTES 2.9 0.8 - 3.5 K/UL    ABS. MONOCYTES 0.9 0.0 - 1.0 K/UL    ABS. EOSINOPHILS 0.1 0.0 - 0.4 K/UL    ABS. BASOPHILS 0.1 0.0 - 0.1 K/UL    ABS. IMM. GRANS. 0.1 (H) 0.00 - 0.04 K/UL    DF AUTOMATED     METABOLIC PANEL, COMPREHENSIVE    Collection Time: 05/07/18  9:34 PM   Result Value Ref Range    Sodium 141 136 - 145 mmol/L    Potassium 3.7 3.5 - 5.1 mmol/L    Chloride 106 97 - 108 mmol/L    CO2 25 21 - 32 mmol/L    Anion gap 10 5 - 15 mmol/L    Glucose 105 (H) 65 - 100 mg/dL    BUN 29 (H) 6 - 20 MG/DL    Creatinine 1.42 (H) 0.55 - 1.02 MG/DL    BUN/Creatinine ratio 20 12 - 20      GFR est AA 44 (L) >60 ml/min/1.73m2    GFR est non-AA 36 (L) >60 ml/min/1.73m2    Calcium 9.3 8.5 - 10.1 MG/DL    Bilirubin, total 0.5 0.2 - 1.0 MG/DL    ALT (SGPT) 24 12 - 78 U/L    AST (SGOT) 15 15 - 37 U/L    Alk.  phosphatase 87 45 - 117 U/L    Protein, total 7.4 6.4 - 8.2 g/dL    Albumin 3.6 3.5 - 5.0 g/dL    Globulin 3.8 2.0 - 4.0 g/dL    A-G Ratio 0.9 (L) 1.1 - 2.2     CK W/ CKMB & INDEX    Collection Time: 05/07/18  9:34 PM   Result Value Ref Range    CK 56 26 - 192 U/L    CK - MB 4.2 (H) <3.6 NG/ML    CK-MB Index 7.5 (H) 0 - 2.5     LIPASE    Collection Time: 05/07/18  9:34 PM   Result Value Ref Range    Lipase 171 73 - 393 U/L   TROPONIN I    Collection Time: 05/07/18  9:34 PM   Result Value Ref Range    Troponin-I, Qt. 0.05 (H) <0.05 ng/mL   URINALYSIS W/ REFLEX CULTURE    Collection Time: 05/07/18  9:34 PM   Result Value Ref Range    Color YELLOW/STRAW      Appearance CLEAR CLEAR      Specific gravity 1.010 1.003 - 1.030      pH (UA) 5.5 5.0 - 8.0      Protein NEGATIVE  NEG mg/dL    Glucose NEGATIVE  NEG mg/dL    Ketone NEGATIVE  NEG mg/dL    Bilirubin NEGATIVE  NEG      Blood NEGATIVE  NEG      Urobilinogen 0.2 0.2 - 1.0 EU/dL    Nitrites NEGATIVE  NEG      Leukocyte Esterase NEGATIVE  NEG      WBC 0-4 0 - 4 /hpf    RBC 0-5 0 - 5 /hpf    Epithelial cells FEW FEW /lpf    Bacteria NEGATIVE  NEG /hpf    UA:UC IF INDICATED CULTURE NOT INDICATED BY UA RESULT CNI      Hyaline cast 0-2 0 - 5 /lpf   D DIMER    Collection Time: 05/07/18  9:34 PM   Result Value Ref Range    D-dimer 0.29 0.00 - 0.65 mg/L FEU   TROPONIN I    Collection Time: 05/07/18 11:30 PM   Result Value Ref Range    Troponin-I, Qt. 0.06 (H) <0.05 ng/mL   EKG, 12 LEAD, SUBSEQUENT    Collection Time: 05/08/18 12:42 AM   Result Value Ref Range    Ventricular Rate 82 BPM    Atrial Rate 92 BPM    P-R Interval 162 ms    QRS Duration 112 ms    Q-T Interval 424 ms    QTC Calculation (Bezet) 495 ms    Calculated P Axis 88 degrees    Calculated R Axis -66 degrees    Calculated T Axis 137 degrees    Diagnosis                Radiologic Studies -   CT Results  (Last 48 hours)               05/07/18 2254  CT ABD PELV WO CONT Final result    Impression:  IMPRESSION:   Mild diffuse subcutaneous fat stranding and presacral edema are likely related   to fluid overload/CHF. Unchanged cardiomegaly. Diminished small right pleural   effusion. Narrative:  EXAM:  CT ABD PELV WO CONT       INDICATION: Abdominal pain; RUQ ab pain         COMPARISON: March 25, 2018       CONTRAST:  None. TECHNIQUE:    Thin axial images were obtained through the abdomen and pelvis. Coronal and   sagittal reconstructions were generated. Oral contrast was not administered. CT   dose reduction was achieved through use of a standardized protocol tailored for   this examination and automatic exposure control for dose modulation. The absence of intravenous contrast material reduces the sensitivity for   evaluation of the solid parenchymal organs of the abdomen. FINDINGS:    LUNG BASES: Mild bibasilar atelectasis. Small right pleural effusion is   diminished. INCIDENTALLY IMAGED HEART AND MEDIASTINUM: Stable cardiomegaly. LIVER: No mass or biliary dilatation.    GALLBLADDER: Surgically absent. SPLEEN: No mass. PANCREAS: No mass or ductal dilatation. ADRENALS: Unremarkable. KIDNEYS/URETERS: No mass, calculus, or hydronephrosis. Unchanged 3.5 cm left   renal cyst.   STOMACH: Unremarkable. SMALL BOWEL: No dilatation or wall thickening. COLON: No dilatation or wall thickening. APPENDIX: Surgically absent. PERITONEUM: No ascites or pneumoperitoneum. RETROPERITONEUM: No lymphadenopathy or aortic aneurysm. New presacral edema. REPRODUCTIVE ORGANS: Status post hysterectomy. URINARY BLADDER: No mass or calculus. BONES: No destructive bone lesion. Unchanged sclerotic bone lesion at T12. ADDITIONAL COMMENTS: Mild diffuse subcutaneous fat stranding. CXR Results  (Last 48 hours)               05/07/18 2149  XR CHEST PA LAT Final result    Impression:  IMPRESSION:        Cardiomegaly. No acute process. Narrative:  EXAM:  XR CHEST PA LAT       INDICATION:  dyspnea       COMPARISON:  3/29/2018        FINDINGS:       PA and lateral radiographs of the chest demonstrate mild linear scarring in the   left midlung. The lungs are free of acute disease. There is unchanged   cardiomegaly but no vascular congestion or pleural fluid. The bones and soft   tissues are unremarkable. Medical Decision Making   I am the first provider for this patient. I reviewed the vital signs, available nursing notes, past medical history, past surgical history, family history and social history. Vital Signs-Reviewed the patient's vital signs.   Patient Vitals for the past 12 hrs:   Temp Pulse Resp BP SpO2   05/08/18 0114 - 75 19 - 96 %   05/08/18 0100 - 71 19 126/59 96 %   05/08/18 0030 - 74 21 111/57 96 %   05/08/18 0001 - 76 28 (!) 111/92 95 %   05/07/18 2334 - 72 22 - 96 %   05/07/18 2330 - 79 26 111/58 96 %   05/07/18 2315 - 75 22 121/63 96 %   05/07/18 2215 - 75 24 113/72 97 %   05/07/18 2200 - 80 25 125/64 98 %   05/07/18 2115 - 70 24 104/57 98 %   05/07/18 2015 - 76 23 105/57 99 %   05/07/18 2008 97.8 °F (36.6 °C) 72 18 110/44 98 %       Pulse Oximetry Analysis - 98% on RA    Cardiac Monitor:   Rate: 72 bpm  Rhythm: Normal Sinus Rhythm      EKG interpretation: (Preliminary) 2102  Rhythm: atrial fib and PVC's; and regular . Rate (approx.): 81; Axis: normal; QRS interval: normal ; ST/T wave: normal; Other findings: QTC prolonged. Incomplete right bundle branch. QAs in anterior leads. Written by Akosua Dupree ED Scribe, as dictated by Abigail Bailey MD.    Records Reviewed: Nursing Notes, Old Medical Records, Ambulance Run Sheet, Previous Radiology Studies and Previous Laboratory Studies    Provider Notes (Medical Decision Making):   Differential includes heart failure, pulmonary edema, pleural effusion, PE, ACS, arrhythmia, cellulitis, DVT, choledocholithiasis, UTI, pyelonephritis, MSK pain, shingles. Hx of cholecystectomy. - CBC, CMP, lipase, troponin, BNP  - UA  - D-dimer to risk stratify for PE, DVT  - CXR  - CT a/p  - Symptomatic management and reevaluate    Disposition: Troponin . 05/.06, consistent with prior values, no new EKG changes. Right sided chest pain atypical.  Doubt ACS. D-dimer negative. No pulmonary edema noted on CXR. Will increase lasix to 60 mg x 3 days. Given erythema to BLEs, will treat with course of keflex (renally dosed). No acute intraabdominal findings on CT a/p. Advised close follow up with cardiologist and PCP. Discussed results, prescriptions and follow up plan with patient. Provided customary return to ED instructions. Patient expressed understanding. Sulma Silva MD    ED Course:   Initial assessment performed. The patients presenting problems have been discussed, and they are in agreement with the care plan formulated and outlined with them. I have encouraged them to ask questions as they arise throughout their visit. PROGRESS NOTE:  1:15 AM  Pt ambulated without any difficulty.  Maintained O2 sats above 92%. Discharge Note:  1:20 AM  The patient has been re-evaluated and is ready for discharge. Reviewed available results with patient. Counseled patient on diagnosis and care plan. Patient has expressed understanding, and all questions have been answered. Patient agrees with plan and agrees to follow up as recommended, or to return to the ED if their symptoms worsen. Discharge instructions have been provided and explained to the patient, along with reasons to return to the ED. PLAN:  1. Current Discharge Medication List      START taking these medications    Details   cephALEXin (KEFLEX) 500 mg capsule Take 1 Cap by mouth two (2) times a day for 7 days. Qty: 14 Cap, Refills: 0           2. Follow-up Information     Follow up With Details Comments [address], [place]  Please follow up with your cardiologist this week [id number] [phone]      Sabine Carias MD In 2 days  [address] [facility],[address] [id number] [phone]      [facility] EMERGENCY DEPT  As needed, If symptoms worsen [address] [place] [address]  [phone]        Return to ED if worse     Diagnosis     Clinical Impression:   1. Right-sided chest pain    2. RUQ pain    3. Chronic systolic heart failure (HCC)    4. Cellulitis of lower extremity, unspecified laterality        Attestations: This note is prepared by Mohit Quinones. Adelaide Dunbar and Jerri Ngo, acting as Scribes for Jaye Echeverria MD.    Jaye Echeverria MD: The scribe's documentation has been prepared under my direction and personally reviewed by me in its entirety. I confirm that the note above accurately reflects all work, treatment, procedures, and medical decision making performed by me.

## 2018-05-08 NOTE — MR AVS SNAPSHOT
[address] 5814 [facility] 
[phone] Patient: Manjula Sears MRN: [id number]:[id number] Visit Information Date & Time Provider Department Dept. Phone Encounter #  
 5/8/2018 11:30 AM MD Jeannette Brown Dr [id number] Follow-up Instructions Return in about 3 days (around 5/11/2018) for routine follow up. Your Appointments 5/22/2018 10:15 AM  
ROUTINE CARE with MD Maksim Brown 380 ([facility]) Appt Note: medication refill 4/23/18s[address] 400 [facility] [address]  
[phone]  
  
   
 [address] [place] [address] [address] Upcoming Health Maintenance Date Due  
 GLAUCOMA SCREENING Q2Y 1/3/2010 BREAST CANCER SCRN MAMMOGRAM 11/11/2018 MEDICARE YEARLY EXAM 4/24/2019 COLONOSCOPY 12/24/2019 DTaP/Tdap/Td series (2 - Td) 4/28/2025 Allergies as of 5/8/2018  Review Complete On: 5/8/2018 By: Sarah Hubbard LPN Severity Noted Reaction Type Reactions Ciprofloxacin  05/06/2010    Shortness of Breath Codeine  05/06/2010    Nausea Only Influenza Virus Vaccine, Specific  03/10/2014   Not Verified Unknown (comments) Allergy documented in Admission Data base 1 screening Pcn [Penicillins]  05/06/2010    Itching Current Immunizations  Reviewed on 5/6/2010 Name Date Influenza Vaccine  Deferred (Contraindication) Pneumococcal Conjugate (PCV-13) 1/31/2017 Pneumococcal Polysaccharide (PPSV-23) 4/28/2015,  Deferred (Contraindication) Tdap 4/28/2015 Not reviewed this visit You Were Diagnosed With   
  
 Codes Comments Systolic heart failure, unspecified HF chronicity ([facility]    -  Primary ICD-10-CM: I50.20 ICD-9-CM: 428.20 Pulmonary emphysema, unspecified emphysema type ([facility]     ICD-10-CM: J43.9 ICD-9-CM: 492.8 Left leg swelling     ICD-10-CM: M79.89 ICD-9-CM: 729.81 Vitals BP Pulse Temp Resp Height(growth percentile) Weight(growth percentile) 102/64 64 97 °F (36.1 °C) (Oral) 24 5' 5\" (1.651 m) 157 lb (71.2 kg) SpO2 BMI OB Status Smoking Status 95% 26.13 kg/m2 Hysterectomy Current Some Day Smoker Vitals History BMI and BSA Data Body Mass Index Body Surface Area  
 26.13 kg/m 2 1.81 m 2 Preferred Pharmacy Pharmacy Name Phone [facility] [address] [place] [address] Avenue [phone] Your Updated Medication List  
  
   
This list is accurate as of 5/8/18 12:44 PM.  Always use your most recent med list.  
  
  
  
  
 acetaminophen 325 mg tablet Commonly known as:  TYLENOL Take 2 Tabs by mouth every four (4) hours as needed. albuterol 90 mcg/actuation inhaler Commonly known as:  PROVENTIL HFA, VENTOLIN HFA, PROAIR HFA Take 1 Puff by inhalation every six (6) hours as needed for Wheezing. aspirin delayed-release 81 mg tablet Take 1 Tab by mouth daily. aspirin-acetaminophen-caffeine 250-250-65 mg per tablet Commonly known as:  EXCEDRIN ES Take 1 Tab by mouth. atorvastatin 40 mg tablet Commonly known as:  LIPITOR Take 1 Tab by mouth every evening. cephALEXin 500 mg capsule Commonly known as:  Dk Prima Take 1 Cap by mouth two (2) times a day for 7 days. cetirizine 10 mg tablet Commonly known as:  ZYRTEC Take 10 mg by mouth daily as needed for Allergies. clonazePAM 1 mg tablet Commonly known as:  [place] Panda Take 1 Tab by mouth three (3) times daily. Max Daily Amount: 3 mg. As needed  
  
 clopidogrel 75 mg Tab Commonly known as:  PLAVIX Take 1 Tab by mouth daily. ferrous sulfate 325 mg (65 mg iron) tablet Take 1 Tab by mouth Daily (before breakfast). fluticasone 50 mcg/actuation nasal spray Commonly known as:  Reji Castrejon 2 Sprays by Both Nostrils route daily as needed for Allergies. furosemide 40 mg tablet Commonly known as:  LASIX Take 1 Tab by mouth daily. nitroglycerin 0.4 mg SL tablet Commonly known as:  NITROSTAT  
1 Tab by SubLINGual route every five (5) minutes as needed. Indications: Angina  
  
 nystatin topical cream  
Commonly known as:  MYCOSTATIN Apply  to affected area daily as needed (vaginal itching/rash). omeprazole 20 mg capsule Commonly known as:  PRILOSEC Take 1 Cap by mouth daily. Take only as needed  
  
 phenazopyridine 95 mg tab Commonly known as:  PYRIDIUM Take 1 Tab by mouth three (3) times daily as needed for Pain. predniSONE 5 mg tablet Commonly known as:  Camilo Kyle Take 1 Tab by mouth daily. Take as needed for wheezing VITAMIN B-12 100 mcg tablet Generic drug:  cyanocobalamin Take 100 mcg by mouth daily. Follow-up Instructions Return in about 3 days (around 5/11/2018) for routine follow up. To-Do List   
 05/09/2018 Imaging:  DUPLEX LOWER EXT VENOUS BILAT   
  
 05/10/2018 To Be Determined Appointment with Chiquis Goodman RN at [place] [address]  
  
 05/10/2018 To Be Determined Appointment with Chiquis Goodman RN at [place] [address]  
  
 05/17/2018 To Be Determined Appointment with Luis Enrique Ruvalcaba LPN at [place] [address]  
  
 05/23/2018 To Be Determined Appointment with Luis Enrique Ruvalcaba LPN at [place] [address]  
  
 05/30/2018 To Be Determined Appointment with Luis Enrique Ruvalcaba LPN at [place] [address]  
  
 06/06/2018 To Be Determined Appointment with Chiquis Goodman RN at [place] [address] Introducing [facility]!    
 Tracey Richard introduces [employer]Crossing patient portal. Now you can access parts of your medical record, email your doctor's office, and request medication refills online. 1. In your internet browser, go to [email or web address] 2. Click on the First Time User? Click Here link in the Sign In box. You will see the New Member Sign Up page. 3. Enter your Mov[employer] Access Code exactly as it appears below. You will not need to use this code after youve completed the sign-up process. If you do not sign up before the expiration date, you must request a new code. · [email or web address] Access Code: [email or web address] Expires: 7/23/2018  7:43 PM 
 
4. Enter the last four digits of your Social Security Number (xxxx) and Date of Birth (mm/dd/yyyy) as indicated and click Submit. You will be taken to the next sign-up page. 5. Create a Movolo.com ID. This will be your Movolo.com login ID and cannot be changed, so think of one that is secure and easy to remember. 6. Create a [employer] password. You can change your password at any time. 7. Enter your Password Reset Question and Answer. This can be used at a later time if you forget your password. 8. Enter your e-mail address. You will receive e-mail notification when new information is available in [address] 9. Click Sign Up. You can now view and download portions of your medical record. 10. Click the Download Summary menu link to download a portable copy of your medical information. If you have questions, please visit the Frequently Asked Questions section of the [email or web address] website. Remember, [email or web address] is NOT to be used for urgent needs. For medical emergencies, dial 911. Now available from your iPhone and Android! Please provide this summary of care documentation to your next provider. Your primary care clinician is listed as Mamie Gary. If you have any questions after today's visit, please call [phone].

## 2018-05-08 NOTE — ED NOTES
Patient arriving via EMS with complaints of abdominal pain intermittently starting yesterday afternoon. Patient reports shortness of breath with onset of the pain. Patient also complaining of leg swelling. Patient states she was recently in the hospital for CHF. Patient updated on plan of care and call bell within reach.

## 2018-05-08 NOTE — PROGRESS NOTES
Chief Complaint   Patient presents with    Ankle swelling     [facility] ER follow up     lower legs and ankles edema, redness and tender     I have reviewed the patient's medical history in detail and updated the computerized patient record. Health Maintenance reviewed. 1. Have you been to the ER, urgent care clinic since your last visit? Hospitalized since your last visit? yes    2. Have you seen or consulted any other health care providers outside of the [address] [place] [address] [place] since your last visit? Include any pap smears or colon screening. Yes  [facility]     Encouraged pt to discuss pt's wishes with spouse/partner/family and bring them in the next appt to follow thru with the Advanced Directive    Fall Risk Assessment, last 12 mths 5/8/2018   Able to walk? Yes   Fall in past 12 months? No   Fall with injury? -   Number of falls in past 12 months -   Fall Risk Score -       PHQ over the last two weeks 5/8/2018   Little interest or pleasure in doing things Several days   Feeling down, depressed or hopeless Several days   Total Score PHQ 2 2       Abuse Screening Questionnaire 1/24/2018   Do you ever feel afraid of your partner? N   Are you in a relationship with someone who physically or mentally threatens you? N   Is it safe for you to go home?  Y       ADL Assessment 1/24/2018   Feeding yourself No Help Needed   Getting from bed to chair No Help Needed   Getting dressed No Help Needed   Bathing or showering No Help Needed   Walk across the room (includes cane/walker) No Help Needed   Using the telphone No Help Needed   Taking your medications No Help Needed   Preparing meals No Help Needed   Managing money (expenses/bills) No Help Needed   Moderately strenuous housework (laundry) No Help Needed   Shopping for personal items (toiletries/medicines) No Help Needed   Shopping for groceries No Help Needed   Driving No Help Needed   Climbing a flight of stairs No Help Needed   Getting to places beyond walking distances No Help Needed

## 2018-05-08 NOTE — DISCHARGE INSTRUCTIONS
- Please increase lasix to 60 mg daily for three days and follow up with your cardiologist.  - Please take antibiotics as prescribed. Chest Pain: Care Instructions  Your Care Instructions    There are many things that can cause chest pain. Some are not serious and will get better on their own in a few days. But some kinds of chest pain need more testing and treatment. Your doctor may have recommended a follow-up visit in the next 8 to 12 hours. If you are not getting better, you may need more tests or treatment. Even though your doctor has released you, you still need to watch for any problems. The doctor carefully checked you, but sometimes problems can develop later. If you have new symptoms or if your symptoms do not get better, get medical care right away. If you have worse or different chest pain or pressure that lasts more than 5 minutes or you passed out (lost consciousness), call 911 or seek other emergency help right away. A medical visit is only one step in your treatment. Even if you feel better, you still need to do what your doctor recommends, such as going to all suggested follow-up appointments and taking medicines exactly as directed. This will help you recover and help prevent future problems. How can you care for yourself at home? · Rest until you feel better. · Take your medicine exactly as prescribed. Call your doctor if you think you are having a problem with your medicine. · Do not drive after taking a prescription pain medicine. When should you call for help? Call 911 if:  ? · You passed out (lost consciousness). ? · You have severe difficulty breathing. ? · You have symptoms of a heart attack. These may include:  ¨ Chest pain or pressure, or a strange feeling in your chest.  ¨ Sweating. ¨ Shortness of breath. ¨ Nausea or vomiting. ¨ Pain, pressure, or a strange feeling in your back, neck, jaw, or upper belly or in one or both shoulders or arms.   ¨ Lightheadedness or sudden weakness. ¨ A fast or irregular heartbeat. After you call 911, the  may tell you to chew 1 adult-strength or 2 to 4 low-dose aspirin. Wait for an ambulance. Do not try to drive yourself. ?Call your doctor today if:  ? · You have any trouble breathing. ? · Your chest pain gets worse. ? · You are dizzy or lightheaded, or you feel like you may faint. ? · You are not getting better as expected. ? · You are having new or different chest pain. Where can you learn more? Go to http://marta-naomy.info/. Enter A120 in the search box to learn more about \"Chest Pain: Care Instructions. \"  Current as of: March 20, 2017  Content Version: 11.4  © 1226-9271 [employer]. Care instructions adapted under license by [employer] (which disclaims liability or warranty for this information). If you have questions about a medical condition or this instruction, always ask your healthcare professional. [place] [address] any warranty or liability for your use of this information. Abdominal Pain: Care Instructions  Your Care Instructions    Abdominal pain has many possible causes. Some aren't serious and get better on their own in a few days. Others need more testing and treatment. If your pain continues or gets worse, you need to be rechecked and may need more tests to find out what is wrong. You may need surgery to correct the problem. Don't ignore new symptoms, such as fever, nausea and vomiting, urination problems, pain that gets worse, and dizziness. These may be signs of a more serious problem. Your doctor may have recommended a follow-up visit in the next 8 to 12 hours. If you are not getting better, you may need more tests or treatment. The doctor has checked you carefully, but problems can develop later. If you notice any problems or new symptoms, get medical treatment right away.   Follow-up care is a key part of your treatment and safety. Be sure to make and go to all appointments, and call your doctor if you are having problems. It's also a good idea to know your test results and keep a list of the medicines you take. How can you care for yourself at home? · Rest until you feel better. · To prevent dehydration, drink plenty of fluids, enough so that your urine is light yellow or clear like water. Choose water and other caffeine-free clear liquids until you feel better. If you have kidney, heart, or liver disease and have to limit fluids, talk with your doctor before you increase the amount of fluids you drink. · If your stomach is upset, eat mild foods, such as rice, dry toast or crackers, bananas, and applesauce. Try eating several small meals instead of two or three large ones. · Wait until 48 hours after all symptoms have gone away before you have spicy foods, alcohol, and drinks that contain caffeine. · Do not eat foods that are high in fat. · Avoid anti-inflammatory medicines such as aspirin, ibuprofen (Advil, Motrin), and naproxen (Aleve). These can cause stomach upset. Talk to your doctor if you take daily aspirin for another health problem. When should you call for help? Call 911 anytime you think you may need emergency care. For example, call if:  ? · You passed out (lost consciousness). ? · You pass maroon or very bloody stools. ? · You vomit blood or what looks like coffee grounds. ? · You have new, severe belly pain. ?Call your doctor now or seek immediate medical care if:  ? · Your pain gets worse, especially if it becomes focused in one area of your belly. ? · You have a new or higher fever. ? · Your stools are black and look like tar, or they have streaks of blood. ? · You have unexpected vaginal bleeding. ? · You have symptoms of a urinary tract infection. These may include:  ¨ Pain when you urinate. ¨ Urinating more often than usual.  ¨ Blood in your urine.    ? · You are dizzy or lightheaded, or you feel like you may faint. ? Watch closely for changes in your health, and be sure to contact your doctor if:  ? · You are not getting better after 1 day (24 hours). Where can you learn more? Go to http://marta-naomy.info/. Enter R419 in the search box to learn more about \"Abdominal Pain: Care Instructions. \"  Current as of: March 20, 2017  Content Version: 11.4  © 3325-6310 [employer]. Care instructions adapted under license by [employer] (which disclaims liability or warranty for this information). If you have questions about a medical condition or this instruction, always ask your healthcare professional. [place] [address] any warranty or liability for your use of this information.

## 2018-05-08 NOTE — PROGRESS NOTES
Goals      Establish PCP relationships and regularly scheduled appointments. 4/5 NN reminded pt.daughter appt. with PCP (Dr. Aracelis Gonsalez) 4/6/18 10:30 am, need f/u appt. with Dr. Irvin Cottrell [phone].,OECZCORETTA-RN care is a key part of your treatment and safety. Be sure to make and go to all appointments, and call your doctor if you are having problems. It's also a good idea to know your test results and keep a list of the medicines you take-      4/13 Unable to reach, LVM. -[address]    4/16 NN reminded pt. appt.with PCP 4/17/18 @ 2 pm, need schedule f/u with cardiologist; Irvin Cottrell, NN will provided pt.with contact information during office visit. NN encourage monitor weights daily and record readings, monitor weight increase 3 lbs in one day and up 5 lbs in one week notify PCP or cardiologist office. Pt verbalizes understanding reports she has been through this before. -[address]      4/17 Patient cancelled appt. with PCP, rescheduled on 4/23/18. -[address]    4/23 Patient attend f/u with PCP.-    4/27 Patient reports she has scheduled f/u with Cardiologist; Dr. Irvin Cottrell on 5/1/18. NN encourage to keep record of weights to take with her to her f/u, and to write down or any questions she might have for the cardiologist.-    5/7 Patient reports she has attend f/u appointments. -[address]    5/8 Patient here in office f/u ED visit; seen by Dr. Aracelis Gonsalez return in 3 days. -[address]       Knowledge and adherence to medication plan. 4/5  Patient will understand general knowledge of medications, s/e and take meds as prescribed. NN unable reviewed medications list with (PHI) pt.daughter reports patient does not have any of her medications or discharge summary from [facility]. NN received call from [facility] Taylor Paiz requesting NN to call pt.daughter to review hospital d/c summary with pt.  Daughter d/t she does not have a copy nor does she have a copy of d/c summary from [facility]; appears the pt.and daughter left abruptly. Pt. Daughter concern that pt.do not have her medications, reports some lower ext.swelling,NO SOB, no SOB with minimal exertion, no chest discomfort, pt.daughter unaware if pt.have wt.gain of 3 lbs in 24 hrs or 5 lbs in week. NN reminded pt.daughter the importance weight daily  ,limit fluids 1500ml/day, encourage elevation of lower extremities, monitoring sodium intake. NN informed pt.daughter PCP off at this time, if pt. experience any increased SOB, increased confusion, fatigue, chest discomfort , more swelling than usual lower extremities, to go to urgent care to have pt.evaluated. NN reminded pt.daughter appt.tomorrow morning at 10:30am 4/6/18. NN will provide pt.and daughter educational material on CHF during office visit. -[address]     4/13 Unable to reach, LV. -[address]    4/16 BS  med reconcile completed, pt. Reports she taking medications as prescribed, no s/s of s/e will notify office with any questions or concerns. -[address]    4/23 Patient attend f/u with PCP, received new orders:  predniSONE (DELTASONE) 5 mg tablet 1      Sig: Take 1 Tab by mouth daily. Take as needed for wheezing     phenazopyridine (PYRIDIUM) 95 mg tab 0      Sig: Take 1 Tab by mouth three (3) times daily as needed for Pain      4/27 Patient reports she is taking medications as prescribe, no c/o wheezing, no pain or burning when voiding. Patient reports she is doing good. NN encourage pt.experience any s/e or have any questions or concern please call the office; NN provided pt.with contact information. Patient verbalizes understanding. -[address]     5/7 Patient reports she taking medications as directed, but doen't feel lasix helping much, has swelling lower extremities from knees down to feet. NN encourage elevation, decreased fluid intake, monitor sodium intake,  and see PCP. Pt. Declined at this time. -[address]    5/8 Pt.seen if office f/u ED visit. -       Supportive resources in place to maintain patient in the community (ie., home health, equipment, DME, refer to, etc.)            4/5 Patient staying with daughter Anastasia Coleman for now, pt.confused at times as charted in hospital notes Hx. Dementia, has appt. with PCP Dr. Prerna Price 4/6/18 10:30 am, f/u discharge from [facility]. Daughter very active and supportive with pt.care. NN called Ángel Quiles at [facility] ([phone]) reports have not received referral. NN called Tika at [address] [place] [address] ([phone]) to verify referral reports they have received information today and has contact PCP office. -[address]    4/5 Report from Jorge Beltran [facility]) Per Jorge Beltran at [facility], Reviewed [place] Back questions to ensure patient arrived at SNF safely. Patient's daughter arrived day after admission to SNF, 4/4, and took patient home, stating she did not feel that she needed to be in SNF. Patient/daughter did not allow any discharge planning or preparations to be done. SNF staff sent medication list to PCP Dr. Everardo Do, and nurse navigator Danny Gastelum. SNF staff also sent referral to [facility].       4/13 Unable to reach, LVM. -[address]    4/16 Patient received [facility] SN and PT 2/3 times a week; pt live at home alone, daughter active with pt.care. Pt. Reports she is doing well at home. [facility]    4/23 Patient seen in office PCP f/u-    4/27 Patient reports she continue with [facility] but has completed PT, reports she will continue with [facility] nursing for one more week. Patient reports she continue to check her weight daily; stable at 145 lbs, NN reminded notify PCP office of weight gain of 3 lbs in one day, or 5 lbs in one week, any increased SOB or increased swelling extremities. Patient verbalizes understanding and states she will call if experiencing any these symptoms. -[address]    5/7 Patient reports weight on Sat. 5/5 148, today Monday 5/7 157, swelling from knees down to feet, >L knee, some SOB on exertion, pt.speaking clearly in complete sentences, NN encourage  pt.schedule appt. to be seen by PCP asap.  Reminded taking medications as directed pt.reports she does not feel Lasix helping much. Patient declined to schedule appt. at this time. NN reminded monitor wts. daily, limit sodium intake, fluid restriction of 1500 ml.also information mailed to pt.to review. NN called pt.daughter (PHI) Ms. William Johnson to notify pt.wt.gain and suggested see PCP, daughter reports she is not in town at this time and if mom starts feeling bad she knows how to call 911 service to escort to hospital. NN reminded pt.daughter we would like to prevent pt. from reaching that point. But if needed, yes she has 911 service available at all times. NN notified PCP, will f/u. -[address]    5/7 Patient return call, reports she will call 911 service d/t she is now experiencing some dizziness. NN will notify PCP pt.going to ER.for eval.-    5/8 Patient here at office f/u ED visit, c/o swelling from knees down to feet, mild SOB, seen by Dr. Allyn Fox, pt.continue with ABX as prescribed, x 7 days, pt.reports she is taking. NN encourage to complete the entire course of ABX. Pt.verbalizes understanding reports she will take as directed. NN reminded pt.the importance monitoring wts daily, following low Sodium diet, and limiting fluid intake 1500 ml. Encourage to notify office wts.gain 3 lbs in one day, 5 lbs in on week, increased SOB, and lower ext.swelling, pt.verbalizes understanding. NN enocurage pt.to review educational information CHF that was mailed to her home, review and call NN with any questions or concerns. NN contact information provided. Pt.in agreement. -[address]

## 2018-05-08 NOTE — ED NOTES
Dr. Marlin Siddiqui reviewed discharge instructions with the patient. The patient verbalized understanding. Vitals stable. Pt assisted with dressing and then out to discharge.

## 2018-05-10 NOTE — TELEPHONE ENCOUNTER
Pt called stated that she wanted to let dr Santhosh Thompson know that he legs were doing so much better ansd that she feels better and wanted to thank him for the medication bc the swelling has gone down

## 2018-05-12 PROBLEM — I21.4 NSTEMI (NON-ST ELEVATED MYOCARDIAL INFARCTION) (HCC): Status: ACTIVE | Noted: 2018-01-01

## 2018-05-12 NOTE — ED NOTES
Per MD Greene, Patient is to obtain a 200cc bolus at this time for her BP. He states he will come see the patient shortly.

## 2018-05-12 NOTE — CONSULTS
Pt seen and  Examined. Full consult dictated,     NSTMI with afib, CHF and acute renal failure. No currently a cath candidate.  Echo, resume Plavix

## 2018-05-12 NOTE — PROGRESS NOTES
TRANSFER - IN REPORT:    Verbal report received from [place], RN(name) on [address]  being received from ED(unit) for routine progression of care      Report consisted of patients Situation, Background, Assessment and   Recommendations(SBAR). Information from the following report(s) SBAR, Kardex, ED Summary, Intake/Output, MAR, Recent Results and Cardiac Rhythm A-Fib was reviewed with the receiving nurse. Opportunity for questions and clarification was provided. Assessment will be completed upon patients arrival to unit and care assumed. 1907 - Received pt from ED via stretcher with NS, Heparin, & Amio drips infusing. Pt c/o chest discomfort, but states it is no more than usual. Pt is A&Ox4; asked pt about her wishes regarding end of life. Pt states, \"I want to be comfortable, I do not want to be kept alive on anything. \" Pt states that a mask for oxygen is okay, but refuses any type of pressor to maintain her BP or sustain life. 1 - Pt's daughters Sally & Tamir Me at bedside, getting ready to leave. Inquired about daughters phone numbers and to nominate someone to be main point of contact. Sally is the main contact. Daughters asking why pt has severe back pain; explained the reason for her pain and the inability to give her pain medication was d/t her hypotension. Explained further that if in trying to treat her heart rate, it is causing hypotension, but pt does not want any pressors to maintain her BP. Bedside and Verbal shift change report given to Elmer Rocha RN (oncoming nurse) by Beth Duverney, RN (offgoing nurse). Report included the following information SBAR, Kardex, Intake/Output, MAR, Recent Results and Cardiac Rhythm A-Fib.

## 2018-05-12 NOTE — ED NOTES
Spoke with Aixa in pharmacy who states according to patient's PTT results to administer a 2000unit bolus and increase rate to 14un/kg/hour.

## 2018-05-12 NOTE — ED NOTES
Patient complaining of chest pressure. Heart rate in 140s and hypotensive. Patient in CRISTOPHER Alarcon MD [place] paged and returned call instantly. He verbally ordered an amiodarone drip on the patient  1mg/min for 24hours. .5mg/min following. Do not hold unless BP goes below 80s.

## 2018-05-12 NOTE — ED NOTES
TRANSFER - OUT REPORT:    Verbal report given to Tyler Foster RN(name) on Jluis Reyes  being transferred to PCU (unit) for routine progression of care       Report consisted of patients Situation, Background, Assessment and   Recommendations(SBAR). Information from the following report(s) SBAR, Kardex, ED Summary, Intake/Output, MAR, Recent Results, Med Rec Status and Cardiac Rhythm A. Fib was reviewed with the receiving nurse. Lines:   Peripheral IV 05/12/18 Left Antecubital (Active)   Site Assessment Clean, dry, & intact 5/12/2018  2:33 AM   Phlebitis Assessment 0 5/12/2018  2:33 AM   Infiltration Assessment 0 5/12/2018  2:33 AM   Dressing Status Clean, dry, & intact 5/12/2018  2:33 AM   Dressing Type Tape;Transparent 5/12/2018  2:33 AM   Hub Color/Line Status Pink;Flushed;Patent 5/12/2018  2:33 AM       Peripheral IV 05/12/18 Right Wrist (Active)   Site Assessment Clean, dry, & intact 5/12/2018  3:18 AM   Phlebitis Assessment 0 5/12/2018  3:18 AM   Infiltration Assessment 0 5/12/2018  3:18 AM   Dressing Status Clean, dry, & intact 5/12/2018  3:18 AM   Dressing Type Tape;Transparent 5/12/2018  3:18 AM   Hub Color/Line Status Pink;Flushed;Patent 5/12/2018  3:18 AM   Action Taken Blood drawn 5/12/2018  3:18 AM        Opportunity for questions and clarification was provided.       Patient transported with:   Monitor  O2 @ 2 liters  Registered Nurse

## 2018-05-12 NOTE — PROGRESS NOTES
Pharmacy  Heparin Monitoring    Labs:  Recent Labs      05/12/18   0913  05/12/18   0317  05/12/18   0206   APTT  46.8*   --   26.5   HGB   --   10.4*  10.1*   PLT   --   307  312   INR   --    --   1.5*       Current rate:  12 unit/kg/hr    Impression/Plan:    New rate: 14 unit/kg/hr (increase rate by 2 Units/kg/hr)   PRN bolus dose (Yes/No): Yes 2000 Units   Infusion rate, PRN bolus dose and aPTT results were discussed with the nurse: (Yes/No): Yes: Ivette Dunne RN     Thanks,  Madelin Smith [facility]    [email or web address]/[email or web address]. pdf

## 2018-05-12 NOTE — H&P
Hospitalist Admission Note    NAME: Abdulkadir Lindsey   :  1945   MRN:  [id number]     Date/Time:  2018 4:08 AM    Patient PCP: Bola Pina MD  ______________________________________________________________________  Given the patient's current clinical presentation, I have a high level of concern for decompensation if discharged from the emergency department. Complex decision making was performed, which includes reviewing the patient's available past medical records, laboratory results, and x-ray films. My assessment of this patient's clinical condition and my plan of care is as follows. Assessment / Plan:  NSTEMI in pt with CAD s/p [facility] with 2 TANNER   Chronic systolic HF EF 52%   CM/valve disease  Remote PAF  -admit to step down unit  -BP borderline at 100s, HR 140s ; remain with chest discomfort despite total 8 mg of morphine   -start BB   -c/w heparin gtt   -c/w NTP since still having chest discomfort ( doubt her Bp will tolerate nitro gtt)   -cont home ASA/plavix/statin   -fluid status /diuretics: difficult to assess properly IVF vs lasix - CT wo obvious edema/vascular congestion + BP low side, so will start gentle IVF then renal /cardiology to re assess later on today   -ECHO   -keeping NPO in case she will need card cath in am  -duplex US: negative for DVT    -monitor daily weight   -monitor troponin   -cxray: Unchanged significant cardiomegaly and mild edema. New small bilateral pleural  Effusions  -CT: New mild right pleural effusion. Bibasilar atelectasis. Unchanged cardiomegaly. Moderate emphysema. MACY on CKD stage III  -baseline Cr 1.3-1.4, admission 2.33  -holding ace   -gentle IVF  -will ask primary neprology to help with management of this complicated pt   - monitor closely. Avoid nephrotoxic drugs, adjust all meds to GFR.      SIRS ( leukocytosis/tachycardia)   -likely due to above  -monitor   -check UA     Per last DC possible mild baseline dementia  -h/o hallucinations last admission   -watch for sundowning  -avoid oversedation     Tobacco use  -educated on smocking cessation  -declined nicotine patch     COPD wo exacerbation   H/o Migraine HA             Code Status: DNR per pt wishes in ED ( in presence of primary nurse)   Surrogate Decision Maker: dtr Artice Running     DVT Prophylaxis:heparin gtt   GI Prophylaxis: not indicated    Baseline: lives alone, independent; she hjas 3 dtrs, 2 of them lives out of town     Admission was done for Dr. Nely Chiu as a tuck in service. He will assume care of this pt at 7 am. Please re consult us as needed. Subjective:   CHIEF COMPLAINT: chest pain     HISTORY OF PRESENT ILLNESS:     Radha Castro is a 68 y.o.  female who presents with above complaint. Pt reports that chest pain started yesterday around 2300. She describe CP as mid sternal, pressure like, 10/10, radiating to both UEs. Chest pain is associated with dyspnea/nausea. Pt also reports leg swelling. No fever/chills/cough/dizziness. Pt was getting progressively weak recently. She was supposed to have Op duplex LE done to r/o DVT. Pt was hospitalized at the end of march for NSTEMI. Ed provider discussed pt case with Dr Nely Chiu who recommended to start pt on heparin gtt. Dr Nely Chiu asked to to tuck pt in for him tonight. He will assume care of this pt in am.     Vs:98.1 °F (36.7 °C) - 134 - 115/76 - 24 - 985 RA     We were asked to admit for work up and evaluation of the above problems.      Past Medical History:   Diagnosis Date    CHF (congestive heart failure) (HCC)     Other ill-defined conditions(799.89)     AMI    Psychiatric disorder     depression        Past Surgical History:   Procedure Laterality Date    CARDIAC SURG PROCEDURE UNLIST  03/2018    stents    HX APPENDECTOMY      HX CHOLECYSTECTOMY      HX GI      adhesions    HX GYN      hysterectomy/  10 years       Social History   Substance Use Topics    Smoking status: Current Some Day Smoker     Packs/day: 0.10     Last attempt to quit: 11/1/2014    Smokeless tobacco: Never Used      Comment: 1 a day    Alcohol use No        Family History   Problem Relation Age of Onset    No Known Problems Mother     No Known Problems Father     Seizures Daughter     Cancer Daughter      Allergies   Allergen Reactions    Ciprofloxacin Shortness of Breath    Codeine Nausea Only    Influenza Virus Vaccine, Specific Unknown (comments)     Allergy documented in Admission Data base 1 screening    Pcn [Penicillins] Itching        Prior to Admission medications    Medication Sig Start Date End Date Taking? Authorizing Provider   clonazePAM (KLONOPIN) 1 mg tablet Take 1 Tab by mouth three (3) times daily. Max Daily Amount: 3 mg. As needed 4/23/18  Yes Tj Ontiveros MD   predniSONE (DELTASONE) 5 mg tablet Take 1 Tab by mouth daily. Take as needed for wheezing 4/23/18  Yes Tj Ontiveros MD   aspirin-acetaminophen-caffeine [facility]) 566-744-30 mg per tablet Take 1 Tab by mouth. Yes Historical Provider   clopidogrel (PLAVIX) 75 mg tab Take 1 Tab by mouth daily. 4/6/18  Yes Tj Ontiveros MD   ferrous sulfate 325 mg (65 mg iron) tablet Take 1 Tab by mouth Daily (before breakfast). 4/6/18  Yes Tj Ontiveros MD   furosemide (LASIX) 40 mg tablet Take 1 Tab by mouth daily. 4/6/18  Yes Tj Ontiveros MD   atorvastatin (LIPITOR) 40 mg tablet Take 1 Tab by mouth every evening. 4/6/18  Yes Tj Ontiveros MD   albuterol (PROVENTIL HFA, VENTOLIN HFA, PROAIR HFA) 90 mcg/actuation inhaler Take 1 Puff by inhalation every six (6) hours as needed for Wheezing. 4/6/18  Yes Tj Ontiveros MD   aspirin delayed-release 81 mg tablet Take 1 Tab by mouth daily. 4/4/18  Yes Zach Saez MD   fluticasone (FLONASE) 50 mcg/actuation nasal spray 2 Sprays by Both Nostrils route daily as needed for Allergies. Yes Historical Provider   cetirizine (ZYRTEC) 10 mg tablet Take 10 mg by mouth daily as needed for Allergies.    Yes Historical Provider omeprazole (PRILOSEC) 20 mg capsule Take 1 Cap by mouth daily. Take only as needed 8/7/17  Yes Jaja Marcano MD   cyanocobalamin (VITAMIN B-12) 100 mcg tablet Take 100 mcg by mouth daily. Yes Reginald Dunbar MD       REVIEW OF SYSTEMS:     I am not able to complete the review of systems because: The patient is intubated and sedated    The patient has altered mental status due to his acute medical problems    The patient has baseline aphasia from prior stroke(s)    The patient has baseline dementia and is not reliable historian    The patient is in acute medical distress and unable to provide information           Total of 12 systems reviewed as follows:       POSITIVE= underlined text  Negative = text not underlined  General:  fever, chills, sweats, generalized weakness, weight loss/gain,      loss of appetite   Eyes:    blurred vision, eye pain, loss of vision, double vision  ENT:    rhinorrhea, pharyngitis   Respiratory:   cough, sputum production, SOB, GUNDERSON, wheezing, pleuritic pain   Cardiology:   chest pain, palpitations, orthopnea, PND, edema, syncope   Gastrointestinal:  abdominal pain , N/V, diarrhea, dysphagia, constipation, bleeding   Genitourinary:  frequency, urgency, dysuria, hematuria, incontinence   Muskuloskeletal :  arthralgia, myalgia, back pain  Hematology:  easy bruising, nose or gum bleeding, lymphadenopathy   Dermatological: rash, ulceration, pruritis, color change / jaundice  Endocrine:   hot flashes or polydipsia   Neurological:  headache, dizziness, confusion, focal weakness, paresthesia,     Speech difficulties, memory loss, gait difficulty  Psychological: Feelings of anxiety, depression, agitation    Objective:   VITALS:    Visit Vitals    BP 91/77    Pulse (!) 124    Temp 98.1 °F (36.7 °C)    Resp 19    Ht 5' 5\" (1.651 m)    Wt 68 kg (150 lb)    SpO2 95%    BMI 24.96 kg/m2       PHYSICAL EXAM:    General:    Alert, cooperative, no distress, appears stated age.   Appears acutely ill HEENT: Atraumatic, anicteric sclerae, pink conjunctivae     No oral ulcers, mucosa moist, throat clear, dentition fair  Neck:  Supple, symmetrical,  thyroid: non tender  Lungs:   Clear to auscultation bilaterally. No Wheezing or Rhonchi. No rales. Chest wall:  No tenderness  No Accessory muscle use. Heart:   Regular  rhythm, tachycardic,+  murmur   + edema  Abdomen:   Soft, non-tender. Not distended. Bowel sounds normal  Extremities: No cyanosis. No clubbing,      Skin turgor normal, Capillary refill normal, Radial dial pulse 2+  Skin:     Not pale. Not Jaundiced  No rashes   Psych:  Good insight. Not depressed. Not anxious or agitated. Neurologic: EOMs intact. No facial asymmetry. No aphasia or slurred speech. Symmetrical strength, Sensation grossly intact. Alert and oriented X 4.     _______________________________________________________________________  Care Plan discussed with:    Comments   Patient y    Family      RN y    Care Manager                    Consultant:  mikey ED provider    _______________________________________________________________________  Expected  Disposition:   Home with Family    HH/PT/OT/RN y   SNF/LTC    SELINA    ________________________________________________________________________  TOTAL TIME:  72 Minutes    Critical Care Provided     Minutes non procedure based      Comments    y Reviewed previous records    y Discussion with patient and/or family and questions answered       ________________________________________________________________________  Signed: Sarah Kaplan MD    Procedures: see electronic medical records for all procedures/Xrays and details which were not copied into this note but were reviewed prior to creation of Plan.     LAB DATA REVIEWED:    Recent Results (from the past 24 hour(s))   EKG, 12 LEAD, INITIAL    Collection Time: 05/12/18  1:06 AM   Result Value Ref Range    Ventricular Rate 131 BPM    Atrial Rate 127 BPM    QRS Duration 106 ms    Q-T Interval 324 ms    QTC Calculation (Bezet) 478 ms    Calculated R Axis -73 degrees    Calculated T Axis 101 degrees    Diagnosis       Undetermined rhythm  Low voltage QRS  Incomplete right bundle branch block  Left anterior fascicular block  Possible Anterolateral infarct (cited on or before 12-MAY-2018)  Abnormal ECG  When compared with ECG of 08-MAY-2018 00:42,  Significant changes have occurred     CBC WITH AUTOMATED DIFF    Collection Time: 05/12/18  2:06 AM   Result Value Ref Range    WBC 13.2 (H) 3.6 - 11.0 K/uL    RBC 4.25 3.80 - 5.20 M/uL    HGB 10.1 (L) 11.5 - 16.0 g/dL    HCT 33.0 (L) 35.0 - 47.0 %    MCV 77.6 (L) 80.0 - 99.0 FL    MCH 23.8 (L) 26.0 - 34.0 PG    MCHC 30.6 30.0 - 36.5 g/dL    RDW 16.5 (H) 11.5 - 14.5 %    PLATELET 822 899 - 457 K/uL    MPV 12.0 8.9 - 12.9 FL    NRBC 0.0 0  WBC    ABSOLUTE NRBC 0.00 0.00 - 0.01 K/uL    NEUTROPHILS 84 (H) 32 - 75 %    LYMPHOCYTES 10 (L) 12 - 49 %    MONOCYTES 5 5 - 13 %    EOSINOPHILS 0 0 - 7 %    BASOPHILS 0 0 - 1 %    IMMATURE GRANULOCYTES 1 (H) 0.0 - 0.5 %    ABS. NEUTROPHILS 11.1 (H) 1.8 - 8.0 K/UL    ABS. LYMPHOCYTES 1.4 0.8 - 3.5 K/UL    ABS. MONOCYTES 0.7 0.0 - 1.0 K/UL    ABS. EOSINOPHILS 0.0 0.0 - 0.4 K/UL    ABS. BASOPHILS 0.0 0.0 - 0.1 K/UL    ABS. IMM.  GRANS. 0.1 (H) 0.00 - 0.04 K/UL    DF AUTOMATED     CK W/ CKMB & INDEX    Collection Time: 05/12/18  2:06 AM   Result Value Ref Range     (H) 26 - 192 U/L    CK - MB 44.7 (H) <3.6 NG/ML    CK-MB Index 19.1 (H) 0 - 2.5     METABOLIC PANEL, COMPREHENSIVE    Collection Time: 05/12/18  2:06 AM   Result Value Ref Range    Sodium 135 (L) 136 - 145 mmol/L    Potassium 4.1 3.5 - 5.1 mmol/L    Chloride 102 97 - 108 mmol/L    CO2 22 21 - 32 mmol/L    Anion gap 11 5 - 15 mmol/L    Glucose 127 (H) 65 - 100 mg/dL    BUN 44 (H) 6 - 20 MG/DL    Creatinine 2.33 (H) 0.55 - 1.02 MG/DL    BUN/Creatinine ratio 19 12 - 20      GFR est AA 25 (L) >60 ml/min/1.73m2    GFR est non-AA 20 (L) >60 ml/min/1.73m2 Calcium 9.1 8.5 - 10.1 MG/DL    Bilirubin, total 1.2 (H) 0.2 - 1.0 MG/DL    ALT (SGPT) 31 12 - 78 U/L    AST (SGOT) 61 (H) 15 - 37 U/L    Alk. phosphatase 83 45 - 117 U/L    Protein, total 7.5 6.4 - 8.2 g/dL    Albumin 4.1 3.5 - 5.0 g/dL    Globulin 3.4 2.0 - 4.0 g/dL    A-G Ratio 1.2 1.1 - 2.2     MAGNESIUM    Collection Time: 05/12/18  2:06 AM   Result Value Ref Range    Magnesium 2.0 1.6 - 2.4 mg/dL   TROPONIN I    Collection Time: 05/12/18  2:06 AM   Result Value Ref Range    Troponin-I, Qt. 6.50 (H) <0.05 ng/mL   PTT    Collection Time: 05/12/18  2:06 AM   Result Value Ref Range    aPTT 26.5 22.1 - 32.0 sec    aPTT, therapeutic range     58.0 - 77.0 SECS   PROTHROMBIN TIME + INR    Collection Time: 05/12/18  2:06 AM   Result Value Ref Range    INR 1.5 (H) 0.9 - 1.1      Prothrombin time 15.2 (H) 9.0 - 11.1 sec   NT-PRO BNP    Collection Time: 05/12/18  2:06 AM   Result Value Ref Range    NT pro-BNP >87002 (H) 0 - 125 PG/ML   D DIMER    Collection Time: 05/12/18  2:06 AM   Result Value Ref Range    D-dimer 0.29 0.00 - 0.65 mg/L FEU   CBC WITH AUTOMATED DIFF    Collection Time: 05/12/18  3:17 AM   Result Value Ref Range    WBC 13.4 (H) 3.6 - 11.0 K/uL    RBC 4.40 3.80 - 5.20 M/uL    HGB 10.4 (L) 11.5 - 16.0 g/dL    HCT 33.7 (L) 35.0 - 47.0 %    MCV 76.6 (L) 80.0 - 99.0 FL    MCH 23.6 (L) 26.0 - 34.0 PG    MCHC 30.9 30.0 - 36.5 g/dL    RDW 16.2 (H) 11.5 - 14.5 %    PLATELET 754 686 - 553 K/uL    MPV 11.8 8.9 - 12.9 FL    NRBC 0.0 0  WBC    ABSOLUTE NRBC 0.00 0.00 - 0.01 K/uL    NEUTROPHILS 84 (H) 32 - 75 %    LYMPHOCYTES 10 (L) 12 - 49 %    MONOCYTES 6 5 - 13 %    EOSINOPHILS 0 0 - 7 %    BASOPHILS 0 0 - 1 %    IMMATURE GRANULOCYTES 0 0.0 - 0.5 %    ABS. NEUTROPHILS 11.2 (H) 1.8 - 8.0 K/UL    ABS. LYMPHOCYTES 1.4 0.8 - 3.5 K/UL    ABS. MONOCYTES 0.8 0.0 - 1.0 K/UL    ABS. EOSINOPHILS 0.0 0.0 - 0.4 K/UL    ABS. BASOPHILS 0.0 0.0 - 0.1 K/UL    ABS. IMM.  GRANS. 0.1 (H) 0.00 - 0.04 K/UL    DF AUTOMATED

## 2018-05-12 NOTE — ED NOTES
Patient arriving via EMS with complaints of chest pain / abdominal pain / sob onset at about 2300. Patient denies cough, cold, congestion. Patient denies fevers. Patient states the pain comes in waves, and she was recently seen here for abdominal / chest pain, but this pain feels different. Patient updated on plan of care and call bell within reach.

## 2018-05-12 NOTE — CONSULTS
[address] Roshan Baldwin.  MR#: [id number]  : 1945  ACCOUNT #: [de-identified]   DATE OF SERVICE: 2018    REQUESTING PHYSICIAN:  Mary Enciso M.D. REASON FOR CONSULTATION:  Management of acute renal failure. HISTORY OF PRESENT ILLNESS:  Patient was seen and examined in ED. History is obtained from the patient and chart review. The patient known to me from previous admission. HISTORY OF PRESENT ILLNESS:  The patient is a 77-year-old  female with past medical history significant for ischemic cardiomyopathy with EF of 25-30%, AFib, dyslipidemia, COPD, who presents to ED with chief complaint of chest pain, shortness of breath, nausea and back pain. She is found to have evidence of non-ST elevation MI. Her troponin I on admission is 6.5. She also has atrial fibrillation with rapid ventricular rate and borderline hypotension. She has evidence of acute kidney injury with a creatinine of 2.33 which has further increased to 2.86 on repeat a few hours apart. Patient was recently hospitalized in March/2018 with a similar presentation. She had a cardiac catheterization and stenting of the right coronary artery during that admission. She had MACY during that admission. Her creatinine actually improved and was nearly normal at the time of discharge. Patient denies any recent vomiting or diarrhea. No NSAID use. She is DNR. She denies any active chest pain at the present time. \"I am sick and let me go. \"  \"I can't live a life like this. \"    PAST SURGICAL HISTORY:  1.  Status post appendectomy. 2.  Cholecystectomy. 3.  Hysterectomy. SOCIAL HISTORY:  She continues to smoke, although at a reduced amount. She is a  and lives alone. She has three daughters. Denies alcohol abuse. FAMILY HISTORY:  No known CKD or ESRD. REVIEW OF SYSTEMS:  As noted in HPI. She denies any blood in the stool. No abdominal pain.   No hemoptysis. No urinary complaints. PHYSICAL EXAMINATION:  GENERAL:  Elderly female who is ill appearing, in no acute distress. VITAL SIGNS:  She is afebrile, pulse 123-127. BP was as low as 46-64 systolic over 97-91 diastolic. Respiration of 18, saturating 95%. HEENT:  Atraumatic, normocephalic. Oropharynx is moist.  No scleral icterus. NECK:  No cervical or supraclavicular adenopathy. No thyromegaly. JVD slightly elevated. LUNGS:  Have diminished breath sound with few crackles at both bases. HEART:  Irregularly irregular rhythm with tachycardia. No murmur or rubs. ABDOMEN:  Soft, nontender, active bowel sounds. No organomegaly. EXTREMITIES:  No clubbing or cyanosis. Trace peripheral edema. Peripheral pulses diminished. CENTRAL NERVOUS SYSTEM:  Drowsy, but arousable. She is able to tell me month, day, date and location. No myoclonus. Grossly nonfocal.  SKIN:  No rashes or jaundice. LABORATORY DATA:  Creatinine is up to 2.86. Other electrolytes are acceptable. Most recent troponin I is 5.89 from a peak of 6.5. Urinalysis is only positive for trace ketones, 100 protein and trace blood with 5-10 RBCs. ProBNP is greater than 30,000. Hemoglobin of 10.1. Chest x-ray shows small bilateral pleural effusion. EKG with AFib, no ST elevation. ASSESSMENT:  1. Acute kidney injury. This is most likely due to prerenal azotemia in the setting of non-ST elevation MI causing decreased renal perfusion with a stunned myocardium with a background of ischemic cardiomyopathy. Seems to be exhibiting cardiorenal syndrome. She also has relative hypotension with tachycardia from AFib, also contributing to renal failure. She may have early ATN. The suspicion for obstruction is very low. 2.  Non-ST elevation MI.  3.  Atrial fibrillation with rapid ventricular response. 4.  Ischemic cardiomyopathy with EF of 25-30%.     5.  Fluid overload as evidenced by shortness of breath, pleural effusion in the setting of congestive heart failure. 6.  COPD.  7.  Ongoing tobacco use. 8.  Dyslipidemia. ASSESSMENT AND PLAN:  Patient is seen by cardiology and she is not a candidate for any invasive procedure at present from their standpoint. She is on IV heparin. Patient's prognosis is guarded given her multiple comorbidities and ischemic cardiomyopathy. I briefly discussed renal prognosis with her and explained the possibility of needing dialysis. Patient is not interested in aggressive medical measures and does not want dialysis. Told me few times to do not pursue any dialysis. \"Please let me go. I cannot live a sick life like this. \"  She is aware of the consequences of death without dialysis. As such, there is no emergent indication for dialysis either, but will likely have to consider in the next 24-48 hours. RECOMMENDATIONS:  1. Continue supportive care with aspirin, IV heparin. 2.  Rate and rhythm control as possible with AFib. 3.  Avoid further hypotension. Cannot use most cardiac meds given her relative hypotension. 4.  Monitor I's and O's.  5.  Avoid nephrotoxins. No ACE inhibitor or ARB. 6.  I am not sure if she is a candidate for any inotropes. Defer to cardiology. See discussion above. The patient does not want dialysis, which may be indicated in next 24-48 hours. She is well aware of the consequences. As such, given her underlying cardiomyopathy. She may not be the best candidate for dialysis either. Thanks for renal consultation. We will follow the patient with you.       Vaughn Stephen MD       BP / LN  D: 05/12/2018 17:51     T: 05/12/2018 18:29  JOB #: [id number]

## 2018-05-12 NOTE — PROGRESS NOTES
Hospitalist Progress Note    NAME: Ashlyn Quinonez   :  1945   MRN:  [id number]       Assessment / Plan:  NSTEMI with atrial fibrillation (PAF)in pt with CAD s/p [facility] with 2 TANNER ,AVDNXWP systolic HF EF 66% . CM/valve disease. Troponin 6.50-->6.46-->5.89  Will continue to monitor vitals ,still tachy and with chest discomfort. c/w heparin gtt. c/w NTP. cont home ASA/plavix/statin . Now on gentle IVF hydration  Will follow up cardiology and nephrology recommendations on fluids . ECHO pending. monitor daily weight and Is and Os. MACY on CKD stage III  baseline Cr 1.3-1.4, admission 2.33-->2.86. ACEI on hold . on going Gentle IVF. Nephrology consult pending. monitor closely. Avoid nephrotoxic drugs, adjust all meds to GFR.      SIRS ( leukocytosis/tachycardia)   likely due to above.monitor ,urine culture pending.     Per last DC possible mild baseline dementia  h/o hallucinations last admission. supportive care for now.     Tobacco use  educated on smocking cessation the ER  And patient declined nicotine patch      COPD wo exacerbation   H/o Migraine HA       Body mass index is 24.96 kg/(m^2).: 18.5 - 24.9 Normal weight    Code status: DNR  Prophylaxis: heparin gtt  Recommended Disposition: TBD     Subjective:     Chief Complaint / Reason for Physician Visit  I am feeling much better now. .  Discussed with RN events overnight. Review of Systems:  Symptom Y/N Comments  Symptom Y/N Comments   Fever/Chills n   Chest Pain n    Poor Appetite    Edema     Cough    Abdominal Pain n    Sputum    Joint Pain     SOB/GUNDERSON    Pruritis/Rash     Nausea/vomit n   Tolerating PT/OT     Diarrhea n   Tolerating Diet     Constipation n   Other       Could NOT obtain due to:      Objective:     VITALS:   Last 24hrs VS reviewed since prior progress note.  Most recent are:  Patient Vitals for the past 24 hrs:   Temp Pulse Resp BP SpO2   18 1001 - (!) 126 17 101/62 95 %   18 0730 98 °F (36.7 °C) (!) 122 15 99/64 96 % 05/12/18 0600 - (!) 117 23 (!) 128/97 94 %   05/12/18 0500 - (!) 131 18 107/76 96 %   05/12/18 0430 - (!) 139 22 103/73 98 %   05/12/18 0345 - (!) 124 19 91/77 95 %   05/12/18 0315 - (!) 132 25 108/71 96 %   05/12/18 0230 - (!) 138 24 105/65 96 %   05/12/18 0115 - (!) 135 (!) 31 114/68 98 %   05/12/18 0103 98.1 °F (36.7 °C) (!) 134 24 115/76 98 %     No intake or output data in the 24 hours ending 05/12/18 1053     PHYSICAL EXAM:  General: WD, WN. Alert, cooperative, no acute distress    EENT:  EOMI. Anicteric sclerae. MMM  Resp:  Decrease air entry in th lower lung fields, no crackles BL. CV:  Regular  rhythm,  one plus edema  GI:  Soft, Non distended, Non tender.  +Bowel sounds  Neurologic:  Alert and oriented X 3, normal speech,   Psych:   Good insight. Not anxious nor agitated  Skin:  No rashes. No jaundice    Reviewed most current lab test results and cultures  YES  Reviewed most current radiology test results   YES  Review and summation of old records today    NO  Reviewed patient's current orders and MAR    YES  PMH/SH reviewed - no change compared to H&P  ________________________________________________________________________  Care Plan discussed with:    Comments   Patient y    Family      RN y    Care Manager     Consultant                        Multidiciplinary team rounds were held today with , nursing, pharmacist and clinical coordinator. Patient's plan of care was discussed; medications were reviewed and discharge planning was addressed.      ________________________________________________________________________  Total NON critical care TIME:  30   Minutes    Total CRITICAL CARE TIME Spent:   Minutes non procedure based      Comments   >50% of visit spent in counseling and coordination of care     ________________________________________________________________________  Raji Mccoy MD     Procedures: see electronic medical records for all procedures/Xrays and details which were not copied into this note but were reviewed prior to creation of Plan. LABS:  I reviewed today's most current labs and imaging studies. Pertinent labs include:  Recent Labs      05/12/18   0317  05/12/18   0206   WBC  13.4*  13.2*   HGB  10.4*  10.1*   HCT  33.7*  33.0*   PLT  307  312     Recent Labs      05/12/18   0615  05/12/18   0206   NA  136  135*   K  4.3  4.1   CL  101  102   CO2  22  22   GLU  131*  127*   BUN  48*  44*   CREA  2.86*  2.33*   CA  9.5  9.1   MG   --   2.0   ALB   --   4.1   TBILI   --   1.2*   SGOT   --   61*   ALT   --   31   INR   --   1.5*       Signed:  Madhu Rosales MD

## 2018-05-12 NOTE — ED NOTES
Assumed care of patient. Patient resting comfortably on stretcher with call bell within reach. Patient has no complaints at this time. Patient updated on plan of care.

## 2018-05-12 NOTE — ED NOTES
Dr. Wilma Amador notified of patient critical troponin of 6.46. No further orders received at this time.

## 2018-05-12 NOTE — CONSULTS
[address]  MR#: [id number]  : 1945  ACCOUNT #: [de-identified]   DATE OF SERVICE: 2018    DATE OF SERVICE:  2018      REQUESTING PHYSICIAN:  Mathew Bonilla MD     REASON FOR CONSULTATION:  Evaluate abnormal cardiac enzymes. CHIEF COMPLAINT:  Chest pain, shortness of breath, nausea and back pain. HISTORY OF PRESENT ILLNESS:  The patient is a 17-year-old female with a known ischemic cardiomyopathy and an EF of approximately 25-30% by prior assessment. She also has a history of chronic renal insufficiency, dyslipidemia and atrial fibrillation. She was admitted back in late March with an acute coronary syndrome. She underwent PTCA and stenting of the right coronary artery by Dr. Ramon Juan. She has a known chronically occluded left anterior descending and also has a remote history of stenting of the marginal branch. Her EF at that time was 25-30% by echo and prior EF in  was 30-35%. The patient did have some acute renal failure following her procedure. She was eventually discharged home, but has developed multiple complaints, including back pain, chest discomfort, shortness of breath and nausea. She came into the ER early this morning and appeared to be in atrial fibrillation with evidence of volume overload. In addition, her creatinine had risen to 2.9, whereas in early April it was 1.1. Her troponin was 6.5 and I was asked to see the patient for evaluation. The patient was subsequently admitted to the hospitalist service. A repeat troponin was 6.46 and renal function has slightly worsened. A renal consult is currently pending. The patient also has been in rapid atrial fibrillation and blood pressure has been somewhat low. She is apparently a DNR. She is not actively having chest discomfort at this time, but does not feel well overall. PAST MEDICAL HISTORY:  As noted above, COPD, dyslipidemia.       PAST SURGICAL HISTORY:  Status post appendectomy, status post cholecystectomy, status post hysterectomy. CURRENT MEDICATIONS:  IV heparin, aspirin, nitro paste, Lipitor, carvedilol, Protonix, iron sulfate, B12, Colace. Prior to admission, the patient was also on Plavix. SOCIAL HISTORY:  The patient does continue to smoke, although at a reduced amount. She is a . She does not abuse alcohol. FAMILY HISTORY:  No history of heart disease in any immediate family members. REVIEW OF SYSTEMS:  As noted above, otherwise noncontributory. No melena, no hematochezia. Positive nausea. Positive some diarrhea. PHYSICAL EXAMINATION:    GENERAL:  Reveals an ill-appearing elderly white female. VITAL SIGNS:  Blood pressure 99/64, pulse 122, respirations 15, temperature 98. HEENT:  Pupils are equal.  Oropharynx with moist oral mucosa. NECK:  Supple. No masses or thyromegaly. No cervical or supraclavicular adenopathy. Jugular venous pressure appears mildly elevated. No carotid bruits. CHEST:  Scattered crackles at the bases. SKIN:  Pale in appearance. No obvious rashes. BACK:  No scoliosis. CARDIAC:  Irregularly irregular rhythm, tachycardic, no gallop. ABDOMEN:  Soft, nontender. No masses or organomegaly. Bowel sounds positive. EXTREMITIES:  No cyanosis, clubbing or edema. Distal pulses are not palpable. NEUROLOGIC:  No obvious focal motor deficits. LABORATORY DATA:  BUN 48, creatinine 2.9, prior creatinine 1.05 on 04/18. Troponin 6.46. ProBNP greater than 30,000. White count 13.2, hemoglobin 10.1, platelets 112. DIAGNOSTIC DATA:  Chest x-ray shows small bilateral pleural effusions. EKG:  Atrial fibrillation, left axis deviation, QS pattern in V1 through V6. No ST elevation. Nonspecific ST-T changes. IMPRESSION:    1.  Non-ST segment elevation myocardial infarction. 2.  Atrial fibrillation with rapid ventricular response.     3.  Known ischemic cardiomyopathy with EF 25-30%. 4.  Congestive heart failure and volume overload. 5.  Acute on chronic renal failure. 6.  COPD. 7.  Dyslipidemia. 8.  Ongoing tobacco abuse. RECOMMENDATIONS:  The patient is not a candidate for invasive procedures at this point, as she is in acute renal failure and I suspect she will need hemodialysis. Would resume Plavix and continue aspirin. Continue heparin for now. We will order an echocardiogram.  The patient's prognosis is guarded, given her multiple medical issues. Thank you for this consult.       Grecia Rodriguez MD       [address] [place] [address] /   D: 05/12/2018 10:46     T: 05/12/2018 11:14  JOB #: [id number]  CC: Joo Maxwell MD  CC: Casimiro Taylor MD

## 2018-05-12 NOTE — ED NOTES
Spoke with Scottie Soler in pharmacy in regards to patient's heparin drip.  She states no bolus is necessary and to increase the patient's heparin to 15units/kg/hr

## 2018-05-12 NOTE — CONSULTS
[facility] ([facility])         Nephrology and Hypertension         Patient seen and examined. Full consult dictated    ASSESSMENT:  MACY- likely pre-renal vs ischemic ATN in the setting of ischemic CMP (EF of 25-30%), NSTEMI (stunned myocardium), Afib with RVR and Hypotension. Had MACY during previous admit also  Afib with RVR  NSTEMI- repeat. S/p RCA stent during previous admit. Not a candidate for invasive procedures per Cardiology  Hypotension  Ischemic Cardiomyopathy    Pts high risk for worsening MACY. Will likely need dialysis in next 24-48 hrs. Discussed renal prognosis with her. She does not want dialysis. Aware of the consequences and in ability to survive without dialysis  \"Please let me go. I do not want to live sick like this\". PLAN:  Ct supportive care/medical Rx of NSTEMI  Cant use most BP meds due to hypotension  IOs as possible  May have to stop IVF if she gets more sob  Pressors if needed and it pt allows  Rate/rythm control with Afib as you are  Daily labs  No ACE-I or ARB  If MACY worsens, then would consider palliative/hospice eval- if pt continues to refuse dialysis    D/W pt  Thanks for Renal consult. We will follow patient with you.     Signed by:  Ema Iraheta MD  Nephrology and HTN

## 2018-05-12 NOTE — ED PROVIDER NOTES
EMERGENCY DEPARTMENT HISTORY AND PHYSICAL EXAM      Date: 5/12/2018  Patient Name: Ibrahima Boudreaux    History of Presenting Illness     Chief Complaint   Patient presents with    Chest Pain     Arrival from EMS with complaints of chest pain / sob starting at 2300    Shortness of Breath       History Provided By: Patient    HPI: Ibrahima Boudreaux, 68 y.o. female with PMHx significant for Depression, AMI, CHF, presents ambulatory to the ED with cc of generalized back pain, CP, and SOB today. Pt is nauseous and does have leg swelling. Pt states, \"I just dont feel good\". She denies any fevers, chills vomiting. She is on a bloodthinner Plavix for previous blood clot. She has taken all her medications today. She is scheduled for an US of her legs for DVT rule out. Pt states that she has slowed down and has not been as active, concerned this may lead to a clot. Pt denies any recent long distance travels, use of oral contraceptives. Social Hx: + Tobacco (0.1 ppd), - EtOH (-), - illicit drug use (-)    There are no other complaints, changes, or physical findings at this time. PCP: Paul Nichols MD    Current Facility-Administered Medications   Medication Dose Route Frequency Provider Last Rate Last Dose    sodium chloride (NS) flush 5-10 mL  5-10 mL IntraVENous PRN Rashida Jimenez MD        aspirin (ASPIRIN) tablet 325 mg  325 mg Oral NOW Rashida Jimenez MD        heparin (porcine) injection 4,000 Units  58.8 Units/kg IntraVENous ONCE Rashida Jimenez MD        heparin 25,000 units in D5W 250 ml infusion  12-25 Units/kg/hr IntraVENous TITRATE Rashida Jimenez MD        metoprolol (LOPRESSOR) injection 5 mg  5 mg IntraVENous ONCE Rashida Jimenez MD         Current Outpatient Prescriptions   Medication Sig Dispense Refill    clonazePAM (KLONOPIN) 1 mg tablet Take 1 Tab by mouth three (3) times daily. Max Daily Amount: 3 mg. As needed 90 Tab 1    predniSONE (DELTASONE) 5 mg tablet Take 1 Tab by mouth daily.  Take as needed for wheezing 30 Tab 1    aspirin-acetaminophen-caffeine (EXCEDRIN ES) 250-250-65 mg per tablet Take 1 Tab by mouth.  clopidogrel (PLAVIX) 75 mg tab Take 1 Tab by mouth daily. 30 Tab 5    ferrous sulfate 325 mg (65 mg iron) tablet Take 1 Tab by mouth Daily (before breakfast). 60 Tab 5    furosemide (LASIX) 40 mg tablet Take 1 Tab by mouth daily. 30 Tab 5    atorvastatin (LIPITOR) 40 mg tablet Take 1 Tab by mouth every evening. 30 Tab 5    albuterol (PROVENTIL HFA, VENTOLIN HFA, PROAIR HFA) 90 mcg/actuation inhaler Take 1 Puff by inhalation every six (6) hours as needed for Wheezing. 1 Inhaler 5    aspirin delayed-release 81 mg tablet Take 1 Tab by mouth daily. 30 Tab 1    fluticasone (FLONASE) 50 mcg/actuation nasal spray 2 Sprays by Both Nostrils route daily as needed for Allergies.  cetirizine (ZYRTEC) 10 mg tablet Take 10 mg by mouth daily as needed for Allergies.  omeprazole (PRILOSEC) 20 mg capsule Take 1 Cap by mouth daily. Take only as needed 30 Cap 11    cyanocobalamin (VITAMIN B-12) 100 mcg tablet Take 100 mcg by mouth daily.          Past History     Past Medical History:  Past Medical History:   Diagnosis Date    CHF (congestive heart failure) (HCC)     Other ill-defined conditions(799.89)     AMI    Psychiatric disorder     depression       Past Surgical History:  Past Surgical History:   Procedure Laterality Date    CARDIAC SURG PROCEDURE UNLIST  03/2018    stents    HX APPENDECTOMY      HX CHOLECYSTECTOMY      HX GI      adhesions    HX GYN      hysterectomy/  10 years       Family History:  Family History   Problem Relation Age of Onset    No Known Problems Mother     No Known Problems Father     Seizures Daughter     Cancer Daughter        Social History:  Social History   Substance Use Topics    Smoking status: Current Some Day Smoker     Packs/day: 0.10     Last attempt to quit: 11/1/2014    Smokeless tobacco: Never Used      Comment: 1 a day    Alcohol use No Allergies: Allergies   Allergen Reactions    Ciprofloxacin Shortness of Breath    Codeine Nausea Only    Influenza Virus Vaccine, Specific Unknown (comments)     Allergy documented in Admission Data base 1 screening    Pcn [Penicillins] Itching         Review of Systems   Review of Systems   Constitutional: Negative. Negative for chills and fever. HENT: Negative. Negative for congestion, facial swelling, rhinorrhea, sore throat, trouble swallowing and voice change. Eyes: Negative. Respiratory: Positive for shortness of breath. Negative for apnea, cough, chest tightness and wheezing. Cardiovascular: Positive for chest pain and leg swelling. Negative for palpitations. Gastrointestinal: Positive for nausea. Negative for abdominal distention, abdominal pain, blood in stool, constipation, diarrhea and vomiting. Endocrine: Negative. Negative for cold intolerance, heat intolerance and polyuria. Genitourinary: Negative. Negative for difficulty urinating, dysuria, flank pain, frequency, hematuria and urgency. Musculoskeletal: Positive for back pain (generalized). Negative for arthralgias, myalgias, neck pain and neck stiffness. Skin: Negative. Negative for color change and rash. Neurological: Negative. Negative for dizziness, syncope, facial asymmetry, speech difficulty, weakness, light-headedness, numbness and headaches. Hematological: Negative. Does not bruise/bleed easily. Psychiatric/Behavioral: Negative. Negative for confusion and self-injury. The patient is not nervous/anxious. Physical Exam   Physical Exam   Constitutional: She is oriented to person, place, and time. She appears well-developed and well-nourished. No distress. HENT:   Head: Normocephalic and atraumatic. Mouth/Throat: Oropharynx is clear and moist. No oropharyngeal exudate. Eyes: Conjunctivae and EOM are normal. Pupils are equal, round, and reactive to light. Neck: Normal range of motion. Cardiovascular: Regular rhythm and normal heart sounds. Tachycardia present. Exam reveals no gallop and no friction rub. No murmur heard. Tachycardic at 131   Pulmonary/Chest: Effort normal. No respiratory distress. She exhibits no tenderness. Coarse breath sounds bilaterally. Abdominal: Soft. Bowel sounds are normal. She exhibits no distension and no mass. There is no tenderness. There is no rebound and no guarding. Musculoskeletal: Normal range of motion. She exhibits tenderness. She exhibits no edema or deformity. +2 peripheral edema. Neurological: She is alert and oriented to person, place, and time. She displays normal reflexes. No cranial nerve deficit. She exhibits normal muscle tone. Coordination normal.   Skin: Skin is warm. No rash noted. She is not diaphoretic. Psychiatric: She has a normal mood and affect. Nursing note and vitals reviewed.         Diagnostic Study Results     Labs -     Recent Results (from the past 12 hour(s))   EKG, 12 LEAD, INITIAL    Collection Time: 05/12/18  1:06 AM   Result Value Ref Range    Ventricular Rate 131 BPM    Atrial Rate 127 BPM    QRS Duration 106 ms    Q-T Interval 324 ms    QTC Calculation (Bezet) 478 ms    Calculated R Axis -73 degrees    Calculated T Axis 101 degrees    Diagnosis       Undetermined rhythm  Low voltage QRS  Incomplete right bundle branch block  Left anterior fascicular block  Possible Anterolateral infarct (cited on or before 12-MAY-2018)  Abnormal ECG  When compared with ECG of 08-MAY-2018 00:42,  Significant changes have occurred     CBC WITH AUTOMATED DIFF    Collection Time: 05/12/18  2:06 AM   Result Value Ref Range    WBC 13.2 (H) 3.6 - 11.0 K/uL    RBC 4.25 3.80 - 5.20 M/uL    HGB 10.1 (L) 11.5 - 16.0 g/dL    HCT 33.0 (L) 35.0 - 47.0 %    MCV 77.6 (L) 80.0 - 99.0 FL    MCH 23.8 (L) 26.0 - 34.0 PG    MCHC 30.6 30.0 - 36.5 g/dL    RDW 16.5 (H) 11.5 - 14.5 %    PLATELET 330 631 - 017 K/uL    MPV 12.0 8.9 - 12.9 FL    NRBC 0.0 0  WBC    ABSOLUTE NRBC 0.00 0.00 - 0.01 K/uL    NEUTROPHILS 84 (H) 32 - 75 %    LYMPHOCYTES 10 (L) 12 - 49 %    MONOCYTES 5 5 - 13 %    EOSINOPHILS 0 0 - 7 %    BASOPHILS 0 0 - 1 %    IMMATURE GRANULOCYTES 1 (H) 0.0 - 0.5 %    ABS. NEUTROPHILS 11.1 (H) 1.8 - 8.0 K/UL    ABS. LYMPHOCYTES 1.4 0.8 - 3.5 K/UL    ABS. MONOCYTES 0.7 0.0 - 1.0 K/UL    ABS. EOSINOPHILS 0.0 0.0 - 0.4 K/UL    ABS. BASOPHILS 0.0 0.0 - 0.1 K/UL    ABS. IMM. GRANS. 0.1 (H) 0.00 - 0.04 K/UL    DF AUTOMATED     CK W/ CKMB & INDEX    Collection Time: 05/12/18  2:06 AM   Result Value Ref Range     (H) 26 - 192 U/L    CK - MB 44.7 (H) <3.6 NG/ML    CK-MB Index 19.1 (H) 0 - 2.5     METABOLIC PANEL, COMPREHENSIVE    Collection Time: 05/12/18  2:06 AM   Result Value Ref Range    Sodium 135 (L) 136 - 145 mmol/L    Potassium 4.1 3.5 - 5.1 mmol/L    Chloride 102 97 - 108 mmol/L    CO2 22 21 - 32 mmol/L    Anion gap 11 5 - 15 mmol/L    Glucose 127 (H) 65 - 100 mg/dL    BUN 44 (H) 6 - 20 MG/DL    Creatinine 2.33 (H) 0.55 - 1.02 MG/DL    BUN/Creatinine ratio 19 12 - 20      GFR est AA 25 (L) >60 ml/min/1.73m2    GFR est non-AA 20 (L) >60 ml/min/1.73m2    Calcium 9.1 8.5 - 10.1 MG/DL    Bilirubin, total 1.2 (H) 0.2 - 1.0 MG/DL    ALT (SGPT) 31 12 - 78 U/L    AST (SGOT) 61 (H) 15 - 37 U/L    Alk.  phosphatase 83 45 - 117 U/L    Protein, total 7.5 6.4 - 8.2 g/dL    Albumin 4.1 3.5 - 5.0 g/dL    Globulin 3.4 2.0 - 4.0 g/dL    A-G Ratio 1.2 1.1 - 2.2     MAGNESIUM    Collection Time: 05/12/18  2:06 AM   Result Value Ref Range    Magnesium 2.0 1.6 - 2.4 mg/dL   TROPONIN I    Collection Time: 05/12/18  2:06 AM   Result Value Ref Range    Troponin-I, Qt. 6.50 (H) <0.05 ng/mL   PTT    Collection Time: 05/12/18  2:06 AM   Result Value Ref Range    aPTT 26.5 22.1 - 32.0 sec    aPTT, therapeutic range     58.0 - 77.0 SECS   PROTHROMBIN TIME + INR    Collection Time: 05/12/18  2:06 AM   Result Value Ref Range    INR 1.5 (H) 0.9 - 1.1      Prothrombin time 15. 2 (H) 9.0 - 11.1 sec   NT-PRO BNP    Collection Time: 05/12/18  2:06 AM   Result Value Ref Range    NT pro-BNP >79728 (H) 0 - 125 PG/ML   D DIMER    Collection Time: 05/12/18  2:06 AM   Result Value Ref Range    D-dimer 0.29 0.00 - 0.65 mg/L FEU       Radiologic Studies -   DUPLEX LOWER EXT VENOUS BILAT   Final Result      Study Result      EXAM:  DUPLEX LOWER EXT VENOUS BILAT     INDICATION:  Leg swelling, pain, DVT suspected     COMPARISON: None.     FINDINGS: Duplex Doppler sonography of the bilateral lower extremity was  performed from the groin to the calf. The bilateral common femoral, femoral and  popliteal veins are compressible with normal color-flow and wave forms and  response to physiologic maneuvers including Valsalva and augmentation. A small  left Baker's cyst is present.     IMPRESSION  IMPRESSION: No deep venous thrombosis identified. CXR Results  (Last 48 hours)               05/12/18 0150  XR CHEST PA LAT Final result    Impression:  Impression:   Unchanged significant cardiomegaly and mild edema. New small bilateral pleural   effusions       Narrative:  Chest PA and lateral       History: Chest pain and short of breath       Comparison: 5/7/2018       Findings:  Scarring or discoid atelectasis is seen in the left midlung. There   are small bilateral pleural effusions that have increased. The heart is   significantly enlarged with unchanged mild edema. The visualized osseous   structures are unremarkable. Medical Decision Making   I am the first provider for this patient. I reviewed the vital signs, available nursing notes, past medical history, past surgical history, family history and social history. Vital Signs-Reviewed the patient's vital signs.   Patient Vitals for the past 12 hrs:   Temp Pulse Resp BP SpO2   05/12/18 0230 - (!) 138 24 105/65 96 %   05/12/18 0115 - (!) 135 (!) 31 114/68 98 %   05/12/18 0103 98.1 °F (36.7 °C) (!) 134 24 115/76 98 % EKG interpretation: (Preliminary)  1:06 AM  Rhythm: incomplete RBBB, sinus tachycardia; and regular . Rate (approx.): 131; abnormal EKG. Asi interpreted by Deb Fischer MD    Records Reviewed: Nursing Notes, Old Medical Records, Previous electrocardiograms, Ambulance Run Sheet, Previous Radiology Studies and Previous Laboratory Studies    Provider Notes (Medical Decision Making):   DDx: ACS, PNA, heart failure, Pneumothorax, electrolyte disturbance. 67 y/o female hx of CAD with stents and systolic heart failure presents with CP and SOB. Tachycardic on arrival but otherwise stable vitals. Will check EKG, cardiac enzyumes, labs, cxr, and will reassess the need for cardiology consult. Per chart review pts last cardiac cath was on 3/29/18 which showed severe stenosis in the RCA, pt had stents x 2 placed. ED Course:   Initial assessment performed. The patients presenting problems have been discussed, and they are in agreement with the care plan formulated and outlined with them. I have encouraged them to ask questions as they arise throughout their visit. TOBACCO COUNSELING:  Upon evaluation, pt expressed that they are a current tobacco user. For approximately 10 minutes, pt has been counseled on the dangers of smoking and was encouraged to quit as soon as possible in order to decrease further risks to their health. Pt has conveyed their understanding of the risks involved should they continue to use tobacco products.     Progress Note:  2:10AM  Pt's in acute renal failure; will start gentle IVF hydration    2:30AM  Pt given ASA 324mg; IV metoprolol for paroxysmal AFIB    2:45AM  Elevated troponin noted > 6; will give heparin bolus and start heparin gtt for NSTEMI; if continues to have pain and rising troponin will likely need cardiac cath    PROGRESS NOTE:  3:00 AM  Pt last cardiac cath was on 3/29; pt had TANNER x2 placed; on ASA/plavix currently      CONSULT NOTE:   3:05 AM  Deb Fischer MD spoke with  Ramona,   Specialty: Cardiology  Discussed pt's hx, disposition, and available diagnostic and imaging results. Reviewed care plans. Consultant agrees with plans as outlined. He agrees with starting the pt on a heparin drip. .   Written by GONZAELZ Laguna, as dictated by Onesimo Newby MD.    CONSULT NOTE:   3:09 Sylvie Junior MD spoke with Dr. Maty Silva,   Specialty: Hospitalist  Discussed pt's hx, disposition, and available diagnostic and imaging results. Reviewed care plans. Consultant will evaluate pt for admission. Written by GONZALEZ Laguna, as dictated by Onesimo Newby MD.    CRITICAL CARE NOTE :    11:17 AM    IMPENDING DETERIORATION -Airway, Respiratory, Cardiovascular, Metabolic and Renal  ASSOCIATED RISK FACTORS - Hypotension, Shock, Hypoxia, Dysrhythmia, Metabolic changes, Dehydration and Vascular Compromise  MANAGEMENT- Bedside Assessment and Supervision of Care  INTERPRETATION -  Xrays, CT Scan, Blood Gases, ECG, Blood Pressure and Cardiac Output Measures   INTERVENTIONS - hemodynamic mngmt, Metobolic interventions and severe tachycardia, atrial fib management, elevated troponin and NSTEMI management for possible coronary occlusion  CASE REVIEW - Hospitalist, Medical Sub-Specialist, Nursing, Family and cardiologist  TREATMENT RESPONSE -Stable  PERFORMED BY - Self    NOTES   :    I have spent 65 minutes of critical care time involved in lab review, consultations with specialist, family decision- making, bedside attention and documentation. During this entire length of time I was immediately available to the patient . Critical Care: The reason for providing this level of medical care for this critically ill patient was due to a critical illness that impaired one or more vital organ systems, such that there was a high probability of imminent or life threatening deterioration in the patient's condition.  This care involved high complexity decision making to assess, manipulate, and support vital system functions, to treat this degree of vital organ system failure, and to prevent further life threatening deterioration of the patients condition. Marisol Hooks MD    Disposition:  ADMIT NOTE:  3:10 AM  Patient is being admitted to the hospital by Dr. Mayra Fields. The results of their tests and reasons for their admission have been discussed with them and/or available family. They convey agreement and understanding for the need to be admitted and for their admission diagnosis. Consultation has been made with the inpatient physician specialist for hospitalization. PLAN: Admit the pt. Diagnosis     Clinical Impression:   1. NSTEMI (non-ST elevated myocardial infarction) (HCC)    2. Paroxysmal atrial fibrillation ([facility] [address]    3. Acute renal failure superimposed on chronic kidney disease, unspecified CKD stage, unspecified acute renal failure type ([facility] [address]    4. SIRS (systemic inflammatory response syndrome) ([facility])        Attestations: This note is prepared by Darryl Garcia acting as Scribe for MD Marisol Wilkes MD : The scribe's documentation has been prepared under my direction and personally reviewed by me in its entirety. I confirm that the note above accurately reflects all work, treatment, procedures, and medical decision making performed by me. This note will not be viewable in [address]

## 2018-05-13 PROBLEM — I51.9 CARDIAC DISEASE: Status: ACTIVE | Noted: 2018-01-01

## 2018-05-13 NOTE — PROGRESS NOTES
Cardiology Progress Note      5/13/2018 3:56 PM    Admit Date: 5/12/2018          Subjective: Plans for hospice noted. Pt awake and comfortable at present          Visit Vitals    BP (!) 87/41    Pulse (!) 106    Temp 98 °F (36.7 °C)    Resp 20    Ht 5' 5\" (1.651 m)    Wt 72.9 kg (160 lb 11.5 oz)    SpO2 99%    BMI 26.74 kg/m2     05/11 1901 - 05/13 0700  In: 2501 [I.V.:2501]  Out: 30 [Urine:30]        Objective:      Physical Exam:  VS as above     Data Review:   Labs:    creat 3.7  Hgb 10.2         Assessment:     1. Non-ST segment elevation myocardial infarction. 2.  Atrial fibrillation with rapid ventricular response. 3.  Known ischemic cardiomyopathy with EF 25-30%. 4.  Congestive heart failure and volume overload. 5.  Acute on chronic renal failure. 6.  COPD. 7.  Dyslipidemia. 8.  Ongoing tobacco abuse. Plan: Agree that hospice appropriate.  Pt does not want HD Will notify Dr Priscilla Pantoja

## 2018-05-13 NOTE — HOSPICE
[facility] follow up on consultation visit note    Order for consult noted. History and events of this hospitalization reviewed.       TC to daughter [phone], introduced myself and provided support    Plans are to meet at patient's bedside between 1300 and 1330    Please call [phone] if questions or concerns    Ave Potts RN [facility]

## 2018-05-13 NOTE — PROGRESS NOTES
Cm acknowledged hospice consult. [facility] has initiated contact with patient and has family meeting scheduled. 1606 - patient is being admitted to inpatient hospice.     Care Management Interventions  Transition of Care Consult (CM Consult):  (Hospice consult - inpatient hospice)  Physical Therapy Consult: Yes  Occupational Therapy Consult: Yes  Discharge Location  Discharge Placement: Other: (Inpatient Hospice)      Leisa Vargas RN CM  Ext [phone]

## 2018-05-13 NOTE — PROGRESS NOTES
Spiritual Care Assessment/Progress Note  [facility]      NAME: Ayla Sherwood      MRN: [id number]  AGE: 68 y.o. SEX: female  Oriental orthodox Affiliation: Catholic   Language: English     5/13/2018     Total Time (in minutes): 13     Spiritual Assessment begun in [facility] PROGRESSIVE CARE through conversation with:         [x]Patient        [x] Family    [] Friend(s)        Reason for Consult: Request by staff     Spiritual beliefs: (Please include comment if needed)     [x] Identifies with a chayo tradition:     [x] Supported by a chayo community: [place]     [] Claims no spiritual orientation:      [] Seeking spiritual identity:           [] Adheres to an individual form of spirituality:      [] Not able to assess:                     Identified resources for coping:      [x] Prayer                               [] Music                  [] Guided Imagery     [x] Family/friends                 [] Pet visits     [] Devotional reading                         [] Unknown     [x] Other: Scripture                                             Interventions offered during this visit: (See comments for more details)    Patient Interventions: Affirmation of chayo, Affirmation of emotions/emotional suffering, Iconic (affirming the presence of God/Higher Power), End of life issues discussed, Normalization of emotional/spiritual concerns, Prayer (actual), Oriental orthodox beliefs/image of God discussed     Family/Friend(s):  Affirmation of emotions/emotional suffering, Prayer (actual), Iconic (affirming the presence of God/Higher Power), Affirmation of chayo     Plan of Care:     [x] Support spiritual and/or cultural needs    [] Support AMD and/or advance care planning process      [x] Support grieving process   [] Coordinate Rites and/or Rituals    [] Coordination with community clergy   [] No spiritual needs identified at this time   [] Detailed Plan of Care below (See Comments)  [] Make referral to Music Therapy  [] Make referral to Pet Therapy     [] Make referral to Addiction services  [] Make referral to [facility]  [] Make referral to Spiritual Care Partner  [] No future visits requested        [x] Follow up visits as needed     Comments:  Responded to family request that came through [place], [facility] to offer support to patient and family as patient has made decision for comfort measures only. Three daughters, three young adult grandchildren, her brother and her niece were present. Provided empathic listening and emotional support to patient as she shared she is tired. Granddaughter had shared outside the room that hospice has already consulted and they plan to move forward. Patient declined shared prayer, but was receptive to assurance of prayer. Offered her reassurance by quoting a scripture verse. Advised all of  availability.   ADEBAYO Horta, [address] [facility], [address] [place] [address] Road Paging Service  [phone]

## 2018-05-13 NOTE — PROGRESS NOTES
PCU SHIFT NURSING NOTE      Bedside and Verbal shift change report given to SYDNEE Hwang (oncoming nurse) by Dimas Mcgee (offgoing nurse). Report included the following information SBAR, Kardex, Intake/Output, MAR, Recent Results and Cardiac Rhythm A-Fib. Shift Summary:   0935 - Pt expressing pain and nausea, and stating that she just wanted to be kept comfortable. Made pt aware that the medical team was still pursuing treatment and that pain management was minimal at this time. Explained the difference between palliative care vs. Hospice. Pt continues to state, \"I just want to be kept comfortable, and I am tired of fighting. \" Encouraged pt to think about her wishes in terms of care; granddaughter at the bedside and very supportive. 8320 - Pt coughing; stopped IVFs. BP remains stable. Will notify MD.   1013 - Pt requesting full comfort measures only, wanting to stop all medical treatments & requesting hospice; paging Dr. Keshia Cronin for hospice consult. Waiting for return call. Margarita Muller 121 Dr. Keshia Cronin; waiiting for return call. 65 - Dr. Keshia Cronin made aware of pt's requests to stop all treatment and wants comfort care and to pursue Hospice Comfort care measures at this time. Received orders for Hospice consult, Lorazepam IV 1mg every 6 hrs as needed for anxiety, nausea, or restlessness; and a scopolamine patch for nausea. Paged Hospice consult, waiting to hear back. 8499 [facility] dispatcher notified pt's need for consult. Hospice dispatch to call Darline Sanz RN to evaluate whether pt appropriate. 56 Jessie Salinas RN with Hospice informed of pt's wishes for comfort care measures only. Made her aware of pt status and recent ordered medications. Hospice nurse to speak with pt and family at bedside.      Admission Date 5/12/2018   Admission Diagnosis NSTEMI (non-ST elevated myocardial infarction) ([facility] [address]   Consults IP CONSULT TO CARDIOLOGY  IP CONSULT TO NEPHROLOGY        Consults   []PT   []OT   []Speech   []Case Management      [] Palliative      Cardiac Monitoring Order   [x]Yes   []No     IV drips   []Yes    Drip:                            Dose:  Drip:                            Dose:  Drip:                            Dose:   [x]No     GI Prophylaxis   []Yes   []No         DVT Prophylaxis   SCDs:             Eladio stockings:         [] Medication   []Contraindicated   []None      Activity Level Activity Level: Bed Rest     Activity Assistance: Partial (one person)   Purposeful Rounding every 1-2 hour? [x]Yes   Barton Score  Total Score: 3   Bed Alarm (If score 3 or >)   [x]Yes   [] Refused (See signed refusal form in chart)   Nicholas Score  Nicholas Score: 16   Nicholas Score (if score 14 or less)   []PMT consult   []Wound Care consult      []Specialty bed   [] Nutrition consult          Needs prior to discharge:   Home O2 required:    [x]Yes   []No    If yes, how much O2 required? Other:    Last Bowel Movement: Last Bowel Movement Date: 05/11/18      Influenza Vaccine Received Flu Vaccine for Current Season (usually Sept-March): Not Flu Season        Pneumonia Vaccine           Diet Active Orders   Diet    DIET RENAL Regular      LDAs               Peripheral IV 05/12/18 Left Antecubital (Active)   Site Assessment Clean, dry, & intact 5/12/2018  2:33 AM   Phlebitis Assessment 0 5/12/2018  2:33 AM   Infiltration Assessment 0 5/12/2018  2:33 AM   Dressing Status Clean, dry, & intact 5/12/2018  2:33 AM   Dressing Type Tape;Transparent 5/12/2018  2:33 AM   Hub Color/Line Status Pink;Flushed;Patent 5/12/2018  2:33 AM       Peripheral IV 05/13/18 Right Forearm (Active)   Site Assessment Clean, dry, & intact 5/13/2018  5:56 AM   Phlebitis Assessment 0 5/13/2018  5:56 AM   Infiltration Assessment 0 5/13/2018  5:56 AM   Dressing Status Clean, dry, & intact 5/13/2018  5:56 AM   Dressing Type Transparent 5/13/2018  5:56 AM   Hub Color/Line Status Pink; Infusing 5/13/2018  5:56 AM                      Urinary Catheter Urinary Catheter 05/12/18 2- way; Daugherty-Indications for Use: Accurate measurement of urinary output    Intake & Output   Date 05/12/18 0700 - 05/13/18 0659 05/13/18 0700 - 05/14/18 0659   Shift [id number] 1900-0659 24 Hour Total 2080-1324 4672-1448 24 Hour Total   I  N  T  A  K  E   I.V.  (mL/kg/hr)  2489.7  (2.8) 2489.7  (1.4)         Heparin Volume  261.8 261.8         Amiodarone Volume  431.7 431.7         Volume (0.9% sodium chloride infusion)  1796.3 1796.3       Shift Total  (mL/kg)  2489.7  (34.2) 2489.7  (34.2)      O  U  T  P  U  T   Urine  (mL/kg/hr)  30  (0) 30  (0)         Urine Output (mL) (Urinary Catheter 05/12/18 2- way; Daugherty)  30 30       Shift Total  (mL/kg)  30  (0.4) 30  (0.4)      NET  2459.7 2459.7      Weight (kg) 68 72.9 72.9 72.9 72.9 72.9         Readmission Risk Assessment Tool Score Medium Risk            20       Total Score        3 Has Seen PCP in Last 6 Months (Yes=3, No=0)    4 IP Visits Last 12 Months (1-3=4, 4=9, >4=11)    5 Pt. Coverage (Medicare=5 , Medicaid, or Self-Pay=4)    8 Charlson Comorbidity Score (Age + Comorbid Conditions)        Criteria that do not apply:    . Living with Significant Other. Assisted Living. LTAC. SNF.  or   Rehab    Patient Length of Stay (>5 days = 3)       Expected Length of Stay - - -   Actual Length of Stay 1

## 2018-05-13 NOTE — HOSPICE
[facility] RN consultation visit note. Order for hospice noted. History and events of this hospitalization reviewed. Met with patient and family of 3 children and grandchildren. Patient was awake, alert yet speech often not appropriate. Often reaching for items in the air. RR 22 with use of accessory muscles. Diminished sounds in bases. Abdomen rounded as if with fluid and 2-3+ Edema just proximal to knees and distal.  Skin tight and shiny. Daugherty draining minimal amounts of dark urine. 30 ml in previous 24 hours. Hospice philosophy and scope of services presented. Questions and concerns addressed. Patient said Andrea Vance is what I want, I am tired. \"       Had been received  Morphine 2 mg IV x 3 in previous 15 hours for dyspnea / generalized and chest pains and Lorazepam 1 mg IV for anxiety with good palliation. Patient denied pain or of being short of breath. Did complain of itching \"all over\" without rash noted. Hospice MD on call Dr. Lucinda Williamson advised and order received to admit patient to inpatient hospice with Routine LOC with dx of End Stage Cardiac Disease. Orders received for comfort. Patient and family advised with understanding and appreciation expressed.     Morphine 2 mg IV had just been given and patient very lethargic    Dr. Carol Richard advised and deferred to Dr. Junior Moon to be hospice attending MD.    Please call [phone] if questions or concerns    Rashmi Joseph RN [facility]

## 2018-05-13 NOTE — PROGRESS NOTES
Dr. Angus Arroyo notified of pts request for pain medicine, advised of BP, pt tolerating earlier dose of morphine, orders received

## 2018-05-13 NOTE — PROGRESS NOTES
In room to complete admission database for primary nurse. Dr. Keshia Cronin in to see patient. Biju Poole RN in to see patient.  Received verbal order for morphine 2 mg IV x1

## 2018-05-13 NOTE — PROGRESS NOTES
6281 - Attempted to call report; nurse unable to take report at this time, nurse to call back. 5050 - TRANSFER - OUT REPORT:    Verbal report given to SYDNEE Escamilla(name) on [address]  being transferred to Oncology(unit) for routine progression of care       Report consisted of patients Situation, Background, Assessment and   Recommendations(SBAR). Information from the following report(s) SBAR, Kardex, Intake/Output, MAR and Recent Results was reviewed with the receiving nurse. Lines:   Peripheral IV 05/12/18 Left Antecubital (Active)   Site Assessment Clean, dry, & intact 5/13/2018  7:31 AM   Phlebitis Assessment 0 5/13/2018  7:31 AM   Infiltration Assessment 0 5/13/2018  7:31 AM   Dressing Status Clean, dry, & intact 5/13/2018  7:31 AM   Dressing Type Tape;Transparent 5/13/2018  7:31 AM   Hub Color/Line Status Pink 5/13/2018  7:31 AM       Peripheral IV 05/13/18 Right Forearm (Active)   Site Assessment Clean, dry, & intact 5/13/2018  7:31 AM   Phlebitis Assessment 0 5/13/2018  7:31 AM   Infiltration Assessment 0 5/13/2018  7:31 AM   Dressing Status Clean, dry, & intact 5/13/2018  7:31 AM   Dressing Type Tape;Transparent 5/13/2018  7:31 AM   Hub Color/Line Status Pink; Infusing 5/13/2018  7:31 AM        Opportunity for questions and clarification was provided.       200- Patient transported with:   O2 @ 2 liters  Registered Nurse  [facility]

## 2018-05-13 NOTE — HOSPICE
[facility]  [phone]    Inpatient Nursing Admission   Patient Name: Kaye Sousa  YOB: 1945  Age: 68 y.o. Date of Hospice Admission: 5/13/2018  Hospice Attending Elected by Patient: Pooja Becerra MD  Primary Care Physician: Jenna Torres MD  Admitting RN: Jose Barboza  :      Level of [address] of Care:  [facility]  Patient Room: 2270/01 moved to [address]   ER Visits/ Hospitalizations in past year: 3/8/18 Acute CHF  3/25  CAD  NSTEMI  CHF  Hospice Diagnosis: 5/12   This admit      Hospice Dx     Endstage Cardiac Disease  Cardiac disease  Onset Date of Hospice Diagnosis:     Co-Morbidities:   Patient Active Problem List   Diagnosis Code    Systolic heart failure (HCC) I50.20    Hypertension I10    Anxiety F41.9    GERD (gastroesophageal reflux disease) K21.9    Elevated cholesterol E78.00    Allergic rhinitis J30.9    Moderate persistent asthma J45.40    Coronary artery disease without angina pectoris I25.10    Migraine G43.909    Chronic obstructive pulmonary disease ([facility] [address] J44.9    NSTEMI (non-ST elevated myocardial infarction) ([facility] [address] I21.4    Cardiac disease I51.9     Diagnoses RELATED to the terminal prognosis: NSTMI on this admit 6/93   Systolic CHF Atrial fibrillaltion   Ischemic cardiomyopathy with EF 15-20 %  CHF with volume overload on this admit   Acute on chronic renal failure    Ongoing tobacco use  Pulmonary HTN  Other Diagnoses: COPD   SIRS   Mild baseline dementia    Rationale for a prognosis of life expectancy of 6 months or less if the disease follows its normal course     Admitted through the ER 5/12 c dx of NSTEMI with CAD s/p [facility] with 2 TANNER 3/29    Valvular disease   HR was rapid ventricular response atrial fibrillation in the 140s   Beta blocker started with heparin drip, NTP for persistent chest pain, diuretics with gentle hydration.   This am was on Amiodarone and Heparin drips with persistent nausea. 30 ml of urine in previous 24 hours   Pain and dyspnea continued and patient asked for comfort and for all of the aggressive measures be stopped and refused a cardiac catheterization-wasn't a good candidate any way as CKD acute on chronic   Persistent hypotension with nausea, anxiety and chest pain. Nephrology and Cardiology in agreement with plans for comfort. German Verduzco is a 68 y.o. who was admitted to [facility]. The patient's principle diagnosis of End Stage Cardiac Disease  has resulted in generalized malaise, no appetite, dyspnea with O2 in place and anxiety. Functionally, the patient's Palliative Performance Scale has declined over a period of 2 weeks  and is estimated at 20       Objective information that support this patients limited prognosis includes:  CXR 5/12  IMPRESSION:  New mild right pleural effusion. Bibasilar atelectasis. Unchanged cardiomegaly. Moderate emphysema. ECHO 5/12    LEFT VENTRICLE: The ventricle was dilated. Ejection fraction was estimated  in the range of 15 % to 20 %. There was severe diffuse hypokinesis. RIGHT VENTRICLE: The ventricle was dilated. Systolic function was  moderately to markedly reduced. LEFT ATRIUM: The atrium was dilated. RIGHT ATRIUM: The atrium was dilated. MITRAL VALVE: There was mild thickening. DOPPLER: There was moderate  regurgitation. AORTIC VALVE: The valve was probably trileaflet. DOPPLER: There was no  stenosis. There was no significant regurgitation. TRICUSPID VALVE: Normal valve structure. DOPPLER: There was moderate  regurgitation. There was mild pulmonary hypertension. The patient/family chose comfort measures with the support of Hospice.     Patient meets for  Routine LOC as evidenced by-Patient had received Morphine 2 mg IV and Lorazepam 1 mg IV a few times during the day and denied shortness of breath at admit yet family said she doesn't like to complain      ASSESSMENT    Patient self-reports: [x]  Yes      SIGNS/PHYSICAL FINDINGS: Patient awake, lethargic and kept eyes closed when not in conversation yet was wakeful. Speech often not appropriate and would often reach for things in the air, 2-3+ edema just proximal to knees and distal.   Pale, skin warm and pasty, had wet cloth on her forehead with room very cool and patient complaining of being too warm, Lungs without adventitious sounds and markedly diminished in bases, abdomen rounded as if with fluid, minimal amounts of urine  30 ml over past 24 hours. Peripheral iv site unremarkable    KARNOFSKY:  20    FAST for all dementia:  Less than 7A      CLINICAL INFORMATION       LAB VALUES  5/13  WBC 22.5   HGB 10.2   HCT 33.8     CO2 18   BUN 68  CR 3.73   CA 8.4   GFRs  12 and 14    Baseline BUN around 30     CK-MB 44.7    CK-MB INDEX 19.1    NT proBNP  Greater than 24361  Lab Results   Component Value Date/Time    Protein, total 7.5 05/12/2018 02:06 AM    Albumin 4.1 05/12/2018 02:06 AM       Currently this patient has:  [x] Supplemental O2 [x] Peripheral IV  [x] Daugherty Catheter     PLAN     1. Frequent chest pains and itching after Morphine started 24 hours earlier   Hydromorphone 0.3 MG IV q 1 hr prn   Lorazepam 0.5 mg IV q 1 hr prn as adjunct tx    2. Anxiety reported   Lorazepam 0.5 mg IV q 1 hr prn    3. Dyspnea   Hydromorphone as above  O2 2 lpm nc prn   4. Itching without noticeable rash. Sarna topically q 8 hours to areas that patient complains about  5. Potential for secretions with pulmonary edema   Scopolamine patch   Glycopyrrolate 0.2 mg IV q 4 hours prn  6. Nausea  With complaints of bloating with frequent belching   Ondansetron 4 mg IV q 4 hours prn   Mylanta 30 ml q 4 hours prn       Hospice Team Frequency Orders:  Skilled Nurse -   Every other day x 14 days and 5 prns  . MSW  1 visit for initial assessment/evaluation for family support and need for volunteer services. Shaina Cerrato  1 visit for initial assessment/evaluation for spiritual support. ADVANCE CARE PLANNING (Complete in ACP Flow Sheet)   Code Status: DNR  Durable DNR:   [x]  No  Code Status Discussed/Confirmed:Yes  Preference for Other Life Sustaining Treatment Discussed/Confirmed:Yes  Hospitalization Preference:  family prefers for patient to remain inpatient     Advance Care Planning 2018   Patient's Healthcare Decision Maker is: Verbal statement (Legal Next of Kin remains as decision maker)   Primary Decision Maker Name Radha Stauffer   Primary Decision Maker Phone Number [phone]   Primary Decision Maker Relationship to Patient Adult child   Secondary Decision Maker Name Quan Muniz   Secondary Decision Maker Phone Number [phone]   Secondary Decision Maker Relationship to Patient Adult child   Confirm Advance Directive None   Patient Would Like to Complete Advance Directive Yes   Does the patient have other document types Do Not Resuscitate        Service:       [x] Unknown  Appropriate for Pinning Ceremony:    Yazidism: Faith   Home:  Undecided at admission   Checking into cremation services    DISCHARGE PLANNING     1. Discharge Plan: It is expected that patient will pass while inpatient yet [place] or [facility] are options    2. Patient/Family teaching:  Education provided on symptoms at end of life    3. Response to patient/family teaching: understanding and appreciation expressed      SOCIAL/EMOTIONAL/SPIRITUAL NEEDS     Spiritual Issues Identified: None    Psych/ Social/ Emotional Issues Identified:  Was living alone until this MI. Family attentive and will keep morris      Caregiver Support:  [x] Provided information on End of Sharalexander Cunningham contacted, discharge to hospice order received  Dr. Mercedes Murcia  contacted for hospice and provided verbal certification of terminal illness with life expectancy of 6 months or less. Orders for hospice admission, medications and plan of treatment received. Medication reconciliation completed. Dr. Gandara Rater is attending MD     MEDS: See medication list below  DME: Per hospital  Supplies: Per hospital  IDT communication to include MD, SN, SW, [address] [place] [address] and support team    ALLERGIES AND MEDICATIONS     Allergies:    Allergies   Allergen Reactions    Ciprofloxacin Shortness of Breath    Codeine Nausea Only    Influenza Virus Vaccine, Specific Unknown (comments)     Allergy documented in Admission Data base 1 screening    Pcn [Penicillins] Itching     Current Facility-Administered Medications   Medication Dose Route Frequency    saline peripheral flush soln 5 mL  5 mL InterCATHeter PRN    HYDROmorphone (DILAUDID) injection 0.3 mg  0.3 mg IntraVENous Q1H PRN    LORazepam (ATIVAN) injection 0.5 mg  0.5 mg IntraVENous Q1H PRN    ondansetron (ZOFRAN) injection 4 mg  4 mg IntraVENous Q4H PRN    acetaminophen (TYLENOL) suppository 650 mg  650 mg Rectal Q4H PRN    scopolamine (TRANSDERM-SCOP) 1 mg over 3 days 1 Patch  1 Patch TransDERmal Q72H    glycopyrrolate (ROBINUL) injection 0.2 mg  0.2 mg IntraVENous Q4H PRN    bisacodyl (DULCOLAX) suppository 10 mg  10 mg Rectal DAILY PRN    camphor-menthol (SARNA) 0.5-0.5 % lotion   Topical TID    alum-mag hydroxide-simeth (MYLANTA) oral suspension 30 mL  30 mL Oral Q4H PRN

## 2018-05-13 NOTE — PROGRESS NOTES
Name: Aubrie Nye   MRN: [id number]  : 1945      ASSESSMENT:  MACY- likely pre-renal vs ischemic ATN in the setting of ischemic CMP (EF of 25-30%), NSTEMI (stunned myocardium), Afib with RVR and Hypotension. Had MACY during previous admit also  Afib with RVR  NSTEMI- repeat. S/p RCA stent during previous admit. Not a candidate for invasive procedures per Cardiology  Hypotension  Ischemic Cardiomyopathy     MACY has worsened as expected. Ideally needs CVVH, but does not want dialysis or escalation of care. BP still low. D/W pt and grand dtr at bedside.       PLAN:  No escalation of care  No dialysis  Hospice has been consulted     Subjective:  Resting. Grand dtr at bedside. Drowsy but arousable.  Answers appropriately    ROS:   Deferred    Exam:  Visit Vitals    BP (!) 87/41    Pulse (!) 106    Temp 98.5 °F (36.9 °C)    Resp 20    Ht 5' 5\" (1.651 m)    Wt 72.9 kg (160 lb 11.5 oz)    SpO2 99%    BMI 26.74 kg/m2       Chronic ill appearing in NAD  No icterus  Mild JVP+  Dec BS  Irregular, mild tachy  Trace edema    Current Facility-Administered Medications   Medication Dose Route Frequency Last Dose    LORazepam (ATIVAN) injection 1 mg  1 mg IntraVENous Q6H PRN 0.5 mg at 18 1104    scopolamine (TRANSDERM-SCOP) 1 mg over 3 days 1 Patch  1 Patch TransDERmal Q72H 1 Patch at 18 1104    sodium chloride (NS) flush 5-10 mL  5-10 mL IntraVENous PRN 10 mL at 18 0936    heparin 25,000 units in D5W 250 ml infusion  12-25 Units/kg/hr IntraVENous TITRATE Stopped at 18 1105    heparin (porcine) injection 4,000 Units  4,000 Units IntraVENous PRN      heparin (porcine) injection 2,000 Units  2,000 Units IntraVENous PRN 2,000 Units at 18 0711    carvedilol (COREG) tablet 3.125 mg  3.125 mg Oral BID WITH MEALS Stopped at 18 0830    ondansetron (ZOFRAN) injection 4 mg  4 mg IntraVENous Q4H PRN 4 mg at 05/13/18 0834    acetaminophen (TYLENOL) tablet 650 mg  650 mg Oral Q6H  mg at 05/12/18 1813    clonazePAM (KlonoPIN) tablet 1 mg  1 mg Oral Q12H PRN      albuterol-ipratropium (DUO-NEB) 2.5 MG-0.5 MG/3 ML  3 mL Nebulization Q4H PRN      aspirin delayed-release tablet 81 mg  81 mg Oral DAILY 81 mg at 05/12/18 0857    atorvastatin (LIPITOR) tablet 40 mg  40 mg Oral QPM 40 mg at 05/12/18 1813    cetirizine (ZYRTEC) tablet 10 mg  10 mg Oral DAILY PRN      clopidogrel (PLAVIX) tablet 75 mg  75 mg Oral DAILY 75 mg at 05/12/18 0857    cyanocobalamin (VITAMIN B12) tablet 100 mcg  100 mcg Oral DAILY 100 mcg at 05/12/18 0857    ferrous sulfate tablet 325 mg  325 mg Oral  mg at 05/12/18 0918    fluticasone (FLONASE) 50 mcg/actuation nasal spray 2 Spray  2 Spray Both Nostrils DAILY PRN      pantoprazole (PROTONIX) tablet 40 mg  40 mg Oral ACB 40 mg at 05/12/18 0857    sodium chloride (NS) flush 5-10 mL  5-10 mL IntraVENous Q8H 10 mL at 05/13/18 0554    sodium chloride (NS) flush 5-10 mL  5-10 mL IntraVENous PRN      naloxone (NARCAN) injection 0.4 mg  0.4 mg IntraVENous PRN      morphine injection 2 mg  2 mg IntraVENous Q4H PRN 2 mg at 05/13/18 0935    docusate sodium (COLACE) capsule 100 mg  100 mg Oral  mg at 05/12/18 1813    0.9% sodium chloride infusion  75 mL/hr IntraVENous CONTINUOUS Stopped at 05/13/18 0950    nitroglycerin (NITROBID) 2 % ointment 1 Inch  1 Inch Topical Q6H Stopped at 05/12/18 2100    clopidogrel (PLAVIX) tablet 75 mg  75 mg Oral DAILY 75 mg at 05/12/18 1135    amiodarone (CORDARONE) 375 mg/250 mL D5W infusion  1 mg/min IntraVENous CONTINUOUS 0.5 mg/min at 05/13/18 5177    Followed by   [address] [place] [address] amiodarone (CORDARONE) 375 mg/250 mL D5W infusion  0.5 mg/min IntraVENous CONTINUOUS         Labs/Data:    Lab Results   Component Value Date/Time    WBC 22.5 (H) 05/13/2018 05:12 AM    Hemoglobin (POC) 11.9 03/08/2014 10:14 AM    HGB 10.2 (L) 05/13/2018 05:12 AM    Hematocrit (POC) 35 03/08/2014 10:14 AM    HCT 33.8 (L) 05/13/2018 05:12 AM    PLATELET 946 47/83/2244 05:12 AM    MCV 79.7 (L) 05/13/2018 05:12 AM       Lab Results   Component Value Date/Time    Sodium 132 (L) 05/13/2018 05:12 AM    Potassium 4.9 05/13/2018 05:12 AM    Chloride 98 05/13/2018 05:12 AM    CO2 18 (L) 05/13/2018 05:12 AM    Anion gap 16 (H) 05/13/2018 05:12 AM    Glucose 112 (H) 05/13/2018 05:12 AM    BUN 68 (H) 05/13/2018 05:12 AM    Creatinine 3.73 (H) 05/13/2018 05:12 AM    BUN/Creatinine ratio 18 05/13/2018 05:12 AM    GFR est AA 14 (L) 05/13/2018 05:12 AM    GFR est non-AA 12 (L) 05/13/2018 05:12 AM    Calcium 8.4 (L) 05/13/2018 05:12 AM       Wt Readings from Last 3 Encounters:   05/13/18 72.9 kg (160 lb 11.5 oz)   05/09/18 69.4 kg (153 lb)   05/08/18 71.2 kg (157 lb)         Intake/Output Summary (Last 24 hours) at 05/13/18 1124  Last data filed at 05/13/18 0523   Gross per 24 hour   Intake          2489.69 ml   Output               30 ml   Net          2459.69 ml       Patient seen and examined. Chart reviewed. Labs, data and other pertinent notes reviewed in last 24 hrs.     PMH/SH/FH reviewed and unchanged compared to H&P    Jose Chang MD

## 2018-05-14 NOTE — PROGRESS NOTES
Renal -    Now on Hospice. No HD desired. I will sign off, but please call with questions/changes.     Kristine Casillas

## 2018-05-14 NOTE — PROGRESS NOTES
Spoke to [facility]) received labs of +VRE in urine at this time. Notifying  Placing pt on contact precautions. Spoke to office and awaiting for Dr to return call to make him aware of lab results and rec poss orders.

## 2018-05-14 NOTE — HOSPICE
[facility]  [phone]    Social Work Admission Note  Patient Name: Juan Grullon  YOB: 1945  Age: 68 y.o. Date of Visit: 05/14/18  Facility of Care: [facility]   Patient Room: [facility]/     Hospice Attending: Jen Mullins MD  Hospice Diagnosis: Endstage Cardiac Disease  Cardiac disease    Level of Care:    []  GIP    []  Respite   [x]  Routine    NARRATIVE   This MSW visited the room of pt. Daughter Pola De was in the family room, granddaughter Abdias Obando was at bedside. MSW met with daughter Aneta Lerma is a 69 y/o CF with a hospcie diagnosis of ES Cardiac disease, with comorbid COPD, depression, chronic systolic HF, MACY CKD III, dementia and tobacco use. Pt was admitted 5/12 with chest pains. Pt is , her  passed 16 years ago. Pt has 3 daughter Annemarie Iniguez, who lives in [place], Pola De, who lives in [facility], and Enrique who lives in [place], [place]. Pt is a former  and also worked at [employer] before retiring due to disability. Daughter Hua Pablo describes pts as happy peron who lived independently with support from daughter Kimberly Smith prior to this current hospitalization. .     MSW met with daughter Pola De introduce self as SWer on the care team, explain my role  and to assess needs, Daughter reported she is at peace and stated  \"this is what pt wanted\". Daughter is coping adaptively though feelings of honoring pts wishes and support from her family. Daughter reported pt had made her wishes known to the family. Daughter noted sister Glenis Balderas has been tearful. Per daughter Kimberly Smith has been pcg. Pola De noted her mother is a woman of strong chayo. MSW and daughter discussed any needs at this time. MSW provided GFMS to provide education re the EOL process. MSW offered to wrtie letters for grandchildren re pts admission to [facility] if they were needed for work. DAughter will let MSW know of any needs for work related absences.  Pt is [place]  CARE PLANNING    Code Status:   Durable DNR: _ Yes  _ No  Advance Care Planning 2018   Patient's Healthcare Decision Maker is: Verbal statement (Legal Next of Kin remains as decision maker)   Primary Decision Maker Name Ronna Ramirez   Primary Decision Maker Phone Number [phone]   Primary Decision Maker Relationship to Patient Adult child   Secondary Decision Maker Name Janneth Reyes   Secondary Decision Maker Phone Number [phone]   Secondary Decision Maker Relationship to Patient Adult child   Confirm Advance Directive None   Patient Would Like to Complete Advance Directive Yes   Does the patient have other document types Do Not Resuscitate       Relationship Status:  []  Single     []        []      []  Domestic Partner     [x]  /  []  Common Law  []    []  Unknown    If in a relationship, name of partner/spouse:  Duration of relationship:    Sikhism: Spiritism     [place]:   [place] [place]     Social Work Initial Assessment     Gender:  female    Race/Ethnicity: (umesh all that apply)  []  [place] [facility] or [place] Native  []    []  Black or   []   or   []  [address] [place] [address] or [facility]  [x]  Garwood Elders  []  Unknown      Service:    []  Yes   [x]  No       []  Unknown  Appropriate for Pinning Ceremony:   []  Yes      []  No  Is patient using VA benefits?    []  Yes      []  No     Primary Language: ENGLISH  []   Needed  []   utilized during visit    Ability to express thoughts/needs/feelings  []  Expressed thoughts/feelings/needs without difficulty  []  Requires extra time and cuing  []  Speech limited single words  []  Uses only gestures (eye, blinking eye or head movement/pointing)  []  Unable to express thoughts/feelings/needs (speech unintelligible or inappropriate)  [x]  Minimally responsive  Notes:      Mental Status:  []  Alert-oriented to:     []  Person     []  Place     [] Time  []  Comatose-responds to:    []   Verbal stimuli    []  Tactile stimuli    []  Painful stimuli  []  Forgetful  []  Disoriented/Confused  []  Lethargic  []  Agitated  [x]  Other (specify): Minimally responsive     Notes:      Patients description of Illness/Current Health Status:    [x]  Patient unable to discuss Minimally responsive    []  Patient unwilling to discuss  []  (Specify)        Knowledge/Understanding of Disease Process  Patient:    []  Demonstrates knowledge/understanding of disease process   []  Demonstrates knowledge/understanding of treatment plan   []  Demonstrates knowledge/understanding of prognosis   []  Demonstrates acceptance of prognosis   []  Demonstrates knowledge/understanding of resuscitation status   [x]  Other (specify) Minimally responsive    Caregiver:   [x]  Demonstrates knowledge/understanding of disease process   [x]  Demonstrates knowledge/understanding of treatment plan   [x]  Demonstrates knowledge/understanding of prognosis   [x]  Demonstrates acceptance of prognosis   []  Demonstrates knowledge/understanding of resuscitation status   []  Other (specify)  Notes:      Patients living arrangement/care setting:  Use the PRIOR COLUMN when the PATIENTS current health status necessitated a change in his/her primary residence.     Prior Current Response              []             []    Patients own home/residence              []             []    Home of family member/friend              []             []    Boarding home              []             []    Assisted living facility/FDC center              []             []    Hospital/Acute care facility              []             []    Skilled nursing facility              []             []    Long term care facility/Nursing home              []             [x]    Hospice in Patient      Primary Caregiver:  []  No Primary Caregiver  Name of Primary Caregiver:QUAN MCCORMICK   Relationship or Primary Caregiver:    [] Spouse/Significant other       [x]  Natural Child        []  Step child       []  Sibling   []  Parent   []  Friend/Neighbor   []  Community/Worship Volunteer   []  Paid help   []  Other (specify):___________  Notes:       Family members/Significant others:  Corey Obando   Relationship:DTR   Phone Number: [phone]  Actively involved in care? [x]  Yes  []  No    Name:LAURA   Relationship:DTR  Phone Number:  Actively involved in care? []  Yes  []  No    Name:  Relationship:  Phone Number:  Actively involved in care?   []  Yes  []  No    Social support systems: (select ONE best description)  []  Excellent social support system which includes three or more family members or friends  []  Good social support system which includes two or less members or friends  []  [address] support which includes one family member or friend  []  Poor social support; no family members or friends; basically ALONE  Notes:      Emotional Status: (umesh all that apply)    Patient Caregiver Response                 [x]                [x]    Mood/Affect stable and appropriate                   []                []    Angry                 []                []    Anxious                 []                []    Apprehensive                 []                []    Avoidant                 []                []    Clinging                 []                []    Depressed                 []                []    Distraught                 []                []    Elated                 []                []    Euphoric                 []                []    Fearful                 []                []    Flat Affect                 []                []    Helpless                 []                []    Hostile                 []                []    Impulsive                 []                []    Irritable                 []                []    Labile                 []                []    Manic                 []                [] Restlessness                 []                []    Sad                 []                []    Suspicious                 []                []    Tearful                 []                []    Withdrawn     Notes:     Coping Skills (strengths/weakness):    Patient: Coping Skills (strength/weakness): Minimally responsive     Family/caregiver (strength/weakness):ACCEPTING, REALISTIC, AT PEACE      [place] of care (umesh all that apply):     [x]  No burden evident   []  Family must administer medications   []  Illness causing financial strain   []  Family/Support feels overwhelmed   []  Family/Support sleep disturbed with patients care   []  Patients care causes extra physical stress  of death  []  Illness causes changes in family lifestyle  []  Illness impacting family/support employment  []  Family experiencing increased time demands  []  Patients behavior endangers family  []  Denial of patients illness  []  Concern over outcome of illness/fear  []  Patients behavior embarrassing to family   Notes:      Risk Factors: (umesh all that apply):    [x]  No burden evident   []  Alcohol abuse  []  Financial resources inadequate to meet basic needs (food/house/etc)  []  Financial resources inadequate to meet health care needs (supplies/equipment/medications)  []  Food/nutrition resources inadequate  []  Home environment unsafe/inadequate for home care  []  Homicidal risk  []  Lives alone or without concerned relatives  []  Multiple medications/complex schedule  []  Physical limitations increase likelihood of falls  []  Plan of care/treatments complicated  []  Substance use/abuse  []  Suicidal risk  []  Visual impairment threatens safety/ability to perform self-care  []  Other (specify):     Abuse/Neglect (actual/potential risks):  [x]  No signs of abuse/neglect  []  History of abuse/neglect                 []  [id number] [phone] []  Sexual  []  History of domestic violence  []  Lacks adequate physical care  []  Affiliated [employer] emotional nurturing/support  []  Lacks appropriate stimulation/cognitive experiences  []  Left alone inappropriately  []  Lacks necessary supervision  []  Inadequate or delayed medical care  []  Unsafe environment (i.e guns/drug use/history of violence in the home/etc.)  []  Bruising or other physical signs of injury present  []  Other (specify):  Notes:   []  Refer to child/adult protective services      Current Sources of Stress (in Addition to Current Illness):   [x]  None reported  []  Bills/Debt    []  Career/Job change    []   (short term)    []   (long term)    []  Death of a child (recent)    []  Death of a parent (recent)   []  Death of a spouse (recent)   []  Employment status changed   []  Family discord    []  Financial loss/Inadequate inther (specify):come  []  Job loss  []  Legal issues unresolved  []  Lifestyle change  []  Marital discord  []  Marriage within the last year  []  Paperwork (insurance/legal/etc) overwhelming  []  Separation/Divorce  []  Other (specify):  Notes:      Current [employer] Being Utilized     1. Interventions/Plan of Care     1. Assess social and emotional factors related to coping with end of life issues  2. Community resource planning/referral   3. Relocation to different care setting if/when symptoms stabilize  4.      Discharge Planning     PT WILL MOST LIKELY PASS AT [facility]   MSW Assessment Completed by: Néstor Castrejon  05/14/18    Time In:4;30 PM       Time Out:5;30 PM

## 2018-05-14 NOTE — H&P
[facility]  [phone]    Patient Name: Delfin Martinez  YOB: 1945    Date of Provider Hospice Visit: 05/14/18    Level of Care:   [] General Inpatient (GIP)    [x] Routine   [] Respite    Current Location of Care:  [] [facility] [] [facility] [x] [facility] [] [address] [place] [address] [] [facility], patient referred from:  [] [facility] [] [facility] [] [facility] [] [address] [place] [address] [] Home [] Other:     Date of Original Hospice Admission: 5-13-18  Hospice Medical Director at time of admission: Dr. Perez Gomez Diagnosis: End stage cardiac disease  Diagnoses RELATED to the terminal prognosis: NSTEMI, ischemic cardiomyopathy ef 25-30 %, acute renal failure, hypotension  Other Diagnoses: rapid Afib     HOSPICE SUMMARY   Do not cut and paste chart information other than imaging findings    Delfin Martinez is a 68y.o. year old who was admitted to [facility]. Pt was admitted to [facility] on 5/12/18 with NSTEMI, troponin 6.5, rapid afib and acute renal failure. Pt not a candidate for aggressive intervention per cardiologist  Had recent hospitalization 3 mos ago for ACS and had RCA stent. Pt has decided against dialysis/CVVH  and requested comfort care only  Has remained hypotensive and has had minimal urine output and rising creatinine level    She has had a few doses of morphine for pain/dyspnea and ativan for anxiety   has been intermittently awake but mostly lethargic  Admitted to hospice at routine level of care    The patient's principle diagnosis has resulted in NSTEMI, hypotension, acute renal failure ,afib, pain , anxiety and AMS  Refer to LCD     Functionally, the patient's Karnofsky and/or Palliative Performance Scale has declined over a period of days and is estimated at 20  .  The patient is dependent on the following ADLs:all    Objective information that support this patients limited prognosis includes:   tropnin 6.5 on 5-12-18  Bun/cr 68/3.73 on 5-13-18    Echo 5-12-18  SUMMARY:  Left ventricle: The ventricle was dilated. There was severe diffuse  hypokinesis. Right ventricle: The ventricle was dilated. Systolic function was  moderately to markedly reduced. Left atrium: The atrium was dilated. Right atrium: The atrium was dilated. Mitral valve: There was moderate regurgitation. Tricuspid valve: There was moderate regurgitation. There was mild  pulmonary hypertension. INDICATIONS: NSTEMI. PROCEDURE: This was a routine study. The study included complete 2D  imaging, M-mode, complete spectral Doppler, and color Doppler. The heart  rate was 105 bpm, at the start of the study. Systolic blood pressure was  99 mmHg, at the start of the study. Diastolic blood pressure was 64 mmHg,  at the start of the study. LEFT VENTRICLE: The ventricle was dilated. Ejection fraction was estimated  in the range of 15 % to 20 %. There was severe diffuse hypokinesis. RIGHT VENTRICLE: The ventricle was dilated. Systolic function was  moderately to markedly reduced. LEFT ATRIUM: The atrium was dilated. RIGHT ATRIUM: The atrium was dilated. MITRAL VALVE: There was mild thickening. DOPPLER: There was moderate  regurgitation. AORTIC VALVE: The valve was probably trileaflet. DOPPLER: There was no  stenosis. There was no significant regurgitation. TRICUSPID VALVE: Normal valve structure. DOPPLER: There was moderate  regurgitation. There was mild pulmonary hypertension. PULMONIC VALVE: Not well visualized. AORTA: The root exhibited normal size. PERICARDIUM: There was no pericardial effusion. The patient/family chose comfort measures with the support of Hospice. HOSPICE DIAGNOSES   Active Symptoms:  1. Dyspnea  2. Pain  3. anxiety  4. pruritus   PLAN   1. Admitted to hospice routine level of care  2. symptom medications as ordered  3. Benadryl IV for itch/pruritus  4.  Met with the patients granddaughter earlier today and discussed pt's condition and POC    5.  and SW to support family needs  6. Disposition: to home with hospice if symptoms controlled and stable    Prognosis estimated based on 05/14/18 clinical assessment is:   [x] Hours to Days    [] Days to Weeks    [] Other:    Communicated plan of care with: Hospice Case Manager;  Hospice IDT; Care Team     GOALS OF CARE     Resuscitation Status: DNR  Durable DNR: [] Yes [] No    Advance Care Planning 5/12/2018   Patient's Healthcare Decision Maker is: Verbal statement (Legal Next of Kin remains as decision maker)   Primary Decision Maker Name Susana Delgadillo   Primary Decision Maker Phone Number [phone]   Primary Decision Maker Relationship to Patient Adult child   Secondary Decision Maker Name Mora Singh   Secondary Decision Maker Phone Number [phone]   Secondary Decision Maker Relationship to Patient Adult child   Confirm Advance Directive None   Patient Would Like to Complete Advance Directive Yes   Does the patient have other document types Do Not Resuscitate        HISTORY     History obtained from: chart and hospice RN    CHIEF COMPLAINT:   The patient is:   [x] Verbal  [x] Nonverbal  [] Unresponsive    HPI/SUBJECTIVE:    Pt is lethargic, this afternoon c/o generalized itching without rash       REVIEW OF SYSTEMS     The following systems were: [] reviewed  [x] unable to be reviewed    Positive ROS include:  Constitutional: fatigue, weakness, in pain, short of breath  Ears/nose/mouth/throat: increased airway secretions  Respiratory:shortness of breath, wheezing  Gastrointestinal:poor appetite, nausea, vomiting, abdominal pain, constipation, diarrhea  Musculoskeletal:pain, deformities, swelling legs  Neurologic:confusion, hallucinations, weakness  Psychiatric:anxiety, feeling depressed, poor sleep      Endocrine:Adult Non-Verbal Pain Assessment Score:  3/10  Face  [] 0   No particular expression or smile  [x] 1   Occasional grimace, tearing, frowning, wrinkled forehead  [] 2   Frequent grimace, tearing, frowning, wrinkled forehead    Activity (movement)  [x] 0   Lying quietly, normal position  [] 1   Seeking attention through movement or slow, cautious movement  [] 2   Restless, excessive activity and/or withdrawal reflexes    Guarding  [x] 0   Lying quietly, no positioning of hands over areas of body  [] 1   Splinting areas of the body, tense  [] 2   Rigid, stiff    Physiology (vital signs)  [] 0   Stable vital signs  [x] 1   Change in any of the following: SBP > 20mm Hg; HR > 20/minute  [] 2   Change in any of the following: SBP > 30mm Hg; HR > 25/minute    Respiratory  [] 0   Baseline RR/SpO2, compliant with ventilator  [x] 1   RR > 10 above baseline, or 5% drop SpO2, mild asynchrony with ventilator  [] 2   RR > 20 above baseline, or 10% drop SpO2, asynchrony with ventilator     FUNCTIONAL ASSESSMENT     Palliative Performance Scale (PPS):20     PSYCHOSOCIAL/SPIRITUAL ASSESSMENT     Active Problems:    Cardiac disease (5/13/2018)      Past Medical History:   Diagnosis Date    CHF (congestive heart failure) (HCC)     Other ill-defined conditions(799.89)     AMI    Psychiatric disorder     depression      Past Surgical History:   Procedure Laterality Date    CARDIAC SURG PROCEDURE UNLIST  03/2018    stents    HX APPENDECTOMY      HX CHOLECYSTECTOMY      HX GI      adhesions    HX GYN      hysterectomy/  10 years      Social History   Substance Use Topics    Smoking status: Current Some Day Smoker     Packs/day: 0.10     Last attempt to quit: 11/1/2014    Smokeless tobacco: Never Used      Comment: 1 a day    Alcohol use No     Family History   Problem Relation Age of Onset    No Known Problems Mother     No Known Problems Father     Seizures Daughter     Cancer Daughter       Allergies   Allergen Reactions    Ciprofloxacin Shortness of Breath    Codeine Nausea Only    Influenza Virus Vaccine, Specific Unknown (comments)     Allergy documented in Admission Data base 1 screening  Pcn [Penicillins] Itching      Current Facility-Administered Medications   Medication Dose Route Frequency    diphenhydrAMINE (BENADRYL) injection 25 mg  25 mg IntraVENous Q8H PRN    saline peripheral flush soln 5 mL  5 mL InterCATHeter PRN    HYDROmorphone (DILAUDID) injection 0.3 mg  0.3 mg IntraVENous Q1H PRN    LORazepam (ATIVAN) injection 0.5 mg  0.5 mg IntraVENous Q1H PRN    ondansetron (ZOFRAN) injection 4 mg  4 mg IntraVENous Q4H PRN    acetaminophen (TYLENOL) suppository 650 mg  650 mg Rectal Q4H PRN    scopolamine (TRANSDERM-SCOP) 1 mg over 3 days 1 Patch  1 Patch TransDERmal Q72H    glycopyrrolate (ROBINUL) injection 0.2 mg  0.2 mg IntraVENous Q4H PRN    bisacodyl (DULCOLAX) suppository 10 mg  10 mg Rectal DAILY PRN    camphor-menthol (SARNA) 0.5-0.5 % lotion   Topical TID    alum-mag hydroxide-simeth (MYLANTA) oral suspension 30 mL  30 mL Oral Q4H PRN        PHYSICAL EXAM     Wt Readings from Last 3 Encounters:   05/13/18 160 lb 11.5 oz (72.9 kg)   05/09/18 153 lb (69.4 kg)   05/08/18 157 lb (71.2 kg)       Visit Vitals    BP 93/76 (BP 1 Location: Right arm, BP Patient Position: At rest)    Pulse 75    Temp 98 °F (36.7 °C)    Resp 18    SpO2 93%       Supplemental O2  [x] Yes  [] NO  Last bowel movement:     Currently this patient has:  [x] Peripheral IV [] PICC  [] PORT [] ICD    [x] Daugherty Catheter [] NG Tube   [] PEG Tube    [] Rectal Tube [] Drain  [] Other:     Constitutional:lethargic , briefly opened eyes this morning, NAD  Eyes: perrl  ENMT: moist  Cardiovascular: irrr  Respiratory: clear  Gastrointestinal: soft  Musculoskeletal:mild LE edema b/l  Skin: intake  Neurologic:  Psychiatric:   Other:       Pertinent Lab and or Imaging Tests:  Lab Results   Component Value Date/Time    Sodium 132 (L) 05/13/2018 05:12 AM    Potassium 4.9 05/13/2018 05:12 AM    Chloride 98 05/13/2018 05:12 AM    CO2 18 (L) 05/13/2018 05:12 AM    Anion gap 16 (H) 05/13/2018 05:12 AM    Glucose 112 (H) 05/13/2018 05:12 AM    BUN 68 (H) 05/13/2018 05:12 AM    Creatinine 3.73 (H) 05/13/2018 05:12 AM    BUN/Creatinine ratio 18 05/13/2018 05:12 AM    GFR est AA 14 (L) 05/13/2018 05:12 AM    GFR est non-AA 12 (L) 05/13/2018 05:12 AM    Calcium 8.4 (L) 05/13/2018 05:12 AM     Lab Results   Component Value Date/Time    Protein, total 7.5 05/12/2018 02:06 AM    Albumin 4.1 05/12/2018 02:06 AM           Total time: 50 min  Counseling / coordination time:no   > 50% counseling / coordination?: zander

## 2018-05-14 NOTE — PROGRESS NOTES
Oncology Nursing Communication Tool  6:28 AM  5/14/2018     Bedside shift change report given to Odette Hernandez RN (incoming nurse) by Aubree Galvan RN (outgoing nurse) on [facility]. Report included the following information SBAR, Kardex and MAR. Shift Summary:  Gave ativan 3x. Granddaughter at bedside. Issues for physician to address: Oncology Shift Note   Admission Date 5/13/2018   Admission Diagnosis Endstage Cardiac Disease  Cardiac disease   Code Status Prior   Consults None      Cardiac Monitoring [] Yes [] No      Purposeful Hourly Rounding [] Yes    Ruperto Score     Ruperto score 3 or > [] Bed Alarm [] Avasys [] 1:1 sitter [] Patient refused (Place signed refusal form in chart)      Pain Managed [] Yes [] No    Key Pain Meds             aspirin-acetaminophen-caffeine (EXCEDRIN ES) 250-250-65 mg per tablet Take 1 Tab by mouth. Influenza Vaccine             Oxygen needs? [] Room air Oxygen @  []1L    []2L    []3L   []4L    []5L   []6L     Use home O2? [] Yes [] No  Perform O2 challenge test using  smartphrase (.oxygenchallenge)      Last bowel movement Last Bowel Movement Date: 05/13/18  bowel movement      Urinary Catheter             LDAs               Peripheral IV 05/12/18 Left Antecubital (Active)   Site Assessment Clean, dry, & intact 5/13/2018  7:31 AM   Phlebitis Assessment 0 5/13/2018  7:31 AM   Infiltration Assessment 0 5/13/2018  7:31 AM   Dressing Status Clean, dry, & intact 5/13/2018  7:31 AM   Dressing Type Tape;Transparent 5/13/2018  7:31 AM   Hub Color/Line Status Pink 5/13/2018  7:31 AM       Peripheral IV 05/13/18 Right Forearm (Active)   Site Assessment Clean, dry, & intact 5/13/2018  7:31 AM   Phlebitis Assessment 0 5/13/2018  7:31 AM   Infiltration Assessment 0 5/13/2018  7:31 AM   Dressing Status Clean, dry, & intact 5/13/2018  7:31 AM   Dressing Type Tape;Transparent 5/13/2018  7:31 AM   Hub Color/Line Status Pink; Infusing 5/13/2018  7:31 AM Readmission Risk Assessment Tool Score Medium Risk            20       Total Score        3 Has Seen PCP in Last 6 Months (Yes=3, No=0)    4 IP Visits Last 12 Months (1-3=4, 4=9, >4=11)    5 Pt. Coverage (Medicare=5 , Medicaid, or Self-Pay=4)    8 Charlson Comorbidity Score (Age + Comorbid Conditions)        Criteria that do not apply:    . Living with Significant Other. Assisted Living. LTAC. SNF.  or   Rehab    Patient Length of Stay (>5 days = 3)       Expected Length of Stay - - -   Actual Length of Stay [facility]

## 2018-05-14 NOTE — HOSPICE
Initial spiritual assessment completed with patient's daughters. The patient opened her eyes when the  walked in the room and said hi then slept through most of the visit. As the  was leaving the patient opened her eyes and the  asked if there was anything that she ended. She smiled and said no then went back to sleep. The patient's daughters are struggling to accept the patient's decision to be on Hospice. They are not ready to let go. The  and daughters discussed grief and end-of-life concerns. The patient and family have a strong chayo. The daughter's  visited yesterday and the patient's  is visiting today. They are well supported by the chayo community and by one another. The patient is at peace withy her impending death but her family is anxious about the patient's death as they did not want her to chose comfort measures. The  validated their emotions. Spiritual acuity - 1. The patient has a strong chayo and good support from her Jew and family. No immediate spiritual issues.     Frequency: 1 wk 2; 5 PRN 14

## 2018-05-15 NOTE — HOSPICE
PRN visit to check on patient and family as the patient is declining. Patient was asleep and did not respondt to the . The patient's brother was sitting by the bed offer support to the patient. He said she had opened her eyes and mumbled a few words when he talked to her when he arrived but had been sleeping ever since. He is sad about his sister but knows how much she has been suffering and does not want to see her suffer. His goal is to keep her comfortable. No needs at this time. Report sent to hospice nursing staff.

## 2018-05-15 NOTE — PROGRESS NOTES
Oncology Nursing Communication Tool  6:48 AM  5/15/2018     Bedside shift change report given to Ирина Heredia RN (incoming nurse) by Kaden Garcia (outgoing nurse) on [facility]. Report included the following information SBAR, Kardex, Intake/Output, MAR and Recent Results. Shift Summary: PRN benadryl, ativan and dilaudid given x2. Pt very restless and pulling on melissa and iv when ativan wears off. Alert to self when awake, otherwise confused. Family at bedside throughout the night. Issues for physician to address: none       Oncology Shift Note   Admission Date 5/13/2018   Admission Diagnosis Endstage Cardiac Disease  Cardiac disease   Code Status Prior   Consults None      Cardiac Monitoring [] Yes [x] No      Purposeful Hourly Rounding [x] Yes    Ruperto Score Total Score: 4   Rupreto score 3 or > [] Bed Alarm [] Avasys [] 1:1 sitter [] Patient refused (Place signed refusal form in chart)      Pain Managed [x] Yes [] No    Key Pain Meds             aspirin-acetaminophen-caffeine (EXCEDRIN ES) 250-250-65 mg per tablet Take 1 Tab by mouth. Influenza Vaccine             Oxygen needs? [] Room air Oxygen @  []1L    [x]2L    []3L   []4L    []5L   []6L     Use home O2? [] Yes [] No  Perform O2 challenge test using  smartphrase (.oxygenchallenge)      Last bowel movement Last Bowel Movement Date: 05/13/18  bowel movement      Urinary Catheter Urinary Catheter 05/12/18 2- way; Melissa-Indications for Use: End of life care     Urinary Catheter 05/12/18 2- way; Melissa-Urine Output (mL): 500 ml     LDAs               Peripheral IV 05/13/18 Right Forearm (Active)   Site Assessment Clean, dry, & intact 5/15/2018  2:05 AM   Phlebitis Assessment 0 5/15/2018  2:05 AM   Infiltration Assessment 0 5/15/2018  2:05 AM   Dressing Status Clean, dry, & intact 5/15/2018  2:05 AM   Dressing Type Tape;Transparent 5/15/2018  2:05 AM   Hub Color/Line Status Pink;Flushed;Patent 5/15/2018  2:05 AM Readmission Risk Assessment Tool Score Medium Risk            20       Total Score        3 Has Seen PCP in Last 6 Months (Yes=3, No=0)    4 IP Visits Last 12 Months (1-3=4, 4=9, >4=11)    5 Pt. Coverage (Medicare=5 , Medicaid, or Self-Pay=4)    8 Charlson Comorbidity Score (Age + Comorbid Conditions)        Criteria that do not apply:    . Living with Significant Other. Assisted Living. LTAC. SNF.  or   Rehab    Patient Length of Stay (>5 days = 3)       Expected Length of Stay - - -   Actual Length of Stay [facility]

## 2018-05-15 NOTE — HOSPICE
[facility] to Those in Need  [phone]    Routine Nursing Note   Patient Name: Sven Shankar  YOB: 1945  Age: 68 y.o. Date of Visit: 05/15/18  Facility of Care: [facility]  Patient Room: 1112/     Hospice Attending: Zulma Vega MD  Hospice Diagnosis: Endstage Cardiac Disease  Cardiac disease    Level of Care: Routine    ASSESSMENT, PLAN AND INTERVENTIONS     1. Patient admitted to Routine Level of Care  2. Itching/urticaria/renal?    Sarna topical 3 times daily   Benadryl 25 mg IV q 8 hours prn added today   3. Pulmonary congestion  Continue Scopolamine patch   Glycopyrrolate 0.2 mg IV being given prn as needed  4. Pain well controlled with use of Hydromorphone 0.3 mg IV q 1 hour prn and being given appropriately    5. Dyspnea well controlled with O2 at 2 lpm nc and Hydromorphone as above  6. Restlessness   Lorazepam 0.5 mg IV q 1 hr prn being given appropriately  7. Nausea    Scopolamine patch    Ondansetron 4 mg IV prn q 4 hours    Spiritual Interventions:     Psych/ Social/ Emotional Interventions: Support given this loving family     Care Coordination Needs: Will send report to [facility] hospice interdisciplinary team.  Discussed with unit nurse Isidro Monet and New Orders discussed / approved with Dr. Zulma Vega    Description History and Chart Review     List number of doses of PRN medications in last 24 hours:  Medication 1:  Diphenhydramine 25 mg IV   Number of doses:  1    Medication 2:  Hydromorphone 0.3 mg IV   Number of doses:  2    Medication 3: Lorazepam 0.5 mg IV   Number of doses:  7    DISCHARGE PLANNING     1. Discharge Plan: It is expected that patient will pass while inpatient yet [facility] or [place] are options   2. Patient/Family teaching:  Education provided on symptoms at end of life    3.  Response to patient/family teaching: understanding and appreciation expressed        ASSESSMENT    KARNOFSKY: *20    Quality Measure: Patient self-reports: [x] No    ESAS:   Time of Assessment: 1540  Pain (1-10): 1  Fatigue (1-10):   Shortness of breath (1-10): 2  Nausea (1-10): Appetite (1-10): Anxiety: (1-10):   Depression: (1-10):   Well-being: (1-10):   Constipation: _ Yes  _ No  LAST BM:     CLINICAL INFORMATION   No data found.       Currently this patient has:  [x] Supplemental O2   [x] IV    [] PICC      [] PORT   [] NG Tube    [] PEG Tube   [] Ostomy     [] Daugherty draining _______ urine  [] Other:     SIGNS/PHYSICAL FINDINGS     Skin (including wound):  [] Warm, dry, supple, intact and color normal for race  [] Warm   [x] Dry   [x] Cool     [] Clammy       [] Diaphoretic    Turgor   [] Normal   [x] Decreased  Color:   [] Pink   [x] Pale   [] Cyanotic   [] Erythema   [] Jaundice   [] Normal for Race  []  Wounds:    Neuro:  [] Lethargy  [x] Restlessness / agitation  [] Confusion / delirium  [] Hallucinations  [] Responds to maximal stimulation  [] Unresponsive  [] Seizures     Cardiac:  [] Dyspnea on Exertion  [] JVD  [] Murmur  [] Palpitations  [x] Hypotension  [] Hypertension  [] Tachycardia  [] Bradycardia  [] Irregular HR  [] Pulses Decreased  [] Pulses Absent  [] Edema:       (Location, Grade and Pitting)  [] Mottling:      (Location)    Respiratory:  Breath sounds:    [] Diminished   [] Wheeze   [x] Rhonchi   [] Rales   [] Even and unlabored  [] Labored:            [] Cough   [] Non Productive   [] Productive    [] Description:           [] Deep suctioned   [x] O2 at _2__ LPM  [] High flow oxygen greater than 10 LPM  [] Bi-Pap    GI  [x] Abdomen rounded as if with fluid and soft  [] Ascites  [] Nausea  [] Vomiting  [] Incontinent of bowels  [] Bowel sounds (yes/no)  [] Diarrhea  [] Constipation (see above including last bowel movement)  [] Checked for impaction  [] Last BM     Nutrition  Diet:__sips________  Appetite:   [] Good   [] Fair   [] Poor   [] Tube Feeding       [] Voiding  [] Incontinent   [x] Daugherty    Musculoskeletal  [] Balance/Parkman Unsteady   [] Weak   Strength:    [] Normal    [] Limited    [x] Decreasing   Activities:    [] Up as tolerated   [x] Bedridden    [] Specify:    [address]  [] 24 hr. Caregiver   [x] Side rails ? [x] Hospital bed   [] Reviewed Falls & Safety     ALLERGIES AND MEDICATIONS     Allergies:    Allergies   Allergen Reactions    Ciprofloxacin Shortness of Breath    Codeine Nausea Only    Influenza Virus Vaccine, Specific Unknown (comments)     Allergy documented in Admission Data base 1 screening    Pcn [Penicillins] Itching       Current Facility-Administered Medications   Medication Dose Route Frequency    diphenhydrAMINE (BENADRYL) injection 25 mg  25 mg IntraVENous Q8H PRN    saline peripheral flush soln 5 mL  5 mL InterCATHeter PRN    HYDROmorphone (DILAUDID) injection 0.3 mg  0.3 mg IntraVENous Q1H PRN    LORazepam (ATIVAN) injection 0.5 mg  0.5 mg IntraVENous Q1H PRN    ondansetron (ZOFRAN) injection 4 mg  4 mg IntraVENous Q4H PRN    acetaminophen (TYLENOL) suppository 650 mg  650 mg Rectal Q4H PRN    scopolamine (TRANSDERM-SCOP) 1 mg over 3 days 1 Patch  1 Patch TransDERmal Q72H    glycopyrrolate (ROBINUL) injection 0.2 mg  0.2 mg IntraVENous Q4H PRN    bisacodyl (DULCOLAX) suppository 10 mg  10 mg Rectal DAILY PRN    camphor-menthol (SARNA) 0.5-0.5 % lotion   Topical TID    alum-mag hydroxide-simeth (MYLANTA) oral suspension 30 mL  30 mL Oral Q4H PRN          Visit Time In: 1450  Visit Time Out: 9837

## 2018-05-15 NOTE — PROGRESS NOTES
Oncology Nursing Communication Tool  8:03 PM  5/14/2018     Bedside and Verbal shift change report given to Daralyn Holter, RN (incoming nurse) by Thai Temple RN (outgoing nurse) on [facility]. Report included the following information SBAR, Kardex, Procedure Summary, Intake/Output and MAR. Shift Summary: Patient agitated early, medicated for pain and itching and has been resting comfortably ever since. Family grieving appropriately      Issues for physician to address: None at this time         Oncology Shift Note   Admission Date 5/13/2018   Admission Diagnosis Endstage Cardiac Disease  Cardiac disease   Code Status Prior   Consults None      Cardiac Monitoring [] Yes [] No      Purposeful Hourly Rounding [] Yes    Ruperto Score Total Score: 3   Ruperto score 3 or > [] Bed Alarm [] Avasys [] 1:1 sitter [] Patient refused (Place signed refusal form in chart)      Pain Managed [] Yes [] No    Key Pain Meds             aspirin-acetaminophen-caffeine (EXCEDRIN ES) 250-250-65 mg per tablet Take 1 Tab by mouth. Influenza Vaccine             Oxygen needs? [] Room air Oxygen @  []1L    []2L    []3L   []4L    []5L   []6L     Use home O2? [] Yes [] No  Perform O2 challenge test using  smartphrase (.oxygenchallenge)      Last bowel movement Last Bowel Movement Date: 05/13/18  bowel movement      Urinary Catheter Urinary Catheter 05/12/18 2- way; Daugherty-Indications for Use: End of life care     Urinary Catheter 05/12/18 2- way; Daugherty-Urine Output (mL): 350 ml     LDAs               Peripheral IV 05/12/18 Left Antecubital (Active)   Site Assessment Clean, dry, & intact 5/14/2018  7:59 AM   Phlebitis Assessment 0 5/14/2018  7:59 AM   Infiltration Assessment 0 5/14/2018  7:59 AM   Dressing Status Clean, dry, & intact 5/14/2018  7:59 AM   Dressing Type Tape;Transparent 5/14/2018  7:59 AM   Hub Color/Line Status Pink;Capped 5/14/2018  7:59 AM       Peripheral IV 05/13/18 Right Forearm (Active) Site Assessment Clean, dry, & intact 5/14/2018  7:59 AM   Phlebitis Assessment 0 5/14/2018  7:59 AM   Infiltration Assessment 0 5/14/2018  7:59 AM   Dressing Status Clean, dry, & intact 5/14/2018  7:59 AM   Dressing Type Tape;Transparent 5/14/2018  7:59 AM   Hub Color/Line Status Capped;Pink 5/14/2018  7:59 AM                         Readmission Risk Assessment Tool Score Medium Risk            20       Total Score        3 Has Seen PCP in Last 6 Months (Yes=3, No=0)    4 IP Visits Last 12 Months (1-3=4, 4=9, >4=11)    5 Pt. Coverage (Medicare=5 , Medicaid, or Self-Pay=4)    8 Charlson Comorbidity Score (Age + Comorbid Conditions)        Criteria that do not apply:    . Living with Significant Other. Assisted Living. LTAC. SNF.  or   Rehab    Patient Length of Stay (>5 days = 3)       Expected Length of Stay - - -   Actual Length of Stay 1          Luly Escobar RN

## 2018-05-16 NOTE — PROGRESS NOTES
Responded to page to Oncology unit when patient . Patient's daughter, son-in-law, and two granddaughters were in family room of hospice suite. Granddaughter shared that her grandmother wanted was to be at peace and she finds comfort in knowing suffering is over. Other family members echoed same feelings. No other family members are expected to come. Stepped into next room and offered silent prayer at bedside. Daughter had a few questions about next steps; they have not chosen a  home. Gave them the telephone number for the Nursing Office so they can call when a decision has been made. Offered condolences, provided pastoral presence and assurance of prayer.   ADEBAYO Ware, [facility], [address]    [address] Paging Service  [phone])

## 2018-05-16 NOTE — HOSPICE
[facility]  [phone]    Patient Name: Jluis Reyes  YOB: 1945    Date of Provider Hospice Visit: 05/16/18    Level of Care:   [] General Inpatient (GIP)    [x] Routine   [] Respite    Current Location of Care:  [] [facility] [] [facility] [x] [facility] [] [facility] [] Hospice [facility], patient referred from:  [] [facility] [] [facility] [] [facility] [] [facility] [] Home [] Other:     Date of Original Hospice Admission: 5-13-18  Hospice Medical Director at time of admission: Dr. Marcello Wilson Diagnosis: End stage cardiac disease  Diagnoses RELATED to the terminal prognosis: NSTEMI, ischemic cardiomyopathy ef 25-30 %, acute renal failure, hypotension  Other Diagnoses: rapid Afib     HOSPICE SUMMARY   Do not cut and paste chart information other than imaging findings    Jluis Reyes is a 68y.o. year old who was admitted to [address] [place] [address]. Pt was admitted to [facility] on 5/12/18 with NSTEMI, troponin 6.5, rapid afib and acute renal failure. Pt not a candidate for aggressive intervention per cardiologist  Had recent hospitalization 3 mos ago for ACS and had RCA stent. Pt has decided against dialysis/CVVH  and requested comfort care only  Has remained hypotensive and has had minimal urine output and rising creatinine level    She has had a few doses of morphine for pain/dyspnea and ativan for anxiety   has been intermittently awake but mostly lethargic  Admitted to hospice at routine level of care    The patient's principle diagnosis has resulted in NSTEMI, hypotension, acute renal failure ,afib, pain , anxiety and AMS  Refer to LCD     Functionally, the patient's Karnofsky and/or Palliative Performance Scale has declined over a period of days and is estimated at 20  .  The patient is dependent on the following ADLs:all    Objective information that support this patients limited prognosis includes:   tropnin 6.5 on 5-12-18  Bun/cr 68/3.73 on 5-13-18    Echo 5-12-18  SUMMARY:  Left ventricle: The ventricle was dilated. There was severe diffuse  hypokinesis. Right ventricle: The ventricle was dilated. Systolic function was  moderately to markedly reduced. Left atrium: The atrium was dilated. Right atrium: The atrium was dilated. Mitral valve: There was moderate regurgitation. Tricuspid valve: There was moderate regurgitation. There was mild  pulmonary hypertension. INDICATIONS: NSTEMI. PROCEDURE: This was a routine study. The study included complete 2D  imaging, M-mode, complete spectral Doppler, and color Doppler. The heart  rate was 105 bpm, at the start of the study. Systolic blood pressure was  99 mmHg, at the start of the study. Diastolic blood pressure was 64 mmHg,  at the start of the study. LEFT VENTRICLE: The ventricle was dilated. Ejection fraction was estimated  in the range of 15 % to 20 %. There was severe diffuse hypokinesis. RIGHT VENTRICLE: The ventricle was dilated. Systolic function was  moderately to markedly reduced. LEFT ATRIUM: The atrium was dilated. RIGHT ATRIUM: The atrium was dilated. MITRAL VALVE: There was mild thickening. DOPPLER: There was moderate  regurgitation. AORTIC VALVE: The valve was probably trileaflet. DOPPLER: There was no  stenosis. There was no significant regurgitation. TRICUSPID VALVE: Normal valve structure. DOPPLER: There was moderate  regurgitation. There was mild pulmonary hypertension. PULMONIC VALVE: Not well visualized. AORTA: The root exhibited normal size. PERICARDIUM: There was no pericardial effusion. The patient/family chose comfort measures with the support of Hospice. HOSPICE DIAGNOSES   Active Symptoms:  1. Dyspnea  2. Pain  3. anxiety  4. pruritus   PLAN   1.  Admitted to hospice routine level of care--change to GIP today d/w frequent dosing of iv dilaudid and ativan 8 doses/last 24hrs , iv benadryl for itching and ongoing restlessness and dyspnea  2. Change dilaudid 0.5 mg q3 hrs  3. Order ativan 0.5 mg q3hrs  4. Scheduled robinul   5. Benadryl IV for itch/pruritus  6. Met with the patients granddaughter and daughter today to discuss management of EOL symptoms in       agreement with med changes. 7.  and SW to support family needs  8. Disposition: to home with hospice if symptoms controlled and stable    Prognosis estimated based on 05/16/18 clinical assessment is:   [x] Hours to Days    [] Days to Weeks    [] Other:    Communicated plan of care with: Hospice Case Manager;  Hospice IDT; Care Team     GOALS OF CARE     Resuscitation Status: DNR  Durable DNR: [] Yes [] No    Advance Care Planning 5/12/2018   Patient's Healthcare Decision Maker is: Verbal statement (Legal Next of Kin remains as decision maker)   Primary Decision Maker Name Ronna Ramirez   Primary Decision Maker Phone Number [phone]   Primary Decision Maker Relationship to Patient Adult child   Secondary Decision Maker Name Janneth Reyes   Secondary Decision Maker Phone Number [phone]   Secondary Decision Maker Relationship to Patient Adult child   Confirm Advance Directive None   Patient Would Like to Complete Advance Directive Yes   Does the patient have other document types Do Not Resuscitate        HISTORY     History obtained from: chart and hospice RN    CHIEF COMPLAINT:   The patient is:   [x] Verbal  [x] Nonverbal  [] Unresponsive    HPI/SUBJECTIVE:    Pt is lethargic, this afternoon c/o generalized itching without rash       REVIEW OF SYSTEMS     The following systems were: [] reviewed  [x] unable to be reviewed    Positive ROS include:  Constitutional: fatigue, weakness, in pain, short of breath  Ears/nose/mouth/throat: increased airway secretions  Respiratory:shortness of breath, wheezing  Gastrointestinal:poor appetite, nausea, vomiting, abdominal pain, constipation, diarrhea  Musculoskeletal:pain, deformities, swelling legs  Neurologic:confusion, hallucinations, weakness  Psychiatric:anxiety, feeling depressed, poor sleep      Endocrine:Adult Non-Verbal Pain Assessment Score:  3/10  Face  [] 0   No particular expression or smile  [x] 1   Occasional grimace, tearing, frowning, wrinkled forehead  [] 2   Frequent grimace, tearing, frowning, wrinkled forehead    Activity (movement)  [x] 0   Lying quietly, normal position  [] 1   Seeking attention through movement or slow, cautious movement  [] 2   Restless, excessive activity and/or withdrawal reflexes    Guarding  [x] 0   Lying quietly, no positioning of hands over areas of body  [] 1   Splinting areas of the body, tense  [] 2   Rigid, stiff    Physiology (vital signs)  [] 0   Stable vital signs  [x] 1   Change in any of the following: SBP > 20mm Hg; HR > 20/minute  [] 2   Change in any of the following: SBP > 30mm Hg; HR > 25/minute    Respiratory  [] 0   Baseline RR/SpO2, compliant with ventilator  [x] 1   RR > 10 above baseline, or 5% drop SpO2, mild asynchrony with ventilator  [] 2   RR > 20 above baseline, or 10% drop SpO2, asynchrony with ventilator     FUNCTIONAL ASSESSMENT     Palliative Performance Scale (PPS):20     PSYCHOSOCIAL/SPIRITUAL ASSESSMENT     Active Problems:    Cardiac disease (5/13/2018)      Past Medical History:   Diagnosis Date    CHF (congestive heart failure) (HCC)     Other ill-defined conditions(799.89)     AMI    Psychiatric disorder     depression      Past Surgical History:   Procedure Laterality Date    CARDIAC SURG PROCEDURE UNLIST  03/2018    stents    HX APPENDECTOMY      HX CHOLECYSTECTOMY      HX GI      adhesions    HX GYN      hysterectomy/  10 years      Social History   Substance Use Topics    Smoking status: Current Some Day Smoker     Packs/day: 0.10     Last attempt to quit: 11/1/2014    Smokeless tobacco: Never Used      Comment: 1 a day    Alcohol use No     Family History   Problem Relation Age of Onset    No Known Problems Mother     No Known Problems Father     Seizures Daughter     Cancer Daughter       Allergies   Allergen Reactions    Ciprofloxacin Shortness of Breath    Codeine Nausea Only    Influenza Virus Vaccine, Specific Unknown (comments)     Allergy documented in Admission Data base 1 screening    Pcn [Penicillins] Itching      Current Facility-Administered Medications   Medication Dose Route Frequency    HYDROmorphone (DILAUDID) injection 0.5 mg  0.5 mg IntraVENous Q3H    LORazepam (ATIVAN) injection 0.5 mg  0.5 mg IntraVENous Q3H    glycopyrrolate (ROBINUL) injection 0.2 mg  0.2 mg IntraMUSCular Q4H    diphenhydrAMINE (BENADRYL) injection 25 mg  25 mg IntraVENous Q8H PRN    saline peripheral flush soln 5 mL  5 mL InterCATHeter PRN    HYDROmorphone (DILAUDID) injection 0.3 mg  0.3 mg IntraVENous Q1H PRN    LORazepam (ATIVAN) injection 0.5 mg  0.5 mg IntraVENous Q1H PRN    ondansetron (ZOFRAN) injection 4 mg  4 mg IntraVENous Q4H PRN    acetaminophen (TYLENOL) suppository 650 mg  650 mg Rectal Q4H PRN    scopolamine (TRANSDERM-SCOP) 1 mg over 3 days 1 Patch  1 Patch TransDERmal Q72H    glycopyrrolate (ROBINUL) injection 0.2 mg  0.2 mg IntraVENous Q4H PRN    bisacodyl (DULCOLAX) suppository 10 mg  10 mg Rectal DAILY PRN    camphor-menthol (SARNA) 0.5-0.5 % lotion   Topical TID    alum-mag hydroxide-simeth (MYLANTA) oral suspension 30 mL  30 mL Oral Q4H PRN        PHYSICAL EXAM     Wt Readings from Last 3 Encounters:   05/13/18 72.9 kg (160 lb 11.5 oz)   05/09/18 69.4 kg (153 lb)   05/08/18 71.2 kg (157 lb)       Visit Vitals    BP (!) 101/36 (BP 1 Location: Right arm, BP Patient Position: At rest)    Pulse 80    Temp 98 °F (36.7 °C)    Resp 8    SpO2 (!) 87%       Supplemental O2  [x] Yes  [] NO  Last bowel movement:     Currently this patient has:  [x] Peripheral IV [] PICC  [] PORT [] ICD    [x] Daugherty Catheter [] NG Tube   [] PEG Tube    [] Rectal Tube [] Drain  [] Other:     Constitutional:lethargic , briefly opened eyes this morning, NAD  Eyes: perrl  ENMT: moist  Cardiovascular: irrr  Respiratory: clear  Gastrointestinal: soft  Musculoskeletal:mild LE edema b/l  Skin: intake  Neurologic:  Psychiatric:   Other:       Pertinent Lab and or Imaging Tests:  Lab Results   Component Value Date/Time    Sodium 132 (L) 05/13/2018 05:12 AM    Potassium 4.9 05/13/2018 05:12 AM    Chloride 98 05/13/2018 05:12 AM    CO2 18 (L) 05/13/2018 05:12 AM    Anion gap 16 (H) 05/13/2018 05:12 AM    Glucose 112 (H) 05/13/2018 05:12 AM    BUN 68 (H) 05/13/2018 05:12 AM    Creatinine 3.73 (H) 05/13/2018 05:12 AM    BUN/Creatinine ratio 18 05/13/2018 05:12 AM    GFR est AA 14 (L) 05/13/2018 05:12 AM    GFR est non-AA 12 (L) 05/13/2018 05:12 AM    Calcium 8.4 (L) 05/13/2018 05:12 AM     Lab Results   Component Value Date/Time    Protein, total 7.5 05/12/2018 02:06 AM    Albumin 4.1 05/12/2018 02:06 AM           Total time:25 min  Counseling / coordination time:d/w pt's daughter, grandaughter and floor RN  > 50% counseling / coordination?: zander

## 2018-05-16 NOTE — PROGRESS NOTES
Oncology Nursing Communication Tool  0715 AM  5/16/2018     Bedside shift change report given to Jose Seals RN (incoming nurse) by Velma Bryan RN (outgoing nurse) on Ayla Sherwood. Report included the following information SBAR. Shift Summary:       Issues for physician to address: Oncology Shift Note   Admission Date 5/13/2018   Admission Diagnosis Endstage Cardiac Disease  Cardiac disease   Code Status Prior   Consults None      Cardiac Monitoring [] Yes [] No      Purposeful Hourly Rounding [] Yes    Ruperto Score Total Score: 2   Ruperto score 3 or > [] Bed Alarm [] Avasys [] 1:1 sitter [] Patient refused (Place signed refusal form in chart)      Pain Managed [] Yes [] No    Key Pain Meds             aspirin-acetaminophen-caffeine (EXCEDRIN ES) 250-250-65 mg per tablet Take 1 Tab by mouth. Influenza Vaccine             Oxygen needs? [] Room air Oxygen @  []1L    []2L    []3L   []4L    []5L   []6L     Use home O2? [] Yes [] No  Perform O2 challenge test using  smartphrase (.oxygenchallenge)      Last bowel movement Last Bowel Movement Date: 05/13/18  bowel movement      Urinary Catheter Urinary Catheter 05/12/18 2- way; Daugherty-Indications for Use: End of life care     Urinary Catheter 05/12/18 2- way; Daugherty-Urine Output (mL): 100 ml     LDAs               Peripheral IV 05/15/18 Right Hand (Active)   Site Assessment Clean, dry, & intact 5/16/2018  3:06 AM   Phlebitis Assessment 0 5/16/2018  3:06 AM   Infiltration Assessment 0 5/16/2018  3:06 AM   Dressing Status Clean, dry, & intact 5/16/2018  3:06 AM   Dressing Type Tape;Transparent 5/16/2018  3:06 AM   Hub Color/Line Status Yellow;Capped;Flushed;Patent 5/16/2018  3:06 AM                         Readmission Risk Assessment Tool Score Medium Risk            20       Total Score        3 Has Seen PCP in Last 6 Months (Yes=3, No=0)    4 IP Visits Last 12 Months (1-3=4, 4=9, >4=11)    5 Pt.  Coverage (Medicare=5 , Medicaid, or Self-Pay=4)    8 Charlson Comorbidity Score (Age + Comorbid Conditions)        Criteria that do not apply:    . Living with Significant Other. Assisted Living. LTAC. SNF.  or   Rehab    Patient Length of Stay (>5 days = 3)       Expected Length of Stay - - -   Actual Length of Stay 3          Parag Mcdonald RN

## 2018-05-16 NOTE — PROGRESS NOTES
Family called Nurse as patient with prolonged apnea. Nurse at bedside as patient stopped breathing. TOD 1736. Dual RN verification with Iron Pierce RN. Attending, MD to pronounce, Supervisor,  and [employer] all notified. Family grieving appropriately.

## 2018-05-16 NOTE — PROGRESS NOTES
Oncology Nursing Communication Tool  8:21 PM  5/15/2018     Bedside and Verbal shift change report given to The Karissa Brunson RN (incoming nurse) by Josefina Terry RN (outgoing nurse) on [facility]. Report included the following information SBAR, Kardex, Intake/Output and MAR. Shift Summary: Patient now unresponsive with periods of apne      Issues for physician to address: None at this time         Oncology Shift Note   Admission Date 5/13/2018   Admission Diagnosis Endstage Cardiac Disease  Cardiac disease   Code Status Prior   Consults None      Cardiac Monitoring [] Yes [] No      Purposeful Hourly Rounding [] Yes    Ruperto Score Total Score: 4   Ruperto score 3 or > [] Bed Alarm [] Avasys [] 1:1 sitter [] Patient refused (Place signed refusal form in chart)      Pain Managed [] Yes [] No    Key Pain Meds             aspirin-acetaminophen-caffeine (EXCEDRIN ES) 250-250-65 mg per tablet Take 1 Tab by mouth. Influenza Vaccine             Oxygen needs? [] Room air Oxygen @  []1L    []2L    []3L   []4L    []5L   []6L     Use home O2? [] Yes [] No  Perform O2 challenge test using  smartphrase (.oxygenchallenge)      Last bowel movement Last Bowel Movement Date: 05/13/18  bowel movement      Urinary Catheter Urinary Catheter 05/12/18 2- way; Daugherty-Indications for Use: End of life care     Urinary Catheter 05/12/18 2- way; Daugherty-Urine Output (mL): 500 ml     LDAs               Peripheral IV 05/13/18 Right Forearm (Active)   Site Assessment Clean, dry, & intact 5/15/2018  7:03 PM   Phlebitis Assessment 0 5/15/2018  7:52 AM   Infiltration Assessment 0 5/15/2018  7:52 AM   Dressing Status Clean, dry, & intact 5/15/2018  7:52 AM   Dressing Type Transparent;Tape 5/15/2018  7:52 AM   Hub Color/Line Status Pink;Flushed;Patent 5/15/2018  7:52 AM                         Readmission Risk Assessment Tool Score Medium Risk            20       Total Score        3 Has Seen PCP in Last 6 Months (Yes=3, No=0)    4 IP Visits Last 12 Months (1-3=4, 4=9, >4=11)    5 Pt. Coverage (Medicare=5 , Medicaid, or Self-Pay=4)    8 Charlson Comorbidity Score (Age + Comorbid Conditions)        Criteria that do not apply:    . Living with Significant Other. Assisted Living. LTAC. SNF.  or   Rehab    Patient Length of Stay (>5 days = 3)       Expected Length of Stay - - -   Actual Length of Stay 2          Beatrice Bose RN

## 2018-05-17 PROCEDURE — 3331090001 HH PPS REVENUE CREDIT

## 2018-05-17 PROCEDURE — 3331090002 HH PPS REVENUE DEBIT

## 2018-05-17 NOTE — PROGRESS NOTES
I was called to see Ms. Sylvie Dawkins due to patient's death. On my arrival, She was lying in bed; there was no response to verbal or painful stimuli; pupils were fixed and dilated; there was no pulse, no respirations and no heart sounds.   Ace Hughes was pronounced dead at 6:01 PM.      Lyn Hampton MD  8:19 PM, 5/16/2018

## 2018-05-18 PROCEDURE — 3331090001 HH PPS REVENUE CREDIT

## 2018-05-18 PROCEDURE — 3331090002 HH PPS REVENUE DEBIT

## 2018-05-19 PROCEDURE — 3331090002 HH PPS REVENUE DEBIT

## 2018-05-19 PROCEDURE — 3331090001 HH PPS REVENUE CREDIT

## 2018-05-20 PROCEDURE — 3331090002 HH PPS REVENUE DEBIT

## 2018-05-20 PROCEDURE — 3331090001 HH PPS REVENUE CREDIT

## 2018-05-20 NOTE — DISCHARGE SUMMARY
Hospice Discharge Summary    [facility]  [facility]        Date of Admission: 5/13/2018  Date of Discharge: 5/16/2018    Keyla Schofield is a 68y.o. year old who was admitted to [facility] at [facility] with a Hospice diagnosis of Endstage Cardiac Disease;Cardiac disease. The patient's care was focused on comfort and the patient passed away on 5/16/2018. Keyla Schofield is a 68y.o. year old who was admitted to [facility]. Pt was admitted to [facility] on 5/12/18 with NSTEMI, troponin 6.5, rapid afib and acute renal failure. Pt not a candidate for aggressive intervention per cardiologist  Had recent hospitalization 3 mos ago for ACS and had RCA stent.   Pt has decided against dialysis/CVVH  and requested comfort care only  Has remained hypotensive and has had minimal urine output and rising creatinine level     She has had a few doses of morphine for pain/dyspnea and ativan for anxiety   has been intermittently awake but mostly lethargic  Admitted to hospice at routine level of care     The patient's principle diagnosis has resulted in NSTEMI, hypotension, acute renal failure ,afib, pain , anxiety and AMS  Refer to LCD

## 2018-05-21 PROCEDURE — 3331090001 HH PPS REVENUE CREDIT

## 2018-05-21 PROCEDURE — 3331090002 HH PPS REVENUE DEBIT

## 2018-05-22 PROCEDURE — 3331090002 HH PPS REVENUE DEBIT

## 2018-05-22 PROCEDURE — 3331090001 HH PPS REVENUE CREDIT

## 2018-05-23 PROCEDURE — 3331090001 HH PPS REVENUE CREDIT

## 2018-05-23 PROCEDURE — 3331090002 HH PPS REVENUE DEBIT

## 2018-05-24 PROCEDURE — 3331090001 HH PPS REVENUE CREDIT

## 2018-05-24 PROCEDURE — 3331090002 HH PPS REVENUE DEBIT

## 2018-05-25 PROCEDURE — 3331090002 HH PPS REVENUE DEBIT

## 2018-05-25 PROCEDURE — 3331090001 HH PPS REVENUE CREDIT

## 2018-05-26 PROCEDURE — 3331090001 HH PPS REVENUE CREDIT

## 2018-05-26 PROCEDURE — 3331090002 HH PPS REVENUE DEBIT

## 2018-05-27 PROCEDURE — 3331090001 HH PPS REVENUE CREDIT

## 2018-05-27 PROCEDURE — 3331090002 HH PPS REVENUE DEBIT

## 2018-05-28 PROCEDURE — 3331090001 HH PPS REVENUE CREDIT

## 2018-05-28 PROCEDURE — 3331090002 HH PPS REVENUE DEBIT

## 2018-05-29 PROCEDURE — 3331090002 HH PPS REVENUE DEBIT

## 2018-05-29 PROCEDURE — 3331090001 HH PPS REVENUE CREDIT

## 2018-05-30 PROCEDURE — 3331090002 HH PPS REVENUE DEBIT

## 2018-05-30 PROCEDURE — 3331090001 HH PPS REVENUE CREDIT

## 2018-05-31 PROCEDURE — 3331090002 HH PPS REVENUE DEBIT

## 2018-05-31 PROCEDURE — 3331090001 HH PPS REVENUE CREDIT

## 2018-06-23 NOTE — DISCHARGE SUMMARY
Hospitalist Discharge Summary     Patient ID:  Hermila Cuevas  [id number]  81 y.o.  1945    PCP on record: Iraj Felix MD    Admit date: 5/12/2018  Discharge date and time: 6/23/2018      DISCHARGE DIAGNOSIS:  PATIENT WAS UNDER CARE OF HOSPICE WHILE INPATIENT and   Hermila Cuevas was pronounced dead at 5:0 PM ON  5/16/2018    NSTEMI with atrial fibrillation (PAF)in pt with CAD s/p C with 2 TANNER 8/74,EPFRAIM systolic HF EF 52% . CM/valve disease. Troponin 6.50-->6.46-->5.89  NSTEMI. MACY on CKD stage III   SIRS ( leukocytosis/tachycardia)   Per last DC possible mild baseline dementia        Tobacco use  educated on smocking cessation the ER  And patient declined nicotine patch       COPD wo exacerbation   H/o Migraine HA           CONSULTATIONS:  IP CONSULT TO CARDIOLOGY  IP CONSULT TO NEPHROLOGY    Excerpted HPI from H&P of Kelsey Dickey MD:  Jazz Baird is a 68 y.o.  female who presents with above complaint. Pt reports that chest pain started yesterday around 2300. She describe CP as mid sternal, pressure like, 10/10, radiating to both UEs. Chest pain is associated with dyspnea/nausea. Pt also reports leg swelling. No fever/chills/cough/dizziness. Pt was getting progressively weak recently. She was supposed to have Op duplex LE done to r/o DVT. Pt was hospitalized at the end of march for NSTEMI. Ed provider discussed pt case with Dr Desean Banegas who recommended to start pt on heparin gtt. Dr Desean Banegas asked to to tuck pt in for him tonight. He will assume care of this pt in am    ______________________________________________________________________  DISCHARGE SUMMARY/HOSPITAL COURSE:  for full details see H&P, daily progress notes, labs, consult notes. _______________________________________________________________________  Patient seen and examined by me on discharge day. Pertinent Findings:  General:                    WD, WN.  Un responsive    EENT: EOMI. Anicteric sclerae. MMM  Resp:                                   Decrease air entry in th lower lung fields, no crackles BL. CV:                                      Regular  rhythm,  one plus edema  GI:                                       Soft, Non distended, Non tender.  +Bowel sounds  Neurologic:               un responsive   Psych:                       Good insight. Not anxious nor agitated  Skin:                                    No rashes. No jaundice  _______________________________________________________________________  DISCHARGE MEDICATIONS:   Discharge Medication List as of 5/13/2018  5:28 PM          My Recommended Diet, Activity, Wound Care, and follow-up labs are listed in the patient's Discharge Insturctions which I have personally completed and reviewed.     ______________________________________________________________________    Risk of deterioration:pateint pronounced dead   ______________________________________________________________________    Disposition  IP Hospice    ______________________________________________________________________    ______________________________________________________________________    Code Status: DNR/DNI  ___

## 2020-09-26 NOTE — MR AVS SNAPSHOT
Visit Information Date & Time Provider Department Dept. Phone Encounter #  
 4/21/2017  1:00 PM Bernard Cordero  Kay Ortega [id number] Follow-up Instructions Return in about 4 weeks (around 5/19/2017) for routine follow up. Upcoming Health Maintenance Date Due  
 GLAUCOMA SCREENING Q2Y 1/3/2010 MEDICARE YEARLY EXAM 2/1/2018 BREAST CANCER SCRN MAMMOGRAM 11/11/2018 COLONOSCOPY 12/24/2019 DTaP/Tdap/Td series (2 - Td) 4/28/2025 Allergies as of 4/21/2017  Review Complete On: 4/21/2017 By: Marcin Sotomayor LPN Severity Noted Reaction Type Reactions Ciprofloxacin  05/06/2010    Shortness of Breath Codeine  05/06/2010    Nausea Only Influenza Virus Vaccine, Specific  03/10/2014   Not Verified Unknown (comments) Allergy documented in Admission Data base 1 screening Pcn [Penicillins]  05/06/2010    Itching Current Immunizations  Reviewed on 5/6/2010 Name Date Influenza Vaccine  Deferred (Contraindication) Pneumococcal Conjugate (PCV-13) 1/31/2017 Pneumococcal Polysaccharide (PPSV-23) 4/28/2015,  Deferred (Contraindication) Tdap 4/28/2015 Not reviewed this visit You Were Diagnosed With   
  
 Codes Comments Intractable migraine without aura and with status migrainosus    -  Primary ICD-10-CM: O19.308 
ICD-9-CM: 346.13 Systolic heart failure, unspecified heart failure chronicity     ICD-10-CM: I50.20 ICD-9-CM: 428.20 Coronary artery disease involving native coronary artery of native heart without angina pectoris     ICD-10-CM: I25.10 ICD-9-CM: 414.01 Essential hypertension     ICD-10-CM: I10 
ICD-9-CM: 401.9 Otalgia of right ear     ICD-10-CM: H92.01 
ICD-9-CM: 388.70 Vitals BP Pulse Temp Resp Height(growth percentile) Weight(growth percentile)  132/56 (BP 1 Location: Left arm, BP Patient Position: Sitting) (!) 49 Yes 96.6 °F (35.9 °C) (Oral) 18 5' 4.5\" (1.638 m) 168 lb (76.2 kg) SpO2 BMI OB Status Smoking Status 98% 28.39 kg/m2 Hysterectomy Former Smoker BMI and BSA Data Body Mass Index Body Surface Area  
 28.39 kg/m 2 1.86 m 2 Preferred Pharmacy Pharmacy Name Phone [facility], [address] [phone] Your Updated Medication List  
  
   
This list is accurate as of: 4/21/17  1:59 PM.  Always use your most recent med list.  
  
  
  
  
 albuterol 90 mcg/actuation inhaler Commonly known as:  PROVENTIL HFA, VENTOLIN HFA, PROAIR HFA Take 1 Puff by inhalation every six (6) hours as needed for Wheezing. arformoterol 15 mcg/2 mL Nebu neb solution Commonly known as:  Sallie Carrier 2 mL by Nebulization route two (2) times a day. aspirin 81 mg chewable tablet Take 1 Tab by mouth daily. atorvastatin 40 mg tablet Commonly known as:  LIPITOR Take 1 Tab by mouth every evening. clonazePAM 1 mg tablet Commonly known as:  KlonoPIN  
TAKE TWO TABLETS BY MOUTH TWICE DAILY EXCEDRIN MIGRAINE PO Take 1 Tab by mouth as needed. FISH OIL 1,000 mg Cap Generic drug:  omega-3 fatty acids-vitamin e Take 1 Cap by mouth. fluticasone 50 mcg/actuation nasal spray Commonly known as:  FLONASE  
1 spray each nostril daily  
  
 furosemide 20 mg tablet Commonly known as:  LASIX Take 2 Tabs by mouth daily. HYDROcodone-acetaminophen 5-325 mg per tablet Commonly known as:  Fredick Sly Take 1 Tab by mouth every six (6) hours as needed for Pain. Max Daily Amount: 4 Tabs. Do not take with klonipine  
  
 lisinopril 5 mg tablet Commonly known as:  Alin Dance Take 1 Tab by mouth daily. neomycin-polymyxin-hydrocortisone (buffered) 3.5-10,000-1 mg/mL-unit/mL-% otic suspension Commonly known as:  Norva Darion Administer 3 Drops in right ear three (3) times daily for 7 days. nitroglycerin 0.4 mg SL tablet Commonly known as:  NITROSTAT  
1 Tab by SubLINGual route every five (5) minutes as needed. Indications: ANGINA  
  
 omeprazole 20 mg capsule Commonly known as:  PRILOSEC Take 1 Cap by mouth daily. Take only as needed  
  
 predniSONE 10 mg tablet Commonly known as:  DELTASONE  
4 tabs for 2 day, 3 tabs for 2 days, 2 tabs for 2 days, 1tab for 2 days VITAMIN B-12 100 mcg tablet Generic drug:  cyanocobalamin Take 100 mcg by mouth daily. Prescriptions Printed Refills HYDROcodone-acetaminophen (NORCO) 5-325 mg per tablet 0 Sig: Take 1 Tab by mouth every six (6) hours as needed for Pain. Max Daily Amount: 4 Tabs. Do not take with klonipine Class: Print Route: Oral  
 clonazePAM (KLONOPIN) 1 mg tablet 0 Sig: TAKE TWO TABLETS BY MOUTH TWICE DAILY Class: Print Prescriptions Sent to Pharmacy Refills  
 predniSONE (DELTASONE) 10 mg tablet 1 Si tabs for 2 day, 3 tabs for 2 days, 2 tabs for 2 days, 1tab for 2 days Class: Normal  
 Pharmacy: [id number] [place]  [address] [facility] Ph #: [phone]  
 neomycin-polymyxin-hydrocortisone, buffered, (PEDIOTIC) 3.5-10,000-1 mg/mL-unit/mL-% otic suspension 0 Sig: Administer 3 Drops in right ear three (3) times daily for 7 days. Class: Normal  
 Pharmacy: [id number] [place]  [address] [facility] Ph #: [phone] Route: Right Ear We Performed the Following [place] COLLECTION VENOUS BLOOD,VENIPUNCTURE [id number] CPT(R)] [place] HANDLG&/OR CONVEY OF SPEC FOR TR OFFICE TO LAB [33683 CPT(R)] SED RATE (ESR) [id number] CPT(R)] Follow-up Instructions Return in about 4 weeks (around 2017) for routine follow up. Patient Instructions Please bring your medications with you to the next visit.  You can check your  medications against what is listed in your check out sheet  Let us know if there is a difference. Introducing [facility]! Jeniffer Urias introduces [employer] patient portal. Now you can access parts of your medical record, email your doctor's office, and request medication refills online. 1. In your internet browser, go to [email or web address] 2. Click on the First Time User? Click Here link in the Sign In box. You will see the New Member Sign Up page. 3. Enter your [employer] Access Code exactly as it appears below. You will not need to use this code after youve completed the sign-up process. If you do not sign up before the expiration date, you must request a new code. · [employer] Access Code: [email or web address] Expires: 5/1/2017  5:06 PM 
 
4. Enter the last four digits of your Social Security Number (xxxx) and Date of Birth (mm/dd/yyyy) as indicated and click Submit. You will be taken to the next sign-up page. 5. Create a [employer] ID. This will be your [employer] login ID and cannot be changed, so think of one that is secure and easy to remember. 6. Create a [employer] password. You can change your password at any time. 7. Enter your Password Reset Question and Answer. This can be used at a later time if you forget your password. 8. Enter your e-mail address. You will receive e-mail notification when new information is available in [address] 9. Click Sign Up. You can now view and download portions of your medical record. 10. Click the Download Summary menu link to download a portable copy of your medical information. If you have questions, please visit the Frequently Asked Questions section of the [employer] website. Remember, [employer] is NOT to be used for urgent needs. For medical emergencies, dial 911. Now available from your iPhone and Android! Please provide this summary of care documentation to your next provider. Your primary care clinician is listed as Chapin Sutton.  If you have any questions after today's visit, please call [phone].

## 2024-10-21 NOTE — TELEPHONE ENCOUNTER
Patient would like results of her ultrasound. Please call her at [phone]. [Difficulty with Sleep] : difficulty with sleep [Fever] : fever [Negative] : Genitourinary